# Patient Record
Sex: FEMALE | Race: WHITE | NOT HISPANIC OR LATINO | Employment: OTHER | URBAN - METROPOLITAN AREA
[De-identification: names, ages, dates, MRNs, and addresses within clinical notes are randomized per-mention and may not be internally consistent; named-entity substitution may affect disease eponyms.]

---

## 2017-09-25 ENCOUNTER — HOSPITAL ENCOUNTER (OUTPATIENT)
Dept: GENERAL RADIOLOGY | Facility: HOSPITAL | Age: 82
Discharge: HOME | End: 2017-09-25
Attending: INTERNAL MEDICINE | Admitting: INTERNAL MEDICINE

## 2017-09-25 ENCOUNTER — TRANSCRIBE ORDERS (OUTPATIENT)
Dept: ADMINISTRATIVE | Facility: HOSPITAL | Age: 82
End: 2017-09-25

## 2017-09-25 DIAGNOSIS — R05.9 COUGH: Primary | ICD-10-CM

## 2017-09-25 PROCEDURE — 71020 HC CHEST PA AND LATERAL: CPT

## 2018-01-22 ENCOUNTER — TRANSCRIBE ORDERS (OUTPATIENT)
Dept: ADMINISTRATIVE | Facility: HOSPITAL | Age: 83
End: 2018-01-22

## 2018-01-22 ENCOUNTER — HOSPITAL ENCOUNTER (OUTPATIENT)
Dept: GENERAL RADIOLOGY | Facility: HOSPITAL | Age: 83
Discharge: HOME | End: 2018-01-22
Admitting: NURSE PRACTITIONER

## 2018-01-22 DIAGNOSIS — M25.561 ACUTE PAIN OF RIGHT KNEE: Primary | ICD-10-CM

## 2018-01-22 PROCEDURE — 73560 X-RAY EXAM OF KNEE 1 OR 2: CPT

## 2018-01-25 ENCOUNTER — OFFICE VISIT (OUTPATIENT)
Dept: ORTHOPEDIC SURGERY | Facility: CLINIC | Age: 83
End: 2018-01-25

## 2018-01-25 VITALS
WEIGHT: 136.69 LBS | HEART RATE: 69 BPM | SYSTOLIC BLOOD PRESSURE: 170 MMHG | BODY MASS INDEX: 25.81 KG/M2 | HEIGHT: 61 IN | DIASTOLIC BLOOD PRESSURE: 87 MMHG

## 2018-01-25 DIAGNOSIS — M70.50 PES ANSERINE BURSITIS: Primary | ICD-10-CM

## 2018-01-25 DIAGNOSIS — S80.01XA CONTUSION OF RIGHT KNEE, INITIAL ENCOUNTER: ICD-10-CM

## 2018-01-25 PROCEDURE — 99204 OFFICE O/P NEW MOD 45 MIN: CPT | Performed by: ORTHOPAEDIC SURGERY

## 2018-01-25 PROCEDURE — 20610 DRAIN/INJ JOINT/BURSA W/O US: CPT | Performed by: ORTHOPAEDIC SURGERY

## 2018-01-25 RX ORDER — TRIAMCINOLONE ACETONIDE 40 MG/ML
40 INJECTION, SUSPENSION INTRA-ARTICULAR; INTRAMUSCULAR
Status: COMPLETED | OUTPATIENT
Start: 2018-01-25 | End: 2018-01-25

## 2018-01-25 RX ORDER — LIDOCAINE HYDROCHLORIDE 10 MG/ML
2 INJECTION, SOLUTION INFILTRATION; PERINEURAL
Status: COMPLETED | OUTPATIENT
Start: 2018-01-25 | End: 2018-01-25

## 2018-01-25 RX ADMIN — LIDOCAINE HYDROCHLORIDE 2 ML: 10 INJECTION, SOLUTION INFILTRATION; PERINEURAL at 10:39

## 2018-01-25 RX ADMIN — TRIAMCINOLONE ACETONIDE 40 MG: 40 INJECTION, SUSPENSION INTRA-ARTICULAR; INTRAMUSCULAR at 10:39

## 2018-01-25 NOTE — PROGRESS NOTES
Procedure   Large Joint Arthrocentesis  Date/Time: 1/25/2018 10:39 AM  Consent given by: patient  Site marked: site marked  Timeout: Immediately prior to procedure a time out was called to verify the correct patient, procedure, equipment, support staff and site/side marked as required   Supporting Documentation  Indications: pain   Procedure Details  Location: knee - R knee (Pes Bursa)  Preparation: Patient was prepped and draped in the usual sterile fashion  Needle size: 22 G  Approach: anterolateral  Medications administered: 2 mL lidocaine 1 %; 40 mg triamcinolone acetonide 40 MG/ML (Bupivacaine 0.25%, NDC: 55150-167-10, LOT: [id number], EXP: 09/2019/, 2cc)  Patient tolerance: patient tolerated the procedure well with no immediate complications

## 2018-01-25 NOTE — PROGRESS NOTES
Orthopaedic Clinic Note: Knee New Patient    Chief Complaint   Patient presents with   • Right Knee - Pain        HPI    Luh Mckeon is a 87 y.o. female who presents with right knee pain for 3 week(s). Onset Began after a fall on ice 3 weeks ago in which she bent her knee into a figure 4 as she fell and landed directly on her buttocks.  She localizes the pain to the medial aspect of the knee and proximal tibia.  She also has pain that radiates in the back of the knee.  Pain is worse with bending the knee and standing.  Sitting and resting improve the pain.  Previous treatments have included use of a cane, Tylenol, and bracing.  Despite these interventions, her pain is failed to significantly improve over the past 3 weeks.  She is having difficulty with daily activities as a result of the ongoing pain.    History reviewed. No pertinent past medical history.   Past Surgical History:   Procedure Laterality Date   • HYSTERECTOMY     • SHOULDER SURGERY     • TONSILLECTOMY        Family History   Problem Relation Age of Onset   • Cancer Mother    • Stroke Mother    • Heart attack Father      Social History     Social History   • Marital status:      Spouse name: N/A   • Number of children: N/A   • Years of education: N/A     Occupational History   • Not on file.     Social History Main Topics   • Smoking status: Never Smoker   • Smokeless tobacco: Never Used   • Alcohol use No   • Drug use: No   • Sexual activity: Defer     Other Topics Concern   • Not on file     Social History Narrative   • No narrative on file      No current outpatient prescriptions on file prior to visit.     No current facility-administered medications on file prior to visit.       Allergies   Allergen Reactions   • Nasal Spray         Review of Systems   Constitutional: Positive for activity change.   HENT: Positive for congestion and postnasal drip.    Eyes: Negative.    Respiratory: Positive for cough.    Cardiovascular: Positive for  "leg swelling.   Gastrointestinal: Negative.    Endocrine: Positive for cold intolerance.   Genitourinary: Positive for urgency.   Musculoskeletal: Positive for gait problem.        Joint Pain    Skin: Negative.    Allergic/Immunologic: Negative.    Neurological: Positive for dizziness and light-headedness.   Hematological: Negative.    Psychiatric/Behavioral: Negative.         The following portions of the patient's history were reviewed and updated as appropriate: allergies, current medications, past family history, past medical history, past social history, past surgical history and problem list.    Physical Exam  Blood pressure 170/87, pulse 69, height 154.9 cm (61\"), weight 62 kg (136 lb 11 oz).    Body mass index is 25.83 kg/(m^2).    GENERAL APPEARANCE: awake, alert & oriented x 3, in no acute distress and well developed, well nourished  PSYCH: normal affect  LUNGS:  breathing nonlabored  EYES: sclera anicteric  CARDIOVASCULAR: palpable dorsalis pedis, palpable posterior tibial bilaterally. Capillary refill less than 2 seconds  EXTREMITIES: no clubbing, cyanosis  GAIT:  Antalgic            Right Lower Extremity Exam:   ----------  Hip Exam  ----------  FLEXION CONTRACTURE: None  FLEXION: 110 degrees  INTERNAL ROTATION: 20 degrees at 90 degrees of flexion   EXTERNAL ROTATION: 40 degrees at 90 degrees of flexion    PAIN WITH HIP MOTION: no  ----------  Knee Exam  ----------  ALIGNMENT: neutral    RANGE OF MOTION:  Normal (0-130 degrees) with no extensor lag or flexion contracture  LIGAMENTOUS STABILITY:   stable to varus and valgus stress at terminal extension and 30 degrees without any evidence of laxity     STRENGTH:  4/5 knee flexion, extension. 5/5 ankle dorsiflexion and plantarflexion.     PAIN WITH PALPATION: pes bursa and medial head of gastroc abductor musculature, medial joint line, and popliteal fossa region  KNEE EFFUSION: no  PAIN WITH KNEE ROM: yes, anterior medially   PATELLAR CREPITUS: no  SPECIAL " EXAM FINDINGS:  Painful Ezequiel's medially, negative Ezequiel's laterally, negative Lachman's test, negative posterior drawer    REFLEXES:  PATELLAR 2+/4  ACHILLES 2+/4    CLONUS: no  STRAIGHT LEG TEST:   negative    SENSATION TO LIGHT TOUCH:  DEEP PERONEAL/SUPERFICIAL PERONEAL/SURAL/SAPHENOUS/TIBIAL:   intact    EDEMA:  no  ERYTHEMA:  no  WOUNDS/INCISIONS: no, focal bruising along the anterior medial aspect of the proximal tibia        Left Lower Extremity Exam:   ----------  Hip Exam  ----------  FLEXION CONTRACTURE: None  FLEXION: 110 degrees  INTERNAL ROTATION: 20 degrees at 90 degrees of flexion   EXTERNAL ROTATION: 40 degrees at 90 degrees of flexion    PAIN WITH HIP MOTION: no  ----------  Knee Exam  ----------  ALIGNMENT: neutral, no varus or valgus deformity     RANGE OF MOTION:  Normal (0-130 degrees) with no extensor lag or flexion contracture  LIGAMENTOUS STABILITY:   stable to varus and valgus stress at 0 and 30 degrees without any evidence of laxity     STRENGTH:  5/5 knee flexion, extension. 5/5 ankle dorsiflexion and plantarflexion.     PAIN WITH PALPATION: denies tenderness to palpation about the knee, denies medial or lateral joint line pain  KNEE EFFUSION: no  PAIN WITH KNEE ROM: no  PATELLAR CREPITUS: no  SPECIAL EXAM FINDINGS:  Negative patellar compression    REFLEXES:  PATELLAR 2+/4  ACHILLES 2+/4    CLONUS: negative  STRAIGHT LEG TEST:   negative    SENSATION TO LIGHT TOUCH:  DEEP PERONEAL/SUPERFICIAL PERONEAL/SURAL/SAPHENOUS/TIBIAL:   intact    EDEMA:  no  ERYTHEMA:  no  WOUNDS/INCISIONS: none, no overlying skin problems.    ______________________________________________________________________  ______________________________________________________________________    RADIOGRAPHIC FINDINGS:   Radiographs from 1/22/18 were personally reviewed.  Radiographs demonstrate minimal arthritic changes with no evidence of acute osseous injury or fracture.    Assessment/Plan:   Diagnosis Plan   1. Pes  anserine bursitis  Large Joint Arthrocentesis   2. Contusion of right knee, initial encounter       Patient's symptoms appear to be most consistent with past bursitis as well as an underlying knee contusion as a result of her fall.  She does have some focal bruising overlying the medial aspect of the proximal tibia.  Her most impressive finding today is tenderness over the pes bursa.  I recommended a cortisone injection into this area to see if this alleviates her symptoms.  This would be both a diagnostic and therapeutic measure.  If her symptoms fail to resolve after an additional 3 weeks, we'll likely refer her to physical therapy as well as consider topical anti-inflammatory.  She is agreeable to this plan.    Procedure Note:  I discussed with the patient the potential benefits of performing a therapeutic injection of the right knee pes bursa as well as potential risks including but not limited to infection, swelling, pain, bleeding, bruising, nerve/vessel damage, skin color changes, transient elevation in blood glucose levels, and fat atrophy. After informed consent and after the area was prepped with alcohol, ethyl chloride was used to numb the skin. Via the anteromedial approach, 2 cc of 1% lidocaine, 2 cc of 0.25% marcaine and 1 cc of 40mg/ml of Kenalog were injected into the right knee pes bursa. The patient tolerated the procedure well. There were no complications. A sterile dressing was placed over the injection site.    Adam Olsen MD  01/25/18  10:42 AM

## 2018-01-26 PROBLEM — M25.512 CHRONIC LEFT SHOULDER PAIN: Status: ACTIVE | Noted: 2018-01-26

## 2018-01-26 PROBLEM — G89.29 CHRONIC LEFT SHOULDER PAIN: Status: ACTIVE | Noted: 2018-01-26

## 2018-02-20 ENCOUNTER — OFFICE VISIT (OUTPATIENT)
Dept: ORTHOPEDIC SURGERY | Facility: CLINIC | Age: 83
End: 2018-02-20

## 2018-02-20 VITALS
DIASTOLIC BLOOD PRESSURE: 98 MMHG | SYSTOLIC BLOOD PRESSURE: 145 MMHG | BODY MASS INDEX: 26.64 KG/M2 | HEIGHT: 61 IN | WEIGHT: 141.09 LBS | HEART RATE: 164 BPM

## 2018-02-20 DIAGNOSIS — S80.01XA CONTUSION OF RIGHT KNEE, INITIAL ENCOUNTER: ICD-10-CM

## 2018-02-20 DIAGNOSIS — M70.50 PES ANSERINE BURSITIS: Primary | ICD-10-CM

## 2018-02-20 PROCEDURE — 99212 OFFICE O/P EST SF 10 MIN: CPT | Performed by: ORTHOPAEDIC SURGERY

## 2018-02-20 NOTE — PROGRESS NOTES
Orthopaedic Clinic Note: Knee Established Patient    Chief Complaint   Patient presents with   • Right Knee - Follow-up     3 weeks          HPI    It has been 3  week(s) since Ms. Mckeon's last visit. She returns to clinic today for Follow-up of right knee pain.  Her pain initially began after a fall in which she landed on her knee.  She was diagnosed contusion as well as has bursitis.  She received an injection at her last visit.  She returns clinic today stating her pain is much improved.  She rates her pain a 1/10 on the pain scale.  She is having no residual limitations in daily activities.  Overall she is doing much better.    Past Medical History:   Diagnosis Date   • Ankle instability    • Chronic left shoulder pain 1/26/2018   • Disorder of tendon of shoulder region, left    • Esophagitis    • Hypertension    • Localized, primary osteoarthritis of left shoulder region    • Shoulder joint replacement status     Right   • Stroke     CVA      Past Surgical History:   Procedure Laterality Date   • APPENDECTOMY     • HYSTERECTOMY     • SHOULDER SURGERY Right    • SHOULDER SURGERY      Arthroscopy of shoulder:Right   • TONSILLECTOMY        Family History   Problem Relation Age of Onset   • Cancer Mother    • Stroke Mother    • Bleeding Disorder Mother    • Osteoarthritis Mother    • Rheum arthritis Mother    • Heart attack Father      Social History     Social History   • Marital status:      Spouse name: N/A   • Number of children: N/A   • Years of education: N/A     Occupational History   • Not on file.     Social History Main Topics   • Smoking status: Never Smoker   • Smokeless tobacco: Never Used   • Alcohol use No   • Drug use: No   • Sexual activity: Defer     Other Topics Concern   • Not on file     Social History Narrative      Current Outpatient Prescriptions on File Prior to Visit   Medication Sig Dispense Refill   • aspirin 325 MG tablet Take 325 mg by mouth Daily.     • CALCIUM-VITAMIN D PO Take  " by mouth. 300-200 MG unit tablet, 1 oral     • losartan (COZAAR) 50 MG tablet Take 50 mg by mouth Take As Directed.     • Misc Natural Products (OSTEO BI-FLEX ADV DOUBLE ST PO) Take  by mouth Take As Directed.     • Omega-3 Fatty Acids (FISH OIL CONCENTRATE PO) Take  by mouth Take As Directed.     • TERBINAFINE HCL PO Take  by mouth. 250 MG Tablet       No current facility-administered medications on file prior to visit.       Allergies   Allergen Reactions   • Nasal Spray         Review of Systems     Physical Exam  Blood pressure 145/98, pulse (!) 164, height 154.9 cm (60.98\"), weight 64 kg (141 lb 1.5 oz).    Body mass index is 26.67 kg/(m^2).    GENERAL APPEARANCE: awake, alert, oriented, in no acute distress  LUNGS:  breathing nonlabored  EXTREMITIES: no clubbing, cyanosis  PERIPHERAL PULSES: palpable dorsalis pedis and posterior tibial pulses bilaterally.    GAIT:  Normal        ----------  Right Knee Exam:  ----------  ALIGNMENT: neutral  ----------  RANGE OF MOTION:  Normal (0-130 degrees) with no extensor lag or flexion contracture  LIGAMENTOUS STABILITY:   stable to varus and valgus stress at terminal extension and 30 degrees without any evidence of laxity  ----------  STRENGTH:  KNEE FLEXION 5/5  KNEE EXTENSION  5/5  ANKLE DORSIFLEXION  5/5  ANKLE PLANTARFLEXION  5/5  ----------  PAIN WITH PALPATION:pes bursa (slight)  KNEE EFFUSION: no  PAIN WITH KNEE ROM: no  PATELLAR CREPITUS:  no  ----------  SENSATION TO LIGHT TOUCH:  DEEP PERONEAL/SUPERFICIAL PERONEAL/SURAL/SAPHENOUS/TIBIAL:    intact  ----------  EDEMA:  no  ERYTHEMA:    no  WOUNDS/INCISIONS:  no  _____________________________________________________________________  _____________________________________________________________________    RADIOGRAPHIC FINDINGS:   No new imaging today     Assessment/Plan:   Diagnosis Plan   1. Pes anserine bursitis     2. Contusion of right knee, initial encounter       Patient's symptoms are much improved after " the injection.  As she is having no limitations, no further intervention is warranted.  She'll follow up on an as-needed basis.    Adam Olsen MD  02/20/18  1:34 PM

## 2018-06-04 ENCOUNTER — TRANSCRIBE ORDERS (OUTPATIENT)
Dept: ADMINISTRATIVE | Facility: HOSPITAL | Age: 83
End: 2018-06-04

## 2018-06-04 DIAGNOSIS — Z12.31 VISIT FOR SCREENING MAMMOGRAM: Primary | ICD-10-CM

## 2018-06-06 ENCOUNTER — TRANSCRIBE ORDERS (OUTPATIENT)
Dept: ADMINISTRATIVE | Facility: HOSPITAL | Age: 83
End: 2018-06-06

## 2018-06-06 DIAGNOSIS — Z78.0 POSTMENOPAUSE: Primary | ICD-10-CM

## 2018-07-05 ENCOUNTER — HOSPITAL ENCOUNTER (OUTPATIENT)
Dept: BONE DENSITY | Facility: HOSPITAL | Age: 83
Discharge: HOME | End: 2018-07-05
Admitting: NURSE PRACTITIONER

## 2018-07-05 ENCOUNTER — HOSPITAL ENCOUNTER (OUTPATIENT)
Dept: MAMMOGRAPHY | Facility: HOSPITAL | Age: 83
Discharge: HOME | End: 2018-07-05
Admitting: OBSTETRICS & GYNECOLOGY

## 2018-07-05 DIAGNOSIS — Z78.0 POSTMENOPAUSE: ICD-10-CM

## 2018-07-05 DIAGNOSIS — Z12.31 VISIT FOR SCREENING MAMMOGRAM: ICD-10-CM

## 2018-07-05 PROCEDURE — 77067 SCR MAMMO BI INCL CAD: CPT | Performed by: RADIOLOGY

## 2018-07-05 PROCEDURE — 77080 DXA BONE DENSITY AXIAL: CPT

## 2018-07-05 PROCEDURE — 77063 BREAST TOMOSYNTHESIS BI: CPT | Performed by: RADIOLOGY

## 2018-07-05 PROCEDURE — 77067 SCR MAMMO BI INCL CAD: CPT

## 2018-07-05 PROCEDURE — 77063 BREAST TOMOSYNTHESIS BI: CPT

## 2018-07-06 ENCOUNTER — APPOINTMENT (OUTPATIENT)
Dept: MAMMOGRAPHY | Facility: HOSPITAL | Age: 83
End: 2018-07-06

## 2018-07-18 ENCOUNTER — TRANSCRIBE ORDERS (OUTPATIENT)
Dept: ADMINISTRATIVE | Facility: HOSPITAL | Age: 83
End: 2018-07-18

## 2018-07-18 ENCOUNTER — HOSPITAL ENCOUNTER (OUTPATIENT)
Dept: GENERAL RADIOLOGY | Facility: HOSPITAL | Age: 83
Discharge: HOME | End: 2018-07-18
Attending: INTERNAL MEDICINE | Admitting: INTERNAL MEDICINE

## 2018-07-18 DIAGNOSIS — M25.561 ACUTE PAIN OF RIGHT KNEE: Primary | ICD-10-CM

## 2018-07-18 PROCEDURE — 73562 X-RAY EXAM OF KNEE 3: CPT

## 2019-01-07 ENCOUNTER — TELEPHONE (OUTPATIENT)
Dept: INTERNAL MEDICINE | Facility: CLINIC | Age: 84
End: 2019-01-07

## 2019-01-07 NOTE — TELEPHONE ENCOUNTER
Please have tavon hold the pravasatin for 2 weeks and if persist come in for evaluation or sooner if itching intolerable

## 2019-02-25 PROBLEM — I10 BENIGN ESSENTIAL HYPERTENSION: Status: ACTIVE | Noted: 2019-02-25

## 2019-02-25 PROBLEM — R26.81 UNSTEADY GAIT: Status: ACTIVE | Noted: 2019-02-25

## 2019-02-25 PROBLEM — B37.2 CANDIDA ONYCHOMYCOSIS: Status: ACTIVE | Noted: 2019-02-25

## 2019-02-25 PROBLEM — R41.3 MEMORY LOSS: Status: ACTIVE | Noted: 2019-02-25

## 2019-02-25 PROBLEM — F41.1 GENERALIZED ANXIETY DISORDER: Status: ACTIVE | Noted: 2019-02-25

## 2019-02-25 PROBLEM — R32 URINARY INCONTINENCE IN FEMALE: Status: ACTIVE | Noted: 2019-02-25

## 2019-02-25 PROBLEM — E78.5 HYPERLIPIDEMIA LDL GOAL <100: Status: ACTIVE | Noted: 2019-02-25

## 2019-02-25 PROBLEM — Z91.81 RISK FOR FALLS: Status: ACTIVE | Noted: 2019-02-25

## 2019-02-27 PROBLEM — R25.2 CRAMP IN LIMB: Status: ACTIVE | Noted: 2019-02-27

## 2019-02-27 PROBLEM — K21.9 GERD (GASTROESOPHAGEAL REFLUX DISEASE): Status: ACTIVE | Noted: 2019-02-27

## 2019-02-27 PROBLEM — M81.0 OSTEOPOROSIS: Status: ACTIVE | Noted: 2019-02-27

## 2019-02-27 PROBLEM — M25.519 SHOULDER PAIN: Status: ACTIVE | Noted: 2019-02-27

## 2019-02-27 PROBLEM — E66.3 OVERWEIGHT (BMI 25.0-29.9): Status: ACTIVE | Noted: 2019-02-27

## 2019-02-27 PROBLEM — N95.9 MENOPAUSAL AND POSTMENOPAUSAL DISORDER: Status: ACTIVE | Noted: 2019-02-27

## 2019-02-27 PROBLEM — G47.00 INSOMNIA: Status: ACTIVE | Noted: 2019-02-27

## 2019-02-27 PROBLEM — J30.9 AR (ALLERGIC RHINITIS): Status: ACTIVE | Noted: 2019-02-27

## 2019-02-27 PROBLEM — M25.569 KNEE PAIN: Status: ACTIVE | Noted: 2019-02-27

## 2019-02-27 PROBLEM — K64.4 EXTERNAL HEMORRHOIDS: Status: ACTIVE | Noted: 2019-02-27

## 2019-02-27 PROBLEM — E53.9 VITAMIN B DEFICIENCY: Status: ACTIVE | Noted: 2019-02-27

## 2019-02-27 PROBLEM — I08.0 RHEUMATIC DISEASE OF MITRAL AND AORTIC VALVES: Status: ACTIVE | Noted: 2019-02-27

## 2019-02-27 PROBLEM — M62.9 DISORDER OF SKELETAL MUSCLE: Status: ACTIVE | Noted: 2019-02-27

## 2019-02-27 PROBLEM — K57.30 DIVERTICULAR DISEASE OF COLON: Status: ACTIVE | Noted: 2019-02-27

## 2019-02-27 PROBLEM — R07.9 CHEST PAIN: Status: ACTIVE | Noted: 2019-02-27

## 2019-02-27 PROBLEM — I08.0 DISORDER OF MITRAL AND AORTIC VALVES: Status: ACTIVE | Noted: 2019-02-27

## 2019-02-27 PROBLEM — R13.10 DYSPHAGIA: Status: ACTIVE | Noted: 2019-02-27

## 2019-02-27 PROBLEM — M25.549 HAND JOINT PAIN: Status: ACTIVE | Noted: 2019-02-27

## 2019-03-11 ENCOUNTER — TELEPHONE (OUTPATIENT)
Dept: INTERNAL MEDICINE | Facility: CLINIC | Age: 84
End: 2019-03-11

## 2019-03-11 ENCOUNTER — OFFICE VISIT (OUTPATIENT)
Dept: INTERNAL MEDICINE | Facility: CLINIC | Age: 84
End: 2019-03-11

## 2019-03-11 VITALS
HEART RATE: 74 BPM | DIASTOLIC BLOOD PRESSURE: 80 MMHG | BODY MASS INDEX: 26.24 KG/M2 | WEIGHT: 139 LBS | HEIGHT: 61 IN | SYSTOLIC BLOOD PRESSURE: 118 MMHG

## 2019-03-11 DIAGNOSIS — R41.3 MEMORY LOSS: ICD-10-CM

## 2019-03-11 DIAGNOSIS — R05.9 COUGH: ICD-10-CM

## 2019-03-11 DIAGNOSIS — E78.5 HYPERLIPIDEMIA LDL GOAL <100: Primary | ICD-10-CM

## 2019-03-11 DIAGNOSIS — I08.0 DISORDER OF MITRAL AND AORTIC VALVES: ICD-10-CM

## 2019-03-11 DIAGNOSIS — E66.3 OVERWEIGHT (BMI 25.0-29.9): ICD-10-CM

## 2019-03-11 DIAGNOSIS — E53.9 VITAMIN B DEFICIENCY: ICD-10-CM

## 2019-03-11 DIAGNOSIS — I10 BENIGN ESSENTIAL HYPERTENSION: ICD-10-CM

## 2019-03-11 LAB
ALBUMIN SERPL-MCNC: 4.49 G/DL (ref 3.2–4.8)
ALBUMIN/GLOB SERPL: 1.9 G/DL (ref 1.5–2.5)
ALP SERPL-CCNC: 89 U/L (ref 25–100)
ALT SERPL W P-5'-P-CCNC: 12 U/L (ref 7–40)
ANION GAP SERPL CALCULATED.3IONS-SCNC: 8 MMOL/L (ref 3–11)
ARTICHOKE IGE QN: 148 MG/DL (ref 0–130)
AST SERPL-CCNC: 18 U/L (ref 0–33)
BASOPHILS # BLD AUTO: 0.03 10*3/MM3 (ref 0–0.2)
BASOPHILS NFR BLD AUTO: 0.5 % (ref 0–1)
BILIRUB SERPL-MCNC: 0.6 MG/DL (ref 0.3–1.2)
BUN BLD-MCNC: 14 MG/DL (ref 9–23)
BUN/CREAT SERPL: 16.9 (ref 7–25)
CALCIUM SPEC-SCNC: 10 MG/DL (ref 8.7–10.4)
CHLORIDE SERPL-SCNC: 101 MMOL/L (ref 99–109)
CHOLEST SERPL-MCNC: 219 MG/DL (ref 0–200)
CO2 SERPL-SCNC: 29 MMOL/L (ref 20–31)
CREAT BLD-MCNC: 0.83 MG/DL (ref 0.6–1.3)
DEPRECATED RDW RBC AUTO: 46.2 FL (ref 37–54)
EOSINOPHIL # BLD AUTO: 0.1 10*3/MM3 (ref 0–0.3)
EOSINOPHIL NFR BLD AUTO: 1.6 % (ref 0–3)
ERYTHROCYTE [DISTWIDTH] IN BLOOD BY AUTOMATED COUNT: 12.9 % (ref 11.3–14.5)
GFR SERPL CREATININE-BSD FRML MDRD: 65 ML/MIN/1.73
GLOBULIN UR ELPH-MCNC: 2.3 GM/DL
GLUCOSE BLD-MCNC: 87 MG/DL (ref 70–100)
HCT VFR BLD AUTO: 46.5 % (ref 34.5–44)
HDLC SERPL-MCNC: 56 MG/DL (ref 40–60)
HGB BLD-MCNC: 14.6 G/DL (ref 11.5–15.5)
IMM GRANULOCYTES # BLD AUTO: 0.02 10*3/MM3 (ref 0–0.05)
IMM GRANULOCYTES NFR BLD AUTO: 0.3 % (ref 0–0.6)
LYMPHOCYTES # BLD AUTO: 1.59 10*3/MM3 (ref 0.6–4.8)
LYMPHOCYTES NFR BLD AUTO: 25.6 % (ref 24–44)
MCH RBC QN AUTO: 30.6 PG (ref 27–31)
MCHC RBC AUTO-ENTMCNC: 31.4 G/DL (ref 32–36)
MCV RBC AUTO: 97.5 FL (ref 80–99)
MONOCYTES # BLD AUTO: 0.62 10*3/MM3 (ref 0–1)
MONOCYTES NFR BLD AUTO: 10 % (ref 0–12)
NEUTROPHILS # BLD AUTO: 3.87 10*3/MM3 (ref 1.5–8.3)
NEUTROPHILS NFR BLD AUTO: 62.3 % (ref 41–71)
PLATELET # BLD AUTO: 303 10*3/MM3 (ref 150–450)
PMV BLD AUTO: 11.3 FL (ref 6–12)
POTASSIUM BLD-SCNC: 4.6 MMOL/L (ref 3.5–5.5)
PROT SERPL-MCNC: 6.8 G/DL (ref 5.7–8.2)
RBC # BLD AUTO: 4.77 10*6/MM3 (ref 3.89–5.14)
SODIUM BLD-SCNC: 138 MMOL/L (ref 132–146)
TRIGL SERPL-MCNC: 121 MG/DL (ref 0–150)
TSH SERPL DL<=0.05 MIU/L-ACNC: 0.68 MIU/ML (ref 0.35–5.35)
VIT B12 BLD-MCNC: 535 PG/ML (ref 211–911)
WBC NRBC COR # BLD: 6.21 10*3/MM3 (ref 3.5–10.8)

## 2019-03-11 PROCEDURE — 80053 COMPREHEN METABOLIC PANEL: CPT | Performed by: INTERNAL MEDICINE

## 2019-03-11 PROCEDURE — 80061 LIPID PANEL: CPT | Performed by: INTERNAL MEDICINE

## 2019-03-11 PROCEDURE — 99214 OFFICE O/P EST MOD 30 MIN: CPT | Performed by: INTERNAL MEDICINE

## 2019-03-11 PROCEDURE — 36415 COLL VENOUS BLD VENIPUNCTURE: CPT | Performed by: INTERNAL MEDICINE

## 2019-03-11 PROCEDURE — 82607 VITAMIN B-12: CPT | Performed by: INTERNAL MEDICINE

## 2019-03-11 PROCEDURE — 82570 ASSAY OF URINE CREATININE: CPT | Performed by: INTERNAL MEDICINE

## 2019-03-11 PROCEDURE — 85025 COMPLETE CBC W/AUTO DIFF WBC: CPT | Performed by: INTERNAL MEDICINE

## 2019-03-11 PROCEDURE — 84443 ASSAY THYROID STIM HORMONE: CPT | Performed by: INTERNAL MEDICINE

## 2019-03-11 PROCEDURE — 82043 UR ALBUMIN QUANTITATIVE: CPT | Performed by: INTERNAL MEDICINE

## 2019-03-11 RX ORDER — LEVOCETIRIZINE DIHYDROCHLORIDE 5 MG/1
5 TABLET, FILM COATED ORAL EVERY EVENING
Qty: 90 TABLET | Refills: 3 | Status: SHIPPED | OUTPATIENT
Start: 2019-03-11 | End: 2019-09-23

## 2019-03-11 RX ORDER — PRAVASTATIN SODIUM 20 MG
20 TABLET ORAL DAILY
COMMUNITY
End: 2019-03-11 | Stop reason: SDUPTHER

## 2019-03-11 RX ORDER — PRAVASTATIN SODIUM 20 MG
20 TABLET ORAL DAILY
Qty: 90 TABLET | Refills: 3 | Status: SHIPPED | OUTPATIENT
Start: 2019-03-11 | End: 2019-06-05 | Stop reason: ALTCHOICE

## 2019-03-11 RX ORDER — LOSARTAN POTASSIUM 50 MG/1
50 TABLET ORAL TAKE AS DIRECTED
Qty: 90 TABLET | Refills: 3 | Status: SHIPPED | OUTPATIENT
Start: 2019-03-11 | End: 2020-01-27 | Stop reason: SDUPTHER

## 2019-03-11 NOTE — ASSESSMENT & PLAN NOTE
.Goal to consume large amounts of vegetables and fruits,heart healthy fats and low carbohydrate choices. Encourage aerobic exercise of walking, jogging or biking gradually up to 150 minute a week and 2-3 times of weight resistance. Employe behavioral modifications such as daily meditation and stopping eating and drinking calories after 7 pm.

## 2019-03-11 NOTE — ASSESSMENT & PLAN NOTE
.Discussed with the patient a low sodium diet (<2000mg/day) and discussed increasing regular aerobic exercise.  Recommend monitoring blood pressures with goal < 130 systolic and < 80 diastolic.

## 2019-03-11 NOTE — PROGRESS NOTES
Chief Complaint   Patient presents with   • Hypertension     She is fasting   • Hyperlipidemia   • Anxiety       History of Present Illness  88 y.o. white female presents for follow up on HTN. She denies chest pain or shortness of breath. She does have mild post nasal drip with some cough. She has some mild intermittent anxiety.     Review of Systems   Constitutional: Negative for chills, fatigue and fever.   HENT: Negative for congestion, ear pain and sinus pressure.    Respiratory: Positive for cough. Negative for chest tightness, shortness of breath and wheezing.    Cardiovascular: Negative for chest pain and palpitations.   Gastrointestinal: Negative for abdominal pain, blood in stool and constipation.   Skin: Negative for color change.   Allergic/Immunologic: Negative for environmental allergies.   Neurological: Negative for dizziness, speech difficulty and headaches.   Psychiatric/Behavioral: Positive for sleep disturbance. Negative for confusion. The patient is nervous/anxious.      All other ROS reviewed and negative.    PMSFH:  The following portions of the patient's history were reviewed and updated as appropriate: allergies, current medications, past family history, past medical history, past social history, past surgical history and problem list.      Current Outpatient Medications:   •  CALCIUM-VITAMIN D PO, Take  by mouth. 300-200 MG unit tablet, 1 oral, Disp: , Rfl:   •  losartan (COZAAR) 50 MG tablet, Take 50 mg by mouth Take As Directed., Disp: , Rfl:   •  Mirabegron ER (MYRBETRIQ) 25 MG tablet sustained-release 24 hour 24 hr tablet, Take 25 mg by mouth Daily., Disp: , Rfl:   •  pravastatin (PRAVACHOL) 20 MG tablet, Take 20 mg by mouth Daily., Disp: , Rfl:   •  aspirin 325 MG tablet, Take 325 mg by mouth Daily., Disp: , Rfl:   •  Misc Natural Products (OSTEO BI-FLEX ADV DOUBLE ST PO), Take  by mouth Take As Directed., Disp: , Rfl:   •  Omega-3 Fatty Acids (FISH OIL CONCENTRATE PO), Take  by mouth  "Take As Directed., Disp: , Rfl:   •  TERBINAFINE HCL PO, Take  by mouth. 250 MG Tablet, Disp: , Rfl:     VITALS:  /80 (BP Location: Left arm, Patient Position: Sitting, Cuff Size: Adult)   Pulse 74   Ht 154.9 cm (60.98\")   Wt 63 kg (139 lb)   BMI 26.28 kg/m²     Physical Exam   Constitutional: She is oriented to person, place, and time. She appears well-developed and well-nourished.   HENT:   Head: Normocephalic.   Right Ear: External ear normal.   Left Ear: External ear normal.   Mouth/Throat: Oropharynx is clear and moist. No oropharyngeal exudate.   Eyes: Conjunctivae and EOM are normal.   Cardiovascular: Normal rate and regular rhythm.   Murmur (Iii/VI SM II/VI DM) heard.  Pulmonary/Chest: Effort normal and breath sounds normal. No respiratory distress.   Abdominal: Soft. Bowel sounds are normal. There is no tenderness.   Musculoskeletal: She exhibits no edema.   Lymphadenopathy:     She has no cervical adenopathy.   Neurological: She is alert and oriented to person, place, and time.   oriented with mild memory loss   Skin: Skin is warm and dry.   Psychiatric: She has a normal mood and affect. Her behavior is normal.   Nursing note and vitals reviewed.      LABS:  No results found for this or any previous visit.    Procedures    ASSESSMENT/PLAN:  Problem List Items Addressed This Visit        Cardiovascular and Mediastinum    Hyperlipidemia LDL goal <100 - Primary     Discussed improving diet and exercise. Specifically, decrease saturated fats and trans fats and avoid bad carbohydrates (sweet, breads , potatoes, and high caloric drinks).  Also aim for 150 minutes aerobic exercise weekly and resistance exercises 2-3x/week.           Relevant Medications    pravastatin (PRAVACHOL) 20 MG tablet    Benign essential hypertension     .Discussed with the patient a low sodium diet (<2000mg/day) and discussed increasing regular aerobic exercise.  Recommend monitoring blood pressures with goal < 130 systolic and " < 80 diastolic.             Disorder of mitral and aortic valves     Control blood pressure and cholesterol.             Digestive    Vitamin B deficiency       Other    Memory loss     Check labs and encouraged better exercise and to control blood pressure.          Overweight (BMI 25.0-29.9)     .Goal to consume large amounts of vegetables and fruits,heart healthy fats and low carbohydrate choices. Encourage aerobic exercise of walking, jogging or biking gradually up to 150 minute a week and 2-3 times of weight resistance. Employe behavioral modifications such as daily meditation and stopping eating and drinking calories after 7 pm.            Other Visit Diagnoses     Cough        post nasal drip and will add xyzal          FOLLOW-UP:  No Follow-up on file.      Electronically signed by:    Schuyler Garcia MD

## 2019-03-12 LAB
CREAT 24H UR-MCNC: 113.7 MG/DL
MICROALBUMIN UR-MCNC: 6.7 UG/ML
MICROALBUMIN/CREAT UR: 5.9 MG/G CREAT (ref 0–30)

## 2019-03-13 ENCOUNTER — TELEPHONE (OUTPATIENT)
Dept: INTERNAL MEDICINE | Facility: CLINIC | Age: 84
End: 2019-03-13

## 2019-03-13 NOTE — TELEPHONE ENCOUNTER
MS. NAIN HERNÁNDEZ STATED SHE BY [employer] GAVE THE WRONG FAX NUMBER FOR THE HANDICAP FORM TO BE FAXED TO.    THE CORRECT NUMBER -[phone]  PLEASE RESEND

## 2019-03-22 ENCOUNTER — TELEPHONE (OUTPATIENT)
Dept: INTERNAL MEDICINE | Facility: CLINIC | Age: 84
End: 2019-03-22

## 2019-03-22 NOTE — TELEPHONE ENCOUNTER
PT CALLED AND WANTED TO TALK TO YOU ABOUT HER LAST VISIT WOULD LIKE FOR YOU TO CALL HER BACK . SHE STATED THAT NO ONE ELSE COULD HELP -[phone]

## 2019-03-25 NOTE — TELEPHONE ENCOUNTER
She was concerned abut her losartan and I explained I preferred people stay on the medications unless they have a tainted version and then I would send in a new prescription for her

## 2019-03-28 ENCOUNTER — TELEPHONE (OUTPATIENT)
Dept: INTERNAL MEDICINE | Facility: CLINIC | Age: 84
End: 2019-03-28

## 2019-03-28 NOTE — TELEPHONE ENCOUNTER
PATIENT'S DAUGHTER CALLED AND PUT HER MOTHER ON SPEAKER PHONE TO CLARIFY WHAT MEDICATIONS SHE SHOULD BE TAKING.  HAVE DR. KIRK CONFIRM WHAT MEDS SHE SHOULD BE ON:    FISH OIL  CALCIUM + VITAMIN D  MYRBETRIQ  OSTEO BIO-FLEX  ASPIRIN 81MG  ASPIRIN 325MG    CALL DAUGHTER WITH RESPONSE.  DAUGHTER SAYS HER MOM HAS BEEN OFF ASA, FISH OIL FOR AT LEAST A YEAR.

## 2019-04-29 ENCOUNTER — PATIENT MESSAGE (OUTPATIENT)
Dept: INTERNAL MEDICINE | Facility: CLINIC | Age: 84
End: 2019-04-29

## 2019-04-29 ENCOUNTER — TELEPHONE (OUTPATIENT)
Dept: INTERNAL MEDICINE | Facility: CLINIC | Age: 84
End: 2019-04-29

## 2019-04-29 NOTE — TELEPHONE ENCOUNTER
You can resond and have her hold the levocetririzine and pravastatin for a feww weeks and see if she get better  You may respond in my chart to her Regarding: Prescription Question  Contact: [phone]  ----- Message from Dayna Can LPN sent at 4/29/2019  1:08 PM EDT -----       ----- Message from Luh Mckeon to Schuyler Garcia MD sent at 4/29/2019  1:01 PM -----   Requesting that my mother, Luh Mckeon refrain from taking pravastatin  20 mg for 2 weeks due to her cough not getting any better along with memory and thinking problems. Would like to see if she does better without this medication. I am Swetha Avery, her daughter and can be reached at [phone] or online by my chart. Also the levocertirizine 5mg tablet that was prescribed at last visit? Please contact me by above phone or my chart. Thank you.

## 2019-04-29 NOTE — TELEPHONE ENCOUNTER
From: Luh Mckeon  To: Schuyler Garcia MD  Sent: 4/29/2019 1:01 PM EDT  Subject: Prescription Question    Requesting that my mother, Luh Mckeon refrain from taking pravastatin 20 mg for 2 weeks due to her cough not getting any better along with memory and thinking problems. Would like to see if she does better without this medication. I am Swetha Avery, her daughter and can be reached at [phone] or online by my chart. Also the levocertirizine 5mg tablet that was prescribed at last visit? Please contact me by above phone or my chart. Thank you.

## 2019-06-04 ENCOUNTER — TELEPHONE (OUTPATIENT)
Dept: INTERNAL MEDICINE | Facility: CLINIC | Age: 84
End: 2019-06-04

## 2019-06-04 NOTE — TELEPHONE ENCOUNTER
PATIENT'S DAUGHTER (NAIN HERNÁNDEZ) CALLED REGARDING HER MOM BEING OFF THE PRAVASTATIN FOR A MONTH SINCE IT HAD BEEN MAKING HER HAVE A COUGH.  SINCE BEING OFF THE MEDICATION HER COUGH HAS GOTTEN A LOT BETTER.    WHAT DO YOU WANT TO DO NEXT?  DO YOU WANT TO RECHECK HER CHOLESTEROL OR JUST START HER ON SOMETHING ELSE?    PLEASE CALL HER.

## 2019-06-05 ENCOUNTER — TELEPHONE (OUTPATIENT)
Dept: INTERNAL MEDICINE | Facility: CLINIC | Age: 84
End: 2019-06-05

## 2019-06-05 RX ORDER — ROSUVASTATIN CALCIUM 5 MG/1
5 TABLET, COATED ORAL DAILY
Qty: 90 TABLET | Refills: 3 | Status: SHIPPED | OUTPATIENT
Start: 2019-06-05 | End: 2019-09-23 | Stop reason: SDUPTHER

## 2019-06-05 NOTE — TELEPHONE ENCOUNTER
PATIENT'S DAUGHTER (NAIN HERNÁNDEZ) CALLED REGARDING HER MOTHER'S CHOLESTEROL MEDICATION.      I TOLD HER THAT I TALKED WITH THE PATIENT EARLIER THIS MORNING.

## 2019-06-05 NOTE — TELEPHONE ENCOUNTER
CALLED PATIENT WITH DR. KIRK'S INSTRUCTIONS REGARDING CHOLESTEROL MEDICATION.  PATIENT VERBALIZED UNDERSTANDING.

## 2019-06-11 ENCOUNTER — TELEPHONE (OUTPATIENT)
Dept: INTERNAL MEDICINE | Facility: CLINIC | Age: 84
End: 2019-06-11

## 2019-06-11 NOTE — TELEPHONE ENCOUNTER
PATIENT CALLED AND HAD RECEIVED SEVERAL MEDICATIONS IN THE MAIL FROM [facility] [employer] MAIL ORDER:   LOSARTAN, PRAVASTATIN, AND LEVOCETIRIZINE.    SPOKE WITH ARMIDA AT [facility]    HUMAN SENT THE PRAVASTATIN IN ERROR AND MAILED OUT THE CRESTOR TODAY.  EXPLAINED TO PATIENT NOT TO TAKE THE PRAVASTATIN PER DR. KIRK AND START ON THE CRESTOR THAT HE ORDERED THAT SHE WILL RECEIVE SOON IN THE MAIL .    I WENT OVER THOROUGHLY THE MEDS SHE SHOULD BE TAKING.  TOLD HER TO DISCARD THE PRAVASTATIN AT A NEARBY PHARMACY AND TAKE CRESTOR .    PATIENT VERBALIZED UNDERSTANDING.

## 2019-07-22 ENCOUNTER — TELEPHONE (OUTPATIENT)
Dept: INTERNAL MEDICINE | Facility: CLINIC | Age: 84
End: 2019-07-22

## 2019-07-22 NOTE — TELEPHONE ENCOUNTER
She could use the certirizine as needed and have them stop sending that for her   She needs th crestor and ideally to help her complex urinary issues she would conitnue the mybertique   You may convey that to Luh Mckeon

## 2019-07-22 NOTE — TELEPHONE ENCOUNTER
Patient's daughter Luh Mckeon [phone] called regarding patient is still taking the Levocetirizine and it is making her sleepy.  The daughter said she was initially put on it for coughing, but she is not coughing any longer and Dr. Garcia said she could discontinue it - however patient told [facility] to send another  supply which she does not need.    Patient is taking the generic Crestor ok, but now her mom doesn't think she needs the Myrbetriq and won't refill.    Daughter says her mom will not let her handle her medications and wants Dr. Garcia to advise which medications she should be taking before calling [facility] to cancel any further refills.

## 2019-09-23 ENCOUNTER — LAB (OUTPATIENT)
Dept: LAB | Facility: HOSPITAL | Age: 84
End: 2019-09-23

## 2019-09-23 ENCOUNTER — TELEPHONE (OUTPATIENT)
Dept: INTERNAL MEDICINE | Facility: CLINIC | Age: 84
End: 2019-09-23

## 2019-09-23 ENCOUNTER — OFFICE VISIT (OUTPATIENT)
Dept: INTERNAL MEDICINE | Facility: CLINIC | Age: 84
End: 2019-09-23

## 2019-09-23 VITALS
WEIGHT: 141 LBS | TEMPERATURE: 97.9 F | HEART RATE: 60 BPM | HEIGHT: 61 IN | SYSTOLIC BLOOD PRESSURE: 162 MMHG | BODY MASS INDEX: 26.62 KG/M2 | DIASTOLIC BLOOD PRESSURE: 98 MMHG

## 2019-09-23 DIAGNOSIS — R41.3 MEMORY LOSS: ICD-10-CM

## 2019-09-23 DIAGNOSIS — M81.0 AGE-RELATED OSTEOPOROSIS WITHOUT CURRENT PATHOLOGICAL FRACTURE: ICD-10-CM

## 2019-09-23 DIAGNOSIS — E78.5 HYPERLIPIDEMIA LDL GOAL <100: ICD-10-CM

## 2019-09-23 DIAGNOSIS — Z23 NEED FOR VACCINATION: ICD-10-CM

## 2019-09-23 DIAGNOSIS — Z00.00 MEDICARE ANNUAL WELLNESS VISIT, SUBSEQUENT: Primary | ICD-10-CM

## 2019-09-23 DIAGNOSIS — I10 BENIGN ESSENTIAL HYPERTENSION: ICD-10-CM

## 2019-09-23 DIAGNOSIS — Z91.81 RISK FOR FALLS: ICD-10-CM

## 2019-09-23 DIAGNOSIS — F51.01 PRIMARY INSOMNIA: ICD-10-CM

## 2019-09-23 DIAGNOSIS — N95.9 MENOPAUSAL AND POSTMENOPAUSAL DISORDER: ICD-10-CM

## 2019-09-23 DIAGNOSIS — R42 DIZZINESS: ICD-10-CM

## 2019-09-23 DIAGNOSIS — R26.81 UNSTEADY GAIT: ICD-10-CM

## 2019-09-23 DIAGNOSIS — R32 URINARY INCONTINENCE IN FEMALE: ICD-10-CM

## 2019-09-23 LAB
25(OH)D3 SERPL-MCNC: 35.5 NG/ML (ref 30–100)
ALBUMIN SERPL-MCNC: 4.5 G/DL (ref 3.5–5.2)
ALBUMIN UR-MCNC: <1.2 MG/DL
ALBUMIN/GLOB SERPL: 1.6 G/DL
ALP SERPL-CCNC: 55 U/L (ref 39–117)
ALT SERPL W P-5'-P-CCNC: 9 U/L (ref 1–33)
ANION GAP SERPL CALCULATED.3IONS-SCNC: 12.6 MMOL/L (ref 5–15)
AST SERPL-CCNC: 19 U/L (ref 1–32)
BASOPHILS # BLD AUTO: 0.05 10*3/MM3 (ref 0–0.2)
BASOPHILS NFR BLD AUTO: 0.8 % (ref 0–1.5)
BILIRUB SERPL-MCNC: 0.5 MG/DL (ref 0.2–1.2)
BUN BLD-MCNC: 10 MG/DL (ref 8–23)
BUN/CREAT SERPL: 14.1 (ref 7–25)
CALCIUM SPEC-SCNC: 9.1 MG/DL (ref 8.6–10.5)
CHLORIDE SERPL-SCNC: 100 MMOL/L (ref 98–107)
CHOLEST SERPL-MCNC: 171 MG/DL (ref 0–200)
CO2 SERPL-SCNC: 26.4 MMOL/L (ref 22–29)
CREAT BLD-MCNC: 0.71 MG/DL (ref 0.57–1)
CREAT UR-MCNC: 40.7 MG/DL
DEPRECATED RDW RBC AUTO: 43.4 FL (ref 37–54)
EOSINOPHIL # BLD AUTO: 0.09 10*3/MM3 (ref 0–0.4)
EOSINOPHIL NFR BLD AUTO: 1.5 % (ref 0.3–6.2)
ERYTHROCYTE [DISTWIDTH] IN BLOOD BY AUTOMATED COUNT: 12.5 % (ref 12.3–15.4)
GFR SERPL CREATININE-BSD FRML MDRD: 78 ML/MIN/1.73
GLOBULIN UR ELPH-MCNC: 2.9 GM/DL
GLUCOSE BLD-MCNC: 90 MG/DL (ref 65–99)
HCT VFR BLD AUTO: 44.5 % (ref 34–46.6)
HDLC SERPL-MCNC: 62 MG/DL (ref 40–60)
HGB BLD-MCNC: 14.4 G/DL (ref 12–15.9)
IMM GRANULOCYTES # BLD AUTO: 0.01 10*3/MM3 (ref 0–0.05)
IMM GRANULOCYTES NFR BLD AUTO: 0.2 % (ref 0–0.5)
LDLC SERPL CALC-MCNC: 87 MG/DL (ref 0–100)
LDLC/HDLC SERPL: 1.4 {RATIO}
LYMPHOCYTES # BLD AUTO: 1.81 10*3/MM3 (ref 0.7–3.1)
LYMPHOCYTES NFR BLD AUTO: 29.8 % (ref 19.6–45.3)
MCH RBC QN AUTO: 30.4 PG (ref 26.6–33)
MCHC RBC AUTO-ENTMCNC: 32.4 G/DL (ref 31.5–35.7)
MCV RBC AUTO: 94.1 FL (ref 79–97)
MICROALBUMIN/CREAT UR: NORMAL MG/G{CREAT}
MONOCYTES # BLD AUTO: 0.54 10*3/MM3 (ref 0.1–0.9)
MONOCYTES NFR BLD AUTO: 8.9 % (ref 5–12)
NEUTROPHILS # BLD AUTO: 3.57 10*3/MM3 (ref 1.7–7)
NEUTROPHILS NFR BLD AUTO: 58.8 % (ref 42.7–76)
NRBC BLD AUTO-RTO: 0 /100 WBC (ref 0–0.2)
PLATELET # BLD AUTO: 296 10*3/MM3 (ref 140–450)
PMV BLD AUTO: 10.7 FL (ref 6–12)
POTASSIUM BLD-SCNC: 4.1 MMOL/L (ref 3.5–5.2)
PROT SERPL-MCNC: 7.4 G/DL (ref 6–8.5)
RBC # BLD AUTO: 4.73 10*6/MM3 (ref 3.77–5.28)
SODIUM BLD-SCNC: 139 MMOL/L (ref 136–145)
TRIGL SERPL-MCNC: 111 MG/DL (ref 0–150)
TSH SERPL DL<=0.05 MIU/L-ACNC: 0.66 UIU/ML (ref 0.27–4.2)
VIT B12 BLD-MCNC: 472 PG/ML (ref 211–946)
VLDLC SERPL-MCNC: 22.2 MG/DL (ref 5–40)
WBC NRBC COR # BLD: 6.07 10*3/MM3 (ref 3.4–10.8)

## 2019-09-23 PROCEDURE — G0008 ADMIN INFLUENZA VIRUS VAC: HCPCS | Performed by: INTERNAL MEDICINE

## 2019-09-23 PROCEDURE — G0439 PPPS, SUBSEQ VISIT: HCPCS | Performed by: INTERNAL MEDICINE

## 2019-09-23 PROCEDURE — 82306 VITAMIN D 25 HYDROXY: CPT

## 2019-09-23 PROCEDURE — 85025 COMPLETE CBC W/AUTO DIFF WBC: CPT

## 2019-09-23 PROCEDURE — 80061 LIPID PANEL: CPT

## 2019-09-23 PROCEDURE — 82570 ASSAY OF URINE CREATININE: CPT

## 2019-09-23 PROCEDURE — 99214 OFFICE O/P EST MOD 30 MIN: CPT | Performed by: INTERNAL MEDICINE

## 2019-09-23 PROCEDURE — 90653 IIV ADJUVANT VACCINE IM: CPT | Performed by: INTERNAL MEDICINE

## 2019-09-23 PROCEDURE — 82607 VITAMIN B-12: CPT

## 2019-09-23 PROCEDURE — 80053 COMPREHEN METABOLIC PANEL: CPT

## 2019-09-23 PROCEDURE — 93000 ELECTROCARDIOGRAM COMPLETE: CPT | Performed by: INTERNAL MEDICINE

## 2019-09-23 PROCEDURE — 84443 ASSAY THYROID STIM HORMONE: CPT

## 2019-09-23 PROCEDURE — 82043 UR ALBUMIN QUANTITATIVE: CPT

## 2019-09-23 RX ORDER — ROSUVASTATIN CALCIUM 5 MG/1
5 TABLET, COATED ORAL DAILY
Qty: 7 TABLET | Refills: 0 | Status: SHIPPED | OUTPATIENT
Start: 2019-09-23 | End: 2019-10-04 | Stop reason: SDUPTHER

## 2019-09-23 NOTE — ASSESSMENT & PLAN NOTE
She has somewhat slow get up and go. Her hearing is decent and declined getting hearing evaluation but will discuss with ENT. She does have trouble with falling asleep. Her mood is overall pretty good. Overall her mood has improved and declined getting psych evaluation or taking any medications. I have gone over her medications with she and her daughter. Her MMSE was 24/30 and discussed memory meds and refer to Dr Arzate. Proceed with  Bone Density. Check labs. Age-appropriate Counseling:  Discussed preventative medicine issues with patient including regular exercise, healthy diet, stress reduction, adequate sleep and recommended age-appropriate screening studies.  Immunizations reviewed.

## 2019-09-23 NOTE — PROGRESS NOTES
QUICK REFERENCE INFORMATION:  The ABCs of the Annual Wellness Visit    Subsequent Medicare Wellness Visit    HEALTH RISK ASSESSMENT    11/14/1930    Recent Hospitalizations:  No hospitalization(s) within the last year..        Current Medical Providers:  Patient Care Team:  Schuyler Garcia MD as PCP - General  Schuyler Garcia MD as PCP - Family Medicine  Schuyler Garcia MD as PCP - Claims Attributed        Smoking Status:  Social History     Tobacco Use   Smoking Status Never Smoker   Smokeless Tobacco Never Used       Alcohol Consumption:  Social History     Substance and Sexual Activity   Alcohol Use No       Depression Screen:   PHQ-2/PHQ-9 Depression Screening 9/23/2019   Little interest or pleasure in doing things 0   Feeling down, depressed, or hopeless 0   Total Score 0       Health Habits and Functional and Cognitive Screening:  Functional & Cognitive Status 9/23/2019   Do you have difficulty preparing food and eating? No   Do you have difficulty bathing yourself, getting dressed or grooming yourself? No   Do you have difficulty using the toilet? No   Do you have difficulty moving around from place to place? No   Do you have trouble with steps or getting out of a bed or a chair? No   Current Diet Well Balanced Diet   Dental Exam Up to date   Eye Exam Up to date   Exercise (times per week) 3 times per week   Current Exercise Activities Include Cardiovasular Workout on Exercise Equipment   Do you need help using the phone?  No   Are you deaf or do you have serious difficulty hearing?  No   Do you need help with transportation? Yes   Do you need help shopping? No   Do you need help preparing meals?  No   Do you need help with housework?  No   Do you need help with laundry? No   Do you need help taking your medications? No   Do you need help managing money? Yes   Do you ever drive or ride in a car without wearing a seat belt? No   Have you felt unusual stress, anger or loneliness in the last month? No    Who do you live with? Alone   If you need help, do you have trouble finding someone available to you? No   Have you been bothered in the last four weeks by sexual problems? No   Do you have difficulty concentrating, remembering or making decisions? No       Fall Risk Screen:  ALEXIS Fall Risk Assessment was completed, and patient is at HIGH risk for falls. Assessment completed on:9/23/2019    ACE III MINI        Does the patient have evidence of cognitive impairment? No    Aspirin use counseling: Does not need ASA (and currently is not on it)    Recent Lab Results:  CMP:  Lab Results   Component Value Date    BUN 14 03/11/2019    CREATININE 0.83 03/11/2019    EGFRIFNONA 65 03/11/2019    BCR 16.9 03/11/2019     03/11/2019    K 4.6 03/11/2019    CO2 29.0 03/11/2019    CALCIUM 10.0 03/11/2019    ALBUMIN 4.49 03/11/2019    BILITOT 0.6 03/11/2019    ALKPHOS 89 03/11/2019    AST 18 03/11/2019    ALT 12 03/11/2019     HbA1c:  No results found for: HGBA1C  Microalbumin:  Lab Results   Component Value Date    MICROALBUR <1.2 09/23/2019     Lipid Panel  Lab Results   Component Value Date    CHOL 219 (H) 03/11/2019    TRIG 121 03/11/2019    HDL 56 03/11/2019     (H) 03/11/2019    AST 18 03/11/2019    ALT 12 03/11/2019       Visual Acuity:  No exam data present    Age-appropriate Screening Schedule:  Refer to the list below for future screening recommendations based on patient's age, sex and/or medical conditions. Orders for these recommended tests are listed in the plan section. The patient has been provided with a written plan.    Health Maintenance   Topic Date Due   • ZOSTER VACCINE (2 of 3) 05/25/2009   • TDAP/TD VACCINES (2 - Td) 03/30/2019   • INFLUENZA VACCINE  08/01/2019   • LIPID PANEL  03/11/2020   • DXA SCAN  07/05/2020   • PNEUMOCOCCAL VACCINES (65+ LOW/MEDIUM RISK)  Completed        Subjective   History of Present Illness. She has dizziness and falls. Worsening HTN and urinary incontinence.      Luh Mckeon is a 88 y.o. female who presents for a Subsequent Wellness Visit.She has acute issue of dizziness and had 2 falls. She get dizziness in the mornings and it is worse with movement. She has feeling of room spinning. She denies associated tinnitus or headaches.  She denies fevers or chills.Her blood pressure is acutely elevated.  She denies chest pain or shortness of breath or headache. She has worsening urinary issues. She wants to take oxybutyinin. She has trouble with inability to go to sleep and poor sleep quality.     CHRONIC CONDITIONS    The following portions of the patient's history were reviewed and updated as appropriate: allergies, current medications, past family history, past medical history, past social history and past surgical history.    Outpatient Medications Prior to Visit   Medication Sig Dispense Refill   • ciclopirox (PENLAC) 8 % solution Apply  topically to the appropriate area as directed Every Night.     • losartan (COZAAR) 50 MG tablet Take 1 tablet by mouth Take As Directed. 90 tablet 3   • Mirabegron ER (MYRBETRIQ) 25 MG tablet sustained-release 24 hour 24 hr tablet Take 1 tablet by mouth Daily. 90 tablet 1   • rosuvastatin (CRESTOR) 5 MG tablet Take 1 tablet by mouth Daily. 90 tablet 3   • aspirin 81 MG tablet Take 1 tablet by mouth Daily.     • CALCIUM-VITAMIN D PO Take  by mouth. 300-200 MG unit tablet, 1 oral     • levocetirizine (XYZAL) 5 MG tablet Take 1 tablet by mouth Every Evening. 90 tablet 3   • Omega-3 Fatty Acids (FISH OIL CONCENTRATE PO) Take  by mouth Take As Directed.       No facility-administered medications prior to visit.        Patient Active Problem List   Diagnosis   • Generalized anxiety disorder   • Unsteady gait   • Risk for falls   • Hyperlipidemia LDL goal <100   • Benign essential hypertension   • Memory loss   • Urinary incontinence in female   • Candida onychomycosis   • AR (allergic rhinitis)   • Overweight (BMI 25.0-29.9)   • Chest pain    • Cramp in limb   • Disorder of mitral and aortic valves   • Disorder of skeletal muscle   • Diverticular disease of colon   • Dysphagia   • External hemorrhoids   •     • Hand joint pain   • Insomnia   • Knee pain   • Menopausal and postmenopausal disorder   • Osteoporosis   • Shoulder pain   • Vitamin B deficiency   • Medicare annual wellness visit, subsequent   • Dizziness       Advance Care Planning:  Patient does not have an advance directive - information provided to the patient today    Identification of Risk Factors:  Risk factors include: Advance Directive Discussion  Cardiovascular risk  Chronic Pain   Dementia/Memory   Depression/Dysphoria  Diabetic Lab Screening   Fall Risk  Glaucoma Risk  Inactivity/Sedentary  Obesity/Overweight   Osteoprorosis Risk  Polypharmacy.    Review of Systems   Constitutional: Negative for chills, fatigue and fever.   HENT: Positive for hearing loss and rhinorrhea. Negative for congestion, ear pain and sinus pressure.    Respiratory: Positive for cough. Negative for chest tightness, shortness of breath and wheezing.    Cardiovascular: Negative for chest pain and palpitations.   Gastrointestinal: Negative for abdominal pain, blood in stool and constipation.   Endocrine: Negative for polydipsia and polyphagia.   Genitourinary: Positive for frequency and urgency. Negative for flank pain.        Urinary incontinence   Musculoskeletal: Positive for arthralgias and gait problem.   Skin: Negative for color change and rash.   Allergic/Immunologic: Negative for environmental allergies.   Neurological: Positive for dizziness. Negative for speech difficulty and headaches.   Hematological: Negative for adenopathy.   Psychiatric/Behavioral: Positive for dysphoric mood and sleep disturbance. Negative for confusion and suicidal ideas. The patient is nervous/anxious.        Compared to one year ago, the patient feels her physical health is the same.  Compared to one year ago, the patient  "feels her mental health is the same.    Objective     Physical Exam   Constitutional: She appears well-developed and well-nourished.   HENT:   Head: Normocephalic and atraumatic.   Right Ear: External ear normal.   Left Ear: External ear normal.   Mouth/Throat: Oropharynx is clear and moist. No oropharyngeal exudate.   Eyes: Conjunctivae and EOM are normal.   Neck: Normal range of motion. Neck supple. No JVD present.   Cardiovascular: Normal rate and regular rhythm.   Murmur (III/VI SM) heard.  Pulmonary/Chest: Effort normal and breath sounds normal. She has no wheezes. She has no rales.   Abdominal: Soft. Bowel sounds are normal.   Musculoskeletal: Normal range of motion.   Lymphadenopathy:     She has no cervical adenopathy.   Neurological: She is alert.   Oriented x2 with somewhat slow get up and go and mildly unsteady.24/30 on MMSE   Skin: Skin is warm and dry.   Psychiatric: She has a normal mood and affect. Her behavior is normal. Thought content normal.          ECG 12 Lead  Date/Time: 9/23/2019 10:30 PM  Performed by: Schuyler Garcia MD  Authorized by: Schuyler Garcia MD   Comparison: compared with previous ECG from 4/29/2011  Similar to previous ECG  Rhythm: sinus rhythm and sinus arrhythmia  Comments: Overall ok EKG             Vitals:    09/23/19 0852   BP: 162/98   BP Location: Left arm   Patient Position: Sitting   Cuff Size: Adult   Pulse: 60   Temp: 97.9 °F (36.6 °C)   TempSrc: Temporal   Weight: 64 kg (141 lb)   Height: 154 cm (60.63\")   PainSc: 0-No pain       Patient's Body mass index is 26.97 kg/m². BMI is above normal parameters. Recommendations include: educational material, exercise counseling and nutrition counseling.      Assessment/Plan   Problem List Items Addressed This Visit        Cardiovascular and Mediastinum    Hyperlipidemia LDL goal <100    Current Assessment & Plan     Lipid abnormalities are unchanged.  Nutritional counseling was provided. and Pharmacotherapy as " ordered.  Lipids will be reassessed 6 months. .         Relevant Medications    rosuvastatin (CRESTOR) 5 MG tablet    Other Relevant Orders    CBC & Differential    Comprehensive Metabolic Panel    Lipid Panel    TSH Rfx On Abnormal To Free T4    Benign essential hypertension    Current Assessment & Plan     Acute issue Hypertension that is worsening.  Continue current treatment regimen.  Dietary sodium restriction.  Weight loss.  Regular aerobic exercise.  Blood pressure will be reassessed at the next regular appointment.Increase losartan to 50 mg per day and take extra dose if systolic >165 or diastolic >95          Relevant Medications    losartan (COZAAR) 50 MG tablet    Other Relevant Orders    Microalbumin / Creatinine Urine Ratio - Urine, Clean Catch (Completed)    ECG 12 Lead       Musculoskeletal and Integument    Osteoporosis    Current Assessment & Plan     Check labs and encourage walking.          Relevant Orders    Vitamin D 25 Hydroxy       Genitourinary    Urinary incontinence in female    Current Assessment & Plan     Worsening and will use mybetrique and reduce fluids in the evening. Add pelvic PT in [place].             Other    Unsteady gait    Current Assessment & Plan     Encourage to get up slowly and get bearings before taking first step. Keep home well lit with clear floors to avoid tripping. Use assist devices for all walking especially from bed to bathroom. Proceed with PT and see neurology.          Relevant Orders    Ambulatory Referral to Neurology    Risk for falls    Current Assessment & Plan     DO PT and refer to ENT.          Memory loss    Current Assessment & Plan     Her memory loss is 24/30 and Counseled patient regarding multimodal approach with healthy nutrition, healthy sleep, regular physical activity, social activities, and meditation.We will refer to dr Arzate for further evaluation and treatment options.            Relevant Orders    Vitamin B12    Ambulatory  Referral to Neurology    Insomnia    Current Assessment & Plan     Discussed improving sleep quality and good sleep hygiene. Avoid caffeine in the evening, try not to exercise within 2-3 hours of bedtime, and avoid stimulating programs such as the news or action or drama close to bedtime. Recommend developing routine of not reading in bed, watching tv in the bedroom, or using electronic devices close to bedtime. Recommend using meditation to help fall asleep or to help get back to sleep for nighttime awakenings.   Add tryptophan 500 mg in the evening and melatonin 2.5 mg in the evening.            Menopausal and postmenopausal disorder    Current Assessment & Plan     Proceed with bone density.          Medicare annual wellness visit, subsequent - Primary    Current Assessment & Plan     She has somewhat slow get up and go. Her hearing is decent and declined getting hearing evaluation but will discuss with ENT. She does have trouble with falling asleep. Her mood is overall pretty good. Overall her mood has improved and declined getting psych evaluation or taking any medications. I have gone over her medications with she and her daughter. Her MMSE was 24/30 and discussed memory meds and refer to Dr Arzate. Proceed with  Bone Density. Check labs. Age-appropriate Counseling:  Discussed preventative medicine issues with patient including regular exercise, healthy diet, stress reduction, adequate sleep and recommended age-appropriate screening studies.  Immunizations reviewed.              Dizziness    Current Assessment & Plan     Acute issue of dizziness. She has some vertigo in the morning. She was awakened yesterday and got dizzy and fell. Proceed with labs. I will put in order for PT and ENT and see Neurology         Relevant Orders    Ambulatory Referral to Neurology    Ambulatory Referral to ENT (Otolaryngology) (Completed)      Other Visit Diagnoses     Need for vaccination        Relevant Orders    Fluad Tri  65yr+ (Completed)        Patient Self-Management and Personalized Health Advice  The patient has been provided with information about: diet, exercise, weight management and prevention of cardiac or vascular disease and preventive services including:   · Annual Wellness Visit (AWV)  · Cardiovascular Disease Screening Tests (may do this order every 5 years in beneficiaries without signs or symptoms of cardiovascular disease)  · Glaucoma screening (for individuals with diabetes mellitus, family history of glaucoma, -Americans (> or =) age 50, -Americans (> or =) age 65).    Outpatient Encounter Medications as of 9/23/2019   Medication Sig Dispense Refill   • ciclopirox (PENLAC) 8 % solution Apply  topically to the appropriate area as directed Every Night.     • losartan (COZAAR) 50 MG tablet Take 1 tablet by mouth Take As Directed. 90 tablet 3   • Mirabegron ER (MYRBETRIQ) 25 MG tablet sustained-release 24 hour 24 hr tablet Take 1 tablet by mouth Daily. 90 tablet 3   • [DISCONTINUED] Mirabegron ER (MYRBETRIQ) 25 MG tablet sustained-release 24 hour 24 hr tablet Take 1 tablet by mouth Daily. 90 tablet 1   • [DISCONTINUED] rosuvastatin (CRESTOR) 5 MG tablet Take 1 tablet by mouth Daily. 90 tablet 3   • [DISCONTINUED] aspirin 81 MG tablet Take 1 tablet by mouth Daily.     • [DISCONTINUED] CALCIUM-VITAMIN D PO Take  by mouth. 300-200 MG unit tablet, 1 oral     • [DISCONTINUED] levocetirizine (XYZAL) 5 MG tablet Take 1 tablet by mouth Every Evening. 90 tablet 3   • [DISCONTINUED] Omega-3 Fatty Acids (FISH OIL CONCENTRATE PO) Take  by mouth Take As Directed.       No facility-administered encounter medications on file as of 9/23/2019.        Reviewed use of high risk medication in the elderly: yes  Reviewed for potential of harmful drug interactions in the elderly: yes    Follow Up:  Return in about 4 months (around 1/23/2020) for Labs this visit, Recheck.     An After Visit Summary and PPPS with all of these  plans were given to the patient.

## 2019-09-23 NOTE — ASSESSMENT & PLAN NOTE
Acute issue of dizziness. She has some vertigo in the morning. She was awakened yesterday and got dizzy and fell. Proceed with labs. I will put in order for PT and ENT and see Neurology

## 2019-09-23 NOTE — ASSESSMENT & PLAN NOTE
Discussed improving sleep quality and good sleep hygiene. Avoid caffeine in the evening, try not to exercise within 2-3 hours of bedtime, and avoid stimulating programs such as the news or action or drama close to bedtime. Recommend developing routine of not reading in bed, watching tv in the bedroom, or using electronic devices close to bedtime. Recommend using meditation to help fall asleep or to help get back to sleep for nighttime awakenings.   Add tryptophan 500 mg in the evening and melatonin 2.5 mg in the evening.

## 2019-09-23 NOTE — TELEPHONE ENCOUNTER
PT'S DAUGHTER CALL STATING THAT PATIENT IS OUT OF  HER ROSUVASTATIN AND WANT GET IT FROM MAIL ORDER FOR 5-7 DAYS    I TOLD HER I WOULD SEND IN A PARTIAL ORDER TO LOCAL PHARMACY TO LAST UNTIL SHE GETS THEM IN   7 DAY SUPPLY SENT IN

## 2019-09-24 NOTE — ASSESSMENT & PLAN NOTE
Her memory loss is 24/30 and Counseled patient regarding multimodal approach with healthy nutrition, healthy sleep, regular physical activity, social activities, and meditation.We will refer to dr Arzate for further evaluation and treatment options.

## 2019-09-24 NOTE — ASSESSMENT & PLAN NOTE
Lipid abnormalities are unchanged.  Nutritional counseling was provided. and Pharmacotherapy as ordered.  Lipids will be reassessed 6 months. .

## 2019-09-24 NOTE — ASSESSMENT & PLAN NOTE
Acute issue Hypertension that is worsening.  Continue current treatment regimen.  Dietary sodium restriction.  Weight loss.  Regular aerobic exercise.  Blood pressure will be reassessed at the next regular appointment.Increase losartan to 50 mg per day and take extra dose if systolic >165 or diastolic >95

## 2019-09-24 NOTE — ASSESSMENT & PLAN NOTE
Encourage to get up slowly and get bearings before taking first step. Keep home well lit with clear floors to avoid tripping. Use assist devices for all walking especially from bed to bathroom. Proceed with PT and see neurology.

## 2019-09-26 RX ORDER — ROSUVASTATIN CALCIUM 5 MG/1
TABLET, COATED ORAL
Qty: 7 TABLET | Refills: 0 | OUTPATIENT
Start: 2019-09-26

## 2019-10-04 RX ORDER — ROSUVASTATIN CALCIUM 5 MG/1
5 TABLET, COATED ORAL DAILY
Qty: 90 TABLET | Refills: 3 | Status: SHIPPED | OUTPATIENT
Start: 2019-10-04 | End: 2020-09-29 | Stop reason: SDUPTHER

## 2019-10-04 NOTE — TELEPHONE ENCOUNTER
Patient's mail order supply never was sent to [facility] for her Crestor only a 7 day supply to her local pharmacy.    Sent in 90 day order to [facility] and notified patient I will call [facility] to put a rush on it.    Patient called back and stated she found her bottle of full Crestor and will call [facility] at [phone] to cancel the new RX    She also did not received lab letter of 9/24/19.  [facility] will reprint and mail out.

## 2019-10-21 ENCOUNTER — TELEPHONE (OUTPATIENT)
Dept: NEUROLOGY | Facility: CLINIC | Age: 84
End: 2019-10-21

## 2020-01-27 ENCOUNTER — OFFICE VISIT (OUTPATIENT)
Dept: INTERNAL MEDICINE | Facility: CLINIC | Age: 85
End: 2020-01-27

## 2020-01-27 ENCOUNTER — LAB (OUTPATIENT)
Dept: LAB | Facility: HOSPITAL | Age: 85
End: 2020-01-27

## 2020-01-27 VITALS
DIASTOLIC BLOOD PRESSURE: 88 MMHG | HEART RATE: 68 BPM | SYSTOLIC BLOOD PRESSURE: 128 MMHG | BODY MASS INDEX: 26.43 KG/M2 | HEIGHT: 61 IN | TEMPERATURE: 98.1 F | WEIGHT: 140 LBS

## 2020-01-27 DIAGNOSIS — E78.5 HYPERLIPIDEMIA LDL GOAL <100: ICD-10-CM

## 2020-01-27 DIAGNOSIS — R26.81 UNSTEADY GAIT: ICD-10-CM

## 2020-01-27 DIAGNOSIS — R32 URINARY INCONTINENCE IN FEMALE: ICD-10-CM

## 2020-01-27 DIAGNOSIS — R41.3 MEMORY LOSS: ICD-10-CM

## 2020-01-27 DIAGNOSIS — I10 BENIGN ESSENTIAL HYPERTENSION: Primary | ICD-10-CM

## 2020-01-27 DIAGNOSIS — I10 BENIGN ESSENTIAL HYPERTENSION: ICD-10-CM

## 2020-01-27 LAB
ALBUMIN SERPL-MCNC: 4 G/DL (ref 3.5–5.2)
ALBUMIN UR-MCNC: <1.2 MG/DL
ALBUMIN/GLOB SERPL: 1.3 G/DL
ALP SERPL-CCNC: 67 U/L (ref 39–117)
ALT SERPL W P-5'-P-CCNC: 10 U/L (ref 1–33)
ANION GAP SERPL CALCULATED.3IONS-SCNC: 11.7 MMOL/L (ref 5–15)
AST SERPL-CCNC: 18 U/L (ref 1–32)
BILIRUB SERPL-MCNC: 0.5 MG/DL (ref 0.2–1.2)
BUN BLD-MCNC: 10 MG/DL (ref 8–23)
BUN/CREAT SERPL: 13.5 (ref 7–25)
CALCIUM SPEC-SCNC: 9.6 MG/DL (ref 8.6–10.5)
CHLORIDE SERPL-SCNC: 101 MMOL/L (ref 98–107)
CHOLEST SERPL-MCNC: 171 MG/DL (ref 0–200)
CO2 SERPL-SCNC: 27.3 MMOL/L (ref 22–29)
CREAT BLD-MCNC: 0.74 MG/DL (ref 0.57–1)
CREAT UR-MCNC: 122.1 MG/DL
GFR SERPL CREATININE-BSD FRML MDRD: 74 ML/MIN/1.73
GLOBULIN UR ELPH-MCNC: 3 GM/DL
GLUCOSE BLD-MCNC: 90 MG/DL (ref 65–99)
HDLC SERPL-MCNC: 60 MG/DL (ref 40–60)
LDLC SERPL CALC-MCNC: 89 MG/DL (ref 0–100)
LDLC/HDLC SERPL: 1.48 {RATIO}
MICROALBUMIN/CREAT UR: NORMAL MG/G{CREAT}
POTASSIUM BLD-SCNC: 4.1 MMOL/L (ref 3.5–5.2)
PROT SERPL-MCNC: 7 G/DL (ref 6–8.5)
SODIUM BLD-SCNC: 140 MMOL/L (ref 136–145)
TRIGL SERPL-MCNC: 112 MG/DL (ref 0–150)
VIT B12 BLD-MCNC: 425 PG/ML (ref 211–946)
VLDLC SERPL-MCNC: 22.4 MG/DL (ref 5–40)

## 2020-01-27 PROCEDURE — 82607 VITAMIN B-12: CPT

## 2020-01-27 PROCEDURE — 82043 UR ALBUMIN QUANTITATIVE: CPT

## 2020-01-27 PROCEDURE — 80061 LIPID PANEL: CPT

## 2020-01-27 PROCEDURE — 99214 OFFICE O/P EST MOD 30 MIN: CPT | Performed by: INTERNAL MEDICINE

## 2020-01-27 PROCEDURE — 80053 COMPREHEN METABOLIC PANEL: CPT

## 2020-01-27 PROCEDURE — 82570 ASSAY OF URINE CREATININE: CPT

## 2020-01-27 RX ORDER — LOSARTAN POTASSIUM 50 MG/1
50 TABLET ORAL TAKE AS DIRECTED
Qty: 100 TABLET | Refills: 3 | Status: SHIPPED | OUTPATIENT
Start: 2020-01-27 | End: 2020-01-28 | Stop reason: SDUPTHER

## 2020-01-27 NOTE — PROGRESS NOTES
"Chief Complaint   Patient presents with   • medication question     myrbetriq       History of Present Illness  89 y.o. white female  presents for follow up on HTN. She denies chest pain or shortness of breath. She has improvement in her urinary losses. She has mild unsteadiness but denies falls.     Review of Systems   Genitourinary: Positive for urgency.        Less loss of urine   Musculoskeletal: Positive for arthralgias and gait problem.   Psychiatric/Behavioral: Positive for decreased concentration.      All other ROS reviewed and negative.    PMSFH:  The following portions of the patient's history were reviewed and updated as appropriate: allergies, current medications, past family history, past medical history, past social history, past surgical history and problem list.      Current Outpatient Medications:   •  losartan (COZAAR) 50 MG tablet, Take 1 tablet by mouth Take As Directed., Disp: 90 tablet, Rfl: 3  •  Mirabegron ER (MYRBETRIQ) 25 MG tablet sustained-release 24 hour 24 hr tablet, Take 1 tablet by mouth Daily., Disp: 90 tablet, Rfl: 3  •  rosuvastatin (CRESTOR) 5 MG tablet, Take 1 tablet by mouth Daily., Disp: 90 tablet, Rfl: 3    VITALS:  /88 (BP Location: Left arm)   Pulse 68   Temp 98.1 °F (36.7 °C)   Ht 154 cm (60.63\")   Wt 63.5 kg (140 lb)   BMI 26.78 kg/m²     Physical Exam   Constitutional: She is oriented to person, place, and time. She appears well-developed and well-nourished.   HENT:   Head: Normocephalic.   Eyes: Conjunctivae and EOM are normal.   Neck: No JVD present.   Cardiovascular: Normal rate and regular rhythm.   Murmur (III/VI SM) heard.  Pulmonary/Chest: Effort normal and breath sounds normal. No respiratory distress.   Abdominal: Soft. Bowel sounds are normal. There is no tenderness.   Musculoskeletal: She exhibits no edema.   Neurological: She is alert and oriented to person, place, and time.   She has pretty good get up and go.    Skin: Skin is warm and dry. "   Psychiatric: She has a normal mood and affect. Her behavior is normal.   Nursing note and vitals reviewed.      LABS:  Results for orders placed or performed in visit on 09/23/19   Comprehensive Metabolic Panel   Result Value Ref Range    Glucose 90 65 - 99 mg/dL    BUN 10 8 - 23 mg/dL    Creatinine 0.71 0.57 - 1.00 mg/dL    Sodium 139 136 - 145 mmol/L    Potassium 4.1 3.5 - 5.2 mmol/L    Chloride 100 98 - 107 mmol/L    CO2 26.4 22.0 - 29.0 mmol/L    Calcium 9.1 8.6 - 10.5 mg/dL    Total Protein 7.4 6.0 - 8.5 g/dL    Albumin 4.50 3.50 - 5.20 g/dL    ALT (SGPT) 9 1 - 33 U/L    AST (SGOT) 19 1 - 32 U/L    Alkaline Phosphatase 55 39 - 117 U/L    Total Bilirubin 0.5 0.2 - 1.2 mg/dL    eGFR Non African Amer 78 >60 mL/min/1.73    Globulin 2.9 gm/dL    A/G Ratio 1.6 g/dL    BUN/Creatinine Ratio 14.1 7.0 - 25.0    Anion Gap 12.6 5.0 - 15.0 mmol/L   Lipid Panel   Result Value Ref Range    Total Cholesterol 171 0 - 200 mg/dL    Triglycerides 111 0 - 150 mg/dL    HDL Cholesterol 62 (H) 40 - 60 mg/dL    LDL Cholesterol  87 0 - 100 mg/dL    VLDL Cholesterol 22.2 5 - 40 mg/dL    LDL/HDL Ratio 1.40    Microalbumin / Creatinine Urine Ratio - Urine, Clean Catch   Result Value Ref Range    Microalbumin/Creatinine Ratio      Creatinine, Urine 40.7 mg/dL    Microalbumin, Urine <1.2 mg/dL   TSH Rfx On Abnormal To Free T4   Result Value Ref Range    TSH 0.662 0.270 - 4.200 uIU/mL   Vitamin B12   Result Value Ref Range    Vitamin B-12 472 211 - 946 pg/mL   Vitamin D 25 Hydroxy   Result Value Ref Range    25 Hydroxy, Vitamin D 35.5 30.0 - 100.0 ng/ml   CBC Auto Differential   Result Value Ref Range    WBC 6.07 3.40 - 10.80 10*3/mm3    RBC 4.73 3.77 - 5.28 10*6/mm3    Hemoglobin 14.4 12.0 - 15.9 g/dL    Hematocrit 44.5 34.0 - 46.6 %    MCV 94.1 79.0 - 97.0 fL    MCH 30.4 26.6 - 33.0 pg    MCHC 32.4 31.5 - 35.7 g/dL    RDW 12.5 12.3 - 15.4 %    RDW-SD 43.4 37.0 - 54.0 fl    MPV 10.7 6.0 - 12.0 fL    Platelets 296 140 - 450 10*3/mm3     Neutrophil % 58.8 42.7 - 76.0 %    Lymphocyte % 29.8 19.6 - 45.3 %    Monocyte % 8.9 5.0 - 12.0 %    Eosinophil % 1.5 0.3 - 6.2 %    Basophil % 0.8 0.0 - 1.5 %    Immature Grans % 0.2 0.0 - 0.5 %    Neutrophils, Absolute 3.57 1.70 - 7.00 10*3/mm3    Lymphocytes, Absolute 1.81 0.70 - 3.10 10*3/mm3    Monocytes, Absolute 0.54 0.10 - 0.90 10*3/mm3    Eosinophils, Absolute 0.09 0.00 - 0.40 10*3/mm3    Basophils, Absolute 0.05 0.00 - 0.20 10*3/mm3    Immature Grans, Absolute 0.01 0.00 - 0.05 10*3/mm3    nRBC 0.0 0.0 - 0.2 /100 WBC       Procedures         ASSESSMENT/PLAN:  Problem List Items Addressed This Visit        Cardiovascular and Mediastinum    Hyperlipidemia LDL goal <100     Lipid abnormalities are unchanged.  Nutritional counseling was provided. and Pharmacotherapy as ordered.  Lipids will be reassessed 4 months. .         Relevant Orders    Comprehensive Metabolic Panel    Lipid Panel    Benign essential hypertension - Primary     Hypertension is improving with treatment.  Continue current treatment regimen.  Dietary sodium restriction.  Regular aerobic exercise.  Blood pressure will be reassessed at the next regular appointment.         Relevant Orders    Microalbumin / Creatinine Urine Ratio - Urine, Clean Catch       Genitourinary    Urinary incontinence in female     She is doing ok with myrbetriq and we discussed PT.         Relevant Medications    Mirabegron ER (MYRBETRIQ) 25 MG tablet sustained-release 24 hour 24 hr tablet       Other    Unsteady gait     Encourage to get up slowly and get bearings before taking first step. Keep home well lit with clear floors to avoid tripping. Use assist devices for all walking especially from bed to bathroom.          Memory loss     Follow up labs.          Relevant Orders    Vitamin B12          FOLLOW-UP:  No follow-ups on file.      Electronically signed by:    Schuyler Garcia MD

## 2020-01-27 NOTE — ASSESSMENT & PLAN NOTE
Lipid abnormalities are unchanged.  Nutritional counseling was provided. and Pharmacotherapy as ordered.  Lipids will be reassessed 4 months. .

## 2020-01-28 RX ORDER — LOSARTAN POTASSIUM 50 MG/1
TABLET ORAL
Qty: 100 TABLET | Refills: 3 | Status: SHIPPED | OUTPATIENT
Start: 2020-01-28 | End: 2020-07-10

## 2020-02-07 ENCOUNTER — TELEPHONE (OUTPATIENT)
Dept: INTERNAL MEDICINE | Facility: CLINIC | Age: 85
End: 2020-02-07

## 2020-02-07 NOTE — TELEPHONE ENCOUNTER
PT  DROVE HERSELF TO [place] TO CHECK BLOOD PRESSURE WHICH /74  PT'S DAUGHTER IS CALLING STATING PT HAND IS DRAWING UP HER THUMB DRAWS TO HER FOREFINGER AND STICKS AND PT  CANT GET IT TO GET UNSTUCK IS HOW DAUGHTER DESCRIBED IT.   PT WENT TO URGENT TREATMENT YESTERDAY SHE WAS CONFUSED AND HAD SLIGHT DEMENTIA. URGENT TREATMENT CALLED  DAUGHTER  DIDN'T WANT PT  DRIVING FELT IT WAS VERY UNSAFE SINCE PT  WAS CONFUSED. URGENT TREATMENT WAS CONCERNED ABOUT TIA/ STROKE     DAUGHTER JUST HAS A COUPLE QUESTIONS  ABOUT WHAT DR. KIRK  THINKS ABOUT THE HAND DRAWING UP     PLEASE ADVISE AND GIVE CALL [phone]

## 2020-02-07 NOTE — TELEPHONE ENCOUNTER
I called and talked to talk with daughter and the confusion is better and the hand is better. She will drive with her Mom sometime in the next week and observe her driving. She did pretty well driving home from [facility] and if hand recurs she will bring her in for evaluation

## 2020-05-29 ENCOUNTER — OFFICE VISIT (OUTPATIENT)
Dept: INTERNAL MEDICINE | Facility: CLINIC | Age: 85
End: 2020-05-29

## 2020-05-29 ENCOUNTER — LAB (OUTPATIENT)
Dept: LAB | Facility: HOSPITAL | Age: 85
End: 2020-05-29

## 2020-05-29 VITALS
SYSTOLIC BLOOD PRESSURE: 126 MMHG | TEMPERATURE: 97.4 F | DIASTOLIC BLOOD PRESSURE: 82 MMHG | BODY MASS INDEX: 26.43 KG/M2 | HEART RATE: 60 BPM | WEIGHT: 140 LBS | HEIGHT: 61 IN

## 2020-05-29 DIAGNOSIS — I10 BENIGN ESSENTIAL HYPERTENSION: Primary | ICD-10-CM

## 2020-05-29 DIAGNOSIS — E55.9 VITAMIN D DEFICIENCY: ICD-10-CM

## 2020-05-29 DIAGNOSIS — M81.0 AGE-RELATED OSTEOPOROSIS WITHOUT CURRENT PATHOLOGICAL FRACTURE: ICD-10-CM

## 2020-05-29 DIAGNOSIS — F51.01 PRIMARY INSOMNIA: ICD-10-CM

## 2020-05-29 DIAGNOSIS — E78.5 HYPERLIPIDEMIA LDL GOAL <100: ICD-10-CM

## 2020-05-29 LAB
25(OH)D3 SERPL-MCNC: 30.3 NG/ML (ref 30–100)
ALBUMIN SERPL-MCNC: 4.2 G/DL (ref 3.5–5.2)
ALBUMIN/GLOB SERPL: 1.5 G/DL
ALP SERPL-CCNC: 65 U/L (ref 39–117)
ALT SERPL W P-5'-P-CCNC: 11 U/L (ref 1–33)
ANION GAP SERPL CALCULATED.3IONS-SCNC: 10.4 MMOL/L (ref 5–15)
AST SERPL-CCNC: 21 U/L (ref 1–32)
BILIRUB SERPL-MCNC: 0.4 MG/DL (ref 0.2–1.2)
BUN BLD-MCNC: 13 MG/DL (ref 8–23)
BUN/CREAT SERPL: 16 (ref 7–25)
CALCIUM SPEC-SCNC: 9.3 MG/DL (ref 8.6–10.5)
CHLORIDE SERPL-SCNC: 100 MMOL/L (ref 98–107)
CO2 SERPL-SCNC: 25.6 MMOL/L (ref 22–29)
CREAT BLD-MCNC: 0.81 MG/DL (ref 0.57–1)
GFR SERPL CREATININE-BSD FRML MDRD: 67 ML/MIN/1.73
GLOBULIN UR ELPH-MCNC: 2.8 GM/DL
GLUCOSE BLD-MCNC: 76 MG/DL (ref 65–99)
POTASSIUM BLD-SCNC: 4.5 MMOL/L (ref 3.5–5.2)
PROT SERPL-MCNC: 7 G/DL (ref 6–8.5)
SODIUM BLD-SCNC: 136 MMOL/L (ref 136–145)

## 2020-05-29 PROCEDURE — 82306 VITAMIN D 25 HYDROXY: CPT

## 2020-05-29 PROCEDURE — 85025 COMPLETE CBC W/AUTO DIFF WBC: CPT

## 2020-05-29 PROCEDURE — 99214 OFFICE O/P EST MOD 30 MIN: CPT | Performed by: INTERNAL MEDICINE

## 2020-05-29 PROCEDURE — 80053 COMPREHEN METABOLIC PANEL: CPT

## 2020-05-29 NOTE — PROGRESS NOTES
"Chief Complaint   Patient presents with   • Hypertension   • Hyperlipidemia       History of Present Illness  89 y.o. white female presents for follow up of her long standing HTN. Her blood pressure is overall doing ok. She denies associated chest pain or headaches. She has had some popping of the left ear.     Review of Systems   Constitutional: Negative for chills, fatigue and fever.   HENT: Positive for postnasal drip. Negative for congestion, ear pain and sinus pressure.         Ear fullness    Respiratory: Negative for cough, chest tightness, shortness of breath and wheezing.    Cardiovascular: Negative for chest pain and palpitations.   Gastrointestinal: Negative for abdominal pain, blood in stool and constipation.   Musculoskeletal: Positive for arthralgias.   Skin: Negative for color change.   Allergic/Immunologic: Negative for environmental allergies.   Neurological: Negative for dizziness, speech difficulty and headaches.   Hematological: Negative for adenopathy.   Psychiatric/Behavioral: Negative for confusion. The patient is not nervous/anxious.      All other ROS reviewed and negative.    PMSFH:  The following portions of the patient's history were reviewed and updated as appropriate: allergies, current medications, past family history, past medical history, past social history, past surgical history and problem list.      Current Outpatient Medications:   •  losartan (COZAAR) 50 MG tablet, Take one tablet daily and take extra dose if systolic >165 or diastolic >95, Disp: 100 tablet, Rfl: 3  •  Mirabegron ER (MYRBETRIQ) 25 MG tablet sustained-release 24 hour 24 hr tablet, Take 1 tablet by mouth Daily., Disp: 90 tablet, Rfl: 3  •  rosuvastatin (CRESTOR) 5 MG tablet, Take 1 tablet by mouth Daily., Disp: 90 tablet, Rfl: 3    VITALS:  /82 (BP Location: Left arm, Patient Position: Sitting, Cuff Size: Adult)   Pulse 60   Temp 97.4 °F (36.3 °C) (Temporal)   Ht 154 cm (60.63\")   Wt 63.5 kg (140 lb)   " BMI 26.78 kg/m²     Physical Exam   Constitutional: She is oriented to person, place, and time. She appears well-developed and well-nourished.   HENT:   Head: Normocephalic.   Right Ear: External ear normal.   Left Ear: External ear normal.   Eyes: Conjunctivae and EOM are normal.   Neck: No JVD present.   Cardiovascular: Normal rate and regular rhythm.   Murmur (II/VI SM ) heard.  Pulmonary/Chest: Effort normal and breath sounds normal. No respiratory distress.   Abdominal: Soft. Bowel sounds are normal. She exhibits no mass. There is no tenderness. There is no guarding.   Musculoskeletal: She exhibits no edema.   Lymphadenopathy:     She has no cervical adenopathy.   Neurological: She is alert and oriented to person, place, and time.   Skin: Skin is warm and dry.   Psychiatric: She has a normal mood and affect. Her behavior is normal.   Nursing note and vitals reviewed.      LABS:  Results for orders placed or performed in visit on 01/27/20   Comprehensive Metabolic Panel   Result Value Ref Range    Glucose 90 65 - 99 mg/dL    BUN 10 8 - 23 mg/dL    Creatinine 0.74 0.57 - 1.00 mg/dL    Sodium 140 136 - 145 mmol/L    Potassium 4.1 3.5 - 5.2 mmol/L    Chloride 101 98 - 107 mmol/L    CO2 27.3 22.0 - 29.0 mmol/L    Calcium 9.6 8.6 - 10.5 mg/dL    Total Protein 7.0 6.0 - 8.5 g/dL    Albumin 4.00 3.50 - 5.20 g/dL    ALT (SGPT) 10 1 - 33 U/L    AST (SGOT) 18 1 - 32 U/L    Alkaline Phosphatase 67 39 - 117 U/L    Total Bilirubin 0.5 0.2 - 1.2 mg/dL    eGFR Non African Amer 74 >60 mL/min/1.73    Globulin 3.0 gm/dL    A/G Ratio 1.3 g/dL    BUN/Creatinine Ratio 13.5 7.0 - 25.0    Anion Gap 11.7 5.0 - 15.0 mmol/L   Lipid Panel   Result Value Ref Range    Total Cholesterol 171 0 - 200 mg/dL    Triglycerides 112 0 - 150 mg/dL    HDL Cholesterol 60 40 - 60 mg/dL    LDL Cholesterol  89 0 - 100 mg/dL    VLDL Cholesterol 22.4 5 - 40 mg/dL    LDL/HDL Ratio 1.48    Vitamin B12   Result Value Ref Range    Vitamin B-12 425 456 - 533  pg/mL   Microalbumin / Creatinine Urine Ratio - Urine, Clean Catch   Result Value Ref Range    Microalbumin/Creatinine Ratio      Creatinine, Urine 122.1 mg/dL    Microalbumin, Urine <1.2 mg/dL       Procedures         ASSESSMENT/PLAN:  Problem List Items Addressed This Visit        Cardiovascular and Mediastinum    Hyperlipidemia LDL goal <100     Lipid abnormalities are unchanged.  Nutritional counseling was provided. and Pharmacotherapy as ordered.  Lipids will be reassessed in 3 months.         Relevant Orders    CBC & Differential    Comprehensive Metabolic Panel    Benign essential hypertension - Primary     Hypertension is improving with treatment.  Continue current treatment regimen.  Regular aerobic exercise.  Blood pressure will be reassessed at the next regular appointment.            Musculoskeletal and Integument    Osteoporosis     Check labs and get prolia shot June 8 with Dr Reyes.             Other    Insomnia     Discussed improving sleep quality and good sleep hygiene. Avoid caffeine in the evening, try not to exercise within 2-3 hours of bedtime, and avoid stimulating programs such as the news or action or drama close to bedtime. Recommend developing routine of not reading in bed, watching tv in the bedroom, or using electronic devices close to bedtime. Recommend using meditation to help fall asleep or to help get back to sleep for nighttime awakenings. Use tryptophan 500 mg and melatonin 3 mg in the evening.                Other Visit Diagnoses     Vitamin D deficiency        Relevant Orders    Vitamin D 25 Hydroxy          FOLLOW-UP:  Return in about 4 months (around 9/29/2020) for Medicare Wellness, Labs this visit.      Electronically signed by:    Schuyler Garcia MD

## 2020-05-29 NOTE — ASSESSMENT & PLAN NOTE
Discussed improving sleep quality and good sleep hygiene. Avoid caffeine in the evening, try not to exercise within 2-3 hours of bedtime, and avoid stimulating programs such as the news or action or drama close to bedtime. Recommend developing routine of not reading in bed, watching tv in the bedroom, or using electronic devices close to bedtime. Recommend using meditation to help fall asleep or to help get back to sleep for nighttime awakenings. Use tryptophan 500 mg and melatonin 3 mg in the evening.

## 2020-05-30 LAB
BASOPHILS # BLD AUTO: 0.05 10*3/MM3 (ref 0–0.2)
BASOPHILS NFR BLD AUTO: 0.9 % (ref 0–1.5)
DEPRECATED RDW RBC AUTO: 41.5 FL (ref 37–54)
EOSINOPHIL # BLD AUTO: 0.11 10*3/MM3 (ref 0–0.4)
EOSINOPHIL NFR BLD AUTO: 2 % (ref 0.3–6.2)
ERYTHROCYTE [DISTWIDTH] IN BLOOD BY AUTOMATED COUNT: 12 % (ref 12.3–15.4)
HCT VFR BLD AUTO: 40.5 % (ref 34–46.6)
HGB BLD-MCNC: 13.7 G/DL (ref 12–15.9)
IMM GRANULOCYTES # BLD AUTO: 0 10*3/MM3 (ref 0–0.05)
IMM GRANULOCYTES NFR BLD AUTO: 0 % (ref 0–0.5)
LYMPHOCYTES # BLD AUTO: 1.46 10*3/MM3 (ref 0.7–3.1)
LYMPHOCYTES NFR BLD AUTO: 26.1 % (ref 19.6–45.3)
MCH RBC QN AUTO: 31.4 PG (ref 26.6–33)
MCHC RBC AUTO-ENTMCNC: 33.8 G/DL (ref 31.5–35.7)
MCV RBC AUTO: 92.7 FL (ref 79–97)
MONOCYTES # BLD AUTO: 0.62 10*3/MM3 (ref 0.1–0.9)
MONOCYTES NFR BLD AUTO: 11.1 % (ref 5–12)
NEUTROPHILS # BLD AUTO: 3.36 10*3/MM3 (ref 1.7–7)
NEUTROPHILS NFR BLD AUTO: 59.9 % (ref 42.7–76)
NRBC BLD AUTO-RTO: 0 /100 WBC (ref 0–0.2)
PLATELET # BLD AUTO: 262 10*3/MM3 (ref 140–450)
PMV BLD AUTO: 11.1 FL (ref 6–12)
RBC # BLD AUTO: 4.37 10*6/MM3 (ref 3.77–5.28)
WBC NRBC COR # BLD: 5.6 10*3/MM3 (ref 3.4–10.8)

## 2020-07-01 ENCOUNTER — TELEPHONE (OUTPATIENT)
Dept: INTERNAL MEDICINE | Facility: CLINIC | Age: 85
End: 2020-07-01

## 2020-07-01 NOTE — TELEPHONE ENCOUNTER
I called daughter.  Patient has taken her losartan today.  Denies chest pain or SOB, just c/o dizziness.  I advised daughter to have patient rest, elevate feet, hydrate, and hold losartan in the morning until after she calls us with am BP reading.

## 2020-07-01 NOTE — TELEPHONE ENCOUNTER
Daughter, Swetha Avery (POA), called stating patient feels dizzy. BP today 97/63. Half an hour later 100/61 after eating. The dizziness started yesterday. No readings for yesterday. No other complaints. Please advise.

## 2020-07-01 NOTE — TELEPHONE ENCOUNTER
If she has not taken her blood pressure medication today, she should not take it.  Does she have shortness of breath or chest pain?  If either of those are present, she needs to go to ER.  If not, hold losartan, keep her feet up, hydrate, and call us tomorrow with blood pressure before she takes losartan.

## 2020-07-02 NOTE — TELEPHONE ENCOUNTER
Patients daughter, Swetha, called with BP readings. With no medication BP this morning 111/81 and 112/68. Still feels a little dizzy.

## 2020-07-02 NOTE — TELEPHONE ENCOUNTER
Suggest she stay off losartan through the weekend unless her blood pressure is 150/90 or greater.  Will need to see Dr Garcia next week.

## 2020-07-08 ENCOUNTER — OFFICE VISIT (OUTPATIENT)
Dept: INTERNAL MEDICINE | Facility: CLINIC | Age: 85
End: 2020-07-08

## 2020-07-08 ENCOUNTER — LAB (OUTPATIENT)
Dept: LAB | Facility: HOSPITAL | Age: 85
End: 2020-07-08

## 2020-07-08 VITALS
HEIGHT: 61 IN | TEMPERATURE: 98 F | WEIGHT: 138 LBS | BODY MASS INDEX: 26.06 KG/M2 | HEART RATE: 72 BPM | DIASTOLIC BLOOD PRESSURE: 84 MMHG | SYSTOLIC BLOOD PRESSURE: 132 MMHG

## 2020-07-08 DIAGNOSIS — R42 DIZZY: Primary | ICD-10-CM

## 2020-07-08 DIAGNOSIS — R82.90 ABNORMAL URINE: ICD-10-CM

## 2020-07-08 DIAGNOSIS — L30.9 DERMATITIS: ICD-10-CM

## 2020-07-08 DIAGNOSIS — E86.0 DEHYDRATION: ICD-10-CM

## 2020-07-08 LAB
BILIRUB BLD-MCNC: NEGATIVE MG/DL
CLARITY, POC: CLEAR
COLOR UR: YELLOW
GLUCOSE UR STRIP-MCNC: NEGATIVE MG/DL
KETONES UR QL: NEGATIVE
LEUKOCYTE EST, POC: ABNORMAL
NITRITE UR-MCNC: NEGATIVE MG/ML
PH UR: 5.5 [PH] (ref 5–8)
PROT UR STRIP-MCNC: NEGATIVE MG/DL
RBC # UR STRIP: ABNORMAL /UL
SP GR UR: 1.01 (ref 1–1.03)
UROBILINOGEN UR QL: NORMAL

## 2020-07-08 PROCEDURE — 99214 OFFICE O/P EST MOD 30 MIN: CPT | Performed by: NURSE PRACTITIONER

## 2020-07-08 PROCEDURE — 87086 URINE CULTURE/COLONY COUNT: CPT

## 2020-07-08 PROCEDURE — 81003 URINALYSIS AUTO W/O SCOPE: CPT | Performed by: NURSE PRACTITIONER

## 2020-07-09 LAB — BACTERIA SPEC AEROBE CULT: NO GROWTH

## 2020-07-14 ENCOUNTER — TELEPHONE (OUTPATIENT)
Dept: INTERNAL MEDICINE | Facility: CLINIC | Age: 85
End: 2020-07-14

## 2020-07-14 NOTE — TELEPHONE ENCOUNTER
Pt's daughter Swetha is returning Aster's call. She said that her mom's bp is running the same as when she saw Dr. Radha valadez which is low. She said her readings are around 123/72.

## 2020-07-20 ENCOUNTER — OFFICE VISIT (OUTPATIENT)
Dept: INTERNAL MEDICINE | Facility: CLINIC | Age: 85
End: 2020-07-20

## 2020-07-20 VITALS
SYSTOLIC BLOOD PRESSURE: 120 MMHG | HEIGHT: 61 IN | DIASTOLIC BLOOD PRESSURE: 77 MMHG | HEART RATE: 84 BPM | BODY MASS INDEX: 25.86 KG/M2 | WEIGHT: 137 LBS | TEMPERATURE: 99.5 F

## 2020-07-20 DIAGNOSIS — R42 DIZZINESS: Primary | ICD-10-CM

## 2020-07-20 DIAGNOSIS — I10 BENIGN ESSENTIAL HYPERTENSION: ICD-10-CM

## 2020-07-20 DIAGNOSIS — S69.91XD INJURY OF RIGHT HAND, SUBSEQUENT ENCOUNTER: ICD-10-CM

## 2020-07-20 PROCEDURE — 99213 OFFICE O/P EST LOW 20 MIN: CPT | Performed by: NURSE PRACTITIONER

## 2020-07-20 RX ORDER — MECLIZINE HCL 12.5 MG/1
12.5 TABLET ORAL 3 TIMES DAILY PRN
Qty: 60 TABLET | Refills: 2 | Status: SHIPPED | OUTPATIENT
Start: 2020-07-20 | End: 2020-08-19 | Stop reason: ALTCHOICE

## 2020-07-20 RX ORDER — SULFAMETHOXAZOLE AND TRIMETHOPRIM 800; 160 MG/1; MG/1
1 TABLET ORAL 2 TIMES DAILY
COMMUNITY
Start: 2020-07-13 | End: 2020-07-21

## 2020-07-20 NOTE — PROGRESS NOTES
Luh Mckeon  11/14/1930  [id number]  Patient Care Team:  Schuyler Garcia MD as PCP - General  Schuyler Garcia MD as PCP - Family Medicine  Schuyler Garcia MD as PCP - Claims Attributed    Luh Mckeon is a pleasant 89 y.o. female who presents for evaluation of Wound Check (right hand )      Chief Complaint   Patient presents with   • Wound Check     right hand        HPI:   Dizziness: Continues.  She has increased her daily hydration as discussed.  Now averaging 5 to 6 cups/day of water.  She has been checking her blood pressure 3-4 times daily and brings a list.  Off losartan blood pressure 113/71 to 126/77 average.  She is still concerned that this could be related to starting calcium as directed per Dr. Reyes.  That was her only new medication at onset of symptoms.    Patient had ED visit 7/13/2020 [facility] for puncture dorsal right hand with rosebush thorn.  She went for evaluation due to excessive bleeding.  They put her on Bactrim which she has been taking without side effects.  They did update her tetanus.  Past Medical History:   Diagnosis Date   • Ankle instability    • Chronic left shoulder pain 1/26/2018   • Disorder of tendon of shoulder region, left    • Dysphagia    • Esophagitis    • Finger fracture, right 2009    Dr Chandler casting and physical therapy   • Hemorrhoids    • Hypertension    • Localized, primary osteoarthritis of left shoulder region    • Right shoulder pain    • Shoulder joint replacement status     Right   • Stroke (CMS/HCC)     CVA     Past Surgical History:   Procedure Laterality Date   • APPENDECTOMY     • CATARACT EXTRACTION, BILATERAL      2020   • ENDOSCOPY  07/2009    with biopsy/esophageal ring dilation   • HEMORRHOIDECTOMY      lanced    • HYSTERECTOMY      partial    • OOPHORECTOMY     • SHOULDER SURGERY Right     replacement   • SHOULDER SURGERY      Arthroscopy of shoulder:Right   • TONSILLECTOMY       Family History   Problem Relation Age of Onset   •  Cancer Mother    • Stroke Mother    • Bleeding Disorder Mother    • Osteoarthritis Mother    • Rheum arthritis Mother    • Breast cancer Mother 60   • Heart attack Father    • Coronary artery disease Father    • Cancer Sister         Bladder    • Bleeding Disorder Brother         2   • Colon cancer Brother         3   • Ovarian cancer Neg Hx      Social History     Tobacco Use   Smoking Status Never Smoker   Smokeless Tobacco Never Used     Allergies   Allergen Reactions   • Nasal [place]        Current Outpatient Medications:   •  Calcium Carbonate-Vitamin D (CALCIUM 500/VITAMIN D PO), Take 1 tablet by mouth Daily., Disp: , Rfl:   •  denosumab (Prolia) 60 MG/ML solution prefilled syringe syringe, Inject 60 mg under the skin into the appropriate area as directed Every 6 (Six) Months., Disp: , Rfl:   •  Mirabegron ER (MYRBETRIQ) 25 MG tablet sustained-release 24 hour 24 hr tablet, Take 1 tablet by mouth Daily., Disp: 90 tablet, Rfl: 3  •  rosuvastatin (CRESTOR) 5 MG tablet, Take 1 tablet by mouth Daily., Disp: 90 tablet, Rfl: 3  •  sulfamethoxazole-trimethoprim (BACTRIM DS,SEPTRA DS) 800-160 MG per tablet, Take 1 tablet by mouth 2 (two) times a day., Disp: , Rfl:   •  meclizine (ANTIVERT) 12.5 MG tablet, Take 1 tablet by mouth 3 (Three) Times a Day As Needed for Dizziness., Disp: 60 tablet, Rfl: 2    Review of Systems   Constitutional: Positive for appetite change. Negative for chills, fatigue and fever.   HENT: Negative for congestion, ear pain and sinus pressure.    Respiratory: Negative for cough, chest tightness, shortness of breath and wheezing.    Cardiovascular: Negative for chest pain and palpitations.   Gastrointestinal: Negative for abdominal pain, blood in stool and constipation.   Skin: Negative for color change.   Allergic/Immunologic: Negative for environmental allergies.   Neurological: Positive for dizziness. Negative for speech difficulty and headache.   Psychiatric/Behavioral: Negative for decreased  "concentration. The patient is not nervous/anxious.      /77   Pulse 84   Temp 99.5 °F (37.5 °C) (Temporal)   Ht 154 cm (60.63\")   Wt 62.1 kg (137 lb)   BMI 26.20 kg/m²     Physical Exam   Constitutional: She is oriented to person, place, and time. She appears well-developed and well-nourished.   HENT:   Head: Normocephalic and atraumatic.   Right Ear: External ear normal.   Left Ear: External ear normal.   **wearing mask   Eyes: Conjunctivae and EOM are normal.   Neck: Normal range of motion. Neck supple.   Cardiovascular: Normal rate, regular rhythm and normal heart sounds.   Pulmonary/Chest: Effort normal and breath sounds normal.   Abdominal: Soft. Bowel sounds are normal.   Musculoskeletal: Normal range of motion.   Neurological: She is alert and oriented to person, place, and time.   Skin: Skin is warm and dry.   Ecchymosis dorsal right hand at wrist, no signs of infection, no edema, skin intact   Psychiatric: She has a normal mood and affect. Her behavior is normal. Judgment and thought content normal.   Vitals reviewed.      Results Review:  I reviewed the patient's new clinical results.    Assessment/Plan:  Luh was seen today for wound check.    Diagnoses and all orders for this visit:    Dizziness  Comments:  Okay to stop calcium for 1 to 2 weeks.  Meclizine as needed dizziness, continue good hydration and blood pressure monitoring.  Stay off losartan    Benign essential hypertension  Comments:  Stay off losartan.  Continue good hydration.  Continue checking blood pressure once every few days and keep a list.    Injury of right hand, subsequent encounter  Comments:  rosebush injury, finishing bactrim, no s/s infx    Other orders  -     meclizine (ANTIVERT) 12.5 MG tablet; Take 1 tablet by mouth 3 (Three) Times a Day As Needed for Dizziness.       Patient Instructions   Okay to stop calcium for 1 to 2 weeks to see if this changes dizziness.  If dizziness continues restart calcium and use " meclizine as needed as discussed    Continue to push for good hydration and check blood pressure every couple of days.    Plan of care reviewed with patient at the conclusion of today's visit. Education was provided regarding diagnosis, management and any prescribed or recommended OTC medications.  Patient verbalizes understanding of and agreement with management plan.    Return for Next scheduled follow up.    *Note that portions of this note were completed with a voice recognition program.  Efforts were made to edit the dictation but occasionally words are transcribed.    CHANTELLE Mcelroy          Answers for HPI/ROS submitted by the patient on 7/14/2020   What is the primary reason for your visit?: Other  Please describe your symptoms.: Wound from tasia bush injury 7/13/20 a lot of bleeding & went to [facility] & received tetanus  shot. They requested follow up visit to make sure no fungal present., Also still having dizziness from low BP?  Have you had these symptoms before?: Yes  How long have you been having these symptoms?: 1-2 weeks

## 2020-07-20 NOTE — PATIENT INSTRUCTIONS
Okay to stop calcium for 1 to 2 weeks to see if this changes dizziness.  If dizziness continues restart calcium and use meclizine as needed as discussed    Continue to push for good hydration and check blood pressure every couple of days.

## 2020-07-28 ENCOUNTER — TELEPHONE (OUTPATIENT)
Dept: INTERNAL MEDICINE | Facility: CLINIC | Age: 85
End: 2020-07-28

## 2020-07-28 NOTE — TELEPHONE ENCOUNTER
Patient's daughter Swetha called on behalf of the patient, requesting a call back from the provider/nurse. She stated that the patient has been experiencing urinary frequency. Not able to empty bladder.  She would like to know what she should do, if something could be called into the pharmacy.    Preferred pharmacy:  [facility] [address] [place] [address] [phone] [facility] [phone] [facility]      Please call patient and advise @    [phone]

## 2020-07-29 ENCOUNTER — OFFICE VISIT (OUTPATIENT)
Dept: INTERNAL MEDICINE | Facility: CLINIC | Age: 85
End: 2020-07-29

## 2020-07-29 ENCOUNTER — LAB (OUTPATIENT)
Dept: LAB | Facility: HOSPITAL | Age: 85
End: 2020-07-29

## 2020-07-29 DIAGNOSIS — R39.15 URGENCY OF URINATION: Primary | ICD-10-CM

## 2020-07-29 DIAGNOSIS — R53.83 FATIGUE, UNSPECIFIED TYPE: ICD-10-CM

## 2020-07-29 DIAGNOSIS — I10 BENIGN ESSENTIAL HYPERTENSION: ICD-10-CM

## 2020-07-29 DIAGNOSIS — I08.0 DISORDER OF MITRAL AND AORTIC VALVES: ICD-10-CM

## 2020-07-29 DIAGNOSIS — R39.15 URGENCY OF URINATION: ICD-10-CM

## 2020-07-29 DIAGNOSIS — R42 DIZZINESS: ICD-10-CM

## 2020-07-29 LAB
ALBUMIN SERPL-MCNC: 3.8 G/DL (ref 3.5–5.2)
ALBUMIN/GLOB SERPL: 1.1 G/DL
ALP SERPL-CCNC: 55 U/L (ref 39–117)
ALT SERPL W P-5'-P-CCNC: 11 U/L (ref 1–33)
ANION GAP SERPL CALCULATED.3IONS-SCNC: 6.9 MMOL/L (ref 5–15)
AST SERPL-CCNC: 18 U/L (ref 1–32)
BASOPHILS # BLD AUTO: 0.07 10*3/MM3 (ref 0–0.2)
BASOPHILS NFR BLD AUTO: 1.2 % (ref 0–1.5)
BILIRUB BLD-MCNC: NEGATIVE MG/DL
BILIRUB SERPL-MCNC: 0.3 MG/DL (ref 0–1.2)
BUN SERPL-MCNC: 8 MG/DL (ref 8–23)
BUN/CREAT SERPL: 11.3 (ref 7–25)
CALCIUM SPEC-SCNC: 8.9 MG/DL (ref 8.6–10.5)
CHLORIDE SERPL-SCNC: 98 MMOL/L (ref 98–107)
CLARITY, POC: ABNORMAL
CO2 SERPL-SCNC: 27.1 MMOL/L (ref 22–29)
COLOR UR: ABNORMAL
CREAT SERPL-MCNC: 0.71 MG/DL (ref 0.57–1)
DEPRECATED RDW RBC AUTO: 40 FL (ref 37–54)
EOSINOPHIL # BLD AUTO: 0.09 10*3/MM3 (ref 0–0.4)
EOSINOPHIL NFR BLD AUTO: 1.6 % (ref 0.3–6.2)
ERYTHROCYTE [DISTWIDTH] IN BLOOD BY AUTOMATED COUNT: 12.3 % (ref 12.3–15.4)
GFR SERPL CREATININE-BSD FRML MDRD: 78 ML/MIN/1.73
GLOBULIN UR ELPH-MCNC: 3.4 GM/DL
GLUCOSE SERPL-MCNC: 94 MG/DL (ref 65–99)
GLUCOSE UR STRIP-MCNC: NEGATIVE MG/DL
HCT VFR BLD AUTO: 40.8 % (ref 34–46.6)
HGB BLD-MCNC: 13.8 G/DL (ref 12–15.9)
IMM GRANULOCYTES # BLD AUTO: 0.02 10*3/MM3 (ref 0–0.05)
IMM GRANULOCYTES NFR BLD AUTO: 0.4 % (ref 0–0.5)
KETONES UR QL: NEGATIVE
LEUKOCYTE EST, POC: ABNORMAL
LYMPHOCYTES # BLD AUTO: 1.33 10*3/MM3 (ref 0.7–3.1)
LYMPHOCYTES NFR BLD AUTO: 23.5 % (ref 19.6–45.3)
MCH RBC QN AUTO: 30.4 PG (ref 26.6–33)
MCHC RBC AUTO-ENTMCNC: 33.8 G/DL (ref 31.5–35.7)
MCV RBC AUTO: 89.9 FL (ref 79–97)
MONOCYTES # BLD AUTO: 0.79 10*3/MM3 (ref 0.1–0.9)
MONOCYTES NFR BLD AUTO: 14 % (ref 5–12)
NEUTROPHILS NFR BLD AUTO: 3.35 10*3/MM3 (ref 1.7–7)
NEUTROPHILS NFR BLD AUTO: 59.3 % (ref 42.7–76)
NITRITE UR-MCNC: POSITIVE MG/ML
NRBC BLD AUTO-RTO: 0 /100 WBC (ref 0–0.2)
PH UR: 5.5 [PH] (ref 5–8)
PLATELET # BLD AUTO: 322 10*3/MM3 (ref 140–450)
PMV BLD AUTO: 10.6 FL (ref 6–12)
POTASSIUM SERPL-SCNC: 4.2 MMOL/L (ref 3.5–5.2)
PROT SERPL-MCNC: 7.2 G/DL (ref 6–8.5)
PROT UR STRIP-MCNC: NEGATIVE MG/DL
RBC # BLD AUTO: 4.54 10*6/MM3 (ref 3.77–5.28)
RBC # UR STRIP: ABNORMAL /UL
SODIUM SERPL-SCNC: 132 MMOL/L (ref 136–145)
SP GR UR: 1.02 (ref 1–1.03)
T4 FREE SERPL-MCNC: 1.37 NG/DL (ref 0.93–1.7)
TSH SERPL DL<=0.05 MIU/L-ACNC: 0.97 UIU/ML (ref 0.27–4.2)
UROBILINOGEN UR QL: NORMAL
WBC # BLD AUTO: 5.65 10*3/MM3 (ref 3.4–10.8)

## 2020-07-29 PROCEDURE — 84439 ASSAY OF FREE THYROXINE: CPT

## 2020-07-29 PROCEDURE — 87077 CULTURE AEROBIC IDENTIFY: CPT

## 2020-07-29 PROCEDURE — 87186 SC STD MICRODIL/AGAR DIL: CPT

## 2020-07-29 PROCEDURE — 85025 COMPLETE CBC W/AUTO DIFF WBC: CPT

## 2020-07-29 PROCEDURE — 81003 URINALYSIS AUTO W/O SCOPE: CPT | Performed by: PHYSICIAN ASSISTANT

## 2020-07-29 PROCEDURE — 80053 COMPREHEN METABOLIC PANEL: CPT

## 2020-07-29 PROCEDURE — 87086 URINE CULTURE/COLONY COUNT: CPT

## 2020-07-29 PROCEDURE — 99214 OFFICE O/P EST MOD 30 MIN: CPT | Performed by: PHYSICIAN ASSISTANT

## 2020-07-29 PROCEDURE — 84443 ASSAY THYROID STIM HORMONE: CPT

## 2020-07-29 NOTE — PROGRESS NOTES
Patient Care Team:  Schuyler Garcia MD as PCP - General  Schuyler Garcia MD as PCP - Family Medicine  Schuyler Garcia MD as PCP - Claims Attributed    Chief Complaint::   Chief Complaint   Patient presents with   • Urinary Urgency     no pain , odor or frequency   feels better this morning        Subjective     HPI  89-year-old female presents for follow-up on hypertension, dizziness, urinary frequency, and fatigue.  She was initially seen in the office on 7/8/2020 by the Ayse Aguayo for hypotension and dizziness.  She was instructed to discontinue losartan 50 mg daily and increase water intake.  Patient returned on 7/20/2020 for follow-up and blood pressure remained stable.  Her dizziness had continued and meclizine was prescribed.  Patient felt that her calcium supplement was causing her dizziness and has discontinued this.  She feels that her dizziness did improve slightly.  She has not started the meclizine.  She has a 2 day history of urinary frequency and worsening fatigue.  She denies chest pain, palpitations, or headache.  She continues to monitor blood pressures three times a day and has noticed BP is rising.        The following portions of the patient's history were reviewed and updated as appropriate: active problem list, medication list, allergies, family history, social history    Review of Systems:   Review of Systems   Constitutional: Positive for fatigue. Negative for activity change, appetite change, diaphoresis, fever, unexpected weight gain and unexpected weight loss.   HENT: Negative for hearing loss.    Eyes: Negative for visual disturbance.   Respiratory: Negative for chest tightness and shortness of breath.    Cardiovascular: Negative for chest pain, palpitations and leg swelling.   Gastrointestinal: Negative for abdominal pain, blood in stool, GERD and indigestion.   Endocrine: Negative for cold intolerance and heat intolerance.   Genitourinary: Positive for frequency. Negative  "for dysuria and hematuria.   Musculoskeletal: Negative for arthralgias and myalgias.   Skin: Negative for skin lesions.   Allergic/Immunologic: Negative for environmental allergies and food allergies.   Neurological: Negative for tremors, seizures, syncope, speech difficulty, weakness, headache, memory problem and confusion.   Hematological: Negative for adenopathy. Does not bruise/bleed easily.   Psychiatric/Behavioral: Negative for sleep disturbance and depressed mood. The patient is not nervous/anxious.        Vital Signs  Vitals:    07/29/20 0854 07/30/20 1432   BP: 152/82 158/78   BP Location: Left arm    Patient Position: Sitting    Cuff Size: Adult    Pulse: 72    Temp: 98.1 °F (36.7 °C)    TempSrc: Temporal    Weight: 62.9 kg (138 lb 9.6 oz)    Height: 154 cm (60.63\")    PainSc: 0-No pain      Body mass index is 26.51 kg/m².    Labs  Lab on 07/29/2020   Component Date Value Ref Range Status   • Glucose 07/29/2020 94  65 - 99 mg/dL Final   • BUN 07/29/2020 8  8 - 23 mg/dL Final   • Creatinine 07/29/2020 0.71  0.57 - 1.00 mg/dL Final   • Sodium 07/29/2020 132* 136 - 145 mmol/L Final   • Potassium 07/29/2020 4.2  3.5 - 5.2 mmol/L Final   • Chloride 07/29/2020 98  98 - 107 mmol/L Final   • CO2 07/29/2020 27.1  22.0 - 29.0 mmol/L Final   • Calcium 07/29/2020 8.9  8.6 - 10.5 mg/dL Final   • Total Protein 07/29/2020 7.2  6.0 - 8.5 g/dL Final   • Albumin 07/29/2020 3.80  3.50 - 5.20 g/dL Final   • ALT (SGPT) 07/29/2020 11  1 - 33 U/L Final   • AST (SGOT) 07/29/2020 18  1 - 32 U/L Final   • Alkaline Phosphatase 07/29/2020 55  39 - 117 U/L Final   • Total Bilirubin 07/29/2020 0.3  0.0 - 1.2 mg/dL Final   • eGFR Non African Amer 07/29/2020 78  >60 mL/min/1.73 Final   • Globulin 07/29/2020 3.4  gm/dL Final   • A/G Ratio 07/29/2020 1.1  g/dL Final   • BUN/Creatinine Ratio 07/29/2020 11.3  7.0 - 25.0 Final   • Anion Gap 07/29/2020 6.9  5.0 - 15.0 mmol/L Final   • Urine Culture 07/29/2020 >100,000 CFU/mL Gram Negative " Bacilli*  Preliminary   • TSH 07/29/2020 0.966  0.270 - 4.200 uIU/mL Final   • Free T4 07/29/2020 1.37  0.93 - 1.70 ng/dL Final   • WBC 07/29/2020 5.65  3.40 - 10.80 10*3/mm3 Final   • RBC 07/29/2020 4.54  3.77 - 5.28 10*6/mm3 Final   • Hemoglobin 07/29/2020 13.8  12.0 - 15.9 g/dL Final   • Hematocrit 07/29/2020 40.8  34.0 - 46.6 % Final   • MCV 07/29/2020 89.9  79.0 - 97.0 fL Final   • MCH 07/29/2020 30.4  26.6 - 33.0 pg Final   • MCHC 07/29/2020 33.8  31.5 - 35.7 g/dL Final   • RDW 07/29/2020 12.3  12.3 - 15.4 % Final   • RDW-SD 07/29/2020 40.0  37.0 - 54.0 fl Final   • MPV 07/29/2020 10.6  6.0 - 12.0 fL Final   • Platelets 07/29/2020 322  140 - 450 10*3/mm3 Final   • Neutrophil % 07/29/2020 59.3  42.7 - 76.0 % Final   • Lymphocyte % 07/29/2020 23.5  19.6 - 45.3 % Final   • Monocyte % 07/29/2020 14.0* 5.0 - 12.0 % Final   • Eosinophil % 07/29/2020 1.6  0.3 - 6.2 % Final   • Basophil % 07/29/2020 1.2  0.0 - 1.5 % Final   • Immature Grans % 07/29/2020 0.4  0.0 - 0.5 % Final   • Neutrophils, Absolute 07/29/2020 3.35  1.70 - 7.00 10*3/mm3 Final   • Lymphocytes, Absolute 07/29/2020 1.33  0.70 - 3.10 10*3/mm3 Final   • Monocytes, Absolute 07/29/2020 0.79  0.10 - 0.90 10*3/mm3 Final   • Eosinophils, Absolute 07/29/2020 0.09  0.00 - 0.40 10*3/mm3 Final   • Basophils, Absolute 07/29/2020 0.07  0.00 - 0.20 10*3/mm3 Final   • Immature Grans, Absolute 07/29/2020 0.02  0.00 - 0.05 10*3/mm3 Final   • nRBC 07/29/2020 0.0  0.0 - 0.2 /100 WBC Final   Office Visit on 07/29/2020   Component Date Value Ref Range Status   • Color 07/29/2020 Dark Yellow  Yellow, Straw, Dark Yellow, Jonna Final   • Clarity, UA 07/29/2020 Slightly Cloudy* Clear Final   • Specific Gravity  07/29/2020 1.025  1.005 - 1.030 Final   • pH, Urine 07/29/2020 5.5  5.0 - 8.0 Final   • Leukocytes 07/29/2020 Small (1+)* Negative Final   • Nitrite, UA 07/29/2020 Positive* Negative Final   • Protein, POC 07/29/2020 Negative  Negative mg/dL Final   • Glucose, UA  07/29/2020 Negative  Negative, 1000 mg/dL (3+) mg/dL Final   • Ketones, UA 07/29/2020 Negative  Negative Final   • Urobilinogen, UA 07/29/2020 Normal  Normal Final   • Bilirubin 07/29/2020 Negative  Negative Final   • Blood, UA 07/29/2020 Trace* Negative Final   Lab on 07/08/2020   Component Date Value Ref Range Status   • Urine Culture 07/08/2020 No growth   Final   Office Visit on 07/08/2020   Component Date Value Ref Range Status   • Color 07/08/2020 Yellow  Yellow, Straw, Dark Yellow, Jonna Final   • Clarity, UA 07/08/2020 Clear  Clear Final   • Specific Gravity  07/08/2020 1.010  1.005 - 1.030 Final   • pH, Urine 07/08/2020 5.5  5.0 - 8.0 Final   • Leukocytes 07/08/2020 Small (1+)* Negative Final   • Nitrite, UA 07/08/2020 Negative  Negative Final   • Protein, POC 07/08/2020 Negative  Negative mg/dL Final   • Glucose, UA 07/08/2020 Negative  Negative, 1000 mg/dL (3+) mg/dL Final   • Ketones, UA 07/08/2020 Negative  Negative Final   • Urobilinogen, UA 07/08/2020 Normal  Normal Final   • Bilirubin 07/08/2020 Negative  Negative Final   • Blood, UA 07/08/2020 Trace* Negative Final   Lab on 05/29/2020   Component Date Value Ref Range Status   • 25 Hydroxy, Vitamin D 05/29/2020 30.3  30.0 - 100.0 ng/ml Final   • Glucose 05/29/2020 76  65 - 99 mg/dL Final   • BUN 05/29/2020 13  8 - 23 mg/dL Final   • Creatinine 05/29/2020 0.81  0.57 - 1.00 mg/dL Final   • Sodium 05/29/2020 136  136 - 145 mmol/L Final   • Potassium 05/29/2020 4.5  3.5 - 5.2 mmol/L Final   • Chloride 05/29/2020 100  98 - 107 mmol/L Final   • CO2 05/29/2020 25.6  22.0 - 29.0 mmol/L Final   • Calcium 05/29/2020 9.3  8.6 - 10.5 mg/dL Final   • Total Protein 05/29/2020 7.0  6.0 - 8.5 g/dL Final   • Albumin 05/29/2020 4.20  3.50 - 5.20 g/dL Final   • ALT (SGPT) 05/29/2020 11  1 - 33 U/L Final   • AST (SGOT) 05/29/2020 21  1 - 32 U/L Final   • Alkaline Phosphatase 05/29/2020 65  39 - 117 U/L Final   • Total Bilirubin 05/29/2020 0.4  0.2 - 1.2 mg/dL Final   •  eGFR Non African Amer 05/29/2020 67  >60 mL/min/1.73 Final   • Globulin 05/29/2020 2.8  gm/dL Final   • A/G Ratio 05/29/2020 1.5  g/dL Final   • BUN/Creatinine Ratio 05/29/2020 16.0  7.0 - 25.0 Final   • Anion Gap 05/29/2020 10.4  5.0 - 15.0 mmol/L Final   • WBC 05/29/2020 5.60  3.40 - 10.80 10*3/mm3 Final   • RBC 05/29/2020 4.37  3.77 - 5.28 10*6/mm3 Final   • Hemoglobin 05/29/2020 13.7  12.0 - 15.9 g/dL Final   • Hematocrit 05/29/2020 40.5  34.0 - 46.6 % Final   • MCV 05/29/2020 92.7  79.0 - 97.0 fL Final   • MCH 05/29/2020 31.4  26.6 - 33.0 pg Final   • MCHC 05/29/2020 33.8  31.5 - 35.7 g/dL Final   • RDW 05/29/2020 12.0* 12.3 - 15.4 % Final   • RDW-SD 05/29/2020 41.5  37.0 - 54.0 fl Final   • MPV 05/29/2020 11.1  6.0 - 12.0 fL Final   • Platelets 05/29/2020 262  140 - 450 10*3/mm3 Final   • Neutrophil % 05/29/2020 59.9  42.7 - 76.0 % Final   • Lymphocyte % 05/29/2020 26.1  19.6 - 45.3 % Final   • Monocyte % 05/29/2020 11.1  5.0 - 12.0 % Final   • Eosinophil % 05/29/2020 2.0  0.3 - 6.2 % Final   • Basophil % 05/29/2020 0.9  0.0 - 1.5 % Final   • Immature Grans % 05/29/2020 0.0  0.0 - 0.5 % Final   • Neutrophils, Absolute 05/29/2020 3.36  1.70 - 7.00 10*3/mm3 Final   • Lymphocytes, Absolute 05/29/2020 1.46  0.70 - 3.10 10*3/mm3 Final   • Monocytes, Absolute 05/29/2020 0.62  0.10 - 0.90 10*3/mm3 Final   • Eosinophils, Absolute 05/29/2020 0.11  0.00 - 0.40 10*3/mm3 Final   • Basophils, Absolute 05/29/2020 0.05  0.00 - 0.20 10*3/mm3 Final   • Immature Grans, Absolute 05/29/2020 0.00  0.00 - 0.05 10*3/mm3 Final   • nRBC 05/29/2020 0.0  0.0 - 0.2 /100 WBC Final       Imaging  No radiology results for the last 30 days.      Current Outpatient Medications:   •  denosumab (Prolia) 60 MG/ML solution prefilled syringe syringe, Inject 60 mg under the skin into the appropriate area as directed Every 6 (Six) Months., Disp: , Rfl:   •  Mirabegron ER (MYRBETRIQ) 25 MG tablet sustained-release 24 hour 24 hr tablet, Take 1 tablet  by mouth Daily., Disp: 90 tablet, Rfl: 3  •  rosuvastatin (CRESTOR) 5 MG tablet, Take 1 tablet by mouth Daily., Disp: 90 tablet, Rfl: 3  •  meclizine (ANTIVERT) 12.5 MG tablet, Take 1 tablet by mouth 3 (Three) Times a Day As Needed for Dizziness., Disp: 60 tablet, Rfl: 2    Physical Exam:    Physical Exam   Constitutional: She is oriented to person, place, and time. She appears well-developed and well-nourished.   HENT:   Head: Normocephalic and atraumatic.   Eyes: Pupils are equal, round, and reactive to light. Conjunctivae and EOM are normal.   Neck: Normal range of motion. Neck supple.   Cardiovascular: Normal rate, regular rhythm, normal heart sounds and intact distal pulses.   Pulmonary/Chest: Effort normal and breath sounds normal.   Abdominal: There is no tenderness.   Neurological: She is alert and oriented to person, place, and time.   Skin: Skin is warm and dry.   Psychiatric: She has a normal mood and affect. Her behavior is normal. Thought content normal.   Nursing note and vitals reviewed.      Procedures        Assessment/Plan   Problem List Items Addressed This Visit        Cardiovascular and Mediastinum    Benign essential hypertension    Current Assessment & Plan     Reports blood pressures have been going up and down.  She discontinued losartan earlier in the month due to extremely low blood pressures and dizziness.          Relevant Orders    Comprehensive Metabolic Panel (Completed)    Ambulatory Referral to [facility] [place] [facility] [place]    Disorder of mitral and aortic valves       Genitourinary    Urgency of urination - Primary    Current Assessment & Plan     UA sent for culture.         Relevant Orders    POC Urinalysis Dipstick, Automated (Completed)    CBC & Differential (Completed)    Comprehensive Metabolic Panel (Completed)    Urine Culture - Urine, Urine, Clean Catch (Completed)       Other    Dizziness    Current Assessment & Plan     Improving.         Relevant Orders    TSH  (Completed)    T4, Free (Completed)    Fatigue    Current Assessment & Plan     Continue drinking plenty of water.  Continue to eat regular meals.  Order for labs today.         Relevant Orders    TSH (Completed)    T4, Free (Completed)          Return in about 2 weeks (around 8/12/2020) for Recheck.    Plan of care reviewed with patient at the conclusion of today's visit. Education was provided regarding diagnosis, management, and any prescribed or recommended OTC medications.Patient verbalizes understanding of and agreement with management plan.       Saima Contreras PA-C    Please note that portions of this note were completed with a voice recognition program. Efforts were made to edit the dictations, but occasionally words are mistranscribed.

## 2020-07-29 NOTE — PROGRESS NOTES
"QUICK REFERENCE INFORMATION:  The ABCs of the Annual Wellness Visit    Subsequent Medicare Wellness Visit    HEALTH RISK ASSESSMENT    11/14/1930    Recent Hospitalizations:  {Hospitalization history:[id number]::\"No hospitalization(s) within the last year.\"}.        Current Medical Providers:  Patient Care Team:  Schuyler Garcia MD as PCP - General  Schuyler Garcia MD as PCP - Family Medicine  Schuyler Garcia MD as PCP - Claims Attributed        Smoking Status:  Social History     Tobacco Use   Smoking Status Never Smoker   Smokeless Tobacco Never Used       Alcohol Consumption:  Social History     Substance and Sexual Activity   Alcohol Use No       Depression Screen:   PHQ-2/PHQ-9 Depression Screening 9/23/2019   Little interest or pleasure in doing things 0   Feeling down, depressed, or hopeless 0   Total Score 0       Health Habits and Functional and Cognitive Screening:  Functional & Cognitive Status 9/23/2019   Do you have difficulty preparing food and eating? No   Do you have difficulty bathing yourself, getting dressed or grooming yourself? No   Do you have difficulty using the toilet? No   Do you have difficulty moving around from place to place? No   Do you have trouble with steps or getting out of a bed or a chair? No   Current Diet Well Balanced Diet   Dental Exam Up to date   Eye Exam Up to date   Exercise (times per week) 3 times per week   Current Exercise Activities Include Cardiovasular Workout on Exercise Equipment   Do you need help using the phone?  No   Are you deaf or do you have serious difficulty hearing?  No   Do you need help with transportation? Yes   Do you need help shopping? No   Do you need help preparing meals?  No   Do you need help with housework?  No   Do you need help with laundry? No   Do you need help taking your medications? No   Do you need help managing money? Yes   Do you ever drive or ride in a car without wearing a seat belt? No   Have you felt unusual stress, " "anger or loneliness in the last month? No   Who do you live with? Alone   If you need help, do you have trouble finding someone available to you? No   Have you been bothered in the last four weeks by sexual problems? No   Do you have difficulty concentrating, remembering or making decisions? No       Fall Risk Screen:  ALEXIS Fall Risk Assessment has not been completed.    ACE III MINI        Does the patient have evidence of cognitive impairment? {Yes/No w/ pre-defaulted No:49363::\"No\"}    Aspirin use counseling: {Aspirin :99617}    Recent Lab Results:  CMP:  Lab Results   Component Value Date    BUN 13 05/29/2020    CREATININE 0.81 05/29/2020    EGFRIFNONA 67 05/29/2020    BCR 16.0 05/29/2020     05/29/2020    K 4.5 05/29/2020    CO2 25.6 05/29/2020    CALCIUM 9.3 05/29/2020    ALBUMIN 4.20 05/29/2020    BILITOT 0.4 05/29/2020    ALKPHOS 65 05/29/2020    AST 21 05/29/2020    ALT 11 05/29/2020     HbA1c:  No results found for: HGBA1C  Microalbumin:  Lab Results   Component Value Date    MICROALBUR <1.2 01/27/2020     Lipid Panel  Lab Results   Component Value Date    CHOL 171 01/27/2020    TRIG 112 01/27/2020    HDL 60 01/27/2020    LDL 89 01/27/2020    AST 21 05/29/2020    ALT 11 05/29/2020       Visual Acuity:  No exam data present    Age-appropriate Screening Schedule:  Refer to the list below for future screening recommendations based on patient's age, sex and/or medical conditions. Orders for these recommended tests are listed in the plan section. The patient has been provided with a written plan.    Health Maintenance   Topic Date Due   • ZOSTER VACCINE (2 of 3) 05/25/2009   • DXA SCAN  07/05/2020   • INFLUENZA VACCINE  08/01/2020   • LIPID PANEL  01/27/2021   • TDAP/TD VACCINES (3 - Td) 07/14/2030        Subjective   History of Present Illness    Luh Mckeon is a 89 y.o. female who presents for a Subsequent Wellness Visit.    CHRONIC CONDITIONS    The following portions of the patient's " "history were reviewed and updated as appropriate: {history reviewed:20406::\"allergies\",\"current medications\",\"past family history\",\"past medical history\",\"past social history\",\"past surgical history\",\"problem list\"}.    Outpatient Medications Prior to Visit   Medication Sig Dispense Refill   • denosumab (Prolia) 60 MG/ML solution prefilled syringe syringe Inject 60 mg under the skin into the appropriate area as directed Every 6 (Six) Months.     • Mirabegron ER (MYRBETRIQ) 25 MG tablet sustained-release 24 hour 24 hr tablet Take 1 tablet by mouth Daily. 90 tablet 3   • rosuvastatin (CRESTOR) 5 MG tablet Take 1 tablet by mouth Daily. 90 tablet 3   • Calcium Carbonate-Vitamin D (CALCIUM 500/VITAMIN D PO) Take 1 tablet by mouth Daily.     • meclizine (ANTIVERT) 12.5 MG tablet Take 1 tablet by mouth 3 (Three) Times a Day As Needed for Dizziness. 60 tablet 2     No facility-administered medications prior to visit.        Patient Active Problem List   Diagnosis   • Generalized anxiety disorder   • Unsteady gait   • Risk for falls   • Hyperlipidemia LDL goal <100   • Benign essential hypertension   • Memory loss   • Urinary incontinence in female   • Candida onychomycosis   • AR (allergic rhinitis)   • Overweight (BMI 25.0-29.9)   • Chest pain   • Cramp in limb   • Disorder of mitral and aortic valves   • Disorder of skeletal muscle   • Diverticular disease of colon   • Dysphagia   • External hemorrhoids   •     • Hand joint pain   • Insomnia   • Knee pain   • Menopausal and postmenopausal disorder   • Osteoporosis   • Shoulder pain   • Vitamin B deficiency   • Medicare annual wellness visit, subsequent   • Dizziness       Advance Care Planning:  {Advance Directive Status:72246}    Identification of Risk Factors:  Risk factors include: {; WELLNESS RISK FACTORS:13242}.    Review of Systems   Constitutional: Negative for chills, fatigue and fever.   HENT: Negative for congestion, ear pain and sinus pressure.  " "  Respiratory: Negative for cough, chest tightness, shortness of breath and wheezing.    Cardiovascular: Negative for chest pain and palpitations.   Gastrointestinal: Negative for abdominal pain, blood in stool and constipation.   Genitourinary: Positive for urgency.   Skin: Negative for color change.   Allergic/Immunologic: Negative for environmental allergies.   Neurological: Negative for dizziness, speech difficulty and headaches.   Psychiatric/Behavioral: Negative for confusion. The patient is not nervous/anxious.        Compared to one year ago, the patient feels her physical health is {better worse same:77882}.  Compared to one year ago, the patient feels her mental health is {better worse same:68671}.    Objective     Physical Exam     Procedures     Vitals:    07/29/20 0854   Height: 154 cm (60.63\")   PainSc: 0-No pain       Patient's Body mass index is 26.2 kg/m². BMI is {BMI range:45219}.      Assessment/Plan   Problem List Items Addressed This Visit     None        Patient Self-Management and Personalized Health Advice  The patient has been provided with information about: {; PERSONALIZED HEALTH ADVICE:22909} and preventive services including:   · {plan:17010}.    Outpatient Encounter Medications as of 7/29/2020   Medication Sig Dispense Refill   • denosumab (Prolia) 60 MG/ML solution prefilled syringe syringe Inject 60 mg under the skin into the appropriate area as directed Every 6 (Six) Months.     • Mirabegron ER (MYRBETRIQ) 25 MG tablet sustained-release 24 hour 24 hr tablet Take 1 tablet by mouth Daily. 90 tablet 3   • rosuvastatin (CRESTOR) 5 MG tablet Take 1 tablet by mouth Daily. 90 tablet 3   • Calcium Carbonate-Vitamin D (CALCIUM 500/VITAMIN D PO) Take 1 tablet by mouth Daily.     • meclizine (ANTIVERT) 12.5 MG tablet Take 1 tablet by mouth 3 (Three) Times a Day As Needed for Dizziness. 60 tablet 2     No facility-administered encounter medications on file as of 7/29/2020.        Reviewed use " of high risk medication in the elderly: {Response; yes/no/na:63}  Reviewed for potential of harmful drug interactions in the elderly: {Response; yes/no/na:63}    Follow Up:  No follow-ups on file.     There are no Patient Instructions on file for this visit.    An After Visit Summary and PPPS with all of these plans were given to the patient.

## 2020-07-30 VITALS
HEIGHT: 61 IN | HEART RATE: 72 BPM | WEIGHT: 138.6 LBS | BODY MASS INDEX: 26.17 KG/M2 | SYSTOLIC BLOOD PRESSURE: 158 MMHG | TEMPERATURE: 98.1 F | DIASTOLIC BLOOD PRESSURE: 78 MMHG

## 2020-07-30 PROBLEM — R39.15 URGENCY OF URINATION: Status: ACTIVE | Noted: 2020-07-30

## 2020-07-30 PROBLEM — R53.83 FATIGUE: Status: ACTIVE | Noted: 2020-07-30

## 2020-07-30 NOTE — ASSESSMENT & PLAN NOTE
Reports blood pressures have been going up and down.  She discontinued losartan earlier in the month due to extremely low blood pressures and dizziness.

## 2020-07-31 ENCOUNTER — TELEPHONE (OUTPATIENT)
Dept: INTERNAL MEDICINE | Facility: CLINIC | Age: 85
End: 2020-07-31

## 2020-07-31 DIAGNOSIS — N30.00 ACUTE CYSTITIS WITHOUT HEMATURIA: ICD-10-CM

## 2020-07-31 DIAGNOSIS — N30.00 ACUTE CYSTITIS WITHOUT HEMATURIA: Primary | ICD-10-CM

## 2020-07-31 LAB — BACTERIA SPEC AEROBE CULT: ABNORMAL

## 2020-07-31 RX ORDER — CEFDINIR 300 MG/1
300 CAPSULE ORAL 2 TIMES DAILY
Qty: 10 CAPSULE | Refills: 0 | Status: SHIPPED | OUTPATIENT
Start: 2020-07-31 | End: 2020-07-31 | Stop reason: SDUPTHER

## 2020-07-31 RX ORDER — CEFDINIR 300 MG/1
300 CAPSULE ORAL 2 TIMES DAILY
Qty: 10 CAPSULE | Refills: 0 | Status: SHIPPED | OUTPATIENT
Start: 2020-07-31 | End: 2020-08-05

## 2020-07-31 NOTE — TELEPHONE ENCOUNTER
Pt's daughter returned Swetha's call. I advised what Nicole had said about having a UTI. Saima sent in Cefdinir for UTI but it was sent to [facility] and should have been sent to [facility] in [place].

## 2020-08-04 ENCOUNTER — TELEPHONE (OUTPATIENT)
Dept: INTERNAL MEDICINE | Facility: CLINIC | Age: 85
End: 2020-08-04

## 2020-08-04 NOTE — TELEPHONE ENCOUNTER
PATIENTS DAUGHTER IS CALLING BECAUSE HER MOTHER DOES NOT WANT TO GO TO THE APPT TOMORROW FOR THE HEART REFERRAL AND THEY HAVE TRIED TO CALL OVER THERE TO CANCEL BUT CANNOT GET A HOLD OF ANYONE    SHE SAID SHE IS STILL CONCERNED ABOUT HER MOMS BLOOD PRESSURE AND HER MOM THINKS [facility] IS TRYING TO CONTROL HER BY MAKING HER GO TO THIS APPT.  HER BLOOD PRESSURE YESTERDAY /160 SOMETHING OVER 72.    SHE IS ALSO WORRIED THAT THE UTI MEDICATION HAS MADE HER DEMENTIA WORSE, SHE IS ACCUSING NAIN OF THINGS RIGHT NOW.     SHE IS WONDERING IF YOU SHOULD SEE HER?     NAIN PH: [phone]

## 2020-08-04 NOTE — TELEPHONE ENCOUNTER
Please tell her that blood pressure in 150's is not critical and not likely causing agitation. Please stop the antibiotic and if not better next week could be seen I do not want to prescribe an agittatin medicine for her. Ideally keep appt 8/19

## 2020-08-04 NOTE — TELEPHONE ENCOUNTER
She doesn't need an order, she is currently on UTI med and daughter is worried its making it worse

## 2020-08-04 NOTE — TELEPHONE ENCOUNTER
I would suggest she follow with cardiology and I could put an for urine and she could get it tomorrow.

## 2020-08-04 NOTE — TELEPHONE ENCOUNTER
It would be better if still bad a few days after completing the antibiotics to come for evaluation. Hopefully will improve in the next few days on its own

## 2020-08-19 ENCOUNTER — OFFICE VISIT (OUTPATIENT)
Dept: INTERNAL MEDICINE | Facility: CLINIC | Age: 85
End: 2020-08-19

## 2020-08-19 VITALS
WEIGHT: 136 LBS | TEMPERATURE: 97.4 F | BODY MASS INDEX: 26.01 KG/M2 | HEART RATE: 68 BPM | DIASTOLIC BLOOD PRESSURE: 86 MMHG | SYSTOLIC BLOOD PRESSURE: 142 MMHG

## 2020-08-19 DIAGNOSIS — R42 DIZZINESS: ICD-10-CM

## 2020-08-19 DIAGNOSIS — M81.0 AGE-RELATED OSTEOPOROSIS WITHOUT CURRENT PATHOLOGICAL FRACTURE: ICD-10-CM

## 2020-08-19 DIAGNOSIS — R41.3 MEMORY LOSS: ICD-10-CM

## 2020-08-19 DIAGNOSIS — I10 BENIGN ESSENTIAL HYPERTENSION: Primary | ICD-10-CM

## 2020-08-19 PROCEDURE — 99214 OFFICE O/P EST MOD 30 MIN: CPT | Performed by: INTERNAL MEDICINE

## 2020-08-19 RX ORDER — LOSARTAN POTASSIUM 25 MG/1
TABLET ORAL
Qty: 45 TABLET | Refills: 3 | Status: SHIPPED | OUTPATIENT
Start: 2020-08-19 | End: 2020-09-29 | Stop reason: SDUPTHER

## 2020-08-19 RX ORDER — LOSARTAN POTASSIUM 25 MG/1
TABLET ORAL
Qty: 45 TABLET | Refills: 3 | Status: SHIPPED | OUTPATIENT
Start: 2020-08-19 | End: 2020-08-19 | Stop reason: SDUPTHER

## 2020-08-19 NOTE — PROGRESS NOTES
Chief Complaint   Patient presents with   • bp f/u   • uti f/u       History of Present Illness  89 y.o. white female presents for follow up of dizziness and  Labile HTN. She denies chest pain or shortness of air or headaches.   She denies burning with urination or abdominal pain. Her dizziness is better.     Review of Systems   Constitutional: Negative for chills and fever.   HENT: Negative for sore throat.    Respiratory: Negative for cough and shortness of breath.    Cardiovascular: Negative for chest pain and palpitations.   Gastrointestinal: Negative for abdominal pain, nausea and vomiting.   Musculoskeletal: Negative for back pain.   Skin: Negative for rash.   Neurological: Positive for dizziness (improved). Negative for light-headedness and headaches.   Psychiatric/Behavioral: Positive for decreased concentration. Negative for dysphoric mood and suicidal ideas. The patient is not nervous/anxious.    All other systems reviewed and are negative.    All other ROS reviewed and negative.    PMSFH:  The following portions of the patient's history were reviewed and updated as appropriate: allergies, current medications, past family history, past medical history, past social history, past surgical history and problem list.      Current Outpatient Medications:   •  denosumab (Prolia) 60 MG/ML solution prefilled syringe syringe, Inject 60 mg under the skin into the appropriate area as directed Every 6 (Six) Months., Disp: , Rfl:   •  Mirabegron ER (MYRBETRIQ) 25 MG tablet sustained-release 24 hour 24 hr tablet, Take 1 tablet by mouth Daily., Disp: 90 tablet, Rfl: 3  •  rosuvastatin (CRESTOR) 5 MG tablet, Take 1 tablet by mouth Daily., Disp: 90 tablet, Rfl: 3  •  Cholecalciferol (VITAMIN D3) 25 MCG (1000 UT) capsule, Take 1 capsule by mouth Daily., Disp: 30 capsule, Rfl: 11  •  losartan (COZAAR) 25 MG tablet, 1/2 tab per day, Disp: 45 tablet, Rfl: 3    VITALS:  /86   Pulse 68   Temp 97.4 °F (36.3 °C)   Wt 61.7  kg (136 lb)   BMI 26.01 kg/m²     Physical Exam   Constitutional: She is oriented to person, place, and time. She appears well-developed and well-nourished.   HENT:   Head: Normocephalic.   Eyes: Conjunctivae and EOM are normal.   Neck: No JVD present.   Cardiovascular: Normal rate and regular rhythm.   Murmur (II/VI SM ) heard.  Pulmonary/Chest: Effort normal and breath sounds normal. She has no wheezes. She has no rales.   Abdominal: Soft. Bowel sounds are normal. There is no tenderness.   Musculoskeletal: She exhibits no edema.   Neurological: She is alert and oriented to person, place, and time.   Psychiatric: She has a normal mood and affect. Her behavior is normal.       LABS:  Results for orders placed or performed in visit on 07/29/20   Urine Culture - Urine, Urine, Clean Catch   Result Value Ref Range    Urine Culture >100,000 CFU/mL Escherichia coli (A)        Susceptibility    Escherichia coli - JESSICA     Ampicillin >=32 Resistant ug/ml     Ampicillin + Sulbactam 16 Intermediate ug/ml     Cefazolin <=4 Susceptible ug/ml     Cefepime <=1 Susceptible ug/ml     Ceftazidime <=1 Susceptible ug/ml     Ceftriaxone <=1 Susceptible ug/ml     Gentamicin <=1 Susceptible ug/ml     Levofloxacin <=0.12 Susceptible ug/ml     Nitrofurantoin <=16 Susceptible ug/ml     Piperacillin + Tazobactam <=4 Susceptible ug/ml     Tetracycline >=16 Resistant ug/ml     Trimethoprim + Sulfamethoxazole >=320 Resistant ug/ml   Comprehensive Metabolic Panel   Result Value Ref Range    Glucose 94 65 - 99 mg/dL    BUN 8 8 - 23 mg/dL    Creatinine 0.71 0.57 - 1.00 mg/dL    Sodium 132 (L) 136 - 145 mmol/L    Potassium 4.2 3.5 - 5.2 mmol/L    Chloride 98 98 - 107 mmol/L    CO2 27.1 22.0 - 29.0 mmol/L    Calcium 8.9 8.6 - 10.5 mg/dL    Total Protein 7.2 6.0 - 8.5 g/dL    Albumin 3.80 3.50 - 5.20 g/dL    ALT (SGPT) 11 1 - 33 U/L    AST (SGOT) 18 1 - 32 U/L    Alkaline Phosphatase 55 39 - 117 U/L    Total Bilirubin 0.3 0.0 - 1.2 mg/dL    eGFR Non   Amer 78 >60 mL/min/1.73    Globulin 3.4 gm/dL    A/G Ratio 1.1 g/dL    BUN/Creatinine Ratio 11.3 7.0 - 25.0    Anion Gap 6.9 5.0 - 15.0 mmol/L   TSH   Result Value Ref Range    TSH 0.966 0.270 - 4.200 uIU/mL   T4, Free   Result Value Ref Range    Free T4 1.37 0.93 - 1.70 ng/dL   CBC Auto Differential   Result Value Ref Range    WBC 5.65 3.40 - 10.80 10*3/mm3    RBC 4.54 3.77 - 5.28 10*6/mm3    Hemoglobin 13.8 12.0 - 15.9 g/dL    Hematocrit 40.8 34.0 - 46.6 %    MCV 89.9 79.0 - 97.0 fL    MCH 30.4 26.6 - 33.0 pg    MCHC 33.8 31.5 - 35.7 g/dL    RDW 12.3 12.3 - 15.4 %    RDW-SD 40.0 37.0 - 54.0 fl    MPV 10.6 6.0 - 12.0 fL    Platelets 322 140 - 450 10*3/mm3    Neutrophil % 59.3 42.7 - 76.0 %    Lymphocyte % 23.5 19.6 - 45.3 %    Monocyte % 14.0 (H) 5.0 - 12.0 %    Eosinophil % 1.6 0.3 - 6.2 %    Basophil % 1.2 0.0 - 1.5 %    Immature Grans % 0.4 0.0 - 0.5 %    Neutrophils, Absolute 3.35 1.70 - 7.00 10*3/mm3    Lymphocytes, Absolute 1.33 0.70 - 3.10 10*3/mm3    Monocytes, Absolute 0.79 0.10 - 0.90 10*3/mm3    Eosinophils, Absolute 0.09 0.00 - 0.40 10*3/mm3    Basophils, Absolute 0.07 0.00 - 0.20 10*3/mm3    Immature Grans, Absolute 0.02 0.00 - 0.05 10*3/mm3    nRBC 0.0 0.0 - 0.2 /100 WBC       Procedures         ASSESSMENT/PLAN:  Problem List Items Addressed This Visit        Cardiovascular and Mediastinum    Benign essential hypertension - Primary     Hypertension is worsening.  Continue current treatment regimen.  Dietary sodium restriction.  Weight loss.  Regular aerobic exercise.  Blood pressure will be reassessed at the next regular appointment Add losartan 12.5 mg per day. .         Relevant Medications    losartan (COZAAR) 25 MG tablet       Musculoskeletal and Integument    Osteoporosis     Add vitamin D 1000 IU per day and increase walking.             Other    Memory loss     Encouraged to see Neurology.. Also encouraged to resume Zoroastrian and exercise and socialize with family.          Dizziness      Stay hydrated and watch with resuming the losartan. She declined ENT for now.                FOLLOW-UP:  Return for Recheck.      Electronically signed by:    Schuyler Garcia MD

## 2020-08-20 NOTE — ASSESSMENT & PLAN NOTE
Encouraged to see Neurology.. Also encouraged to resume Moravian and exercise and socialize with family.

## 2020-08-20 NOTE — ASSESSMENT & PLAN NOTE
Hypertension is worsening.  Continue current treatment regimen.  Dietary sodium restriction.  Weight loss.  Regular aerobic exercise.  Blood pressure will be reassessed at the next regular appointment Add losartan 12.5 mg per day. .

## 2020-09-14 ENCOUNTER — TELEPHONE (OUTPATIENT)
Dept: INTERNAL MEDICINE | Facility: CLINIC | Age: 85
End: 2020-09-14

## 2020-09-25 ENCOUNTER — TELEPHONE (OUTPATIENT)
Dept: INTERNAL MEDICINE | Facility: CLINIC | Age: 85
End: 2020-09-25

## 2020-09-25 DIAGNOSIS — R25.2 CRAMP IN LIMB: ICD-10-CM

## 2020-09-25 DIAGNOSIS — I10 BENIGN ESSENTIAL HYPERTENSION: ICD-10-CM

## 2020-09-25 DIAGNOSIS — E53.9 VITAMIN B DEFICIENCY: ICD-10-CM

## 2020-09-25 DIAGNOSIS — E78.5 HYPERLIPIDEMIA LDL GOAL <100: Primary | ICD-10-CM

## 2020-09-25 NOTE — TELEPHONE ENCOUNTER
Caller: Luh Mckeon    Relationship: Self    Best call back number:[phone]    What orders are you requesting (i.e. lab or imaging): LABS    In what timeframe would the patient need to come in: 09/29/20    Where will you receive your lab/imaging services: IN OFFICE    Additional notes:

## 2020-09-29 ENCOUNTER — LAB (OUTPATIENT)
Dept: LAB | Facility: HOSPITAL | Age: 85
End: 2020-09-29

## 2020-09-29 ENCOUNTER — OFFICE VISIT (OUTPATIENT)
Dept: INTERNAL MEDICINE | Facility: CLINIC | Age: 85
End: 2020-09-29

## 2020-09-29 VITALS
TEMPERATURE: 97.5 F | WEIGHT: 137 LBS | SYSTOLIC BLOOD PRESSURE: 126 MMHG | HEART RATE: 68 BPM | BODY MASS INDEX: 25.86 KG/M2 | DIASTOLIC BLOOD PRESSURE: 78 MMHG | HEIGHT: 61 IN

## 2020-09-29 DIAGNOSIS — E78.5 HYPERLIPIDEMIA LDL GOAL <100: ICD-10-CM

## 2020-09-29 DIAGNOSIS — I10 BENIGN ESSENTIAL HYPERTENSION: ICD-10-CM

## 2020-09-29 DIAGNOSIS — R25.2 CRAMP IN LIMB: ICD-10-CM

## 2020-09-29 DIAGNOSIS — Z00.00 MEDICARE ANNUAL WELLNESS VISIT, SUBSEQUENT: Primary | ICD-10-CM

## 2020-09-29 DIAGNOSIS — R42 DIZZINESS: ICD-10-CM

## 2020-09-29 DIAGNOSIS — E53.9 VITAMIN B DEFICIENCY: ICD-10-CM

## 2020-09-29 LAB
ALBUMIN SERPL-MCNC: 4 G/DL (ref 3.5–5.2)
ALBUMIN UR-MCNC: <1.2 MG/DL
ALBUMIN/GLOB SERPL: 1.3 G/DL
ALP SERPL-CCNC: 58 U/L (ref 39–117)
ALT SERPL W P-5'-P-CCNC: 11 U/L (ref 1–33)
ANION GAP SERPL CALCULATED.3IONS-SCNC: 6.6 MMOL/L (ref 5–15)
AST SERPL-CCNC: 17 U/L (ref 1–32)
BASOPHILS # BLD AUTO: 0.03 10*3/MM3 (ref 0–0.2)
BASOPHILS NFR BLD AUTO: 0.5 % (ref 0–1.5)
BILIRUB SERPL-MCNC: 0.5 MG/DL (ref 0–1.2)
BUN SERPL-MCNC: 11 MG/DL (ref 8–23)
BUN/CREAT SERPL: 14.3 (ref 7–25)
CALCIUM SPEC-SCNC: 9.6 MG/DL (ref 8.6–10.5)
CHLORIDE SERPL-SCNC: 103 MMOL/L (ref 98–107)
CHOLEST SERPL-MCNC: 170 MG/DL (ref 0–200)
CO2 SERPL-SCNC: 28.4 MMOL/L (ref 22–29)
CREAT SERPL-MCNC: 0.77 MG/DL (ref 0.57–1)
CREAT UR-MCNC: 105.5 MG/DL
DEPRECATED RDW RBC AUTO: 39.9 FL (ref 37–54)
EOSINOPHIL # BLD AUTO: 0.1 10*3/MM3 (ref 0–0.4)
EOSINOPHIL NFR BLD AUTO: 1.6 % (ref 0.3–6.2)
ERYTHROCYTE [DISTWIDTH] IN BLOOD BY AUTOMATED COUNT: 12.1 % (ref 12.3–15.4)
GFR SERPL CREATININE-BSD FRML MDRD: 71 ML/MIN/1.73
GLOBULIN UR ELPH-MCNC: 3.2 GM/DL
GLUCOSE SERPL-MCNC: 73 MG/DL (ref 65–99)
HCT VFR BLD AUTO: 42.8 % (ref 34–46.6)
HDLC SERPL-MCNC: 62 MG/DL (ref 40–60)
HGB BLD-MCNC: 14.3 G/DL (ref 12–15.9)
IMM GRANULOCYTES # BLD AUTO: 0.03 10*3/MM3 (ref 0–0.05)
IMM GRANULOCYTES NFR BLD AUTO: 0.5 % (ref 0–0.5)
LDLC SERPL CALC-MCNC: 85 MG/DL (ref 0–100)
LDLC/HDLC SERPL: 1.37 {RATIO}
LYMPHOCYTES # BLD AUTO: 1.79 10*3/MM3 (ref 0.7–3.1)
LYMPHOCYTES NFR BLD AUTO: 27.8 % (ref 19.6–45.3)
MAGNESIUM SERPL-MCNC: 2.4 MG/DL (ref 1.6–2.4)
MCH RBC QN AUTO: 30.2 PG (ref 26.6–33)
MCHC RBC AUTO-ENTMCNC: 33.4 G/DL (ref 31.5–35.7)
MCV RBC AUTO: 90.5 FL (ref 79–97)
MICROALBUMIN/CREAT UR: NORMAL MG/G{CREAT}
MONOCYTES # BLD AUTO: 0.71 10*3/MM3 (ref 0.1–0.9)
MONOCYTES NFR BLD AUTO: 11 % (ref 5–12)
NEUTROPHILS NFR BLD AUTO: 3.78 10*3/MM3 (ref 1.7–7)
NEUTROPHILS NFR BLD AUTO: 58.6 % (ref 42.7–76)
NRBC BLD AUTO-RTO: 0 /100 WBC (ref 0–0.2)
PLATELET # BLD AUTO: 260 10*3/MM3 (ref 140–450)
PMV BLD AUTO: 11 FL (ref 6–12)
POTASSIUM SERPL-SCNC: 4.2 MMOL/L (ref 3.5–5.2)
PROT SERPL-MCNC: 7.2 G/DL (ref 6–8.5)
RBC # BLD AUTO: 4.73 10*6/MM3 (ref 3.77–5.28)
SODIUM SERPL-SCNC: 138 MMOL/L (ref 136–145)
TRIGL SERPL-MCNC: 115 MG/DL (ref 0–150)
TSH SERPL DL<=0.05 MIU/L-ACNC: 0.85 UIU/ML (ref 0.27–4.2)
VIT B12 BLD-MCNC: 364 PG/ML (ref 211–946)
VLDLC SERPL-MCNC: 23 MG/DL (ref 5–40)
WBC # BLD AUTO: 6.44 10*3/MM3 (ref 3.4–10.8)

## 2020-09-29 PROCEDURE — 84443 ASSAY THYROID STIM HORMONE: CPT

## 2020-09-29 PROCEDURE — 80053 COMPREHEN METABOLIC PANEL: CPT

## 2020-09-29 PROCEDURE — G0439 PPPS, SUBSEQ VISIT: HCPCS | Performed by: INTERNAL MEDICINE

## 2020-09-29 PROCEDURE — 83735 ASSAY OF MAGNESIUM: CPT

## 2020-09-29 PROCEDURE — 85025 COMPLETE CBC W/AUTO DIFF WBC: CPT

## 2020-09-29 PROCEDURE — G0008 ADMIN INFLUENZA VIRUS VAC: HCPCS | Performed by: INTERNAL MEDICINE

## 2020-09-29 PROCEDURE — 80061 LIPID PANEL: CPT

## 2020-09-29 PROCEDURE — 90694 VACC AIIV4 NO PRSRV 0.5ML IM: CPT | Performed by: INTERNAL MEDICINE

## 2020-09-29 PROCEDURE — 82607 VITAMIN B-12: CPT

## 2020-09-29 PROCEDURE — 82570 ASSAY OF URINE CREATININE: CPT

## 2020-09-29 PROCEDURE — 82043 UR ALBUMIN QUANTITATIVE: CPT

## 2020-09-29 PROCEDURE — 99213 OFFICE O/P EST LOW 20 MIN: CPT | Performed by: INTERNAL MEDICINE

## 2020-09-29 RX ORDER — ROSUVASTATIN CALCIUM 5 MG/1
5 TABLET, COATED ORAL DAILY
Qty: 90 TABLET | Refills: 3 | Status: SHIPPED | OUTPATIENT
Start: 2020-09-29 | End: 2021-08-06

## 2020-09-29 RX ORDER — LOSARTAN POTASSIUM 25 MG/1
TABLET ORAL
Qty: 90 TABLET | Refills: 3 | Status: SHIPPED | OUTPATIENT
Start: 2020-09-29 | End: 2021-08-09

## 2020-09-29 NOTE — PROGRESS NOTES
QUICK REFERENCE INFORMATION:  The ABCs of the Annual Wellness Visit    Subsequent Medicare Wellness Visit    HEALTH RISK ASSESSMENT    11/14/1930    Recent Hospitalizations:  No hospitalization(s) within the last year..        Current Medical Providers:  Patient Care Team:  Schuyler Garcia MD as PCP - General  Schuyler Garcia MD as PCP - Family Medicine  Schuyler Garcia MD as PCP - Claims Attributed        Smoking Status:  Social History     Tobacco Use   Smoking Status Never Smoker   Smokeless Tobacco Never Used       Alcohol Consumption:  Social History     Substance and Sexual Activity   Alcohol Use No       Depression Screen:   PHQ-2/PHQ-9 Depression Screening 9/29/2020   Little interest or pleasure in doing things 0   Feeling down, depressed, or hopeless 0   Total Score 0       Health Habits and Functional and Cognitive Screening:  Functional & Cognitive Status 9/29/2020   Do you have difficulty preparing food and eating? No   Do you have difficulty bathing yourself, getting dressed or grooming yourself? No   Do you have difficulty using the toilet? No   Do you have difficulty moving around from place to place? No   Do you have trouble with steps or getting out of a bed or a chair? No   Current Diet Well Balanced Diet   Dental Exam Up to date   Eye Exam Up to date   Exercise (times per week) 7 times per week   Current Exercise Activities Include Walking   Do you need help using the phone?  No   Are you deaf or do you have serious difficulty hearing?  No   Do you need help with transportation? Yes   Do you need help shopping? No   Do you need help preparing meals?  No   Do you need help with housework?  No   Do you need help with laundry? No   Do you need help taking your medications? No   Do you need help managing money? No   Do you ever drive or ride in a car without wearing a seat belt? No   Have you felt unusual stress, anger or loneliness in the last month? No   Who do you live with? Alone   If you  need help, do you have trouble finding someone available to you? No   Have you been bothered in the last four weeks by sexual problems? No   Do you have difficulty concentrating, remembering or making decisions? Yes       Fall Risk Screen:  ALEXIS Fall Risk Assessment was completed, and patient is at LOW risk for falls.Assessment completed on:9/29/2020    ACE III MINI        Does the patient have evidence of cognitive impairment? No    Aspirin use counseling: Does not need ASA (and currently is not on it)    Recent Lab Results:  CMP:  Lab Results   Component Value Date    BUN 11 09/29/2020    CREATININE 0.77 09/29/2020    EGFRIFNONA 71 09/29/2020    BCR 14.3 09/29/2020     09/29/2020    K 4.2 09/29/2020    CO2 28.4 09/29/2020    CALCIUM 9.6 09/29/2020    ALBUMIN 4.00 09/29/2020    BILITOT 0.5 09/29/2020    ALKPHOS 58 09/29/2020    AST 17 09/29/2020    ALT 11 09/29/2020     HbA1c:  No results found for: HGBA1C  Microalbumin:  Lab Results   Component Value Date    MICROALBUR <1.2 09/29/2020     Lipid Panel  Lab Results   Component Value Date    CHOL 170 09/29/2020    TRIG 115 09/29/2020    HDL 62 (H) 09/29/2020    LDL 85 09/29/2020    AST 17 09/29/2020    ALT 11 09/29/2020       Visual Acuity:  No exam data present    Age-appropriate Screening Schedule:  Refer to the list below for future screening recommendations based on patient's age, sex and/or medical conditions. Orders for these recommended tests are listed in the plan section. The patient has been provided with a written plan.    Health Maintenance   Topic Date Due   • ZOSTER VACCINE (2 of 3) 05/25/2009   • DXA SCAN  07/05/2020   • LIPID PANEL  09/29/2021   • TDAP/TD VACCINES (3 - Td) 07/14/2030   • INFLUENZA VACCINE  Completed        Subjective   History of Present Illness. Her blood pressure has been labile.     Luh Mckeon is a 89 y.o. female who presents for a Subsequent Wellness Visit.She has had dizziness with low blood pressures at 50 mg and BP  sometimes 90's to low 100's. She denies chest pain or shortness of air.   She has memory loss but is stable.     CHRONIC CONDITIONS    The following portions of the patient's history were reviewed and updated as appropriate: allergies, current medications, past family history, past medical history, past social history, past surgical history and problem list.    Outpatient Medications Prior to Visit   Medication Sig Dispense Refill   • denosumab (Prolia) 60 MG/ML solution prefilled syringe syringe Inject 60 mg under the skin into the appropriate area as directed Every 6 (Six) Months.     • Mirabegron ER (MYRBETRIQ) 25 MG tablet sustained-release 24 hour 24 hr tablet Take 1 tablet by mouth Daily. 90 tablet 3   • losartan (COZAAR) 25 MG tablet 1/2 tab per day (Patient taking differently: 1 tab per day) 45 tablet 3   • rosuvastatin (CRESTOR) 5 MG tablet Take 1 tablet by mouth Daily. 90 tablet 3   • Cholecalciferol (VITAMIN D3) 25 MCG (1000 UT) capsule Take 1 capsule by mouth Daily. 30 capsule 11     No facility-administered medications prior to visit.        Patient Active Problem List   Diagnosis   • Generalized anxiety disorder   • Unsteady gait   • Risk for falls   • Hyperlipidemia LDL goal <100   • Benign essential hypertension   • Memory loss   • Urinary incontinence in female   • Candida onychomycosis   • AR (allergic rhinitis)   • Overweight (BMI 25.0-29.9)   • Chest pain   • Cramp in limb   • Disorder of mitral and aortic valves   • Disorder of skeletal muscle   • Diverticular disease of colon   • Dysphagia   • External hemorrhoids   •     • Hand joint pain   • Insomnia   • Knee pain   • Menopausal and postmenopausal disorder   • Osteoporosis   • Shoulder pain   • Vitamin B deficiency   • Medicare annual wellness visit, subsequent   • Dizziness   • Urgency of urination   • Fatigue       Advance Care Planning:  ACP discussion was held with the patient during this visit. Patient has an advance directive in EMR  which is still valid.     Identification of Risk Factors:  Risk factors include: Advance Directive Discussion.    Review of Systems   Constitutional: Negative for chills, diaphoresis, fatigue and fever.   HENT: Positive for hearing loss. Negative for sore throat.    Eyes: Negative for visual disturbance.   Respiratory: Negative for cough, shortness of breath and wheezing.    Cardiovascular: Negative for chest pain and palpitations.   Gastrointestinal: Negative for abdominal pain.   Musculoskeletal: Positive for arthralgias and gait problem.   Skin: Negative for rash.   Neurological: Positive for dizziness. Negative for headaches.   Psychiatric/Behavioral: Positive for confusion and decreased concentration. Negative for dysphoric mood, sleep disturbance and suicidal ideas. The patient is not nervous/anxious.        Compared to one year ago, the patient feels her physical health is the same.  Compared to one year ago, the patient feels her mental health is the same.    Objective     Physical Exam  Vitals signs and nursing note reviewed.   Constitutional:       Appearance: She is well-developed.   HENT:      Head: Normocephalic.      Right Ear: Tympanic membrane normal.      Left Ear: Tympanic membrane normal.   Eyes:      Conjunctiva/sclera: Conjunctivae normal.   Cardiovascular:      Rate and Rhythm: Normal rate and regular rhythm.      Heart sounds: Murmur (II/VI SM) present.   Pulmonary:      Effort: Pulmonary effort is normal. No respiratory distress.      Breath sounds: Normal breath sounds.   Abdominal:      General: Bowel sounds are normal.      Palpations: Abdomen is soft.      Tenderness: There is no abdominal tenderness.   Musculoskeletal:         General: No swelling.   Skin:     General: Skin is warm and dry.   Neurological:      General: No focal deficit present.      Mental Status: She is alert.      Comments: She has somewhat slow get up and go and has some memory issues. Her MMSE was 24/30   "  Psychiatric:         Behavior: Behavior normal.          Procedures     Vitals:    09/29/20 1016   BP: 126/78   Pulse: 68   Temp: 97.5 °F (36.4 °C)   Weight: 62.1 kg (137 lb)   Height: 154 cm (60.63\")   PainSc: 0-No pain       Patient's Body mass index is 26.2 kg/m². BMI is above normal parameters. Recommendations include: educational material, exercise counseling and nutrition counseling.      Assessment/Plan   Problem List Items Addressed This Visit        Cardiovascular and Mediastinum    Hyperlipidemia LDL goal <100    Current Assessment & Plan     Lipid abnormalities are unchanged.  Nutritional counseling was provided.  Lipids will be reassessed in 6 months.         Relevant Medications    rosuvastatin (CRESTOR) 5 MG tablet    Benign essential hypertension    Current Assessment & Plan     Acute issue of low BP at 50 mg.and have stopped the 50 mg dose. She was 1/2 it but confusion so have changed to 25 mg of losartan. Monitor and if still runs low with dizziness will need to half the 25 mg dose.          Relevant Medications    losartan (COZAAR) 25 MG tablet       Other    Medicare annual wellness visit, subsequent - Primary    Current Assessment & Plan     She is following with Dr Reyes for prolia shots and she saw him 6/2020. She saw dr Griffin earlier this year regarding her eyes and is doing well after cataract surgery. She has memory loss and MMSE was 24/30. Her daughter does help her and we wrote out to take the vitamin D. Her mood is good overall. She has a fall risk and encouraged her to  walker. We discussed PT but she declined for now. Age-appropriate Counseling:  Discussed preventative medicine issues with patient including regular exercise, healthy diet, stress reduction, adequate sleep and recommended age-appropriate screening studies.  Immunizations reviewed.              Dizziness        Patient Self-Management and Personalized Health Advice  The patient has been provided with information " about: diet, exercise, weight management, prevention of cardiac or vascular disease and fall prevention and preventive services including:   · Annual Wellness Visit (AWV).    Outpatient Encounter Medications as of 9/29/2020   Medication Sig Dispense Refill   • denosumab (Prolia) 60 MG/ML solution prefilled syringe syringe Inject 60 mg under the skin into the appropriate area as directed Every 6 (Six) Months.     • losartan (COZAAR) 25 MG tablet 1 tab per day 90 tablet 3   • Mirabegron ER (MYRBETRIQ) 25 MG tablet sustained-release 24 hour 24 hr tablet Take 1 tablet by mouth Daily. 90 tablet 3   • rosuvastatin (CRESTOR) 5 MG tablet Take 1 tablet by mouth Daily. 90 tablet 3   • [DISCONTINUED] losartan (COZAAR) 25 MG tablet 1/2 tab per day (Patient taking differently: 1 tab per day) 45 tablet 3   • [DISCONTINUED] rosuvastatin (CRESTOR) 5 MG tablet Take 1 tablet by mouth Daily. 90 tablet 3   • Cholecalciferol (VITAMIN D3) 25 MCG (1000 UT) capsule Take 1 capsule by mouth Daily. 30 capsule 11     No facility-administered encounter medications on file as of 9/29/2020.        Reviewed use of high risk medication in the elderly: yes  Reviewed for potential of harmful drug interactions in the elderly: yes    Follow Up:  Return in about 6 months (around 3/29/2021) for Recheck.     There are no Patient Instructions on file for this visit.    An After Visit Summary and PPPS with all of these plans were given to the patient.

## 2020-09-29 NOTE — ASSESSMENT & PLAN NOTE
She is following with Dr Reyes for prolia shots and she saw him 6/2020. She saw dr Griffin earlier this year regarding her eyes and is doing well after cataract surgery. She has memory loss and MMSE was 24/30. Her daughter does help her and we wrote out to take the vitamin D. Her mood is good overall. She has a fall risk and encouraged her to  walker. We discussed PT but she declined for now. Age-appropriate Counseling:  Discussed preventative medicine issues with patient including regular exercise, healthy diet, stress reduction, adequate sleep and recommended age-appropriate screening studies.  Immunizations reviewed.

## 2020-09-30 NOTE — ASSESSMENT & PLAN NOTE
Acute issue of low BP at 50 mg.and have stopped the 50 mg dose. She was 1/2 it but confusion so have changed to 25 mg of losartan. Monitor and if still runs low with dizziness will need to half the 25 mg dose.

## 2021-01-06 ENCOUNTER — TELEPHONE (OUTPATIENT)
Dept: INTERNAL MEDICINE | Facility: CLINIC | Age: 86
End: 2021-01-06

## 2021-01-06 NOTE — TELEPHONE ENCOUNTER
PT REQUESTING REFILLS ON MEDICATIONS     LOSARTAN  250MG  ROSUVASTSTIN 5 MG    SHE STATES SHE IS OUT OF THE [facility] AND NEEDS IT FILLED AT [facility] IN [facility]. SHE IS REQUESTING A CALL TO DISCUSS HER MEDICATIONS.

## 2021-01-06 NOTE — TELEPHONE ENCOUNTER
Daughter unsure why pt called, daughter notified both meds were filled in September with a year supply. She stated she was on her way to check on pt.

## 2021-01-20 DIAGNOSIS — R32 URINARY INCONTINENCE IN FEMALE: ICD-10-CM

## 2021-01-20 RX ORDER — MIRABEGRON 25 MG/1
TABLET, FILM COATED, EXTENDED RELEASE ORAL
Qty: 90 TABLET | Refills: 3 | Status: SHIPPED | OUTPATIENT
Start: 2021-01-20 | End: 2021-01-26

## 2021-01-26 ENCOUNTER — TELEPHONE (OUTPATIENT)
Dept: INTERNAL MEDICINE | Facility: CLINIC | Age: 86
End: 2021-01-26

## 2021-01-26 NOTE — TELEPHONE ENCOUNTER
PATIENT CALLED AND STATED THAT SHE HAS BEEN HAVING SOME ISSUES WITH HER URINE AND SHE WOULD LIKE TO IF SHE SHOULD GET IT CHECK BEFORE FILLS  HER PRESCRIPTION OF MYRBETRIQ. PATIENT STATED THAT SHE WAS SUPPOSE TO BE TAKING 25MG VITAMIN D AND SHE WOULD BEEN TAKING 50MG. SHE WOULD LIKE TO KNOW COULD THAT BE THE ISSUES WITH THE URINE  PLEASE GIVE PATIENT A CALL BACK.        CALL BACK NUMBER: [phone]

## 2021-01-27 NOTE — TELEPHONE ENCOUNTER
She should take 50 mcg of vitamin D which I believe is 2000 IU regarding her urine frequency I will increase myrbetrique to 50 and see if that helps and if does not she could follow up for further evaluation

## 2021-02-02 NOTE — TELEPHONE ENCOUNTER
Caller: Luh Mckeon    Relationship: Self    Best call back number: [phone]     Medication needed:   Requested Prescriptions     Pending Prescriptions Disp Refills   • Mirabegron ER (Myrbetriq) 50 MG tablet sustained-release 24 hour 24 hr tablet 90 tablet 3     Sig: Take 50 mg by mouth Daily.       When do you need the refill by: 02/05    What details did the patient provide when requesting the medication: PATIENT HAS ENOUGH FOR 3 DAYS     Does the patient have less than a 3 day supply:  [] Yes  [x] No    What is the patient's preferred pharmacy: [facility] - [facility] [address] - [phone]  - [phone] FX

## 2021-02-04 ENCOUNTER — TELEPHONE (OUTPATIENT)
Dept: INTERNAL MEDICINE | Facility: CLINIC | Age: 86
End: 2021-02-04

## 2021-02-04 NOTE — TELEPHONE ENCOUNTER
Pt is wanting a 2 week supply of myrbetriq sent to [facility] because she will be out before the mail order will arrive

## 2021-03-12 ENCOUNTER — TELEPHONE (OUTPATIENT)
Dept: INTERNAL MEDICINE | Facility: CLINIC | Age: 86
End: 2021-03-12

## 2021-03-12 NOTE — TELEPHONE ENCOUNTER
Patient's daughter Swetha requested a call back. Patient was scheduled to see WILLIAM Peoples on 3/16/21 for a referral to urology. Swetha stated the patient has previously been referred to Dr. Luz Bender for bladder issues and when she contacted Dr. Bender's office today she was informed she does not need a new referral and they can see the patient on 4/15/21. Swetha would like to know if she needs to keep the appointment that was scheduled with WILLIAM Peoples and if we would be able to get the patient in with Dr. Bender any sooner than 4/15/21.    Please call and advise. Swetha's call back [phone]

## 2021-03-12 NOTE — TELEPHONE ENCOUNTER
PATIENT'S DAUGHTER NAIN IS REQUESTING A REFERRAL FOR THE PATIENT TO SEE SOMEONE ABOUT HER BLADDER    SHE STATES THEY WENT TO A DOCTOR ABOUT 3 YEARS AGO BUT CANNOT REMEMBER THE NAME      NAIN CALL BACK [phone]

## 2021-03-24 ENCOUNTER — LAB (OUTPATIENT)
Dept: LAB | Facility: HOSPITAL | Age: 86
End: 2021-03-24

## 2021-03-24 ENCOUNTER — OFFICE VISIT (OUTPATIENT)
Dept: INTERNAL MEDICINE | Facility: CLINIC | Age: 86
End: 2021-03-24

## 2021-03-24 VITALS
DIASTOLIC BLOOD PRESSURE: 86 MMHG | SYSTOLIC BLOOD PRESSURE: 132 MMHG | HEIGHT: 61 IN | BODY MASS INDEX: 26.32 KG/M2 | OXYGEN SATURATION: 98 % | TEMPERATURE: 97.5 F | HEART RATE: 70 BPM | WEIGHT: 139.4 LBS

## 2021-03-24 DIAGNOSIS — R30.0 DYSURIA: ICD-10-CM

## 2021-03-24 DIAGNOSIS — R32 URINARY INCONTINENCE IN FEMALE: ICD-10-CM

## 2021-03-24 DIAGNOSIS — M81.0 AGE-RELATED OSTEOPOROSIS WITHOUT CURRENT PATHOLOGICAL FRACTURE: ICD-10-CM

## 2021-03-24 DIAGNOSIS — I10 BENIGN ESSENTIAL HYPERTENSION: ICD-10-CM

## 2021-03-24 DIAGNOSIS — R39.15 URGENCY OF URINATION: Primary | ICD-10-CM

## 2021-03-24 DIAGNOSIS — R82.90 ABNORMAL URINE FINDINGS: ICD-10-CM

## 2021-03-24 DIAGNOSIS — R39.15 URGENCY OF URINATION: ICD-10-CM

## 2021-03-24 DIAGNOSIS — E78.5 HYPERLIPIDEMIA LDL GOAL <100: ICD-10-CM

## 2021-03-24 LAB
BILIRUB BLD-MCNC: NEGATIVE MG/DL
CLARITY, POC: ABNORMAL
COLOR UR: YELLOW
GLUCOSE UR STRIP-MCNC: NEGATIVE MG/DL
KETONES UR QL: NEGATIVE
LEUKOCYTE EST, POC: ABNORMAL
NITRITE UR-MCNC: POSITIVE MG/ML
PH UR: 7 [PH] (ref 5–8)
PROT UR STRIP-MCNC: NEGATIVE MG/DL
RBC # UR STRIP: NEGATIVE /UL
SP GR UR: 1.01 (ref 1–1.03)
UROBILINOGEN UR QL: NORMAL

## 2021-03-24 PROCEDURE — 87186 SC STD MICRODIL/AGAR DIL: CPT

## 2021-03-24 PROCEDURE — 87088 URINE BACTERIA CULTURE: CPT

## 2021-03-24 PROCEDURE — 87086 URINE CULTURE/COLONY COUNT: CPT

## 2021-03-24 PROCEDURE — 81003 URINALYSIS AUTO W/O SCOPE: CPT | Performed by: NURSE PRACTITIONER

## 2021-03-24 PROCEDURE — 99214 OFFICE O/P EST MOD 30 MIN: CPT | Performed by: NURSE PRACTITIONER

## 2021-03-24 RX ORDER — NITROFURANTOIN 25; 75 MG/1; MG/1
100 CAPSULE ORAL 2 TIMES DAILY
Qty: 10 CAPSULE | Refills: 0 | Status: SHIPPED | OUTPATIENT
Start: 2021-03-24 | End: 2021-03-29

## 2021-03-24 NOTE — PROGRESS NOTES
"  Follow Up Office Visit      Patient Name: Luh Mckeon  : 1930   MRN: [id number]   Care Team: Patient Care Team:  Schuyler Garcia MD as PCP - General  Schuyler Garcia MD as PCP - Family Medicine    Chief Complaint:    Chief Complaint   Patient presents with   • Urinary Frequency     going on for a month        History of Present Illness: Luh Mckeon is a 89 y.o. female who is here today for 6 month follow up and for issue of worsening urinary frequency/urgency x 1-2 months.  Accompanied by her daughter today.    Urinary urgency/frequency- reports she has had issues ongoing for years.  Seemed to be helped with start of Myrbetriq, however, about 1.5 months ago, \"felt like it wasn't working anymore\".  Dose was increased from 25mg to 50mg daily.  She did not see any relief with this, actually states symptomos seemed to progressively worsen since.  Frequency increased with pelvic \"pressure\" and feeling of burning with urination.  Night time trips to bathroom at 4-5 times on average nightly.  No associated fever/chills, back pain, N/V.  Denies any issues of constipation.  Has appointment with specialty Dr. Bender 4/15/21 per patient daughter report. Daughter expresses concern that patient does not drink enough fluids.     HTN- chronic and ongoing.  Continuing to take Losartan 25mg daily as prescribed.  Had previous issue of low BP, likely due lack of adequate fluid intake.  Noted that Losartan was decreased from 50mg down to 25mg at that time.  Denies any issues of dizziness, lightheadedness, syncope or chest pain.  Admits she does have occasional swelling in the ankles, better with elevating feet at night.    HLD- chronic and ongoing.  Continuing to take Crestor 5mg daily.  Last lipids in  looked within normal range.  Denies any AE of medication such as muscle aches/pains.      Osteoporoisis- Continues to see Dr. Reyes with [facility] and managed with Prolia Q6 mo injections.   Taking daily " Vitamin D supplement along with Calcium+D supplement.  Patient is inquiring if Vitamin D could be causing her urinary issues.  Admits she had twisting injury and near-fall accident over the winter with some hip pain, now resolved and no ongoing issues.        Subjective      Review of Systems:   Review of Systems   Constitutional: Negative for appetite change, chills and fever.   HENT: Negative for sore throat and trouble swallowing.    Respiratory: Negative for chest tightness and shortness of breath.    Cardiovascular: Positive for leg swelling. Negative for chest pain and palpitations.   Gastrointestinal: Negative for abdominal pain, constipation, nausea and vomiting.   Genitourinary: Positive for dysuria, frequency, pelvic pressure, urgency and urinary incontinence.   Skin: Negative for color change.   Neurological: Positive for confusion. Negative for dizziness and light-headedness.   Psychiatric/Behavioral: Positive for decreased concentration.       I have reviewed and the following portions of the patient's history were updated as appropriate: past family history, past medical history, past social history, past surgical history and problem list.    Medications:     Current Outpatient Medications:   •  Calcium Carbonate-Vit D-Min (CALCIUM 1200 PO), Take  by mouth., Disp: , Rfl:   •  Cholecalciferol (VITAMIN D3) 25 MCG (1000 UT) capsule, Take 1 capsule by mouth Daily., Disp: 30 capsule, Rfl: 11  •  denosumab (Prolia) 60 MG/ML solution prefilled syringe syringe, Inject 60 mg under the skin into the appropriate area as directed Every 6 (Six) Months., Disp: , Rfl:   •  losartan (COZAAR) 25 MG tablet, 1 tab per day, Disp: 90 tablet, Rfl: 3  •  Mirabegron ER (Myrbetriq) 50 MG tablet sustained-release 24 hour 24 hr tablet, Take 50 mg by mouth Daily., Disp: 30 tablet, Rfl: 0  •  rosuvastatin (CRESTOR) 5 MG tablet, Take 1 tablet by mouth Daily., Disp: 90 tablet, Rfl: 3  •  nitrofurantoin, macrocrystal-monohydrate,  "(Macrobid) 100 MG capsule, Take 1 capsule by mouth 2 (Two) Times a Day for 5 days., Disp: 10 capsule, Rfl: 0    Allergies:   Allergies   Allergen Reactions   • Nasal Spray        Objective     Physical Exam:  Vital Signs:   Vitals:    03/24/21 1008   BP: 132/86   BP Location: Right arm   Patient Position: Sitting   Cuff Size: Adult   Pulse: 70   Temp: 97.5 °F (36.4 °C)   TempSrc: Temporal   SpO2: 98%   Weight: 63.2 kg (139 lb 6.4 oz)   Height: 154 cm (60.63\")   PainSc:   6     Body mass index is 26.66 kg/m².     Physical Exam  Vitals and nursing note reviewed.   Constitutional:       General: She is not in acute distress.     Comments: Accompanied by Daughter (Swetha).   HENT:      Head: Normocephalic and atraumatic.      Right Ear: Tympanic membrane and ear canal normal.      Left Ear: Tympanic membrane and ear canal normal.      Nose: Nose normal.      Mouth/Throat:      Mouth: Mucous membranes are moist.      Pharynx: Oropharynx is clear. No oropharyngeal exudate.   Eyes:      General: No scleral icterus.     Conjunctiva/sclera: Conjunctivae normal.      Pupils: Pupils are equal, round, and reactive to light.   Cardiovascular:      Rate and Rhythm: Normal rate and regular rhythm.      Pulses: Normal pulses.      Heart sounds: Murmur (III/VI) heard.     Pulmonary:      Effort: Pulmonary effort is normal. No respiratory distress.      Breath sounds: Normal breath sounds. No wheezing or rhonchi.   Abdominal:      General: Bowel sounds are normal. There is no distension.      Palpations: Abdomen is soft. There is no mass.      Tenderness: There is no abdominal tenderness. There is no right CVA tenderness or left CVA tenderness.   Musculoskeletal:      Cervical back: Normal range of motion and neck supple. No tenderness.      Right lower leg: Edema (trace ) present.      Left lower leg: Edema (trace) present.   Skin:     General: Skin is warm and dry.      Capillary Refill: Capillary refill takes less than 2 seconds. "   Neurological:      Mental Status: She is alert.      Gait: Gait abnormal.   Psychiatric:         Behavior: Behavior normal.      Comments: Patient alert.  Patient difficulty recalling her medications.  Looks to her daughter to answer questions.          Assessment / Plan      Assessment/Plan:   Problems Addressed This Visit    ICD-10-CM ICD-9-CM   1. Urgency of urination  R39.15 788.63   2. Dysuria  R30.0 788.1   3. Abnormal urine findings  R82.90 791.9   4. Benign essential hypertension  I10 401.1   5. Hyperlipidemia LDL goal <100  E78.5 272.4   6. Urinary incontinence in female  R32 788.30   7. Age-related osteoporosis without current pathological fracture  M81.0 733.01     Urinary urgency/incontinence and dysuria  - U/A with + leuk/nit, sending culture  - suspect possible UTI given her burning sensation with urine, treating with course of macrobid 100mg BID x 5 days  -Plan to continue Myrbetriq at 50mg daily at this time  - Discussed importance of adequate daily fluid intake  - Keep scheduled appointment with Dr. Bender on 4/15/21    Hypertension  - Stable.  - Continue Losartan 25mg daily  - Labs CMP, CBC    Hyperlipidemia  - Continue Crestor 5mg daily  - Discussed importance of health diet  - Labs:  Lipid panel in near future (patient not fasting today).    Osteporosis  - Keep scheduled follow up with Dr. Reyes, continue Prolia as directed  - Patient able to ambulate without assistance, overall steady gait  - Continue supplements of Vitamin D and Calcium; advised patient these would not be associated with her urinary symptoms.   Plan of care reviewed with patient at the conclusion of today's visit. Education was provided regarding diagnosis and management.  Patient verbalizes understanding of and agreement with management plan.    There are no Patient Instructions on file for this visit.    Follow Up:   Return in about 6 months (around 9/24/2021) for for annual wellness with Dr. Garcia, [facility].        CHANTELLE Kirk  [facility] [address]    Please note that portions of this note may have been completed with a voice recognition program. Efforts were made to edit the dictations, but occasionally words are mistranscribed.     Answers for HPI/ROS submitted by the patient on 3/24/2021  Please describe your symptoms.: Constant urge for urination  Have you had these symptoms before?: Yes  How long have you been having these symptoms?: Greater than 2 weeks  What is the primary reason for your visit?: Other

## 2021-03-26 ENCOUNTER — TELEPHONE (OUTPATIENT)
Dept: INTERNAL MEDICINE | Facility: CLINIC | Age: 86
End: 2021-03-26

## 2021-03-26 LAB — BACTERIA SPEC AEROBE CULT: ABNORMAL

## 2021-03-26 NOTE — TELEPHONE ENCOUNTER
Spoke with patient via phone to discuss lab results.  Patient states she is forgetful and doesn't recall being seen in the office this week.    I informed the patient that she does have a urinary tract infection but that the antibiotic (macrobid) she was given is appropriate to treat it.    Advised her to have her Daughter (Swetha) call with any further questions or if patient's symptoms dont resolve with her course of macrobid.    Patient verbalizes understanding but concern she will forget this information (daughter not available to speak to at time of my call to patient).

## 2021-03-29 ENCOUNTER — LAB (OUTPATIENT)
Dept: LAB | Facility: HOSPITAL | Age: 86
End: 2021-03-29

## 2021-03-29 DIAGNOSIS — I10 BENIGN ESSENTIAL HYPERTENSION: ICD-10-CM

## 2021-03-29 DIAGNOSIS — E78.5 HYPERLIPIDEMIA LDL GOAL <100: ICD-10-CM

## 2021-03-29 DIAGNOSIS — R39.15 URGENCY OF URINATION: ICD-10-CM

## 2021-03-29 DIAGNOSIS — R30.0 DYSURIA: ICD-10-CM

## 2021-03-29 LAB
ALBUMIN SERPL-MCNC: 4 G/DL (ref 3.5–5.2)
ALBUMIN/GLOB SERPL: 1.5 G/DL
ALP SERPL-CCNC: 54 U/L (ref 39–117)
ALT SERPL W P-5'-P-CCNC: 9 U/L (ref 1–33)
ANION GAP SERPL CALCULATED.3IONS-SCNC: 8.1 MMOL/L (ref 5–15)
AST SERPL-CCNC: 20 U/L (ref 1–32)
BASOPHILS # BLD AUTO: 0.04 10*3/MM3 (ref 0–0.2)
BASOPHILS NFR BLD AUTO: 0.6 % (ref 0–1.5)
BILIRUB SERPL-MCNC: 0.4 MG/DL (ref 0–1.2)
BUN SERPL-MCNC: 8 MG/DL (ref 8–23)
BUN/CREAT SERPL: 10.8 (ref 7–25)
CALCIUM SPEC-SCNC: 8.9 MG/DL (ref 8.2–9.6)
CHLORIDE SERPL-SCNC: 104 MMOL/L (ref 98–107)
CHOLEST SERPL-MCNC: 150 MG/DL (ref 0–200)
CO2 SERPL-SCNC: 27.9 MMOL/L (ref 22–29)
CREAT SERPL-MCNC: 0.74 MG/DL (ref 0.57–1)
DEPRECATED RDW RBC AUTO: 42 FL (ref 37–54)
EOSINOPHIL # BLD AUTO: 0.09 10*3/MM3 (ref 0–0.4)
EOSINOPHIL NFR BLD AUTO: 1.3 % (ref 0.3–6.2)
ERYTHROCYTE [DISTWIDTH] IN BLOOD BY AUTOMATED COUNT: 12.2 % (ref 12.3–15.4)
GFR SERPL CREATININE-BSD FRML MDRD: 74 ML/MIN/1.73
GLOBULIN UR ELPH-MCNC: 2.7 GM/DL
GLUCOSE SERPL-MCNC: 92 MG/DL (ref 65–99)
HCT VFR BLD AUTO: 43.7 % (ref 34–46.6)
HDLC SERPL-MCNC: 62 MG/DL (ref 40–60)
HGB BLD-MCNC: 14.5 G/DL (ref 12–15.9)
IMM GRANULOCYTES # BLD AUTO: 0.01 10*3/MM3 (ref 0–0.05)
IMM GRANULOCYTES NFR BLD AUTO: 0.1 % (ref 0–0.5)
LDLC SERPL CALC-MCNC: 70 MG/DL (ref 0–100)
LDLC/HDLC SERPL: 1.1 {RATIO}
LYMPHOCYTES # BLD AUTO: 1.83 10*3/MM3 (ref 0.7–3.1)
LYMPHOCYTES NFR BLD AUTO: 27 % (ref 19.6–45.3)
MCH RBC QN AUTO: 31 PG (ref 26.6–33)
MCHC RBC AUTO-ENTMCNC: 33.2 G/DL (ref 31.5–35.7)
MCV RBC AUTO: 93.6 FL (ref 79–97)
MONOCYTES # BLD AUTO: 0.67 10*3/MM3 (ref 0.1–0.9)
MONOCYTES NFR BLD AUTO: 9.9 % (ref 5–12)
NEUTROPHILS NFR BLD AUTO: 4.13 10*3/MM3 (ref 1.7–7)
NEUTROPHILS NFR BLD AUTO: 61.1 % (ref 42.7–76)
NRBC BLD AUTO-RTO: 0 /100 WBC (ref 0–0.2)
PLATELET # BLD AUTO: 275 10*3/MM3 (ref 140–450)
PMV BLD AUTO: 10.6 FL (ref 6–12)
POTASSIUM SERPL-SCNC: 4.7 MMOL/L (ref 3.5–5.2)
PROT SERPL-MCNC: 6.7 G/DL (ref 6–8.5)
RBC # BLD AUTO: 4.67 10*6/MM3 (ref 3.77–5.28)
SODIUM SERPL-SCNC: 140 MMOL/L (ref 136–145)
TRIGL SERPL-MCNC: 100 MG/DL (ref 0–150)
VLDLC SERPL-MCNC: 18 MG/DL (ref 5–40)
WBC # BLD AUTO: 6.77 10*3/MM3 (ref 3.4–10.8)

## 2021-03-29 PROCEDURE — 80053 COMPREHEN METABOLIC PANEL: CPT

## 2021-03-29 PROCEDURE — 80061 LIPID PANEL: CPT

## 2021-03-29 PROCEDURE — 85025 COMPLETE CBC W/AUTO DIFF WBC: CPT

## 2021-04-07 ENCOUNTER — TELEPHONE (OUTPATIENT)
Dept: INTERNAL MEDICINE | Facility: CLINIC | Age: 86
End: 2021-04-07

## 2021-06-15 ENCOUNTER — TRANSCRIBE ORDERS (OUTPATIENT)
Dept: ADMINISTRATIVE | Facility: HOSPITAL | Age: 86
End: 2021-06-15

## 2021-06-15 DIAGNOSIS — M81.0 OSTEOPOROSIS WITHOUT PATHOLOGICAL FRACTURE: Primary | ICD-10-CM

## 2021-07-15 ENCOUNTER — ANESTHESIA EVENT CONVERTED (OUTPATIENT)
Dept: ANESTHESIOLOGY | Facility: HOSPITAL | Age: 86
End: 2021-07-15

## 2021-07-15 ENCOUNTER — ANESTHESIA EVENT (OUTPATIENT)
Dept: ANESTHESIOLOGY | Facility: HOSPITAL | Age: 86
End: 2021-07-15

## 2021-07-15 ENCOUNTER — APPOINTMENT (OUTPATIENT)
Dept: GENERAL RADIOLOGY | Facility: HOSPITAL | Age: 86
End: 2021-07-15

## 2021-07-15 ENCOUNTER — ANESTHESIA (OUTPATIENT)
Dept: PERIOP | Facility: HOSPITAL | Age: 86
End: 2021-07-15

## 2021-07-15 ENCOUNTER — APPOINTMENT (OUTPATIENT)
Dept: CARDIOLOGY | Facility: HOSPITAL | Age: 86
End: 2021-07-15

## 2021-07-15 ENCOUNTER — ANESTHESIA EVENT (OUTPATIENT)
Dept: PERIOP | Facility: HOSPITAL | Age: 86
End: 2021-07-15

## 2021-07-15 ENCOUNTER — APPOINTMENT (OUTPATIENT)
Dept: CT IMAGING | Facility: HOSPITAL | Age: 86
End: 2021-07-15

## 2021-07-15 ENCOUNTER — HOSPITAL ENCOUNTER (INPATIENT)
Facility: HOSPITAL | Age: 86
LOS: 4 days | Discharge: NURSING, REHAB OR LONG-TERM CARE | End: 2021-07-19
Attending: EMERGENCY MEDICINE | Admitting: INTERNAL MEDICINE

## 2021-07-15 ENCOUNTER — ANESTHESIA (OUTPATIENT)
Dept: ANESTHESIOLOGY | Facility: HOSPITAL | Age: 86
End: 2021-07-15

## 2021-07-15 DIAGNOSIS — S72.001A CLOSED FRACTURE OF NECK OF RIGHT FEMUR, INITIAL ENCOUNTER (HCC): Primary | ICD-10-CM

## 2021-07-15 DIAGNOSIS — S72.001A CLOSED RIGHT HIP FRACTURE, INITIAL ENCOUNTER (HCC): ICD-10-CM

## 2021-07-15 PROBLEM — S72.009A FEMORAL NECK FRACTURE: Status: ACTIVE | Noted: 2021-07-15
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
## 2021-07-15 LAB
ABO GROUP BLD: NORMAL
ABO GROUP BLD: NORMAL
ALBUMIN SERPL-MCNC: 4 G/DL (ref 3.5–5.2)
ALBUMIN/GLOB SERPL: 1.3 G/DL
ALP SERPL-CCNC: 75 U/L (ref 39–117)
ALT SERPL W P-5'-P-CCNC: 11 U/L (ref 1–33)
ANION GAP SERPL CALCULATED.3IONS-SCNC: 13 MMOL/L (ref 5–15)
AST SERPL-CCNC: 20 U/L (ref 1–32)
BASOPHILS # BLD AUTO: 0.03 10*3/MM3 (ref 0–0.2)
BASOPHILS NFR BLD AUTO: 0.4 % (ref 0–1.5)
BILIRUB SERPL-MCNC: 0.4 MG/DL (ref 0–1.2)
BLD GP AB SCN SERPL QL: NEGATIVE
BUN SERPL-MCNC: 12 MG/DL (ref 8–23)
BUN/CREAT SERPL: 15.8 (ref 7–25)
CALCIUM SPEC-SCNC: 9 MG/DL (ref 8.2–9.6)
CHLORIDE SERPL-SCNC: 100 MMOL/L (ref 98–107)
CO2 SERPL-SCNC: 23 MMOL/L (ref 22–29)
CREAT SERPL-MCNC: 0.76 MG/DL (ref 0.57–1)
DEPRECATED RDW RBC AUTO: 43.9 FL (ref 37–54)
EOSINOPHIL # BLD AUTO: 0.06 10*3/MM3 (ref 0–0.4)
EOSINOPHIL NFR BLD AUTO: 0.7 % (ref 0.3–6.2)
ERYTHROCYTE [DISTWIDTH] IN BLOOD BY AUTOMATED COUNT: 12.9 % (ref 12.3–15.4)
FLUAV RNA RESP QL NAA+PROBE: NOT DETECTED
FLUBV RNA RESP QL NAA+PROBE: NOT DETECTED
GFR SERPL CREATININE-BSD FRML MDRD: 71 ML/MIN/1.73
GLOBULIN UR ELPH-MCNC: 3 GM/DL
GLUCOSE BLDC GLUCOMTR-MCNC: 110 MG/DL (ref 70–130)
GLUCOSE BLDC GLUCOMTR-MCNC: 113 MG/DL (ref 70–130)
GLUCOSE SERPL-MCNC: 109 MG/DL (ref 65–99)
HCT VFR BLD AUTO: 43.9 % (ref 34–46.6)
HGB BLD-MCNC: 14.4 G/DL (ref 12–15.9)
IMM GRANULOCYTES # BLD AUTO: 0.03 10*3/MM3 (ref 0–0.05)
IMM GRANULOCYTES NFR BLD AUTO: 0.4 % (ref 0–0.5)
INR PPP: 1 (ref 0.8–1.2)
INR PPP: >10 (ref 0.85–1.16)
LYMPHOCYTES # BLD AUTO: 1.53 10*3/MM3 (ref 0.7–3.1)
LYMPHOCYTES NFR BLD AUTO: 18.5 % (ref 19.6–45.3)
MCH RBC QN AUTO: 30.8 PG (ref 26.6–33)
MCHC RBC AUTO-ENTMCNC: 32.8 G/DL (ref 31.5–35.7)
MCV RBC AUTO: 93.8 FL (ref 79–97)
MONOCYTES # BLD AUTO: 0.8 10*3/MM3 (ref 0.1–0.9)
MONOCYTES NFR BLD AUTO: 9.7 % (ref 5–12)
NEUTROPHILS NFR BLD AUTO: 5.84 10*3/MM3 (ref 1.7–7)
NEUTROPHILS NFR BLD AUTO: 70.3 % (ref 42.7–76)
NRBC BLD AUTO-RTO: 0 /100 WBC (ref 0–0.2)
PLATELET # BLD AUTO: 256 10*3/MM3 (ref 140–450)
PMV BLD AUTO: 10.2 FL (ref 6–12)
POTASSIUM SERPL-SCNC: 4.2 MMOL/L (ref 3.5–5.2)
PROT SERPL-MCNC: 7 G/DL (ref 6–8.5)
PROTHROMBIN TIME: 12.1 SECONDS (ref 12.8–15.2)
PROTHROMBIN TIME: >120 SECONDS (ref 11.4–14.4)
RBC # BLD AUTO: 4.68 10*6/MM3 (ref 3.77–5.28)
RH BLD: POSITIVE
RH BLD: POSITIVE
SARS-COV-2 RNA RESP QL NAA+PROBE: NOT DETECTED
SODIUM SERPL-SCNC: 136 MMOL/L (ref 136–145)
T&S EXPIRATION DATE: NORMAL
WBC # BLD AUTO: 8.29 10*3/MM3 (ref 3.4–10.8)

## 2021-07-15 PROCEDURE — 25010000002 MORPHINE PER 10 MG: Performed by: ORTHOPAEDIC SURGERY

## 2021-07-15 PROCEDURE — 87636 SARSCOV2 & INF A&B AMP PRB: CPT | Performed by: INTERNAL MEDICINE

## 2021-07-15 PROCEDURE — 25010000002 ONDANSETRON PER 1 MG: Performed by: EMERGENCY MEDICINE

## 2021-07-15 PROCEDURE — 72170 X-RAY EXAM OF PELVIS: CPT

## 2021-07-15 PROCEDURE — C1755 CATHETER, INTRASPINAL: HCPCS | Performed by: ORTHOPAEDIC SURGERY

## 2021-07-15 PROCEDURE — 72192 CT PELVIS W/O DYE: CPT

## 2021-07-15 PROCEDURE — C1889 IMPLANT/INSERT DEVICE, NOC: HCPCS | Performed by: ORTHOPAEDIC SURGERY

## 2021-07-15 PROCEDURE — 25010000002 MORPHINE PER 10 MG: Performed by: INTERNAL MEDICINE

## 2021-07-15 PROCEDURE — C1776 JOINT DEVICE (IMPLANTABLE): HCPCS | Performed by: ORTHOPAEDIC SURGERY

## 2021-07-15 PROCEDURE — 27236 TREAT THIGH FRACTURE: CPT | Performed by: ORTHOPAEDIC SURGERY

## 2021-07-15 PROCEDURE — 99284 EMERGENCY DEPT VISIT MOD MDM: CPT

## 2021-07-15 PROCEDURE — 25010000002 ROPIVACAINE PER 1 MG: Performed by: ORTHOPAEDIC SURGERY

## 2021-07-15 PROCEDURE — 99222 1ST HOSP IP/OBS MODERATE 55: CPT | Performed by: ORTHOPAEDIC SURGERY

## 2021-07-15 PROCEDURE — 27236 TREAT THIGH FRACTURE: CPT | Performed by: PHYSICIAN ASSISTANT

## 2021-07-15 PROCEDURE — 25010000002 PROPOFOL 10 MG/ML EMULSION: Performed by: ANESTHESIOLOGY

## 2021-07-15 PROCEDURE — 25010000002 ROPIVACAINE PER 1 MG: Performed by: NURSE ANESTHETIST, CERTIFIED REGISTERED

## 2021-07-15 PROCEDURE — 80053 COMPREHEN METABOLIC PANEL: CPT | Performed by: EMERGENCY MEDICINE

## 2021-07-15 PROCEDURE — 25010000002 KETOROLAC TROMETHAMINE PER 15 MG: Performed by: ORTHOPAEDIC SURGERY

## 2021-07-15 PROCEDURE — 73502 X-RAY EXAM HIP UNI 2-3 VIEWS: CPT

## 2021-07-15 PROCEDURE — 25010000002 NEOSTIGMINE 10 MG/10ML SOLUTION: Performed by: ANESTHESIOLOGY

## 2021-07-15 PROCEDURE — 25010000002 FENTANYL CITRATE (PF) 50 MCG/ML SOLUTION: Performed by: ANESTHESIOLOGY

## 2021-07-15 PROCEDURE — 85610 PROTHROMBIN TIME: CPT

## 2021-07-15 PROCEDURE — 99223 1ST HOSP IP/OBS HIGH 75: CPT | Performed by: INTERNAL MEDICINE

## 2021-07-15 PROCEDURE — 86900 BLOOD TYPING SEROLOGIC ABO: CPT | Performed by: INTERNAL MEDICINE

## 2021-07-15 PROCEDURE — 93306 TTE W/DOPPLER COMPLETE: CPT | Performed by: INTERNAL MEDICINE

## 2021-07-15 PROCEDURE — 93005 ELECTROCARDIOGRAM TRACING: CPT | Performed by: EMERGENCY MEDICINE

## 2021-07-15 PROCEDURE — 85610 PROTHROMBIN TIME: CPT | Performed by: EMERGENCY MEDICINE

## 2021-07-15 PROCEDURE — 86900 BLOOD TYPING SEROLOGIC ABO: CPT

## 2021-07-15 PROCEDURE — 86901 BLOOD TYPING SEROLOGIC RH(D): CPT

## 2021-07-15 PROCEDURE — 85025 COMPLETE CBC W/AUTO DIFF WBC: CPT | Performed by: EMERGENCY MEDICINE

## 2021-07-15 PROCEDURE — 25010000002 HYDROMORPHONE PER 4 MG: Performed by: EMERGENCY MEDICINE

## 2021-07-15 PROCEDURE — 86850 RBC ANTIBODY SCREEN: CPT | Performed by: INTERNAL MEDICINE

## 2021-07-15 PROCEDURE — 86901 BLOOD TYPING SEROLOGIC RH(D): CPT | Performed by: INTERNAL MEDICINE

## 2021-07-15 PROCEDURE — 0SRR03Z REPLACEMENT OF RIGHT HIP JOINT, FEMORAL SURFACE WITH CERAMIC SYNTHETIC SUBSTITUTE, OPEN APPROACH: ICD-10-PCS | Performed by: ORTHOPAEDIC SURGERY

## 2021-07-15 PROCEDURE — 82962 GLUCOSE BLOOD TEST: CPT

## 2021-07-15 PROCEDURE — 25010000002 ONDANSETRON PER 1 MG: Performed by: ANESTHESIOLOGY

## 2021-07-15 PROCEDURE — 25010000003 CEFAZOLIN PER 500 MG: Performed by: ANESTHESIOLOGY

## 2021-07-15 PROCEDURE — 86923 COMPATIBILITY TEST ELECTRIC: CPT

## 2021-07-15 PROCEDURE — 93306 TTE W/DOPPLER COMPLETE: CPT

## 2021-07-15 DEVICE — PRT HIP UNI/BIPOL STRYKER: Type: IMPLANTABLE DEVICE | Site: HIP | Status: FUNCTIONAL

## 2021-07-15 DEVICE — HD FEM/HIP BIOLOX/DELTA V40 CERAM 28MM: Type: IMPLANTABLE DEVICE | Site: HIP | Status: FUNCTIONAL

## 2021-07-15 DEVICE — DEV CONTRL TISS STRATAFIX SPIRAL PDO BIDIR 1 36X36CM: Type: IMPLANTABLE DEVICE | Site: HIP | Status: FUNCTIONAL

## 2021-07-15 DEVICE — 127 DEGREE NECK ANGLE HIP STEM
Type: IMPLANTABLE DEVICE | Site: HIP | Status: FUNCTIONAL
Brand: ACCOLADE

## 2021-07-15 DEVICE — DEV CONTRL TISS STRATAFIX SPIRAL PDO DBLARM 0 24X24CM: Type: IMPLANTABLE DEVICE | Site: HIP | Status: FUNCTIONAL

## 2021-07-15 DEVICE — HD UHR BIPOL 28X45MM: Type: IMPLANTABLE DEVICE | Site: HIP | Status: FUNCTIONAL

## 2021-07-15 RX ORDER — PROPOFOL 10 MG/ML
VIAL (ML) INTRAVENOUS AS NEEDED
Status: DISCONTINUED | OUTPATIENT
Start: 2021-07-15 | End: 2021-07-15 | Stop reason: SURG

## 2021-07-15 RX ORDER — ONDANSETRON 2 MG/ML
4 INJECTION INTRAMUSCULAR; INTRAVENOUS EVERY 6 HOURS PRN
Status: DISCONTINUED | OUTPATIENT
Start: 2021-07-15 | End: 2021-07-19 | Stop reason: HOSPADM

## 2021-07-15 RX ORDER — HYDROMORPHONE HYDROCHLORIDE 1 MG/ML
0.5 INJECTION, SOLUTION INTRAMUSCULAR; INTRAVENOUS; SUBCUTANEOUS
Status: DISCONTINUED | OUTPATIENT
Start: 2021-07-15 | End: 2021-07-19 | Stop reason: HOSPADM

## 2021-07-15 RX ORDER — MORPHINE SULFATE 2 MG/ML
1 INJECTION, SOLUTION INTRAMUSCULAR; INTRAVENOUS EVERY 4 HOURS PRN
Status: DISCONTINUED | OUTPATIENT
Start: 2021-07-15 | End: 2021-07-19 | Stop reason: HOSPADM

## 2021-07-15 RX ORDER — ONDANSETRON 2 MG/ML
4 INJECTION INTRAMUSCULAR; INTRAVENOUS ONCE
Status: COMPLETED | OUTPATIENT
Start: 2021-07-15 | End: 2021-07-15

## 2021-07-15 RX ORDER — SODIUM CHLORIDE 0.9 % (FLUSH) 0.9 %
10 SYRINGE (ML) INJECTION AS NEEDED
Status: DISCONTINUED | OUTPATIENT
Start: 2021-07-15 | End: 2021-07-19 | Stop reason: HOSPADM

## 2021-07-15 RX ORDER — ROSUVASTATIN CALCIUM 10 MG/1
5 TABLET, COATED ORAL DAILY
Status: DISCONTINUED | OUTPATIENT
Start: 2021-07-15 | End: 2021-07-19 | Stop reason: HOSPADM

## 2021-07-15 RX ORDER — OXYCODONE HYDROCHLORIDE 5 MG/1
10 TABLET ORAL EVERY 4 HOURS PRN
Status: DISCONTINUED | OUTPATIENT
Start: 2021-07-15 | End: 2021-07-19 | Stop reason: HOSPADM

## 2021-07-15 RX ORDER — OMEGA-3S/DHA/EPA/FISH OIL/D3 300MG-1000
1 CAPSULE ORAL EVERY MORNING
COMMUNITY

## 2021-07-15 RX ORDER — TRANEXAMIC ACID 10 MG/ML
1000 INJECTION, SOLUTION INTRAVENOUS ONCE
Status: COMPLETED | OUTPATIENT
Start: 2021-07-15 | End: 2021-07-15

## 2021-07-15 RX ORDER — NEOSTIGMINE METHYLSULFATE 1 MG/ML
INJECTION, SOLUTION INTRAVENOUS AS NEEDED
Status: DISCONTINUED | OUTPATIENT
Start: 2021-07-15 | End: 2021-07-15 | Stop reason: SURG

## 2021-07-15 RX ORDER — ROPIVACAINE HYDROCHLORIDE 5 MG/ML
INJECTION, SOLUTION EPIDURAL; INFILTRATION; PERINEURAL
Status: COMPLETED | OUTPATIENT
Start: 2021-07-15 | End: 2021-07-15

## 2021-07-15 RX ORDER — SODIUM CHLORIDE, SODIUM LACTATE, POTASSIUM CHLORIDE, CALCIUM CHLORIDE 600; 310; 30; 20 MG/100ML; MG/100ML; MG/100ML; MG/100ML
INJECTION, SOLUTION INTRAVENOUS CONTINUOUS PRN
Status: DISCONTINUED | OUTPATIENT
Start: 2021-07-15 | End: 2021-07-15 | Stop reason: SURG

## 2021-07-15 RX ORDER — FENTANYL CITRATE 50 UG/ML
INJECTION, SOLUTION INTRAMUSCULAR; INTRAVENOUS AS NEEDED
Status: DISCONTINUED | OUTPATIENT
Start: 2021-07-15 | End: 2021-07-15 | Stop reason: SURG

## 2021-07-15 RX ORDER — ONDANSETRON 2 MG/ML
INJECTION INTRAMUSCULAR; INTRAVENOUS AS NEEDED
Status: DISCONTINUED | OUTPATIENT
Start: 2021-07-15 | End: 2021-07-15 | Stop reason: SURG

## 2021-07-15 RX ORDER — NALOXONE HCL 0.4 MG/ML
0.1 VIAL (ML) INJECTION
Status: DISCONTINUED | OUTPATIENT
Start: 2021-07-15 | End: 2021-07-19 | Stop reason: HOSPADM

## 2021-07-15 RX ORDER — LOSARTAN POTASSIUM 25 MG/1
25 TABLET ORAL
Status: DISCONTINUED | OUTPATIENT
Start: 2021-07-15 | End: 2021-07-19 | Stop reason: HOSPADM

## 2021-07-15 RX ORDER — CEFAZOLIN SODIUM 2 G/100ML
2 INJECTION, SOLUTION INTRAVENOUS EVERY 8 HOURS
Status: COMPLETED | OUTPATIENT
Start: 2021-07-16 | End: 2021-07-16

## 2021-07-15 RX ORDER — SODIUM CHLORIDE 9 MG/ML
100 INJECTION, SOLUTION INTRAVENOUS CONTINUOUS
Status: DISCONTINUED | OUTPATIENT
Start: 2021-07-15 | End: 2021-07-19 | Stop reason: HOSPADM

## 2021-07-15 RX ORDER — ACETAMINOPHEN 500 MG
1000 TABLET ORAL EVERY 8 HOURS
Status: DISCONTINUED | OUTPATIENT
Start: 2021-07-16 | End: 2021-07-19 | Stop reason: HOSPADM

## 2021-07-15 RX ORDER — ATRACURIUM BESYLATE 10 MG/ML
INJECTION, SOLUTION INTRAVENOUS AS NEEDED
Status: DISCONTINUED | OUTPATIENT
Start: 2021-07-15 | End: 2021-07-15 | Stop reason: SURG

## 2021-07-15 RX ORDER — CEFAZOLIN SODIUM 1 G/3ML
INJECTION, POWDER, FOR SOLUTION INTRAMUSCULAR; INTRAVENOUS AS NEEDED
Status: DISCONTINUED | OUTPATIENT
Start: 2021-07-15 | End: 2021-07-15 | Stop reason: SURG

## 2021-07-15 RX ORDER — BUPIVACAINE HCL/0.9 % NACL/PF 0.125 %
PLASTIC BAG, INJECTION (ML) EPIDURAL AS NEEDED
Status: DISCONTINUED | OUTPATIENT
Start: 2021-07-15 | End: 2021-07-15 | Stop reason: SURG

## 2021-07-15 RX ORDER — SODIUM CHLORIDE 0.9 % (FLUSH) 0.9 %
10 SYRINGE (ML) INJECTION EVERY 12 HOURS SCHEDULED
Status: DISCONTINUED | OUTPATIENT
Start: 2021-07-15 | End: 2021-07-19 | Stop reason: HOSPADM

## 2021-07-15 RX ORDER — BUPIVACAINE HYDROCHLORIDE 2.5 MG/ML
30 INJECTION, SOLUTION EPIDURAL; INFILTRATION; INTRACAUDAL ONCE
Status: COMPLETED | OUTPATIENT
Start: 2021-07-15 | End: 2021-07-15

## 2021-07-15 RX ORDER — ROPIVACAINE HYDROCHLORIDE 2 MG/ML
8 INJECTION, SOLUTION EPIDURAL; INFILTRATION CONTINUOUS
Status: DISCONTINUED | OUTPATIENT
Start: 2021-07-15 | End: 2021-07-19 | Stop reason: HOSPADM

## 2021-07-15 RX ORDER — HYDROMORPHONE HYDROCHLORIDE 1 MG/ML
0.5 INJECTION, SOLUTION INTRAMUSCULAR; INTRAVENOUS; SUBCUTANEOUS ONCE
Status: COMPLETED | OUTPATIENT
Start: 2021-07-15 | End: 2021-07-15

## 2021-07-15 RX ORDER — ASPIRIN 81 MG/1
81 TABLET ORAL EVERY 12 HOURS SCHEDULED
Status: DISCONTINUED | OUTPATIENT
Start: 2021-07-16 | End: 2021-07-19 | Stop reason: HOSPADM

## 2021-07-15 RX ORDER — NALOXONE HCL 0.4 MG/ML
0.4 VIAL (ML) INJECTION
Status: DISCONTINUED | OUTPATIENT
Start: 2021-07-15 | End: 2021-07-19 | Stop reason: HOSPADM

## 2021-07-15 RX ORDER — OXYBUTYNIN CHLORIDE 10 MG/1
10 TABLET, EXTENDED RELEASE ORAL DAILY
Status: DISCONTINUED | OUTPATIENT
Start: 2021-07-15 | End: 2021-07-15

## 2021-07-15 RX ORDER — MELOXICAM 7.5 MG/1
15 TABLET ORAL DAILY
Status: DISCONTINUED | OUTPATIENT
Start: 2021-07-16 | End: 2021-07-19 | Stop reason: HOSPADM

## 2021-07-15 RX ORDER — GLYCOPYRROLATE 0.2 MG/ML
INJECTION INTRAMUSCULAR; INTRAVENOUS AS NEEDED
Status: DISCONTINUED | OUTPATIENT
Start: 2021-07-15 | End: 2021-07-15 | Stop reason: SURG

## 2021-07-15 RX ORDER — METAXALONE 800 MG/1
400 TABLET ORAL 3 TIMES DAILY PRN
Status: DISCONTINUED | OUTPATIENT
Start: 2021-07-15 | End: 2021-07-19 | Stop reason: HOSPADM

## 2021-07-15 RX ORDER — OXYCODONE HYDROCHLORIDE 5 MG/1
5 TABLET ORAL EVERY 4 HOURS PRN
Status: DISCONTINUED | OUTPATIENT
Start: 2021-07-15 | End: 2021-07-19 | Stop reason: HOSPADM

## 2021-07-15 RX ORDER — CEFAZOLIN SODIUM 2 G/100ML
2 INJECTION, SOLUTION INTRAVENOUS ONCE
Status: DISCONTINUED | OUTPATIENT
Start: 2021-07-15 | End: 2021-07-15 | Stop reason: HOSPADM

## 2021-07-15 RX ORDER — ONDANSETRON 4 MG/1
4 TABLET, FILM COATED ORAL EVERY 6 HOURS PRN
Status: DISCONTINUED | OUTPATIENT
Start: 2021-07-15 | End: 2021-07-19 | Stop reason: HOSPADM

## 2021-07-15 RX ORDER — ACETAMINOPHEN 325 MG/1
650 TABLET ORAL EVERY 8 HOURS
Status: DISCONTINUED | OUTPATIENT
Start: 2021-07-15 | End: 2021-07-15 | Stop reason: SDUPTHER

## 2021-07-15 RX ORDER — LIDOCAINE HYDROCHLORIDE AND EPINEPHRINE 10; 10 MG/ML; UG/ML
10 INJECTION, SOLUTION INFILTRATION; PERINEURAL ONCE
Status: COMPLETED | OUTPATIENT
Start: 2021-07-15 | End: 2021-07-15

## 2021-07-15 RX ADMIN — CEFAZOLIN SODIUM 2 G: 1 INJECTION, POWDER, FOR SOLUTION INTRAMUSCULAR; INTRAVENOUS at 19:42

## 2021-07-15 RX ADMIN — ACETAMINOPHEN 650 MG: 325 TABLET, FILM COATED ORAL at 17:43

## 2021-07-15 RX ADMIN — LIDOCAINE HYDROCHLORIDE,EPINEPHRINE BITARTRATE 10 ML: 10; .01 INJECTION, SOLUTION INFILTRATION; PERINEURAL at 04:23

## 2021-07-15 RX ADMIN — METAXALONE 400 MG: 800 TABLET ORAL at 17:43

## 2021-07-15 RX ADMIN — Medication 400 MCG: at 19:52

## 2021-07-15 RX ADMIN — PROPOFOL 100 MG: 10 INJECTION, EMULSION INTRAVENOUS at 19:41

## 2021-07-15 RX ADMIN — SODIUM CHLORIDE, PRESERVATIVE FREE 10 ML: 5 INJECTION INTRAVENOUS at 08:31

## 2021-07-15 RX ADMIN — FENTANYL CITRATE 100 MCG: 50 INJECTION, SOLUTION INTRAMUSCULAR; INTRAVENOUS at 19:41

## 2021-07-15 RX ADMIN — Medication 200 MCG: at 20:26

## 2021-07-15 RX ADMIN — MORPHINE SULFATE 1 MG: 2 INJECTION, SOLUTION INTRAMUSCULAR; INTRAVENOUS at 06:28

## 2021-07-15 RX ADMIN — SODIUM CHLORIDE, POTASSIUM CHLORIDE, SODIUM LACTATE AND CALCIUM CHLORIDE: 600; 310; 30; 20 INJECTION, SOLUTION INTRAVENOUS at 19:42

## 2021-07-15 RX ADMIN — HYDROMORPHONE HYDROCHLORIDE 0.5 MG: 1 INJECTION, SOLUTION INTRAMUSCULAR; INTRAVENOUS; SUBCUTANEOUS at 03:55

## 2021-07-15 RX ADMIN — Medication 100 MCG: at 19:41

## 2021-07-15 RX ADMIN — ROPIVACAINE HYDROCHLORIDE 8 ML/HR: 2 INJECTION, SOLUTION EPIDURAL; INFILTRATION at 12:15

## 2021-07-15 RX ADMIN — GLYCOPYRROLATE 0.4 MG: 0.4 INJECTION INTRAMUSCULAR; INTRAVENOUS at 21:02

## 2021-07-15 RX ADMIN — ONDANSETRON 4 MG: 2 INJECTION INTRAMUSCULAR; INTRAVENOUS at 03:55

## 2021-07-15 RX ADMIN — SODIUM CHLORIDE 100 ML/HR: 9 INJECTION, SOLUTION INTRAVENOUS at 22:02

## 2021-07-15 RX ADMIN — MORPHINE SULFATE 1 MG: 2 INJECTION, SOLUTION INTRAMUSCULAR; INTRAVENOUS at 11:34

## 2021-07-15 RX ADMIN — TRANEXAMIC ACID 1000 MG: 10 INJECTION, SOLUTION INTRAVENOUS at 20:50

## 2021-07-15 RX ADMIN — ONDANSETRON 4 MG: 2 INJECTION INTRAMUSCULAR; INTRAVENOUS at 21:02

## 2021-07-15 RX ADMIN — OXYBUTYNIN CHLORIDE 10 MG: 10 TABLET, EXTENDED RELEASE ORAL at 08:31

## 2021-07-15 RX ADMIN — ATRACURIUM BESYLATE 20 MG: 10 INJECTION, SOLUTION INTRAVENOUS at 19:41

## 2021-07-15 RX ADMIN — ACETAMINOPHEN 650 MG: 325 TABLET, FILM COATED ORAL at 08:39

## 2021-07-15 RX ADMIN — METAXALONE 400 MG: 800 TABLET ORAL at 08:39

## 2021-07-15 RX ADMIN — ROSUVASTATIN CALCIUM 5 MG: 10 TABLET, COATED ORAL at 08:31

## 2021-07-15 RX ADMIN — BUPIVACAINE HYDROCHLORIDE 30 ML: 2.5 INJECTION, SOLUTION EPIDURAL; INFILTRATION; INTRACAUDAL; PERINEURAL at 04:23

## 2021-07-15 RX ADMIN — ROPIVACAINE HYDROCHLORIDE 20 ML: 5 INJECTION, SOLUTION EPIDURAL; INFILTRATION; PERINEURAL at 11:11

## 2021-07-15 RX ADMIN — MORPHINE SULFATE 1 MG: 2 INJECTION, SOLUTION INTRAMUSCULAR; INTRAVENOUS at 15:13

## 2021-07-15 RX ADMIN — LOSARTAN POTASSIUM 25 MG: 25 TABLET, FILM COATED ORAL at 08:31

## 2021-07-15 RX ADMIN — TRANEXAMIC ACID 1000 MG: 10 INJECTION, SOLUTION INTRAVENOUS at 21:42

## 2021-07-15 RX ADMIN — ROPIVACAINE HYDROCHLORIDE 8 ML/HR: 2 INJECTION, SOLUTION EPIDURAL; INFILTRATION at 21:43

## 2021-07-15 RX ADMIN — NEOSTIGMINE 2 MG: 1 INJECTION INTRAVENOUS at 21:02

## 2021-07-15 NOTE — ANESTHESIA PREPROCEDURE EVALUATION
Anesthesia Evaluation                  Airway   Mallampati: I  TM distance: >3 FB  Neck ROM: full  No difficulty expected  Dental      Pulmonary    Cardiovascular     ECG reviewed    (+) hypertension, hyperlipidemia,       Neuro/Psych  (+) CVA, dizziness/light headedness, psychiatric history,     GI/Hepatic/Renal/Endo    (+)  GERD,      Musculoskeletal     Abdominal    Substance History      OB/GYN          Other                        Anesthesia Plan    ASA 3     general     intravenous induction     Anesthetic plan, all risks, benefits, and alternatives have been provided, discussed and informed consent has been obtained with: patient.

## 2021-07-15 NOTE — ANESTHESIA PROCEDURE NOTES
FICB CATHETER      Patient reassessed immediately prior to procedure    Patient location during procedure: pre-op  Reason for block: procedure for pain and at surgeon's request  Performed by  CRNA: Radha Gorman CRNA  Assisted by: Elza Millard RN  Preanesthetic Checklist  Completed: patient identified, IV checked, site marked, risks and benefits discussed, surgical consent, monitors and equipment checked, pre-op evaluation and timeout performed  Prep:  Pt Position: supine  Sterile barriers:cap, gloves and mask  Prep: ChloraPrep  Patient monitoring: blood pressure monitoring, continuous pulse oximetry and EKG  Procedure  Sedation:no  Performed under: local infiltration  Guidance:ultrasound guided  Images:still images obtained, printed/placed on chart    Laterality:right  Block Type:fascia iliaca compartment  Injection Technique:catheter  Needle Type:echogenic  Needle Gauge:18 G  Resistance on Injection: none  Catheter Size:20 G (20g)  Cath Depth at skin: 10 cm    Medications Used: ropivacaine (NAROPIN) 0.5 % injection, 20 mL  Med admintered at 7/15/2021 11:11 AM      Medications  Preservative Free Saline:20ml  Comment:Ropivacaine 0.5 % 20 ml + NS PF 20 ml given via needle    Post Assessment  Injection Assessment: negative aspiration for heme, no paresthesia on injection and incremental injection  Patient Tolerance:comfortable throughout block  Complications:no  Additional Notes  Procedure:                 Pt placed in supine position.   The insertion site was prepped in sterile fashion with Chlorapreop and clear plastic drapes.  Analgesia was provided by skin infiltration at insertion site with Lidocaine 1% 3mls.  A B-Kohli 18 g , 4 inch echogenic Touhy needle was advance In-plane under ultrasound guidance. The   Anterior superior Iliac crest was initially visualized and the probe was directed slightly medially and slightly towards the umbilicus.  The course of the needle was tracked over the sartorius muscle  through the fascia Iliacus and into the anterior portion of the Iliacus muscle.  Major vessels where identified and avoided as where structures of the peritoneal cavity.  LA injection was made incrementally in 1-5ml amounts spread was visualized superiorly below fascia iliacus.  Injection was completed with negative aspiration of blood and negative intravascular injection.  Injection pressures where normal or minimal resistance.  A 20 g B-Kohli wire styleted catheter was then advance thru the needle and very easily placed in a superior or cephalad direction.  The catheter was secured at insertion site with exofin tissue adhesive, benzoin, and steristreps.  A CHG tegaderm dressing was placed over the insertion site and the nerve catheter labeled and capped.  Thank You.

## 2021-07-15 NOTE — PLAN OF CARE
Goal Outcome Evaluation:  Plan of Care Reviewed With: patient        Progress: no change   Pt is alert and oriented x3, confused on time. Repeatedly asks the same questions over and over. VSS on 2L NC. Normal sinus on tele. PRN morphine given as ordered for pain. NPO. On bedrest. Covid was negative. Purewick in place; has not voided yet. Anticipating surgery today. Will continue to monitor.

## 2021-07-15 NOTE — PLAN OF CARE
Goal Outcome Evaluation:  Plan of Care Reviewed With: patient        Progress: no change  Outcome Summary: Pt taking po muscle relaxer and iv pain meds throughout the day. nerve block put in this am with some relief. surgery sched for late this evening.

## 2021-07-15 NOTE — ED PROVIDER NOTES
"Subjective   89-year-old female presents for evaluation of right hip pain.  She states that this morning she was attempting to get out of her bed when she tripped, fell, and landed awkwardly on her right hip.  She usually uses a walker for ambulation.  She was unable to stand or bear weight after the fall so EMS was called and she was brought to our facility to be evaluated.  The patient is not anticoagulated.  She did not hit her head.  No LOC.  She is complaining of isolated right \"groin pain\" and has no other complaints at this time.  Pain is worse with attempted movement and is currently 8 out of 10 in severity.  No paresthesias.          Review of Systems   Musculoskeletal:        Right hip pain   All other systems reviewed and are negative.      Past Medical History:   Diagnosis Date   • Ankle instability    • Chronic left shoulder pain 1/26/2018   • Disorder of tendon of shoulder region, left    • Dysphagia    • Esophagitis    • Femoral neck fracture (CMS/[facility]) 7/15/2021   • Finger fracture, right 2009    Dr Chandler casting and physical therapy   • Hemorrhoids    • Hypertension    • Localized, primary osteoarthritis of left shoulder region    • Right shoulder pain    • Shoulder joint replacement status     Right   • Stroke (CMS/[facility])     CVA       Allergies   Allergen Reactions   • Nasal Spray        Past Surgical History:   Procedure Laterality Date   • APPENDECTOMY     • CATARACT EXTRACTION, BILATERAL      2020   • ENDOSCOPY  07/2009    with biopsy/esophageal ring dilation   • HEMORRHOIDECTOMY      lanced    • HYSTERECTOMY      partial    • OOPHORECTOMY     • SHOULDER SURGERY Right     replacement   • SHOULDER SURGERY      Arthroscopy of shoulder:Right   • TONSILLECTOMY         Family History   Problem Relation Age of Onset   • Cancer Mother    • Stroke Mother    • Bleeding Disorder Mother    • Osteoarthritis Mother    • Rheum arthritis Mother    • Breast cancer Mother 60   • Heart attack Father    • Coronary " artery disease Father    • Cancer Sister         Bladder    • Bleeding Disorder Brother         2   • Colon cancer Brother         3   • Ovarian cancer Neg Hx        Social History     Socioeconomic History   • Marital status:      Spouse name: Not on file   • Number of children: Not on file   • Years of education: Not on file   • Highest education level: Not on file   Tobacco Use   • Smoking status: Never Smoker   • Smokeless tobacco: Never Used   Substance and Sexual Activity   • Alcohol use: No   • Drug use: No   • Sexual activity: Defer           Objective   Physical Exam  Vitals and nursing note reviewed.   Constitutional:       General: She is not in acute distress.     Appearance: Normal appearance. She is well-developed. She is not diaphoretic.      Comments: Nontoxic-appearing elderly female   HENT:      Head: Normocephalic and atraumatic.   Eyes:      Pupils: Pupils are equal, round, and reactive to light.   Neck:      Comments: No midline cervical spine tenderness noted, no step-off or deformity noted  Cardiovascular:      Rate and Rhythm: Normal rate and regular rhythm.      Pulses: Normal pulses.      Heart sounds: Normal heart sounds. No murmur heard.   No friction rub. No gallop.    Pulmonary:      Effort: Pulmonary effort is normal. No respiratory distress.      Breath sounds: Normal breath sounds. No wheezing or rales.   Abdominal:      General: Bowel sounds are normal. There is no distension.      Palpations: Abdomen is soft. There is no mass.      Tenderness: There is no abdominal tenderness. There is no guarding or rebound.   Musculoskeletal:      Comments: Right lower extremity appears shortened when compared to the left, no pelvic instability present   Skin:     General: Skin is warm and dry.      Findings: No erythema or rash.   Neurological:      Mental Status: She is alert and oriented to person, place, and time.      Comments: Right lower extremity is neurovascularly intact distally  with bounding distal pulses normal sensation   Psychiatric:         Mood and Affect: Mood normal.         Thought Content: Thought content normal.         Judgment: Judgment normal.         Nerve Block    Date/Time: 7/15/2021 4:29 AM  Performed by: Sesar Lazar MD  Authorized by: Sesar Lazar MD     Consent:     Consent obtained:  Verbal and written    Consent given by:  Patient and guardian    Risks discussed:  Allergic reaction, bleeding, intravenous injection, infection, pain, nerve damage, swelling and unsuccessful block    Alternatives discussed:  Alternative treatment  Indications:     Indications:  Pain relief  Location:     Laterality:  Right  Pre-procedure details:     Skin preparation:  2% chlorhexidine    Preparation: Patient was prepped and draped in usual sterile fashion    Skin anesthesia (see MAR for exact dosages):     Skin anesthesia method:  Local infiltration    Local anesthetic:  Lidocaine 1% WITH epi  Procedure details (see MAR for exact dosages):     Guidance: ultrasound      Anesthetic injected:  Bupivacaine 0.25% w/o epi    Steroid injected:  None    Additive injected:  None    Injection procedure:  Anatomic landmarks identified, anatomic landmarks palpated, introduced needle, incremental injection and negative aspiration for blood    Paresthesia:  None  Post-procedure details:     Dressing:  Sterile dressing    Outcome:  Pain improved    Patient tolerance of procedure:  Tolerated well, no immediate complications               ED Course  ED Course as of Jul 15 0430   Thu Jul 15, 2021   0241 89-year-old female presents for evaluation of right hip pain.  She states that just prior to calling the ambulance tonight, she had a mechanical trip and fall and fell awkwardly on her right hip.  She was unable to bear weight afterward.  On arrival to the ED, the patient is nontoxic-appearing.  Her right lower extremity is shortened when compared to the left.  Range of motion limited  secondary to pain.  No pelvic instability noted.  We will obtain labs and imaging, and we will reassess following initial interventions.    [DD]   0310 Plain films confirmed right hip fracture.  Dr. Olsen of orthopedics notified.  We will proceed with femoral nerve block under ultrasound guidance and seek admission to the hospitalist for further evaluation and treatment.    [DD]   0315 I discussed the patient's case with Dr. Marley, the patient will be admitted under her care for further evaluation and treatment.  She is aware/agreeable with the plan at this time.    [DD]   0359 Labs remarkable for markedly elevated INR.  However, the patient does not report taking any anticoagulants.  As result, we will repeat labs.    [DD]   0409 Repeat point-of-care INR is normal.  The patient's initial markedly elevated INR appears to be a lab error.    [DD]   0429 Using ultrasound guidance and under sterile conditions, femoral nerve block was performed without complication.  The patient tolerated the procedure well.    [DD]      ED Course User Index  [DD] Sesar Lazar MD                                   Recent Results (from the past 24 hour(s))   Comprehensive Metabolic Panel    Collection Time: 07/15/21  2:57 AM    Specimen: Blood   Result Value Ref Range    Glucose 109 (H) 65 - 99 mg/dL    BUN 12 8 - 23 mg/dL    Creatinine 0.76 0.57 - 1.00 mg/dL    Sodium 136 136 - 145 mmol/L    Potassium 4.2 3.5 - 5.2 mmol/L    Chloride 100 98 - 107 mmol/L    CO2 23.0 22.0 - 29.0 mmol/L    Calcium 9.0 8.2 - 9.6 mg/dL    Total Protein 7.0 6.0 - 8.5 g/dL    Albumin 4.00 3.50 - 5.20 g/dL    ALT (SGPT) 11 1 - 33 U/L    AST (SGOT) 20 1 - 32 U/L    Alkaline Phosphatase 75 39 - 117 U/L    Total Bilirubin 0.4 0.0 - 1.2 mg/dL    eGFR Non African Amer 71 >60 mL/min/1.73    Globulin 3.0 gm/dL    A/G Ratio 1.3 g/dL    BUN/Creatinine Ratio 15.8 7.0 - 25.0    Anion Gap 13.0 5.0 - 15.0 mmol/L   Protime-INR    Collection Time: 07/15/21  2:57 AM     Specimen: Blood   Result Value Ref Range    Protime >120.0 (C) 11.4 - 14.4 Seconds    INR >10.00 (C) 0.85 - 1.16   CBC Auto Differential    Collection Time: 07/15/21  2:57 AM    Specimen: Blood   Result Value Ref Range    WBC 8.29 3.40 - 10.80 10*3/mm3    RBC 4.68 3.77 - 5.28 10*6/mm3    Hemoglobin 14.4 12.0 - 15.9 g/dL    Hematocrit 43.9 34.0 - 46.6 %    MCV 93.8 79.0 - 97.0 fL    MCH 30.8 26.6 - 33.0 pg    MCHC 32.8 31.5 - 35.7 g/dL    RDW 12.9 12.3 - 15.4 %    RDW-SD 43.9 37.0 - 54.0 fl    MPV 10.2 6.0 - 12.0 fL    Platelets 256 140 - 450 10*3/mm3    Neutrophil % 70.3 42.7 - 76.0 %    Lymphocyte % 18.5 (L) 19.6 - 45.3 %    Monocyte % 9.7 5.0 - 12.0 %    Eosinophil % 0.7 0.3 - 6.2 %    Basophil % 0.4 0.0 - 1.5 %    Immature Grans % 0.4 0.0 - 0.5 %    Neutrophils, Absolute 5.84 1.70 - 7.00 10*3/mm3    Lymphocytes, Absolute 1.53 0.70 - 3.10 10*3/mm3    Monocytes, Absolute 0.80 0.10 - 0.90 10*3/mm3    Eosinophils, Absolute 0.06 0.00 - 0.40 10*3/mm3    Basophils, Absolute 0.03 0.00 - 0.20 10*3/mm3    Immature Grans, Absolute 0.03 0.00 - 0.05 10*3/mm3    nRBC 0.0 0.0 - 0.2 /100 WBC   POC Protime / INR    Collection Time: 07/15/21  4:06 AM    Specimen: Blood   Result Value Ref Range    Protime 12.1 (L) 12.8 - 15.2 seconds    INR 1.0 0.8 - 1.2     Note: In addition to lab results from this visit, the labs listed above may include labs taken at another facility or during a different encounter within the last 24 hours. Please correlate lab times with ED admission and discharge times for further clarification of the services performed during this visit.    XR Hip With or Without Pelvis 2 - 3 View Right   Final Result   Acute right femoral neck fracture which may extend into the greater trochanter.      Signer Name: Oscar Bajwa MD    Signed: 7/15/2021 3:04 AM    Workstation Name: [email or web address]     [facility]        Vitals:    07/15/21 0300 07/15/21 0315 07/15/21 0330 07/15/21 0400   BP: 171/79   157/78 160/80   BP Location:       Patient Position:       Pulse: 76 68 80 67   Resp:       Temp:       TempSrc:       SpO2:  98% 98% 96%   Weight:       Height:         Medications   sodium chloride 0.9 % flush 10 mL (has no administration in time range)   fat emulsion (INTRALIPID,LIPOSYN) 20 % infusion  - ADS Override Pull (has no administration in time range)   HYDROmorphone (DILAUDID) injection 0.5 mg (0.5 mg Intravenous Given 7/15/21 0355)   ondansetron (ZOFRAN) injection 4 mg (4 mg Intravenous Given 7/15/21 0355)   lidocaine 1% - EPINEPHrine 1:623813 (XYLOCAINE W/EPI) 1 %-1:321177 injection 10 mL (10 mL Injection Given by Other 7/15/21 0423)   bupivacaine (PF) (MARCAINE) 0.25 % injection 30 mL (30 mL Injection Given by Other 7/15/21 0423)     ECG/EMG Results (last 24 hours)     Procedure Component Value Units Date/Time    ECG 12 Lead [[id number]] Collected: 07/15/21 0243     Updated: 07/15/21 0244        ECG 12 Lead                     MDM    Final diagnoses:   Closed fracture of neck of right femur, initial encounter (CMS/[facility])       ED Disposition  ED Disposition     ED Disposition Condition Comment    Decision to Admit  Level of Care: Telemetry [5]   Diagnosis: Femoral neck fracture (CMS/[facility]) [516963]   Admitting Physician: MARY VARGAS [[id number]]   Attending Physician: MARY VARGAS [[id number]]   Certification: I Certify That Inpatient Hospital Services Are Medically Necessary For Greater Than 2 Midnights            No follow-up provider specified.       Medication List      No changes were made to your prescriptions during this visit.          Sesar Lazar MD  07/15/21 [id number]

## 2021-07-15 NOTE — CONSULTS
Orthopedic Consult      Patient: Luh Mckeon    Date of Admission: 7/15/2021  2:05 AM    YOB: 1930    Medical Record Number: [id number]    Attending Physician: Amor Serna MD    Consulting Physician: Adam Olsen MD      Chief Complaint(s): Femoral neck fracture (CMS/[facility]) [S72.009A]      History of Present Illness: 89 y.o. female admitted to [facility] with Femoral neck fracture (CMS/[facility]ly [facility]) [S72.009A]. I was consulted for further evaluation and treatment of right femoral neck fracture.  This is a 89-year-old female who was attempting to get out of her bed yesterday evening when she tripped and fell and landed on her right hip.  She was unable to ambulate afterwards.  She was transported by EMS to the hospital for further evaluation and diagnosed with right femoral neck fracture.  At baseline, she uses a walker to assist with ambulation.  She denies any loss of consciousness with her fall.  She is complaining of isolated pain in the right hip groin region.  She rates it an 8/10 on the pain scale with attempted movement.  With immobilization it is a 4/10 on the pain scale.  She denies numbness or tingling in the extremity.  She denies pain elsewhere     Allergies   Allergen Reactions   • Nasal Spray         Home Medications:  Medications Prior to Admission   Medication Sig Dispense Refill Last Dose   • denosumab (Prolia) 60 MG/ML solution prefilled syringe syringe Inject 60 mg under the skin into the appropriate area as directed Every 6 (Six) Months.      • losartan (COZAAR) 25 MG tablet 1 tab per day 90 tablet 3    • Mirabegron ER (Myrbetriq) 50 MG tablet sustained-release 24 hour 24 hr tablet Take 50 mg by mouth Daily. 30 tablet 0    • rosuvastatin (CRESTOR) 5 MG tablet Take 1 tablet by mouth Daily. 90 tablet 3        Current Medications:  Scheduled Meds:fat emulsion, , ,   losartan, 25 mg, Oral, Q24H  oxybutynin XL, 10 mg, Oral, Daily  rosuvastatin, 5 mg, Oral, Daily  sodium  chloride, 10 mL, Intravenous, Q12H      Continuous Infusions:   PRN Meds:.Morphine **AND** naloxone  •  [COMPLETED] Insert peripheral IV **AND** sodium chloride  •  sodium chloride    Past Medical History:   Diagnosis Date   • Ankle instability    • Chronic left shoulder pain 1/26/2018   • Disorder of tendon of shoulder region, left    • Dysphagia    • Esophagitis    • Femoral neck fracture (CMS/HCC) 7/15/2021   • Finger fracture, right 2009    Dr Chandler casting and physical therapy   • Hemorrhoids    • Hypertension    • Localized, primary osteoarthritis of left shoulder region    • Right shoulder pain    • Shoulder joint replacement status     Right   • Stroke (CMS/HCC)     CVA        Past Surgical History:   Procedure Laterality Date   • APPENDECTOMY     • CATARACT EXTRACTION, BILATERAL      2020   • ENDOSCOPY  07/2009    with biopsy/esophageal ring dilation   • HEMORRHOIDECTOMY      lanced    • HYSTERECTOMY      partial    • OOPHORECTOMY     • SHOULDER SURGERY Right     replacement   • SHOULDER SURGERY      Arthroscopy of shoulder:Right   • TONSILLECTOMY          Social History     Occupational History   • Not on file   Tobacco Use   • Smoking status: Never Smoker   • Smokeless tobacco: Never Used   Substance and Sexual Activity   • Alcohol use: No   • Drug use: No   • Sexual activity: Defer      Social History     Social History Narrative   • Not on file        Family History   Problem Relation Age of Onset   • Cancer Mother    • Stroke Mother    • Bleeding Disorder Mother    • Osteoarthritis Mother    • Rheum arthritis Mother    • Breast cancer Mother 60   • Heart attack Father    • Coronary artery disease Father    • Cancer Sister         Bladder    • Bleeding Disorder Brother         2   • Colon cancer Brother         3   • Ovarian cancer Neg Hx        Review of Systems:   General: negative for - chills, fatigue, fever, malaise or night sweats  Psychological: negative for - behavioral disorder, hostility,  irritability, mood swings, obsessive thoughts or suicidal ideation  Ophthalmic: negative for - blurry vision, double vision or eye pain  HEENT: negative for - headaches, hearing change, sinus pain, sore throat or visual changes  Hematological and Lymphatic: negative for - bleeding problems, jaundice, night sweats, pallor or swollen lymph nodes  Endocrine: negative for - malaise/lethargy, mood swings, palpitations, polydipsia/polyuria, skin changes or unexpected weight changes  Respiratory: negative for - cough, shortness of breath or wheezing  Cardiovascular: negative for - chest pain, dyspnea on exertion, palpitations or shortness of breath  Gastrointestinal: negative for - abdominal pain, change in bowel habits, change in stools, constipation, gas/bloating or stool incontinence  Genitourinary: negative for - dysuria, genital discharge, nocturia, pelvic pain or scrotal mass/pain  Musculoskeletal: positive for - pain in hip - right  Neurological: negative for - behavioral changes, headaches, speech problems or visual changes  Dermatological: negative for hair changes, lumps, pruritus and skin lesion changes    Physical Exam: 89 y.o. female    GENERAL APPEARANCE: awake, alert & oriented x 3, in no acute distress and well developed, well nourished  Vitals:    07/15/21 0415 07/15/21 0430 07/15/21 0500 07/15/21 0535   BP:  156/73 144/80 159/72   BP Location:    Left arm   Patient Position:    Lying   Pulse: 73  82 75   Resp:    18   Temp:    97.6 °F (36.4 °C)   TempSrc:    Oral   SpO2: 96%   94%   Weight:    61.2 kg (135 lb)   Height:         PSYCH: normal affect  HEAD: Normocephalic, without obvious abnormality, atraumatic  EYES: No icterus, corneas clear, PERRLA  CARDIOVASCULAR: pulses palpable and equal bilaterally. Capillary refill less than 2 seconds  LUNGS:  breathing nonlabored  ABDOMEN: Soft, nontender, nondistended  BACK: No C-T- L spine tenderness  EXTREMITIES: no clubbing, cyanosis  MUSCULOSKELETAL:    Left  upper extremity: Full painless range of motion of shoulder, elbow, wrist, and digits.  Nontender to palpation throughout the extremity.  Intact EPL, FPL, EDC, FDP, FDS, interosseous, wrist flexion, wrist extension, biceps, triceps, and deltoid. Sensation intact light touch to median, radial, ulnar, and axillary nerves. Palpable radial pulse.  Skin is intact without significant lesions or wounds.    Right upper extremity: Full painless range of motion of shoulder, elbow, wrist, and digits. Nontender to palpation throughout the extremity.  Intact EPL, FPL, EDC, FDP, FDS, interosseous, wrist flexion, wrist extension, biceps, triceps, and deltoid. Sensation intact light touch to median, radial, ulnar, and axillary nerves. Palpable radial pulse.  Skin is intact without significant lesions or wounds.    Pelvis: Stable to AP and lateral compression.    Left lower extremity: Full, painless range of motion of hip, knee, ankle, toes.  Nontender to palpation throughout the extremity.  Negative pain with logroll. Intact EHL, FHL, tibialis anterior, and gastrocsoleus. Sensation intact to light touch to deep peroneal, superficial peroneal, sural, saphenous, tibial nerves. Palpable DP and PT pulses. Skin -intact without evidence of breakdown. Edema -none.    Right lower extremity: Full, painless range of motion of ankle, toes. Range of motion is limited at hip and knee secondary to pain at the hip region.  She is focally tender to palpation about the proximal femur.  Trace tenderness with no focal swelling about the distal femur and knee region.  Nontender to palpation through tibia and foot.  Positive pain with logroll.  Extremity is in a shortened and externally rotated position compared to contralateral side. Intact EHL, FHL, tibialis anterior, and gastrocsoleus. Sensation intact to light touch to deep peroneal, superficial peroneal, sural, saphenous, tibial nerves. Palpable DP and PT pulses. Skin -intact without evidence of  breakdown. Edema -focal swelling at the proximal femur.      Diagnostic Tests:    Admission on 07/15/2021   Component Date Value Ref Range Status   • Glucose 07/15/2021 109* 65 - 99 mg/dL Final   • BUN 07/15/2021 12  8 - 23 mg/dL Final   • Creatinine 07/15/2021 0.76  0.57 - 1.00 mg/dL Final   • Sodium 07/15/2021 136  136 - 145 mmol/L Final   • Potassium 07/15/2021 4.2  3.5 - 5.2 mmol/L Final    Slight hemolysis detected by analyzer. Results may be affected.   • Chloride 07/15/2021 100  98 - 107 mmol/L Final   • CO2 07/15/2021 23.0  22.0 - 29.0 mmol/L Final   • Calcium 07/15/2021 9.0  8.2 - 9.6 mg/dL Final   • Total Protein 07/15/2021 7.0  6.0 - 8.5 g/dL Final   • Albumin 07/15/2021 4.00  3.50 - 5.20 g/dL Final   • ALT (SGPT) 07/15/2021 11  1 - 33 U/L Final   • AST (SGOT) 07/15/2021 20  1 - 32 U/L Final   • Alkaline Phosphatase 07/15/2021 75  39 - 117 U/L Final   • Total Bilirubin 07/15/2021 0.4  0.0 - 1.2 mg/dL Final   • eGFR Non  Amer 07/15/2021 71  >60 mL/min/1.73 Final   • Globulin 07/15/2021 3.0  gm/dL Final   • A/G Ratio 07/15/2021 1.3  g/dL Final   • BUN/Creatinine Ratio 07/15/2021 15.8  7.0 - 25.0 Final   • Anion Gap 07/15/2021 13.0  5.0 - 15.0 mmol/L Final   • Protime 07/15/2021 >120.0* 11.4 - 14.4 Seconds Final   • INR 07/15/2021 >10.00* 0.85 - 1.16 Final   • WBC 07/15/2021 8.29  3.40 - 10.80 10*3/mm3 Final   • RBC 07/15/2021 4.68  3.77 - 5.28 10*6/mm3 Final   • Hemoglobin 07/15/2021 14.4  12.0 - 15.9 g/dL Final   • Hematocrit 07/15/2021 43.9  34.0 - 46.6 % Final   • MCV 07/15/2021 93.8  79.0 - 97.0 fL Final   • MCH 07/15/2021 30.8  26.6 - 33.0 pg Final   • MCHC 07/15/2021 32.8  31.5 - 35.7 g/dL Final   • RDW 07/15/2021 12.9  12.3 - 15.4 % Final   • RDW-SD 07/15/2021 43.9  37.0 - 54.0 fl Final   • MPV 07/15/2021 10.2  6.0 - 12.0 fL Final   • Platelets 07/15/2021 256  140 - 450 10*3/mm3 Final   • Neutrophil % 07/15/2021 70.3  42.7 - 76.0 % Final   • Lymphocyte % 07/15/2021 18.5* 19.6 - 45.3 % Final    • Monocyte % 07/15/2021 9.7  5.0 - 12.0 % Final   • Eosinophil % 07/15/2021 0.7  0.3 - 6.2 % Final   • Basophil % 07/15/2021 0.4  0.0 - 1.5 % Final   • Immature Grans % 07/15/2021 0.4  0.0 - 0.5 % Final   • Neutrophils, Absolute 07/15/2021 5.84  1.70 - 7.00 10*3/mm3 Final   • Lymphocytes, Absolute 07/15/2021 1.53  0.70 - 3.10 10*3/mm3 Final   • Monocytes, Absolute 07/15/2021 0.80  0.10 - 0.90 10*3/mm3 Final   • Eosinophils, Absolute 07/15/2021 0.06  0.00 - 0.40 10*3/mm3 Final   • Basophils, Absolute 07/15/2021 0.03  0.00 - 0.20 10*3/mm3 Final   • Immature Grans, Absolute 07/15/2021 0.03  0.00 - 0.05 10*3/mm3 Final   • nRBC 07/15/2021 0.0  0.0 - 0.2 /100 WBC Final   • COVID19 07/15/2021 Not Detected  Not Detected - Ref. Range Final   • Influenza A PCR 07/15/2021 Not Detected  Not Detected Final   • Influenza B PCR 07/15/2021 Not Detected  Not Detected Final   • Protime 07/15/2021 12.1* 12.8 - 15.2 seconds Final   • INR 07/15/2021 1.0  0.8 - 1.2 Final    Serial Number: [id number]:  [id number]       XR Hip With or Without Pelvis 2 - 3 View Right    Result Date: 7/15/2021  Narrative: CR Hip Uni Comp Min 2 Vws RT INDICATION: Hip pain after fall. COMPARISON: None. FINDINGS: AP pelvis and 2 view(s) of the right hip.  Patient is osteopenic. There is an acute right femoral neck fracture. The fracture may extend into the greater trochanter. No hip dislocation is seen. No other acute fractures are identified. There is no sacroiliac joint diastasis.     Impression: Acute right femoral neck fracture which may extend into the greater trochanter. Signer Name: Oscar Bajwa MD  Signed: 7/15/2021 3:04 AM  Workstation Name: [facility]  [facility]    AP pelvis and right hip radiographs were personally interpreted.  Radiographs demonstrate a displaced right femoral neck fracture with foreshortening of the femoral shaft.    Assessment:  Patient Active Problem List   Diagnosis   • Generalized anxiety disorder    • Unsteady gait   • Risk for falls   • Hyperlipidemia LDL goal <100   • Benign essential hypertension   • Memory loss   • Urinary incontinence in female   • Candida onychomycosis   • AR (allergic rhinitis)   • Overweight (BMI 25.0-29.9)   • Chest pain   • Cramp in limb   • Disorder of mitral and aortic valves   • Disorder of skeletal muscle   • Diverticular disease of colon   • Dysphagia   • External hemorrhoids   •     • Hand joint pain   • Insomnia   • Knee pain   • Menopausal and postmenopausal disorder   • Osteoporosis   • Shoulder pain   • Vitamin B deficiency   • Medicare annual wellness visit, subsequent   • Dizziness   • Urgency of urination   • Fatigue   • Femoral neck fracture (CMS/HCC)   • Fall   • Closed fracture of neck of right femur (CMS/[facility])           Plan:  The patient has clinical and radiographic evidence of right femoral neck fracture.  I had an extensive discussion with the patient/family regarding treatment options for this injury taking into account the nature of the diagnosis, the patient's prior level of cognitive and physical function, medical comorbidities, and goals for treatment.  Operative and nonoperative options as well as alternatives were presented and the risks and benefits of each option were discussed.  Given the patient's age and current functional status, hemiarthroplasty was recommended.  After extensive discussion, the patient/family elected to proceed with a right hip varsha-arthroplasty with the goal to improve patient function, decrease pain, and restore mobility. The risks, benefits, potential complications, and alternatives were again discussed with the patient in detail. Risks included but were not limited to bleeding, infection, anesthesia risks, damage to neurovascular structures, osteolysis, aseptic loosening, instability, dislocation, pain, continued pain, iatrogenic fracture, leg length discrepancy, possible need for future surgery including the potential for  amputation, blood clots, myocardial infarction, stroke, and death. Jazmine-operative blood management and the potential for blood transfusion were discussed with risks and options clearly outlined. Specific details of the surgical procedure, hospitalization, recovery, rehabilitation, and long-term precautions were also presented. Pre-operative teaching was provided. Implant/prosthesis selection was outlined, and the many options available were explained; the final choice will be made at the time of the procedure to match the anatomy and condition of the bone, ligaments, tendons, and muscles. Given this instruction, the patient elected to proceed with the right hip varsha-arthroplasty.     We will obtain CT to ensure fracture does not extend into trochanteric region.  N.p.o.  Plan for OR today for right hip hemiarthroplasty.  Hold DVT chemoprophylaxis  Obtain informed consent.    ADDENDUM: CT scan reveals isolated femoral neck fracture with no extension into trochanteric region.  Plan to proceed with right hip hemiarthroplasty today.    Date: 7/15/2021    Adam Olsen MD

## 2021-07-15 NOTE — ANESTHESIA PROCEDURE NOTES
Airway  Urgency: elective    Date/Time: 7/15/2021 7:43 PM  Airway not difficult    General Information and Staff    Patient location during procedure: OR    Indications and Patient Condition  Indications for airway management: airway protection    Preoxygenated: yes  MILS not maintained throughout  Mask difficulty assessment: 1 - vent by mask    Final Airway Details  Final airway type: endotracheal airway      Successful airway: ETT  Cuffed: yes   Successful intubation technique: direct laryngoscopy  Endotracheal tube insertion site: oral  Blade: Miesha  Blade size: 3  ETT size (mm): 7.0  Cormack-Lehane Classification: grade I - full view of glottis  Placement verified by: chest auscultation and capnometry   Measured from: lips  ETT/EBT  to lips (cm): 20  Number of attempts at approach: 1  Assessment: lips, teeth, and gum same as pre-op and atraumatic intubation    Additional Comments  Negative epigastric sounds, Breath sound equal bilaterally with symmetric chest rise and fall

## 2021-07-15 NOTE — CASE MANAGEMENT/SOCIAL WORK
Discharge Planning Assessment  [facility]     Patient Name: Luh Mckeon  MRN: [id number]  Today's Date: 7/15/2021    Admit Date: 7/15/2021    Discharge Needs Assessment     Row Name 07/15/21 1421       Living Environment    Lives With  alone    Current Living Arrangements  home/apartment/condo 1 level home with 3 steps to enter    Primary Care Provided by  self    Provides Primary Care For  no one    Family Caregiver if Needed  child(ralph), adult;grandchild(ralph), adult    Family Caregiver Names  daughter is Swetha,  granddanarendra Dye tele [phone]    Quality of Family Relationships  involved;supportive    Able to Return to Prior Arrangements  yes       Resource/Environmental Concerns    Transportation Concerns  car, none       Transition Planning    Patient/Family Anticipates Transition to  inpatient rehabilitation facility;home with help/services    Patient/Family Anticipated Services at Transition  home health care;rehabilitation services;skilled nursing    Transportation Anticipated  family or friend will provide       Discharge Needs Assessment    Readmission Within the Last 30 Days  no previous admission in last 30 days    Equipment Currently Used at Home  cane, straight;walker, rolling    Concerns to be Addressed  discharge planning    Anticipated Changes Related to Illness  none    Equipment Needed After Discharge  commode    Outpatient/Agency/Support Group Needs  homecare agency;inpatient rehabilitation facility;outpatient therapy    Discharge Facility/Level of Care Needs  home with home health;rehabilitation facility    Provided Post Acute Provider List?  Yes    Post Acute Provider List  Nursing Home    Delivered To  Support Person    Support Person  chelsea Dye    Method of Delivery  In person    Patient's Choice of Community Agency(s)  [facility] and [facility]        Discharge Plan     Row Name 07/15/21 1425       Plan    Plan  Anticipate rehab services    Plan  Comments  I spoke with patient and her granddaughter who is present with her. She is anticipating surgery this afternoon. Patient is independent at home, using no DME or home services, she drives and lives alone. She is hoping for [facility] rehab at discharge. She is agreeable for referrals for inpatient rehab, if this would be needed. We discussed referral choices and I will make referrals to [facility], [facility] and [facility].     Final Discharge Disposition Code  30 - still a patient        Continued Care and Services - Admitted Since 7/15/2021    Coordination has not been started for this encounter.       Expected Discharge Date and Time     Expected Discharge Date Expected Discharge Time    Jul 19, 2021         Demographic Summary     Row Name 07/15/21 1421       General Information    Admission Type  inpatient    Referral Source  admission list    Preferred Language  English    General Information Comments  PCP Schuyler Garcia       Contact Information    Permission Granted to Share Info With  family/designee        Functional Status     Row Name 07/15/21 1421       Functional Status    Usual Activity Tolerance  good       Functional Status, IADL    Medications  independent    Meal Preparation  independent    Housekeeping  independent    Laundry  independent    Shopping  independent       Employment/    Employment/ Comments Patient has  Medicare and [employer] insurance and denies concerns regarding coverage or disruption in coverage issues. Patient has drug coverage and denies issues obtaining or affording current medications.  .        Psychosocial    No documentation.       Abuse/Neglect    No documentation.       Legal                                          Patient tells me she has advanced directives. Daughter is POA.    No documentation.       Substance Abuse    No documentation.       Patient Forms    No documentation.           Leann Costa RN

## 2021-07-15 NOTE — H&P
[facility] Medicine Services  HISTORY AND PHYSICAL    Patient Name: Luh Mckeon  : 1930  MRN: [id number]  Primary Care Physician: Schuyler Garcia MD  Date of admission: 7/15/2021      Subjective   Subjective     Chief Complaint:  Fall, hip pain    HPI:  Luh Mckeon is a 89 y.o. female who presents with fall after trying to get into bed.  Pt states she fell onto right hip.  Did not hit head.  Did not lose consciousness.  Is not on blood thinners including asa.  Does not have recurrent falls.  Lives w/ daughter.  Has severe pain in right hip/groin currently.  No nu/ti in legs.  No chest pain, shortness of breath, f/c, cough, abd pain.            COVID Details:    Symptoms:    [x] NONE [] Fever []  Cough [] Shortness of breath [] Change in taste/smell      The patient has a COVID HM Topic on their chart, and they are fully vaccinated.      Review of Systems      All other systems reviewed and are negative.     Personal History     Past Medical History:   Diagnosis Date   • Ankle instability    • Chronic left shoulder pain 2018   • Disorder of tendon of shoulder region, left    • Dysphagia    • Esophagitis    • Femoral neck fracture (CMS/HCC) 7/15/2021   • Finger fracture, right     Dr Chandler casting and physical therapy   • Hemorrhoids    • Hypertension    • Localized, primary osteoarthritis of left shoulder region    • Right shoulder pain    • Shoulder joint replacement status     Right   • Stroke (CMS/HCC)     CVA       Past Surgical History:   Procedure Laterality Date   • APPENDECTOMY     • CATARACT EXTRACTION, BILATERAL         • ENDOSCOPY  2009    with biopsy/esophageal ring dilation   • HEMORRHOIDECTOMY      lanced    • HYSTERECTOMY      partial    • OOPHORECTOMY     • SHOULDER SURGERY Right     replacement   • SHOULDER SURGERY      Arthroscopy of shoulder:Right   • TONSILLECTOMY         Family History:   family history includes Bleeding Disorder in  her brother and mother; Breast cancer (age of onset: 60) in her mother; Cancer in her mother and sister; Colon cancer in her brother; Coronary artery disease in her father; Heart attack in her father; Osteoarthritis in her mother; Rheum arthritis in her mother; Stroke in her mother. Otherwise pertinent FHx was reviewed and unremarkable.     Social History:  reports that she has never smoked. She has never used smokeless tobacco. She reports that she does not drink alcohol and does not use drugs.  Social History     Social History Narrative   • Not on file       Medications:  Available home medication information reviewed.  (Not in a hospital admission)      Allergies   Allergen Reactions   • Nasal Spray        Objective   Objective     Vital Signs:   Temp:  [98.1 °F (36.7 °C)] 98.1 °F (36.7 °C)  Heart Rate:  [72] 72  Resp:  [20] 20  BP: (186)/(88) 186/88       Physical Exam    gen; alert, oriented, nad  Heent; perrla, eomi, mmm  Cv; rrr, 3/6 slo rsb  L; ctab, no wheeze/crackles  Abd; soft, +bs, ntnd  Ext; rle shortened and externally rotated; sensation intact, pulses palpable  Skin ;cdi, warm  Neuro; grossly intact  Psych; mood and affect appropriate      Result Review:  I have personally reviewed the results from the time of this admission to 7/15/2021 03:40 EDT and agree with these findings:  [x]  Laboratory  []  Microbiology  [x]  Radiology  []  EKG/Telemetry   []  Cardiology/Vascular   []  Pathology  [x]  Old records  []  Other:  Most notable findings include:   Cbc normal, cmp normal  Hip xray; right femoral neck fracture possibly extending into greater trochanter        LAB RESULTS:      Lab 07/15/21  0257   WBC 8.29   HEMOGLOBIN 14.4   HEMATOCRIT 43.9   PLATELETS 256   NEUTROS ABS 5.84   IMMATURE GRANS (ABS) 0.03   LYMPHS ABS 1.53   MONOS ABS 0.80   EOS ABS 0.06   MCV 93.8         Lab 07/15/21  0257   SODIUM 136   POTASSIUM 4.2   CHLORIDE 100   CO2 23.0   ANION GAP 13.0   BUN 12   CREATININE 0.76   GLUCOSE  "109*   CALCIUM 9.0         Lab 07/15/21  0257   TOTAL PROTEIN 7.0   ALBUMIN 4.00   GLOBULIN 3.0   ALT (SGPT) 11   AST (SGOT) 20   BILIRUBIN 0.4   ALK PHOS 75                     UA    Urinalysis 7/29/20 3/24/21   Ketones, UA Negative Negative   Leukocytes, UA Small (1+) (A) Small (1+) (A)   (A) Abnormal value              Microbiology Results (last 10 days)     ** No results found for the last 240 hours. **          XR Hip With or Without Pelvis 2 - 3 View Right    Result Date: 7/15/2021  CR Hip Uni Comp Min 2 Vws RT INDICATION: Hip pain after fall. COMPARISON: None. FINDINGS: AP pelvis and 2 view(s) of the right hip.  Patient is osteopenic. There is an acute right femoral neck fracture. The fracture may extend into the greater trochanter. No hip dislocation is seen. No other acute fractures are identified. There is no sacroiliac joint diastasis.     Impression: Acute right femoral neck fracture which may extend into the greater trochanter. 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 Right femoral  Neck fracture possibly into greater trochanter;  -Dr. Olsen notified by ER  -pain control (nerve block in er)  -ekg performed  -coags, t/s  *not on anticoagulation  2. Murmur w/ \"disorder of mitral and aortic valves\"  Noted on hx;  -obtain ECHO  3. Htn;  -resume home meds  4. Hyperlipidemia;  -statin    DVT prophylaxis:  Mechanical lle      CODE STATUS:  Full   There are " no questions and answers to display.       Admission Status:  I believe this patient meets INPATIENT status due to femoral neck fracture needing surgical repair.  I feel patient’s risk for adverse outcomes and need for care warrant INPATIENT evaluation and I predict the patient’s care encounter to likely last beyond 2 midnights.      Hilda Marley MD  07/15/21  Electronically signed by Hilda Marley MD, 07/15/21, 3:45 AM EDT.

## 2021-07-15 NOTE — PROGRESS NOTES
[facility] Medicine Services  PROGRESS NOTE    Patient Name: Luh Mckeon  : 1930  MRN: [id number]    Date of Admission: 7/15/2021  Primary Care Physician: Schuyler Garcia MD    Subjective   Subjective     CC:  fall    HPI:  Right hip hurts.    ROS:  Gen- No fevers, chills  CV- No chest pain, palpitations  Resp- No cough, dyspnea  GI- No N/V/D, abd pain        Objective   Objective     Vital Signs:   Temp:  [97.5 °F (36.4 °C)-99.1 °F (37.3 °C)] 97.7 °F (36.5 °C)  Heart Rate:  [64-82] 64  Resp:  [14-20] 16  BP: (112-186)/(59-88) 153/76  Flow (L/min):  [2] 2     Physical Exam:  Constitutional - frail female, lying flat in bed  HEENT-NCAT, mucous membranes moist  CV-RRR  Resp-CTAB, no wheezes, rhonchi or rales  Abd-soft, nontender, nondistended, normoactive bowel sounds  Ext-No lower extremity cyanosis, clubbing or edema bilaterally  Neuro-alert, speech clear  MS- right leg externally rotated  Psych-normal affect   Skin- No rash on exposed UE or LE bilaterally      Results Reviewed:  LAB RESULTS:      Lab 07/15/21  0406 07/15/21  0257   WBC  --  8.29   HEMOGLOBIN  --  14.4   HEMATOCRIT  --  43.9   PLATELETS  --  256   NEUTROS ABS  --  5.84   IMMATURE GRANS (ABS)  --  0.03   LYMPHS ABS  --  1.53   MONOS ABS  --  0.80   EOS ABS  --  0.06   MCV  --  93.8   PROTIME 12.1* >120.0*         Lab 07/15/21  0257   SODIUM 136   POTASSIUM 4.2   CHLORIDE 100   CO2 23.0   ANION GAP 13.0   BUN 12   CREATININE 0.76   GLUCOSE 109*   CALCIUM 9.0         Lab 07/15/21  0257   TOTAL PROTEIN 7.0   ALBUMIN 4.00   GLOBULIN 3.0   ALT (SGPT) 11   AST (SGOT) 20   BILIRUBIN 0.4   ALK PHOS 75         Lab 07/15/21  0406 07/15/21  0257   PROTIME 12.1* >120.0*   INR 1.0 >10.00*             Lab 07/15/21  0621   ABO TYPING A   RH TYPING Positive   ANTIBODY SCREEN Negative         Brief Urine Lab Results  (Last result in the past 365 days)      Color   Clarity   Blood   Leuk Est   Nitrite   Protein   CREAT   Urine  HCG        03/24/21 1038 Yellow Slightly Cloudy Negative Small (1+) Positive Negative               Microbiology Results Abnormal     Procedure Component Value - Date/Time    COVID PRE-OP / PRE-PROCEDURE SCREENING ORDER (NO ISOLATION) - Swab, Nasopharynx [[id number]]  (Normal) Collected: 07/15/21 0426    Lab Status: Final result Specimen: Swab from Nasopharynx Updated: 07/15/21 0508    Narrative:      The following orders were created for panel order COVID PRE-OP / PRE-PROCEDURE SCREENING ORDER (NO ISOLATION) - Swab, Nasopharynx.  Procedure                               Abnormality         Status                     ---------                               -----------         ------                     COVID-19 and FLU A/B PCR...[[id number]]  Normal              Final result                 Please view results for these tests on the individual orders.    COVID-19 and FLU A/B PCR - Swab, Nasopharynx [[id number]]  (Normal) Collected: 07/15/21 0426    Lab Status: Final result Specimen: Swab from Nasopharynx Updated: 07/15/21 0508     COVID19 Not Detected     Influenza A PCR Not Detected     Influenza B PCR Not Detected    Narrative:      Fact sheet for providers: https://www.fda.gov/media/452842/download    Fact sheet for patients: https://www.fda.gov/media/415369/download    Test performed by PCR.          CT Pelvis Without Contrast    Result Date: 7/15/2021  CT Pelvis WO INDICATION:  Right hip pain status post fall today. Right femoral neck fracture. Order requesting evaluation of fracture extent. TECHNIQUE: CT of the pelvis without IV contrast. Coronal and sagittal reconstructions were obtained.  Radiation dose reduction techniques included automated exposure control or exposure modulation based on body size. Count of known CT and cardiac nuc med studies performed in previous 12 months: 0. COMPARISON:  Right hip x-rays 7/15/2021 FINDINGS: There is a moderately impacted and angulated oblique fracture through the right  femoral neck, primarily involving the subcapital and transcervical regions. No evidence of fracture extension into the intertrochanteric region. Moderate apex anterior angulation at the fracture site. Right femoral head is normally located. Small right hip effusion. Bony pelvis, left hip, sacrum, and sacroiliac joints are intact. Multilevel degenerative changes in the utilized lumbar spine. Pelvic soft tissues and musculature are unremarkable. Included visceral pelvis demonstrates moderate distention of the urinary bladder. There is uncomplicated colonic diverticulosis. No free pelvic fluid.     Impression: 1. Moderately impacted and angulated oblique fracture of the right femoral neck, primarily involving the subcapital and transcervical regions. No evidence of fracture extension into the intertrochanteric region. Right hip normally located. 2. Bony pelvis, sacrum, and left hip appear intact. 3. Moderate distention urinary bladder. 4. No free pelvic fluid. Signer Name: Garth Neumann MD  Signed: 7/15/2021 9:05 AM  Workstation Name: [id number]  [facility]    FICB CATHETER    Result Date: 7/15/2021  Radha Gorman CRNA     7/15/2021 11:12 AM FICB CATHETER Patient reassessed immediately prior to procedure Patient location during procedure: pre-op Reason for block: procedure for pain and at surgeon's request Performed by CRNA: Radha Gorman CRNA Assisted by: Elza Millard RN Preanesthetic Checklist Completed: patient identified, IV checked, site marked, risks and benefits discussed, surgical consent, monitors and equipment checked, pre-op evaluation and timeout performed Prep: Pt Position: supine Sterile barriers:cap, gloves and mask Prep: ChloraPrep Patient monitoring: blood pressure monitoring, continuous pulse oximetry and EKG Procedure Sedation:no Performed under: local infiltration Guidance:ultrasound guided Images:still images obtained, printed/placed on chart Laterality:right Block  Type:fascia iliaca compartment Injection Technique:catheter Needle Type:echogenic Needle Gauge:18 G Resistance on Injection: none Catheter Size:20 G (20g) Cath Depth at skin: 10 cm Medications Used: ropivacaine (NAROPIN) 0.5 % injection, 20 mL Med admintered at 7/15/2021 11:11 AM Medications Preservative Free Saline:20ml Comment:Ropivacaine 0.5 % 20 ml + NS PF 20 ml given via needle Post Assessment Injection Assessment: negative aspiration for heme, no paresthesia on injection and incremental injection Patient Tolerance:comfortable throughout block Complications:no Additional Notes Procedure:         Pt placed in supine position.   The insertion site was prepped in sterile fashion with Chlorapreop and clear plastic drapes.  Analgesia was provided by skin infiltration at insertion site with Lidocaine 1% 3mls.  A B-Kohli 18 g , 4 inch echogenic Touhy needle was advance In-plane under ultrasound guidance. The   Anterior superior Iliac crest was initially visualized and the probe was directed slightly medially and slightly towards the umbilicus.  The course of the needle was tracked over the sartorius muscle through the fascia Iliacus and into the anterior portion of the Iliacus muscle.  Major vessels where identified and avoided as where structures of the peritoneal cavity.  LA injection was made incrementally in 1-5ml amounts spread was visualized superiorly below fascia iliacus.  Injection was completed with negative aspiration of blood and negative intravascular injection.  Injection pressures where normal or minimal resistance.  A 20 g B-Kohli wire styleted catheter was then advance thru the needle and very easily placed in a superior or cephalad direction.  The catheter was secured at insertion site with exofin tissue adhesive, benzoin, and steristreps.  A CHG tegaderm dressing was placed over the insertion site and the nerve catheter labeled and capped.  Thank You.     XR Hip With or Without Pelvis 2 - 3 View  Right    Result Date: 7/15/2021  CR Hip Uni Comp Min 2 Vws RT INDICATION: Hip pain after fall. COMPARISON: None. FINDINGS: AP pelvis and 2 view(s) of the right hip.  Patient is osteopenic. There is an acute right femoral neck fracture. The fracture may extend into the greater trochanter. No hip dislocation is seen. No other acute fractures are identified. There is no sacroiliac joint diastasis.     Impression: Acute right femoral neck fracture which may extend into the greater trochanter. Signer Name: Oscar Bajwa MD  Signed: 7/15/2021 3:04 AM  Workstation Name: [facility]  [facility]          I have reviewed the medications:  Scheduled Meds:acetaminophen, 650 mg, Oral, Q8H  losartan, 25 mg, Oral, Q24H  oxybutynin XL, 10 mg, Oral, Daily  rosuvastatin, 5 mg, Oral, Daily  sodium chloride, 10 mL, Intravenous, Q12H      Continuous Infusions:ropivacaine (NAROPIN) 0.2% peripheral nerve cath (moog), 8 mL/hr, Last Rate: 8 mL/hr (07/15/21 1215)      PRN Meds:.metaxalone  •  Morphine **AND** naloxone  •  [COMPLETED] Insert peripheral IV **AND** sodium chloride  •  sodium chloride    Assessment/Plan   Assessment & Plan     Active Hospital Problems    Diagnosis  POA   • **Closed fracture of neck of right femur (CMS/HCC) [S72.001A]  Unknown   • Femoral neck fracture (CMS/HCC) [S72.009A]  Yes   • Fall [W19.XXXA]  Yes   • 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        Brief Hospital Course to date:  Luh Mckeon is a 89 y.o. female with history of HTN, HLD, osteoporosis and urinary frequency present after a fall. Imaging reveals a right femoral neck fracture.    Right femoral neck fracture  - pain control  - plan for hemiarthroplasty today  - PT/OT    HTN    HLD  - crestor    Osteoporosis      DVT prophylaxis: mechanical  Mechanical DVT  prophylaxis orders are present.       Disposition: I expect the patient to be discharged TBD    CODE STATUS:   There are no questions and answers to display.       Amor Serna MD  07/15/21

## 2021-07-16 LAB
ANION GAP SERPL CALCULATED.3IONS-SCNC: 7 MMOL/L (ref 5–15)
BH CV ECHO MEAS - AI DEC SLOPE: 332.6 CM/SEC^2
BH CV ECHO MEAS - AI MAX PG: 88.9 MMHG
BH CV ECHO MEAS - AI MAX VEL: 470.1 CM/SEC
BH CV ECHO MEAS - AI P1/2T: 414 MSEC
BH CV ECHO MEAS - AO MAX PG (FULL): 56 MMHG
BH CV ECHO MEAS - AO MAX PG: 59.2 MMHG
BH CV ECHO MEAS - AO MEAN PG (FULL): 35.7 MMHG
BH CV ECHO MEAS - AO MEAN PG: 37.5 MMHG
BH CV ECHO MEAS - AO ROOT AREA (BSA CORRECTED): 1.8
BH CV ECHO MEAS - AO ROOT AREA: 6.9 CM^2
BH CV ECHO MEAS - AO ROOT DIAM: 3 CM
BH CV ECHO MEAS - AO V2 MAX: 384.8 CM/SEC
BH CV ECHO MEAS - AO V2 MEAN: 286.7 CM/SEC
BH CV ECHO MEAS - AO V2 VTI: 94.9 CM
BH CV ECHO MEAS - AVA(I,A): 0.76 CM^2
BH CV ECHO MEAS - AVA(I,D): 0.76 CM^2
BH CV ECHO MEAS - AVA(V,A): 0.74 CM^2
BH CV ECHO MEAS - AVA(V,D): 0.74 CM^2
BH CV ECHO MEAS - BSA(HAYCOCK): 1.7 M^2
BH CV ECHO MEAS - BSA: 1.7 M^2
BH CV ECHO MEAS - BZI_BMI: 23.2 KILOGRAMS/M^2
BH CV ECHO MEAS - BZI_METRIC_HEIGHT: 162.6 CM
BH CV ECHO MEAS - BZI_METRIC_WEIGHT: 61.2 KG
BH CV ECHO MEAS - EDV(CUBED): 100.4 ML
BH CV ECHO MEAS - EDV(MOD-SP2): 63 ML
BH CV ECHO MEAS - EDV(MOD-SP4): 70 ML
BH CV ECHO MEAS - EDV(TEICH): 99.8 ML
BH CV ECHO MEAS - EF(CUBED): 70.6 %
BH CV ECHO MEAS - EF(MOD-BP): 68 %
BH CV ECHO MEAS - EF(MOD-SP2): 71.4 %
BH CV ECHO MEAS - EF(MOD-SP4): 70 %
BH CV ECHO MEAS - EF(TEICH): 62.3 %
BH CV ECHO MEAS - ESV(CUBED): 29.5 ML
BH CV ECHO MEAS - ESV(MOD-SP2): 18 ML
BH CV ECHO MEAS - ESV(MOD-SP4): 21 ML
BH CV ECHO MEAS - ESV(TEICH): 37.6 ML
BH CV ECHO MEAS - FS: 33.5 %
BH CV ECHO MEAS - IVS/LVPW: 1
BH CV ECHO MEAS - IVSD: 1.1 CM
BH CV ECHO MEAS - LA DIMENSION: 3.3 CM
BH CV ECHO MEAS - LA/AO: 1.1
BH CV ECHO MEAS - LAD MAJOR: 5.1 CM
BH CV ECHO MEAS - LV DIASTOLIC VOL/BSA (35-75): 42.3 ML/M^2
BH CV ECHO MEAS - LV MASS(C)D: 181.4 GRAMS
BH CV ECHO MEAS - LV MASS(C)DI: 109.6 GRAMS/M^2
BH CV ECHO MEAS - LV MAX PG: 3.2 MMHG
BH CV ECHO MEAS - LV MEAN PG: 1.9 MMHG
BH CV ECHO MEAS - LV SYSTOLIC VOL/BSA (12-30): 12.7 ML/M^2
BH CV ECHO MEAS - LV V1 MAX: 89.8 CM/SEC
BH CV ECHO MEAS - LV V1 MEAN: 62.9 CM/SEC
BH CV ECHO MEAS - LV V1 VTI: 22.8 CM
BH CV ECHO MEAS - LVIDD: 4.6 CM
BH CV ECHO MEAS - LVIDS: 3.1 CM
BH CV ECHO MEAS - LVLD AP2: 7.4 CM
BH CV ECHO MEAS - LVLD AP4: 7.4 CM
BH CV ECHO MEAS - LVLS AP2: 5.9 CM
BH CV ECHO MEAS - LVLS AP4: 6.4 CM
BH CV ECHO MEAS - LVOT AREA (M): 3.1 CM^2
BH CV ECHO MEAS - LVOT AREA: 3.2 CM^2
BH CV ECHO MEAS - LVOT DIAM: 2 CM
BH CV ECHO MEAS - LVPWD: 1.1 CM
BH CV ECHO MEAS - MR MAX PG: 127 MMHG
BH CV ECHO MEAS - MR MAX VEL: 560.2 CM/SEC
BH CV ECHO MEAS - MR MEAN PG: 95.9 MMHG
BH CV ECHO MEAS - MR MEAN VEL: 465.2 CM/SEC
BH CV ECHO MEAS - MR VTI: 169.5 CM
BH CV ECHO MEAS - MV A MAX VEL: 110.1 CM/SEC
BH CV ECHO MEAS - MV DEC SLOPE: 422.8 CM/SEC^2
BH CV ECHO MEAS - MV DEC TIME: 0.11 SEC
BH CV ECHO MEAS - MV E MAX VEL: 71.1 CM/SEC
BH CV ECHO MEAS - MV E/A: 0.65
BH CV ECHO MEAS - MV P1/2T MAX VEL: 108.6 CM/SEC
BH CV ECHO MEAS - MV P1/2T: 75.2 MSEC
BH CV ECHO MEAS - MVA P1/2T LCG: 2 CM^2
BH CV ECHO MEAS - MVA(P1/2T): 2.9 CM^2
BH CV ECHO MEAS - PA ACC SLOPE: 595.8 CM/SEC^2
BH CV ECHO MEAS - PA ACC TIME: 0.08 SEC
BH CV ECHO MEAS - PA PR(ACCEL): 41 MMHG
BH CV ECHO MEAS - RAP SYSTOLE: 8 MMHG
BH CV ECHO MEAS - RVDD: 2.1 CM
BH CV ECHO MEAS - RVSP: 20 MMHG
BH CV ECHO MEAS - SI(AO): 394.8 ML/M^2
BH CV ECHO MEAS - SI(CUBED): 42.9 ML/M^2
BH CV ECHO MEAS - SI(LVOT): 43.6 ML/M^2
BH CV ECHO MEAS - SI(MOD-SP2): 27.2 ML/M^2
BH CV ECHO MEAS - SI(MOD-SP4): 29.6 ML/M^2
BH CV ECHO MEAS - SI(TEICH): 37.5 ML/M^2
BH CV ECHO MEAS - SV(AO): 653.4 ML
BH CV ECHO MEAS - SV(CUBED): 70.9 ML
BH CV ECHO MEAS - SV(LVOT): 72.1 ML
BH CV ECHO MEAS - SV(MOD-SP2): 45 ML
BH CV ECHO MEAS - SV(MOD-SP4): 49 ML
BH CV ECHO MEAS - SV(TEICH): 62.1 ML
BH CV ECHO MEAS - TR MAX PG: 12 MMHG
BH CV ECHO MEAS - TR MAX VEL: 175.1 CM/SEC
BH CV VAS BP RIGHT ARM: NORMAL MMHG
BH CV XLRA - RV BASE: 3.9 CM
BH CV XLRA - RV LENGTH: 7.1 CM
BH CV XLRA - RV MID: 3 CM
BUN SERPL-MCNC: 9 MG/DL (ref 8–23)
BUN/CREAT SERPL: 14.1 (ref 7–25)
CALCIUM SPEC-SCNC: 7.6 MG/DL (ref 8.2–9.6)
CHLORIDE SERPL-SCNC: 101 MMOL/L (ref 98–107)
CO2 SERPL-SCNC: 24 MMOL/L (ref 22–29)
CREAT SERPL-MCNC: 0.64 MG/DL (ref 0.57–1)
GFR SERPL CREATININE-BSD FRML MDRD: 87 ML/MIN/1.73
GLUCOSE BLDC GLUCOMTR-MCNC: 86 MG/DL (ref 70–130)
GLUCOSE BLDC GLUCOMTR-MCNC: 97 MG/DL (ref 70–130)
GLUCOSE SERPL-MCNC: 110 MG/DL (ref 65–99)
HCT VFR BLD AUTO: 36.3 % (ref 34–46.6)
HGB BLD-MCNC: 11.7 G/DL (ref 12–15.9)
LEFT ATRIUM VOLUME INDEX: 27.8 ML/M^2
LEFT ATRIUM VOLUME: 46 ML
LV EF 2D ECHO EST: 60 %
POTASSIUM SERPL-SCNC: 4.4 MMOL/L (ref 3.5–5.2)
SODIUM SERPL-SCNC: 132 MMOL/L (ref 136–145)

## 2021-07-16 PROCEDURE — 25010000002 HALOPERIDOL LACTATE PER 5 MG: Performed by: INTERNAL MEDICINE

## 2021-07-16 PROCEDURE — 80048 BASIC METABOLIC PNL TOTAL CA: CPT | Performed by: ORTHOPAEDIC SURGERY

## 2021-07-16 PROCEDURE — 99024 POSTOP FOLLOW-UP VISIT: CPT | Performed by: ORTHOPAEDIC SURGERY

## 2021-07-16 PROCEDURE — 25010000002 ROPIVACAINE PER 1 MG: Performed by: ORTHOPAEDIC SURGERY

## 2021-07-16 PROCEDURE — 85018 HEMOGLOBIN: CPT | Performed by: ORTHOPAEDIC SURGERY

## 2021-07-16 PROCEDURE — 97162 PT EVAL MOD COMPLEX 30 MIN: CPT | Performed by: PHYSICAL THERAPIST

## 2021-07-16 PROCEDURE — 99233 SBSQ HOSP IP/OBS HIGH 50: CPT | Performed by: INTERNAL MEDICINE

## 2021-07-16 PROCEDURE — P9612 CATHETERIZE FOR URINE SPEC: HCPCS

## 2021-07-16 PROCEDURE — 97166 OT EVAL MOD COMPLEX 45 MIN: CPT

## 2021-07-16 PROCEDURE — 82962 GLUCOSE BLOOD TEST: CPT

## 2021-07-16 PROCEDURE — 25010000002 CEFAZOLIN PER 500 MG: Performed by: ORTHOPAEDIC SURGERY

## 2021-07-16 PROCEDURE — 85014 HEMATOCRIT: CPT | Performed by: ORTHOPAEDIC SURGERY

## 2021-07-16 RX ORDER — HALOPERIDOL 5 MG/ML
1 INJECTION INTRAMUSCULAR EVERY 6 HOURS PRN
Status: DISCONTINUED | OUTPATIENT
Start: 2021-07-16 | End: 2021-07-19 | Stop reason: HOSPADM

## 2021-07-16 RX ADMIN — LOSARTAN POTASSIUM 25 MG: 25 TABLET, FILM COATED ORAL at 08:50

## 2021-07-16 RX ADMIN — ASPIRIN 81 MG: 81 TABLET, COATED ORAL at 08:49

## 2021-07-16 RX ADMIN — ASPIRIN 81 MG: 81 TABLET, COATED ORAL at 20:22

## 2021-07-16 RX ADMIN — ROPIVACAINE HYDROCHLORIDE 8 ML/HR: 2 INJECTION, SOLUTION EPIDURAL; INFILTRATION at 19:56

## 2021-07-16 RX ADMIN — ROSUVASTATIN CALCIUM 5 MG: 10 TABLET, COATED ORAL at 08:49

## 2021-07-16 RX ADMIN — ACETAMINOPHEN 1000 MG: 500 TABLET, FILM COATED ORAL at 08:49

## 2021-07-16 RX ADMIN — CEFAZOLIN 2 G: 10 INJECTION, POWDER, FOR SOLUTION INTRAVENOUS at 12:54

## 2021-07-16 RX ADMIN — SODIUM CHLORIDE 100 ML/HR: 9 INJECTION, SOLUTION INTRAVENOUS at 12:54

## 2021-07-16 RX ADMIN — SODIUM CHLORIDE, PRESERVATIVE FREE 10 ML: 5 INJECTION INTRAVENOUS at 20:22

## 2021-07-16 RX ADMIN — CEFAZOLIN 2 G: 10 INJECTION, POWDER, FOR SOLUTION INTRAVENOUS at 03:55

## 2021-07-16 RX ADMIN — MIRABEGRON 50 MG: 50 TABLET, FILM COATED, EXTENDED RELEASE ORAL at 20:22

## 2021-07-16 RX ADMIN — HALOPERIDOL LACTATE 1 MG: 5 INJECTION, SOLUTION INTRAMUSCULAR at 14:11

## 2021-07-16 RX ADMIN — MELOXICAM 15 MG: 7.5 TABLET ORAL at 08:49

## 2021-07-16 NOTE — PLAN OF CARE
Problem: Adult Inpatient Plan of Care  Goal: Plan of Care Review  Recent Flowsheet Documentation  Taken 7/16/2021 1330 by Kaila Alfonso, PT  Plan of Care Reviewed With:   patient   daughter   grandchild(ralph)  Outcome Summary: Pt only oriented to self. While preparing to perform bed mobility and sit patient EOB, patient became agitated. Called nursing, unable to perform EOB or OOB activity at this time due to patient's agitation. Spent time educating patient's family on posterior hip precautions and expectations for patient's PT. Recommend IP rehab at discharge.

## 2021-07-16 NOTE — PLAN OF CARE
Problem: Adult Inpatient Plan of Care  Goal: Plan of Care Review  Recent Flowsheet Documentation  Taken 7/16/2021 1409 by Sylwia Blanca OT  Plan of Care Reviewed With:   patient   daughter   grandchild(ralph)  Outcome Summary: OT IE completed. Pt is alert, Ox1 with acute confusion and agitated by increased stimulation. OOB activity deferred for safety as pt unable to follow commands or maintain PHP. BUE AROM and strength are intact (4+/5) Max A UB dressing. Dependent LB dressing and toileting. Set-up to bring drink to mouth. Education initated with family for RLE PHPs. Family provided all history and DME in place at home. OT will follow IP. Recommend rehab at d/c for best outcome.

## 2021-07-16 NOTE — THERAPY EVALUATION
Patient Name: Luh Mckeon  : 1930    MRN: [id number]                              Today's Date: 2021       Admit Date: 7/15/2021    Visit Dx:     ICD-10-CM ICD-9-CM   1. Closed fracture of neck of right femur, initial encounter (CMS/HCC)  S72.001A 820.8   2. Closed right hip fracture, initial encounter (CMS/HCC)  S72.001A 820.8     Patient Active Problem List   Diagnosis   • Generalized anxiety disorder   • Unsteady gait   • Risk for falls   • Hyperlipidemia LDL goal <100   • Benign essential hypertension   • Memory loss   • Urinary incontinence in female   • Candida onychomycosis   • AR (allergic rhinitis)   • Overweight (BMI 25.0-29.9)   • Chest pain   • Cramp in limb   • Disorder of mitral and aortic valves   • Disorder of skeletal muscle   • Diverticular disease of colon   • Dysphagia   • External hemorrhoids   •     • Hand joint pain   • Insomnia   • Knee pain   • Menopausal and postmenopausal disorder   • Osteoporosis   • Shoulder pain   • Vitamin B deficiency   • Medicare annual wellness visit, subsequent   • Dizziness   • Urgency of urination   • Fatigue   • Femoral neck fracture (CMS/HCC)   • Fall   • Closed fracture of neck of right femur (CMS/[facility])     Past Medical History:   Diagnosis Date   • Ankle instability    • Chronic left shoulder pain 2018   • Disorder of tendon of shoulder region, left    • Dysphagia    • Esophagitis    • Femoral neck fracture (CMS/[facility]) 7/15/2021   • Finger fracture, right     Dr Chandler casting and physical therapy   • Hemorrhoids    • Hypertension    • Localized, primary osteoarthritis of left shoulder region    • Right shoulder pain    • Shoulder joint replacement status     Right   • Stroke (CMS/HCC)     CVA     Past Surgical History:   Procedure Laterality Date   • APPENDECTOMY      Daughter denies   • CATARACT EXTRACTION, BILATERAL         • ENDOSCOPY  2009    with biopsy/esophageal ring dilation   • HEMORRHOIDECTOMY      lanced    • HIP  HEMIARTHROPLASTY Right 7/15/2021    Procedure: HIP HEMIARTHROPLASTY;  Surgeon: Adam Olsen MD;  Location: [facility];  Service: Orthopedics;  Laterality: Right;   • HYSTERECTOMY      partial    • OOPHORECTOMY     • SHOULDER SURGERY Right     replacement   • SHOULDER SURGERY      Arthroscopy of shoulder:Right   • TONSILLECTOMY       General Information     Row Name 07/16/21 1330          Physical Therapy Time and Intention    Document Type  evaluation  -CS     Mode of Treatment  physical therapy;individual therapy  -CS     Row Name 07/16/21 1330          General Information    Patient Profile Reviewed  yes  -CS     Prior Level of Function  independent:;all household mobility;community mobility;gait;transfer;ADL's;bed mobility  -CS     Existing Precautions/Restrictions  fall;right;hip, posterior;brace worn when out of bed;other (see comments) Knee immobilizer when OOB due to fascia iliaca nerve catheter  -CS     Barriers to Rehab  cognitive status;previous functional deficit Pt used STC occassionally prior to fall  -CS     Row Name 07/16/21 1330          Living Environment    Lives With  alone;other (see comments) Daughter and granddaughter able to assist at home  -CS     Row Name 07/16/21 1330          Home Main Entrance    Number of Stairs, Main Entrance  four  -CS     Stair Railings, Main Entrance  railing on left side (ascending)  -CS     Row Name 07/16/21 1330          Stairs Within Home, Primary    Stairs, Within Home, Primary  1 story house  -CS     Number of Stairs, Within Home, Primary  none  -CS     Row Name 07/16/21 1330          Cognition    Orientation Status (Cognition)  oriented to;person;disoriented to;place;situation;time;verbal cues/prompts needed for orientation  -CS     Row Name 07/16/21 1330          Safety Issues, Functional Mobility    Safety Issues Affecting Function (Mobility)  safety precautions follow-through/compliance;insight into deficits/self-awareness;ability to follow  commands;at risk behavior observed;judgment;awareness of need for assistance;problem-solving;sequencing abilities;safety precaution awareness  -CS     Impairments Affecting Function (Mobility)  cognition;endurance/activity tolerance;strength;range of motion (ROM);sensation/sensory awareness  -CS     Comment, Safety Issues/Impairments (Mobility)  Pt became very agitated when asked to perform mobility  -CS       User Key  (r) = Recorded By, (t) = Taken By, (c) = Cosigned By    Initials Name Provider Type    CS Kaila Alfonso PT Physical Therapist        Mobility     Row Name 07/16/21 1330          Bed Mobility    Comment (Bed Mobility)  Set up to perform bed mobility but patient became very agitated and began pulling at lines and gown. Called nsg. Spent time educating patient's family on posterior hip precautions and generalized course of care for patient.  -CS     Row Name 07/16/21 1330          Transfers    Comment (Transfers)  Unable to perform due to patient's confusion and agitation.  -CS     Row Name 07/16/21 1330          Bed-Chair Transfer    Bed-Chair Gage (Transfers)  unable to assess  -CS     Row Name 07/16/21 1330          Sit-Stand Transfer    Sit-Stand [place] (Transfers)  unable to assess  -CS     Row Name 07/16/21 1330          Gait/Stairs (Locomotion)    [place] Level (Gait)  unable to assess  -CS     [place] Level (Stairs)  not tested  -CS     Row Name 07/16/21 1330          Mobility    Extremity Weight-bearing Status  right lower extremity  -CS     Right Lower Extremity (Weight-bearing Status)  weight-bearing as tolerated (WBAT)  -CS       User Key  (r) = Recorded By, (t) = Taken By, (c) = Cosigned By    Initials Name Provider Type    Kaila Irving PT Physical Therapist        Obj/Interventions     Row Name 07/16/21 1330          Range of Motion Comprehensive    General Range of Motion  lower extremity range of motion deficits identified  -CS     Comment,  General Range of Motion  R hip ROM limited by 25%  -     Row Name 07/16/21 1330          Strength Comprehensive (MMT)    General Manual Muscle Testing (MMT) Assessment  lower extremity strength deficits identified  -CS     Comment, General Manual Muscle Testing (MMT) Assessment  R LE unable to perform SLR or have strong quad set. R LE 1/5. L LE MMT grossly 4+/5 - patient able to perform SLR on L LE  -     Row Name 07/16/21 1330          Balance    Balance Interventions  sitting;standing;sit to stand;supported;dynamic;static  -[facility] Name 07/16/21 1330          Sensory Assessment (Somatosensory)    Sensory Assessment (Somatosensory)  unable/difficult to assess Pt unable to answer sensation questions  -       User Key  (r) = Recorded By, (t) = Taken By, (c) = Cosigned By    Initials Name Provider Type    CS Kaila Alfonso, PT Physical Therapist        Goals/Plan     [facility] Name 07/16/21 1330          Bed Mobility Goal 1 (PT)    Activity/Assistive Device (Bed Mobility Goal 1, PT)  bed mobility activities, all  -CS     [place] Level/Cues Needed (Bed Mobility Goal 1, PT)  minimum assist (75% or more patient effort);2 person assist  -CS     Time Frame (Bed Mobility Goal 1, PT)  long term goal (LTG);5 days  -CS     Progress/Outcomes (Bed Mobility Goal 1, PT)  goal ongoing  -[facility] Name 07/16/21 1330          Transfer Goal 1 (PT)    Activity/Assistive Device (Transfer Goal 1, PT)  sit-to-stand/stand-to-sit;bed-to-chair/chair-to-bed;walker, rolling  -CS     [place] Level/Cues Needed (Transfer Goal 1, PT)  minimum assist (75% or more patient effort);2 person assist  -CS     Time Frame (Transfer Goal 1, PT)  long term goal (LTG);5 days  -CS     Progress/Outcome (Transfer Goal 1, PT)  goal ongoing  -[facility] Name 07/16/21 1330          Gait Training Goal 1 (PT)    Activity/Assistive Device (Gait Training Goal 1, PT)  gait (walking locomotion);assistive device use;walker, rolling  -CS     [place]  Level (Gait Training Goal 1, PT)  moderate assist (50-74% patient effort);2 person assist  -CS     Distance (Gait Training Goal 1, PT)  20'  -CS     Time Frame (Gait Training Goal 1, PT)  long term goal (LTG);5 days  -CS     Progress/Outcome (Gait Training Goal 1, PT)  goal ongoing  -CS       User Key  (r) = Recorded By, (t) = Taken By, (c) = Cosigned By    Initials Name Provider Type    CS Kaila Alfonso, PT Physical Therapist        Clinical Impression     Row Name 07/16/21 1331          Pain    Additional Documentation  Pain Scale: Numbers Pre/Post-Treatment (Group)  -CS     Row Name 07/16/21 6833          Pain Scale: Numbers Pre/Post-Treatment    Pretreatment Pain Rating  0/10 - no pain  -CS     Posttreatment Pain Rating  0/10 - no pain  -CS     Row Name 07/16/21 0509          Plan of Care Review    Plan of Care Reviewed With  patient;daughter;grandchild(ralph)  -CS     Outcome Summary  Pt only oriented to self. While preparing to perform bed mobility and sit patient EOB, patient became agitated. Called nursing, unable to perform EOB or OOB activity at this time. Spent time educating patient's family on posterior hip precautions and expectations for patient's PT. Recommend IP rehab at discharge.  -CS     Row Name 07/16/21 3982          Therapy Assessment/Plan (PT)    Patient/Family Therapy Goals Statement (PT)  To go home  -CS     Rehab Potential (PT)  fair, will monitor progress closely  -CS     Criteria for Skilled Interventions Met (PT)  yes;meets criteria  -CS     Predicted Duration of Therapy Intervention (PT)  5 days  -CS     Row Name 07/16/21 8530          Positioning and Restraints    Pre-Treatment Position  in bed  -CS     Post Treatment Position  bed  -CS     In Bed  notified nsg;supine;call light within reach;encouraged to call for assist;exit alarm on;ABD pillow;with family/caregiver;with nsg  -CS     Restraints  soft limb;other (comment) RN notified and soft limb restraints placed to protect  lines  -CS       User Key  (r) = Recorded By, (t) = Taken By, (c) = Cosigned By    Initials Name Provider Type    Kaila Irving PT Physical Therapist        Outcome Measures     Row Name 07/16/21 1330 07/16/21 0800       How much help from another person do you currently need...    Turning from your back to your side while in flat bed without using bedrails?  1  -CS  2  -LF    Moving from lying on back to sitting on the side of a flat bed without bedrails?  1  -CS  2  -LF    Moving to and from a bed to a chair (including a wheelchair)?  1  -CS  2  -LF    Standing up from a chair using your arms (e.g., wheelchair, bedside chair)?  1  -CS  2  -LF    Climbing 3-5 steps with a railing?  1  -CS  1  -LF    To walk in hospital room?  1  -CS  2  -LF    AM-PAC 6 Clicks Score (PT)  6  -CS  11  -LF    Row Name 07/16/21 1417 07/16/21 1330       Functional Assessment    Outcome Measure Options  AM-PAC 6 Clicks Daily Activity (OT)  -TB  AM-PAC 6 Clicks Basic Mobility (PT)  -      User Key  (r) = Recorded By, (t) = Taken By, (c) = Cosigned By    Initials Name Provider Type    Sylwia Swan OT Occupational Therapist    Kera Rojas, RN Registered Nurse    Kaila Irving PT Physical Therapist        Physical Therapy Education                 Title: PT OT SLP Therapies (In Progress)     Topic: Physical Therapy (In Progress)     Point: Mobility training (In Progress)     Learning Progress Summary           Patient Acceptance, D,E, NR,NL by  at 7/16/2021 1330    Comment: Educated patient on posterior hip precautions, importance of PT, importance of mobility, and plan for POC.                   Point: Home exercise program (In Progress)     Learning Progress Summary           Patient Acceptance, D,E, NR,NL by  at 7/16/2021 1330    Comment: Educated patient on posterior hip precautions, importance of PT, importance of mobility, and plan for POC.                   Point: Body mechanics (In  Progress)     Learning Progress Summary           Patient Acceptance, D,E, NR,NL by  at 7/16/2021 1330    Comment: Educated patient on posterior hip precautions, importance of PT, importance of mobility, and plan for POC.                   Point: Precautions (In Progress)     Learning Progress Summary           Patient Acceptance, D,E, NR,NL by  at 7/16/2021 1330    Comment: Educated patient on posterior hip precautions, importance of PT, importance of mobility, and plan for POC.                               User Key     Initials Effective Dates Name Provider Type Discipline     06/16/21 -  Kaila Alfonso PT Physical Therapist PT              PT Recommendation and Plan  Planned Therapy Interventions (PT): balance training, bed mobility training, gait training, home exercise program, neuromuscular re-education, patient/family education, ROM (range of motion), strengthening, transfer training  Plan of Care Reviewed With: patient, daughter, grandchild(ralph)  Outcome Summary: Pt only oriented to self. While preparing to perform bed mobility and sit patient EOB, patient became agitated. Called nursing, unable to perform EOB or OOB activity at this time. Spent time educating patient's family on posterior hip precautions and expectations for patient's PT. Recommend IP rehab at discharge.     Time Calculation:   PT Charges     Row Name 07/16/21 1330             Time Calculation    Start Time  1330  -CS      PT Received On  07/16/21  -CS      PT Goal Re-Cert Due Date  07/26/21  -CS         Untimed Charges    PT Eval/Re-eval Minutes  48  -CS         Total Minutes    Untimed Charges Total Minutes  48  -CS       Total Minutes  48  -CS        User Key  (r) = Recorded By, (t) = Taken By, (c) = Cosigned By    Initials Name Provider Type    Kaila Irving PT Physical Therapist        Therapy Charges for Today     Code Description Service Date Service Provider Modifiers Qty    84469259602 HC PT EVAL MOD  COMPLEXITY 4 7/16/2021 Kaila Alfonso, PT GP 1          PT G-Codes  Outcome Measure Options: AM-PAC 6 Clicks Daily Activity (OT)  AM-PAC 6 Clicks Score (PT): 6  AM-PAC 6 Clicks Score (OT): 12    Kaila Alfonso, ELIUD  7/16/2021

## 2021-07-16 NOTE — DISCHARGE INSTRUCTIONS
"DISCHARGE INSTRUCTIONS   Dr. Olsen     Total Hip Replacement/Hip Hemiarthroplasty     Wound Care   1) Keep wound / incision area clean and dry.   2) Dressing to remain in place until post-operative day 7. Upon dressing removal, assess for wound drainage. If no drainage is present, keep wound / incision area open to air as much as possible. If drainage is present, place sterile dressing to cover wound and assess daily. If drainage continues to occur after post-operative day 14, call the office for an urgent appointment. (You should be seen in the clinic within 1-2 days of calling). DO NOT REMOVE SUTURES (IF PRESENT) UNDER ANY CIRCUMSTANCES PRIOR TO FOLLOW UP APPOINTMENT.  3) No baths or swimming until otherwise instructed. The wound must remain dry for 10 days after surgery. After 10 days, you may begin to shower only if no drainage is present. No submerging the wound under standing water until cleared by your physician (no baths, hot tubs, swimming pools, etc). Sponge baths are the best way to perform personal hygiene while at the same time protecting the wound from moisture.   4) Prior to showering, the wound must remain dry for 72 consecutive hours (no drainage whatsoever) prior to showering. If the wound drains or spots, the clock \"resets\" - make sure the wound has been drainage-free for 72 consecutive hours.   5) Once you are allowed to get the wound wet, please use gentle soap to wash the wound area. DO NOT aggressively scrub the wound with a washcloth or bath sponge. Please visually inspect your wound(s) at least once daily. If the wound(s) are in a difficult to see location, please use a mirror or have someone else assist with visual inspection.   6) No scrubbing the wound. You may \"pad dry\" the wound, but do not rub, as this may open up the wound and pre-dispose to wound infection.   7) Do not apply lotions or creams to incision site, unless instructed otherwise.   8) Observe for redness, swelling, or " drainage. Please call the clinic immediately if you have fevers, chills with warmth/redness surrounding wound site or if you notice pus drainage from the wound site     Activity   No heavy lifting objects greater than 10 pounds.   No driving while on narcotic pain medication.   No submerging wound under standing water (pool, bath tub, etc.) until otherwise instructed.   You may be protected weightbearing as tolerated on your operative (right lower) extremity   Use a walker for ambulation for at least 2 weeks after surgery.  May wean from walker after 2 weeks if approved by your therapist.   Posterior hip precautions for 6 weeks: No bending the hip past 90 degrees. Do not allow the leg to cross the midline of your body (adduction). No twisting motions. Ask your physical therapist to review these precautions with you.     Blood Clot Prophylaxis   (Aspirin vs. Lovenox vs. Eliquis administration is determined by your surgeon and tailored to your specific risk profile. You will be discharged with one of these medications.) You will need to complete a total 4 week course of enteric coated aspirin 325 mg (or 81mg) twice daily or Eliquis 2.5mg twice daily, in order to minimize your risk of blood clots following surgery. You will be supplied with a prescription to obtain this. Alternatively, you will need to compete a total 2 week course of Lovenox after surgery (followed by a 2 week course of aspirin twice daily), in order to minimize the risk of blood clots following surgery. Lovenox requires a single shot in the abdomen, to be taken once daily. You will be supplied with the prescription to obtain this. Prior to your discharge from the hospital, the nursing staff will instruct you on self-administration of the Lovenox, if you will be returning directly home from the hospital.     Discharge Pain Medications   You will be given a prescription for pain medication. You should start taking this the same day after your surgery.  "Wean off as tolerated. Do not wait to take the pain medication until the pain is severe, as it will be difficult to \"catch up\" once this occurs. The pain medication usually reaches its full effect ~1 hour after ingesting. If you have been sent home on Valium, this medication works well for muscle spasms. If you have been sent home on Colace, this medication should be taken until you are off all narcotic (i.e. Norco, Percocet, Oxycodone, etc) pain medications, in order to prevent constipation. Percocet or Norco have Tylenol in their ingredient lists. You must be careful not to exceed 4,000mg (4 grams) of Tylenol, from all sources, within a single 24-hr period. This means that you may not take more than 12 Percocets or Norcos within a 24-hr period. Do NOT take Regular or Extra Strength Tylenol when taking your Percocet or Norco medications.  If you have been sent home with a combination of oxycodone and Tylenol, please take Tylenol as scheduled.  The oxycodone is to be taken as needed for \"breakthrough\" pain.  Some common side effects of the narcotic pain medications (Percocet, Oxycodone, Norco, etc) include nausea and itching. Benadryl is a great over the counter medication that helps calm your stomach, decreases your anxiety levels, and minimizes the itching. You can easily purchase this at your local pharmacy as an over-the-counter medication. Please abide by the instructions as printed on the bottle. If your nausea persists, make sure to take small amounts of crackers or other lighter foods.     Follow-Up   Follow-up with Dr. Olsen's office in 3 weeks from the surgery date for a post-operative evaluation. Have the following xrays done upon arrival to the follow-up appointment: AP pelvis. Please call Dr. Olsen's office at [phone] for orthopaedic appointments or questions.  "

## 2021-07-16 NOTE — CASE MANAGEMENT/SOCIAL WORK
Continued Stay Note  [facility]     Patient Name: Luh Mckeon  MRN: [id number]  Today's Date: 7/16/2021    Admit Date: 7/15/2021    Discharge Plan     Row Name 07/16/21 1711       Plan    Plan  Rehab placement    Plan Comments  Patient has been  accepted by both [facility] and [facility]. They will follow up on Monday and see how she is doing. I have updated her daughter and granddaughter.    Final Discharge Disposition Code  03 - skilled nursing facility (SNF)        Discharge Codes    No documentation.       Expected Discharge Date and Time     Expected Discharge Date Expected Discharge Time    Jul 19, 2021             Leann Costa RN

## 2021-07-16 NOTE — PROGRESS NOTES
[facility]  PROGRESS NOTE    Patient Name: Luh Mckeon  : 1930  MRN: [id number]    Date of Admission: 7/15/2021  Primary Care Physician: Schuyler Garcia MD    Subjective   Subjective     CC:  fall    HPI:  Daughter says she seems much more comfortable than preop.  Patient is not hurting, says she doesn't remember hurting previously.       ROS:  Gen- No fevers, chills  CV- No chest pain, palpitations  Resp- No cough, dyspnea  GI- No N/V/D, abd pain        Objective   Objective     Vital Signs:   Temp:  [96.7 °F (35.9 °C)-99.1 °F (37.3 °C)] 98 °F (36.7 °C)  Heart Rate:  [52-76] 71  Resp:  [14-18] 16  BP: (104-156)/(50-95) 117/68  Flow (L/min):  [2-4] 2     Physical Exam:  Constitutional - frail but alert and conversant.  Dtr at bedside.   HEENT-NCAT, mucous membranes moist  CV-RRR  Resp-CTAB, no wheezes, rhonchi or rales  Abd- soft, nontender, nondistended, normoactive bowel sounds  Ext- Dressing over R hip   Neuro-alert, speech clear  Psych-normal affect   Skin- No rash on exposed UE or LE bilaterally      Results Reviewed:  LAB RESULTS:      Lab 07/16/21  0404 07/15/21  0406 07/15/21  0257   WBC  --   --  8.29   HEMOGLOBIN 11.7*  --  14.4   HEMATOCRIT 36.3  --  43.9   PLATELETS  --   --  256   NEUTROS ABS  --   --  5.84   IMMATURE GRANS (ABS)  --   --  0.03   LYMPHS ABS  --   --  1.53   MONOS ABS  --   --  0.80   EOS ABS  --   --  0.06   MCV  --   --  93.8   PROTIME  --  12.1* >120.0*         Lab 21  0404 07/15/21  025   SODIUM 132* 136   POTASSIUM 4.4 4.2   CHLORIDE 101 100   CO2 24.0 23.0   ANION GAP 7.0 13.0   BUN 9 12   CREATININE 0.64 0.76   GLUCOSE 110* 109*   CALCIUM 7.6* 9.0         Lab 07/15/21  0257   TOTAL PROTEIN 7.0   ALBUMIN 4.00   GLOBULIN 3.0   ALT (SGPT) 11   AST (SGOT) 20   BILIRUBIN 0.4   ALK PHOS 75         Lab 07/15/21  0406 07/15/21  0257   PROTIME 12.1* >120.0*   INR 1.0 >10.00*             Lab 07/15/21  0621   ABO TYPING A    RH TYPING Positive   ANTIBODY SCREEN Negative         Brief Urine Lab Results  (Last result in the past 365 days)      Color   Clarity   Blood   Leuk Est   Nitrite   Protein   CREAT   Urine HCG        03/24/21 1038 Yellow Slightly Cloudy Negative Small (1+) Positive Negative               Microbiology Results Abnormal     Procedure Component Value - Date/Time    COVID PRE-OP / PRE-PROCEDURE SCREENING ORDER (NO ISOLATION) - Swab, Nasopharynx [[id number]]  (Normal) Collected: 07/15/21 0426    Lab Status: Final result Specimen: Swab from Nasopharynx Updated: 07/15/21 0508    Narrative:      The following orders were created for panel order COVID PRE-OP / PRE-PROCEDURE SCREENING ORDER (NO ISOLATION) - Swab, Nasopharynx.  Procedure                               Abnormality         Status                     ---------                               -----------         ------                     COVID-19 and FLU A/B PCR...[[id number]]  Normal              Final result                 Please view results for these tests on the individual orders.    COVID-19 and FLU A/B PCR - Swab, Nasopharynx [[id number]]  (Normal) Collected: 07/15/21 0426    Lab Status: Final result Specimen: Swab from Nasopharynx Updated: 07/15/21 0508     COVID19 Not Detected     Influenza A PCR Not Detected     Influenza B PCR Not Detected    Narrative:      Fact sheet for providers: https://www.fda.gov/media/150878/download    Fact sheet for patients: https://www.fda.gov/media/917593/download    Test performed by PCR.          Adult Transthoracic Echo Complete w/ Color, Spectral and Contrast if necessary per protocol    Result Date: 7/16/2021  · Estimated left ventricular EF = 60% Left ventricular systolic function is normal. · Left ventricular wall thickness is consistent with borderline concentric hypertrophy. · Left ventricular diastolic dysfunction is noted. · Moderate to severe aortic valve stenosis is present. · Aortic valve maximum pressure  gradient is 59.2 mmHg. Aortic valve mean pressure gradient is 37.5 mmHg. · Moderate mitral valve regurgitation is present.      CT Pelvis Without Contrast    Result Date: 7/15/2021  CT Pelvis WO INDICATION:  Right hip pain status post fall today. Right femoral neck fracture. Order requesting evaluation of fracture extent. TECHNIQUE: CT of the pelvis without IV contrast. Coronal and sagittal reconstructions were obtained.  Radiation dose reduction techniques included automated exposure control or exposure modulation based on body size. Count of known CT and cardiac nuc med studies performed in previous 12 months: 0. COMPARISON:  Right hip x-rays 7/15/2021 FINDINGS: There is a moderately impacted and angulated oblique fracture through the right femoral neck, primarily involving the subcapital and transcervical regions. No evidence of fracture extension into the intertrochanteric region. Moderate apex anterior angulation at the fracture site. Right femoral head is normally located. Small right hip effusion. Bony pelvis, left hip, sacrum, and sacroiliac joints are intact. Multilevel degenerative changes in the utilized lumbar spine. Pelvic soft tissues and musculature are unremarkable. Included visceral pelvis demonstrates moderate distention of the urinary bladder. There is uncomplicated colonic diverticulosis. No free pelvic fluid.     Impression: 1. Moderately impacted and angulated oblique fracture of the right femoral neck, primarily involving the subcapital and transcervical regions. No evidence of fracture extension into the intertrochanteric region. Right hip normally located. 2. Bony pelvis, sacrum, and left hip appear intact. 3. Moderate distention urinary bladder. 4. No free pelvic fluid. Signer Name: Garth Neumann MD  Signed: 7/15/2021 9:05 AM  Workstation Name: [id number]  [facility]    XR Pelvis 1 or 2 View    Result Date: 7/15/2021  CR Pelvis 1 or 2 Vws INDICATION: Intraoperative  exam for right hip arthroplasty. COMPARISON: 7/15/2021 FINDINGS: AP view(s) of the pelvis.  There is artifact from operative hardware. The femoral component of a right hip arthroplasty has been placed and is grossly properly aligned. Please see the operative report.  Signer Name: Oscar Bajwa MD  Signed: 7/15/2021 10:46 PM  Workstation Name: [facility]  [facility]    XR Pelvis 1 or 2 View    Result Date: 7/15/2021  CR Pelvis 1 or 2 Vws INDICATION: Status post right hip arthroplasty. COMPARISON: 7/15/2021 FINDINGS: AP view(s) of the pelvis.  There has been interval right hip arthroplasty. Hardware shows expected alignment with no complications. Soft tissue drain is present. There is degenerative disease in the left hip. The iliac crests are not included in the field-of-view.     Impression: Satisfactory appearance of a new right hip arthroplasty. Signer Name: Oscar Bajwa MD  Signed: 7/15/2021 10:45 PM  Workstation Name: [facility]  [facility]    FICB CATHETER    Result Date: 7/15/2021  Radha Gorman CRNA     7/15/2021 11:12 AM FICB CATHETER Patient reassessed immediately prior to procedure Patient location during procedure: pre-op Reason for block: procedure for pain and at surgeon's request Performed by CRNA: Radha Gorman CRNA Assisted by: Elza Millard RN Preanesthetic Checklist Completed: patient identified, IV checked, site marked, risks and benefits discussed, surgical consent, monitors and equipment checked, pre-op evaluation and timeout performed Prep: Pt Position: supine Sterile barriers:cap, gloves and mask Prep: ChloraPrep Patient monitoring: blood pressure monitoring, continuous pulse oximetry and EKG Procedure Sedation:no Performed under: local infiltration Guidance:ultrasound guided Images:still images obtained, printed/placed on chart Laterality:right Block Type:fascia iliaca compartment Injection Technique:catheter Needle Type:echogenic  Needle Gauge:18 G Resistance on Injection: none Catheter Size:20 G (20g) Cath Depth at skin: 10 cm Medications Used: ropivacaine (NAROPIN) 0.5 % injection, 20 mL Med admintered at 7/15/2021 11:11 AM Medications Preservative Free Saline:20ml Comment:Ropivacaine 0.5 % 20 ml + NS PF 20 ml given via needle Post Assessment Injection Assessment: negative aspiration for heme, no paresthesia on injection and incremental injection Patient Tolerance:comfortable throughout block Complications:no Additional Notes Procedure:         Pt placed in supine position.   The insertion site was prepped in sterile fashion with Chlorapreop and clear plastic drapes.  Analgesia was provided by skin infiltration at insertion site with Lidocaine 1% 3mls.  A B-Kohli 18 g , 4 inch echogenic [place] needle was advance In-plane under ultrasound guidance. The   Anterior superior Iliac crest was initially visualized and the probe was directed slightly medially and slightly towards the umbilicus.  The course of the needle was tracked over the sartorius muscle through the fascia Iliacus and into the anterior portion of the Iliacus muscle.  Major vessels where identified and avoided as where structures of the peritoneal cavity.  LA injection was made incrementally in 1-5ml amounts spread was visualized superiorly below fascia iliacus.  Injection was completed with negative aspiration of blood and negative intravascular injection.  Injection pressures where normal or minimal resistance.  A 20 g B-Kohli wire styleted catheter was then advance thru the needle and very easily placed in a superior or cephalad direction.  The catheter was secured at insertion site with exofin tissue adhesive, benzoin, and steristreps.  A CHG tegaderm dressing was placed over the insertion site and the nerve catheter labeled and capped.  Thank You.     XR Hip With or Without Pelvis 2 - 3 View Right    Result Date: 7/15/2021  CR Hip Uni Comp Min 2 Vws RT INDICATION: Hip pain  after fall. COMPARISON: None. FINDINGS: AP pelvis and 2 view(s) of the right hip.  Patient is osteopenic. There is an acute right femoral neck fracture. The fracture may extend into the greater trochanter. No hip dislocation is seen. No other acute fractures are identified. There is no sacroiliac joint diastasis.     Impression: Acute right femoral neck fracture which may extend into the greater trochanter. Signer Name: Oscar Bajwa MD  Signed: 7/15/2021 3:04 AM  Workstation Name: [facility]  [facility]      Results for orders placed during the hospital encounter of 07/15/21    Adult Transthoracic Echo Complete w/ Color, Spectral and Contrast if necessary per protocol    Interpretation Summary  · Estimated left ventricular EF = 60% Left ventricular systolic function is normal.  · Left ventricular wall thickness is consistent with borderline concentric hypertrophy.  · Left ventricular diastolic dysfunction is noted.  · Moderate to severe aortic valve stenosis is present.  · Aortic valve maximum pressure gradient is 59.2 mmHg. Aortic valve mean pressure gradient is 37.5 mmHg.  · Moderate mitral valve regurgitation is present.      I have reviewed the medications:  Scheduled Meds:acetaminophen, 1,000 mg, Oral, Q8H  aspirin, 81 mg, Oral, Q12H  ceFAZolin, 2 g, Intravenous, Q8H  losartan, 25 mg, Oral, Q24H  meloxicam, 15 mg, Oral, Daily  Mirabegron ER, 50 mg, Oral, Nightly  rosuvastatin, 5 mg, Oral, Daily  sodium chloride, 10 mL, Intravenous, Q12H      Continuous Infusions:ropivacaine (NAROPIN) 0.2% peripheral nerve cath (moog), 8 mL/hr, Last Rate: 8 mL/hr (07/15/21 2143)  sodium chloride, 100 mL/hr, Last Rate: 100 mL/hr (07/15/21 2202)      PRN Meds:.HYDROmorphone **AND** naloxone  •  metaxalone  •  Morphine **AND** naloxone  •  ondansetron **OR** ondansetron  •  oxyCODONE  •  oxyCODONE  •  [COMPLETED] Insert peripheral IV **AND** sodium chloride  •  sodium chloride    Assessment/Plan    Assessment & Plan     Active Hospital Problems    Diagnosis  POA   • **Closed fracture of neck of right femur (CMS/HCC) [S72.001A]  Unknown   • Femoral neck fracture (CMS/HCC) [S72.009A]  Yes   • Fall [W19.XXXA]  Yes   • 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        Brief Hospital Course to date:  Luh Mckeon is a 89 y.o. female with history of HTN, HLD, osteoporosis and urinary frequency present after a fall. Imaging revealed a right femoral neck fracture.    Right femoral neck fracture  - hemiarthroplasty 7/15 Dr Olsen  - pain control  - PT/OT  - aspirin for DVT proph per surgeon's note  - to the [facility] soon.     Confusion  - likely metabolic encephalopathy superimposed on baseline debility  - safety measures and reorientation  - prioritize good sleep     HTN    HLD  - crestor    Osteoporosis      DVT prophylaxis: mechanical/ASA  Mechanical DVT prophylaxis orders are present.       Disposition: I expect the patient to be discharged to rehab when bed available.      CODE STATUS:   There are no questions and answers to display.       Yamila Wu MD  07/16/21

## 2021-07-16 NOTE — ANESTHESIA POSTPROCEDURE EVALUATION
Patient: Luh Mckeon    Procedure Summary     Date: 07/15/21 Room / Location:  [facility]  /  [facility]    Anesthesia Start: 1938 Anesthesia Stop: 2134    Procedure: HIP HEMIARTHROPLASTY (Right Hip) Diagnosis:       Closed fracture of neck of right femur, initial encounter (CMS/[facility])      (Closed fracture of neck of right femur, initial encounter (CMS/[facility]) [S72.001A])    Surgeons: Adam Olsen MD Provider: Schuyler Carbone MD    Anesthesia Type: general ASA Status: 3          Anesthesia Type: general    Vitals  No vitals data found for the desired time range.          Post Anesthesia Care and Evaluation    Patient location during evaluation: PACU  Patient participation: complete - patient participated  Level of consciousness: awake and alert  Pain management: adequate  Airway patency: patent  Anesthetic complications: No anesthetic complications  PONV Status: none  Cardiovascular status: hemodynamically stable and acceptable  Respiratory status: nonlabored ventilation, acceptable and nasal cannula  Hydration status: acceptable

## 2021-07-16 NOTE — PLAN OF CARE
Goal Outcome Evaluation:               Pt is alert with confusion. VSS. Denied pain. In and out cath during the shift. SCD in place. NSR to SB on telemetry. 2L of oxygen on nasal canula.

## 2021-07-16 NOTE — THERAPY EVALUATION
Patient Name: Luh Mckeon  : 1930    MRN: [id number]                              Today's Date: 2021       Admit Date: 7/15/2021    Visit Dx:     ICD-10-CM ICD-9-CM   1. Closed fracture of neck of right femur, initial encounter (CMS/HCC)  S72.001A 820.8   2. Closed right hip fracture, initial encounter (CMS/HCC)  S72.001A 820.8     Patient Active Problem List   Diagnosis   • Generalized anxiety disorder   • Unsteady gait   • Risk for falls   • Hyperlipidemia LDL goal <100   • Benign essential hypertension   • Memory loss   • Urinary incontinence in female   • Candida onychomycosis   • AR (allergic rhinitis)   • Overweight (BMI 25.0-29.9)   • Chest pain   • Cramp in limb   • Disorder of mitral and aortic valves   • Disorder of skeletal muscle   • Diverticular disease of colon   • Dysphagia   • External hemorrhoids   •     • Hand joint pain   • Insomnia   • Knee pain   • Menopausal and postmenopausal disorder   • Osteoporosis   • Shoulder pain   • Vitamin B deficiency   • Medicare annual wellness visit, subsequent   • Dizziness   • Urgency of urination   • Fatigue   • Femoral neck fracture (CMS/HCC)   • Fall   • Closed fracture of neck of right femur (CMS/[facility])     Past Medical History:   Diagnosis Date   • Ankle instability    • Chronic left shoulder pain 2018   • Disorder of tendon of shoulder region, left    • Dysphagia    • Esophagitis    • Femoral neck fracture (CMS/[facility]) 7/15/2021   • Finger fracture, right     Dr Chandler casting and physical therapy   • Hemorrhoids    • Hypertension    • Localized, primary osteoarthritis of left shoulder region    • Right shoulder pain    • Shoulder joint replacement status     Right   • Stroke (CMS/HCC)     CVA     Past Surgical History:   Procedure Laterality Date   • APPENDECTOMY      Daughter denies   • CATARACT EXTRACTION, BILATERAL         • ENDOSCOPY  2009    with biopsy/esophageal ring dilation   • HEMORRHOIDECTOMY      lanced    • HIP  "HEMIARTHROPLASTY Right 7/15/2021    Procedure: HIP HEMIARTHROPLASTY;  Surgeon: Adam Olsen MD;  Location: [facility];  Service: Orthopedics;  Laterality: Right;   • HYSTERECTOMY      partial    • OOPHORECTOMY     • SHOULDER SURGERY Right     replacement   • SHOULDER SURGERY      Arthroscopy of shoulder:Right   • TONSILLECTOMY       General Information     Row Name 07/16/21 1400          OT Time and Intention    Document Type  evaluation  -TB     Mode of Treatment  occupational therapy;individual therapy  -TB     Row Name 07/16/21 1400          General Information    Patient Profile Reviewed  yes  -TB     Prior Level of Function  independent:;all household mobility;transfer;bed mobility;ADL's;driving;using stairs Pt lives alone at baseline and family reports independent with family support  -TB     Existing Precautions/Restrictions  fall;right;hip, posterior;brace worn when out of bed KI when up due to fascia iliaca block  -TB     Barriers to Rehab  cognitive status  -TB     Row Name 07/16/21 1400          Occupational Profile    Reason for Services/Referral (Occupational Profile)  to advance occupational engagement  -TB     Row Name 07/16/21 1400          Living Environment    Lives With  alone;other (see comments) family support  -TB     Row Name 07/16/21 1400          Home Main Entrance    Number of Stairs, Main Entrance  four  -TB     Stair Railings, Main Entrance  railings safe and in good condition  -TB     Row Name 07/16/21 1400          Stairs Within Home, Primary    Number of Stairs, Within Home, Primary  none  -TB     Row Name 07/16/21 1400          Cognition    Orientation Status (Cognition)  oriented to;person;disoriented to;place;situation;time;verbal cues/prompts needed for orientation \"July 1962\"  -TB     Row Name 07/16/21 1400          Safety Issues, Functional Mobility    Safety Issues Affecting Function (Mobility)  ability to follow commands;at risk behavior observed;awareness of need for " assistance;insight into deficits/self-awareness;judgment;safety precaution awareness;safety precautions follow-through/compliance  -     Impairments Affecting Function (Mobility)  cognition;range of motion (ROM)  -     Cognitive Impairments, Mobility Safety/Performance  attention;awareness, need for assistance;insight into deficits/self-awareness;judgment;safety precaution awareness;safety precaution follow-through  -TB     Comment, Safety Issues/Impairments (Mobility)  Mobility deferred due to acute confusion/agitation and poor command following.  -       User Key  (r) = Recorded By, (t) = Taken By, (c) = Cosigned By    Initials Name Provider Type     Sylwia Blanca, OT Occupational Therapist          Mobility/ADL's     Row Name 07/16/21 [facility]          Bed Mobility    Comment (Bed Mobility)  Deferred due to acute confusion/agitation.  -     Row Name 07/16/21 140          Transfers    Comment (Transfers)  Deferred due to acute confusion/agitation.  -     Row Name 07/16/21 140          Functional Mobility    Functional Mobility- Comment  Deferred due to acute confusion/agitation.  -     Row Name 07/16/21 1405          Activities of Daily Living    BADL Assessment/Intervention  upper body dressing;lower body dressing;feeding;toileting  -     Row Name 07/16/21 1405          Mobility    Extremity Weight-bearing Status  right lower extremity  -     Right Lower Extremity (Weight-bearing Status)  weight-bearing as tolerated (WBAT) WBAT protected by RW AAT  -     Row Name 07/16/21 1405          Upper Body Dressing Assessment/Training    [place] Level (Upper Body Dressing)  doff;don;pajama/robe;maximum assist (25% patient effort);verbal cues  -     Position (Upper Body Dressing)  sitting up in bed  -     Row Name 07/16/21 1405          Lower Body Dressing Assessment/Training    [place] Level (Lower Body Dressing)  doff;don;socks;dependent (less than 25% patient effort);verbal cues   -TB     Position (Lower Body Dressing)  sitting up in bed  -TB     Comment (Lower Body Dressing)  s/p R YESSICA with PHP x6 weeks  -TB     Row Name 07/16/21 1405          Self-Feeding Assessment/Training    [place] Level (Feeding)  set up;liquids to mouth  -TB     Position (Self-Feeding)  sitting up in bed  -TB     Row Name 07/16/21 1405          Toileting Assessment/Training    [place] Level (Toileting)  dependent (less than 25% patient effort)  -TB     Position (Toileting)  sitting up in bed  -TB     Comment (Toileting)  purwick  -TB       User Key  (r) = Recorded By, (t) = Taken By, (c) = Cosigned By    Initials Name Provider Type    TB Sylwia Blanca OT Occupational Therapist        Obj/Interventions     Row Name 07/16/21 1408          Sensory Assessment (Somatosensory)    Sensory Assessment (Somatosensory)  unable/difficult to assess Pt responds to touch  -TB     Row Name 07/16/21 1408          Vision Assessment/Intervention    Visual Impairment/Limitations  WFL  -TB     Row Name 07/16/21 1408          Range of Motion Comprehensive    General Range of Motion  bilateral upper extremity ROM WFL  -TB     Comment, General Range of Motion  BUE intact  -TB     Row Name 07/16/21 1408          Strength Comprehensive (MMT)    General Manual Muscle Testing (MMT) Assessment  no strength deficits identified  -TB     Comment, General Manual Muscle Testing (MMT) Assessment  BUE intact, functionally 4+/5  -TB       User Key  (r) = Recorded By, (t) = Taken By, (c) = Cosigned By    Initials Name Provider Type    TB Sylwia Blanca OT Occupational Therapist        Goals/Plan     Row Name 07/16/21 1416          Bed Mobility Goal 1 (OT)    Activity/Assistive Device (Bed Mobility Goal 1, OT)  sit to supine/supine to sit  -TB     [place] Level/Cues Needed (Bed Mobility Goal 1, OT)  moderate assist (50-74% patient effort);tactile cues required;verbal cues required  -TB     Time Frame (Bed Mobility Goal 1,  OT)  by discharge  -TB     Strategies/Barriers (Bed Mobility Goal 1, OT)  RLE PHP  -TB     Progress/Outcomes (Bed Mobility Goal 1, OT)  goal ongoing  -TB     Row Name 07/16/21 1416          Transfer Goal 1 (OT)    Activity/Assistive Device (Transfer Goal 1, OT)  sit-to-stand/stand-to-sit;bed-to-chair/chair-to-bed;toilet;walker, rolling  -TB     [place] Level/Cues Needed (Transfer Goal 1, OT)  minimum assist (75% or more patient effort);tactile cues required;verbal cues required  -TB     Time Frame (Transfer Goal 1, OT)  by discharge  -TB     Strategies/Barriers (Transfers Goal 1, OT)  RLE PHP  -TB     Progress/Outcome (Transfer Goal 1, OT)  goal ongoing  -TB     Row Name 07/16/21 1416          Dressing Goal 1 (OT)    Activity/Device (Dressing Goal 1, OT)  lower body dressing  -TB     [place]/Cues Needed (Dressing Goal 1, OT)  moderate assist (50-74% patient effort);tactile cues required;verbal cues required  -TB     Time Frame (Dressing Goal 1, OT)  by discharge  -TB     Strategies/Barriers (Dressing Goal 1, OT)  RLE PHP. AE TBD in treatment.  -TB     Progress/Outcome (Dressing Goal 1, OT)  goal ongoing  -TB       User Key  (r) = Recorded By, (t) = Taken By, (c) = Cosigned By    Initials Name Provider Type    TB Sylwai Blanca OT Occupational Therapist        Clinical Impression     Row Name 07/16/21 1406          Pain Assessment    Additional Documentation  Pain Scale: FACES Pre/Post-Treatment (Group)  -TB     Row Name 07/16/21 1403          Pain Scale: FACES Pre/Post-Treatment    Pain: FACES Scale, Pretreatment  0-->no hurt  -TB     Posttreatment Pain Rating  0-->no hurt  -TB     Pain Location - Side  Right  -TB     Pain Location  hip  -TB     Pre/Posttreatment Pain Comment  No indication of pain  -TB     Row Name 07/16/21 9297          Plan of Care Review    Plan of Care Reviewed With  patient;daughter;grandchild(ralph)  -TB     Outcome Summary  OT IE completed. Pt is alert, Ox1 with acute  confusion and agitated by stimulation. OOB activity deferred for safety as pt unable to follow commands or maintain PHP. BUE AROM and strength are intact (4+/5) Max A UB dressing. Dependent LB dressing and toileting. Set-up to bring drink to mouth. Education initated with family for RLE PHPs. Family provided all history and DME in place at home. OT will follow IP. Recommend rehab at d/c for best outcome.  -TB     Row Name 07/16/21 1406          Therapy Assessment/Plan (OT)    Rehab Potential (OT)  fair, will monitor progress closely  -TB     Criteria for Skilled Therapeutic Interventions Met (OT)  yes;skilled treatment is necessary  -TB     Therapy Frequency (OT)  daily  -TB     Row Name 07/16/21 1404          Therapy Plan Review/Discharge Plan (OT)    Equipment Needs Upon Discharge (OT)  shower chair  -TB     Anticipated Discharge Disposition (OT)  inpatient rehabilitation facility  -TB     Row Name 07/16/21 1405          Vital Signs    Pre Systolic BP Rehab  -- RN cleared OT  -TB     O2 Delivery Pre Treatment  room air  -TB     O2 Delivery Intra Treatment  room air  -TB     O2 Delivery Post Treatment  room air  -TB     Pre Patient Position  Supine  -TB     Intra Patient Position  Supine  -TB     Post Patient Position  Supine  -TB     Row Name 07/16/21 1405          Positioning and Restraints    Pre-Treatment Position  in bed  -TB     Post Treatment Position  bed  -TB     In Bed  fowlers;call light within reach;encouraged to call for assist;exit alarm on;with family/caregiver;with nsg  -TB       User Key  (r) = Recorded By, (t) = Taken By, (c) = Cosigned By    Initials Name Provider Type    TB Sylwia Blanca, OT Occupational Therapist        Outcome Measures     Row Name 07/16/21 8060          How much help from another is currently needed...    Putting on and taking off regular lower body clothing?  1  -TB     Bathing (including washing, rinsing, and drying)  2  -TB     Toileting (which includes using  toilet bed pan or urinal)  1  -TB     Putting on and taking off regular upper body clothing  2  -TB     Taking care of personal grooming (such as brushing teeth)  3  -TB     Eating meals  3  -TB     AM-PAC 6 Clicks Score (OT)  12  -TB     Row Name 07/16/21 0800          How much help from another person do you currently need...    Turning from your back to your side while in flat bed without using bedrails?  2  -LF     Moving from lying on back to sitting on the side of a flat bed without bedrails?  2  -LF     Moving to and from a bed to a chair (including a wheelchair)?  2  -LF     Standing up from a chair using your arms (e.g., wheelchair, bedside chair)?  2  -LF     Climbing 3-5 steps with a railing?  1  -LF     To walk in hospital room?  2  -LF     AM-PAC 6 Clicks Score (PT)  11  -LF     Row Name 07/16/21 1417          Functional Assessment    Outcome Measure Options  AM-PAC 6 Clicks Daily Activity (OT)  -TB       User Key  (r) = Recorded By, (t) = Taken By, (c) = Cosigned By    Initials Name Provider Type    TB Sylwia Blanca OT Occupational Therapist     Kera Justice RN Registered Nurse        Occupational Therapy Education                 Title: PT OT SLP Therapies (In Progress)     Topic: Occupational Therapy (In Progress)     Point: ADL training (In Progress)     Description:   Instruct learner(s) on proper safety adaptation and remediation techniques during self care or transfers.   Instruct in proper use of assistive devices.              Learning Progress Summary           Patient Acceptance, E, NR,NL by TB at 7/16/2021 1418   Family Acceptance, E, NR,NL by TB at 7/16/2021 1418                   Point: Precautions (In Progress)     Description:   Instruct learner(s) on prescribed precautions during self-care and functional transfers.              Learning Progress Summary           Patient Acceptance, E, NR,NL by TB at 7/16/2021 1418   Family Acceptance, E, NR,NL by TB at 7/16/2021 1418                                User Key     Initials Effective Dates Name Provider Type Discipline    TB 06/16/21 -  Sylwia Blanca OT Occupational Therapist OT              OT Recommendation and Plan  Therapy Frequency (OT): daily  Plan of Care Review  Plan of Care Reviewed With: patient, daughter, grandchild(ralph)  Outcome Summary: OT IE completed. Pt is alert, Ox1 with acute confusion and agitated by stimulation. OOB activity deferred for safety as pt unable to follow commands or maintain PHP. BUE AROM and strength are intact (4+/5) Max A UB dressing. Dependent LB dressing and toileting. Set-up to bring drink to mouth. Education initated with family for RLE PHPs. Family provided all history and DME in place at home. OT will follow IP. Recommend rehab at d/c for best outcome.     Time Calculation:   Time Calculation- OT     Row Name 07/16/21 1315             Time Calculation- OT    OT Start Time  1315  -TB      OT Received On  07/16/21  -TB      OT Goal Re-Cert Due Date  07/26/21  -TB         Untimed Charges    OT Eval/Re-eval Minutes  50  -TB         Total Minutes    Untimed Charges Total Minutes  50  -TB       Total Minutes  50  -TB        User Key  (r) = Recorded By, (t) = Taken By, (c) = Cosigned By    Initials Name Provider Type    TB Sylwia Blanca OT Occupational Therapist        Therapy Charges for Today     Code Description Service Date Service Provider Modifiers Qty    [id number] HC OT EVAL MOD COMPLEXITY 4 7/16/2021 Sylwia Blanca OT GO 1               Sylwia Blanca OT  7/16/2021

## 2021-07-16 NOTE — PROGRESS NOTES
"  SUBJECTIVE  Patient resting comfortably.  Pain well controlled.  No events overnight.  Remains confused (baseline).    PHYSICAL THERAPY PROGRESS        OBJECTIVE  Temp (24hrs), Av.9 °F (36.6 °C), Min:96.7 °F (35.9 °C), Max:99.1 °F (37.3 °C)    Blood pressure 117/68, pulse 71, temperature 98 °F (36.7 °C), temperature source Oral, resp. rate 16, height 162.6 cm (64.02\"), weight 61.2 kg (135 lb), SpO2 98 %.    Lab Results (last 24 hours)     Procedure Component Value Units Date/Time    POC Glucose Once [[id number]]  (Normal) Collected: 21 07    Specimen: Blood Updated: 21     Glucose 86 mg/dL      Comment: Meter: [id number] : [id number] Franco Chavis       Basic Metabolic Panel [[id number]]  (Abnormal) Collected: 21 0404    Specimen: Blood Updated: 21 0504     Glucose 110 mg/dL      BUN 9 mg/dL      Creatinine 0.64 mg/dL      Sodium 132 mmol/L      Potassium 4.4 mmol/L      Chloride 101 mmol/L      CO2 24.0 mmol/L      Calcium 7.6 mg/dL      eGFR Non African Amer 87 mL/min/1.73      BUN/Creatinine Ratio 14.1     Anion Gap 7.0 mmol/L     Narrative:      GFR Normal >60  Chronic Kidney Disease <60  Kidney Failure <15      Hemoglobin & Hematocrit, Blood [[id number]]  (Abnormal) Collected: 21 0404    Specimen: Blood Updated: 21 0437     Hemoglobin 11.7 g/dL      Hematocrit 36.3 %     POC Glucose Once [[id number]]  (Normal) Collected: 07/15/21 1135    Specimen: Blood Updated: 07/15/21 1143     Glucose 110 mg/dL      Comment: Meter: [id number] : [id number] Franco Chavis               PHYSICAL EXAM  Right lower extremity: Dressing clean, dry and intact.  Leg lengths symmetric.  Intact EHL, FHL, tibialis anterior, and gastrocsoleus. Sensation intact to light touch to deep peroneal, superficial peroneal, sural, saphenous, tibial nerves. 2+ palpable DP and PT pulses.         Closed fracture of neck of right femur (CMS/HCC)    Hyperlipidemia LDL goal <100    Benign essential " hypertension    Disorder of mitral and aortic valves    Femoral neck fracture (CMS/HCC)    Fall      PLAN / DISPOSITION:  1 Day Post-Op right hip hemiarthroplasty    Protected weight bearing as tolerated right lower extremity, posterior precautions x6 weeks  Pain control  PT/OT for post op mobilization and medical equipment needs   23 hours perioperative antibiotic prophylaxis   SCD's bilateral lower extremities   Aspirin for DVT prophylaxis   Social work for discharge planning.  Okay to discharge from orthopedic standpoint.  Dressing to remain in place for 7 days. May remove on POD#7. If no drainage, may shower on POD#10. No submerging wound in water. If drainage is noted, sterile dressing should be placed and wound checked daily. No showering until wound has remained dry for 72 consecutive hours.   Follow up in 3 weeks for re-assessment.      Future Appointments   Date Time Provider Department Center   9/3/2021  3:20 PM [facility]  [facility]   9/30/2021 10:30 AM Schuyler Garcia MD [facility]       Adam Olsen MD  07/16/21  07:23 EDT

## 2021-07-16 NOTE — NURSING NOTE
RN was called into the room by family and therapy. Patient had pulled off her gown, telemetry monitor, and was trying to pull out her IV. Patient disoriented and combative. Order received for bilateral soft wrist restraints. Haldol 1mg IV given. In and out cath performed. Patient now resting comfortably in bed asleep. Will continue to monitor and assess need for restraint.

## 2021-07-17 LAB
ANION GAP SERPL CALCULATED.3IONS-SCNC: 10 MMOL/L (ref 5–15)
BACTERIA UR QL AUTO: ABNORMAL /HPF
BILIRUB UR QL STRIP: NEGATIVE
BUN SERPL-MCNC: 7 MG/DL (ref 8–23)
BUN/CREAT SERPL: 15.2 (ref 7–25)
CALCIUM SPEC-SCNC: 8 MG/DL (ref 8.2–9.6)
CHLORIDE SERPL-SCNC: 103 MMOL/L (ref 98–107)
CLARITY UR: CLEAR
CO2 SERPL-SCNC: 21 MMOL/L (ref 22–29)
COLOR UR: YELLOW
CREAT SERPL-MCNC: 0.46 MG/DL (ref 0.57–1)
DEPRECATED RDW RBC AUTO: 45.1 FL (ref 37–54)
ERYTHROCYTE [DISTWIDTH] IN BLOOD BY AUTOMATED COUNT: 12.7 % (ref 12.3–15.4)
GFR SERPL CREATININE-BSD FRML MDRD: 128 ML/MIN/1.73
GLUCOSE SERPL-MCNC: 98 MG/DL (ref 65–99)
GLUCOSE UR STRIP-MCNC: NEGATIVE MG/DL
HCT VFR BLD AUTO: 36.6 % (ref 34–46.6)
HGB BLD-MCNC: 11.9 G/DL (ref 12–15.9)
HGB UR QL STRIP.AUTO: ABNORMAL
HYALINE CASTS UR QL AUTO: ABNORMAL /LPF
KETONES UR QL STRIP: ABNORMAL
LEUKOCYTE ESTERASE UR QL STRIP.AUTO: ABNORMAL
MCH RBC QN AUTO: 31.4 PG (ref 26.6–33)
MCHC RBC AUTO-ENTMCNC: 32.5 G/DL (ref 31.5–35.7)
MCV RBC AUTO: 96.6 FL (ref 79–97)
NITRITE UR QL STRIP: NEGATIVE
PH UR STRIP.AUTO: 6.5 [PH] (ref 5–8)
PLATELET # BLD AUTO: 198 10*3/MM3 (ref 140–450)
PMV BLD AUTO: 11.1 FL (ref 6–12)
POTASSIUM SERPL-SCNC: 4 MMOL/L (ref 3.5–5.2)
PROT UR QL STRIP: NEGATIVE
RBC # BLD AUTO: 3.79 10*6/MM3 (ref 3.77–5.28)
RBC # UR: ABNORMAL /HPF
REF LAB TEST METHOD: ABNORMAL
SODIUM SERPL-SCNC: 134 MMOL/L (ref 136–145)
SP GR UR STRIP: 1.01 (ref 1–1.03)
SQUAMOUS #/AREA URNS HPF: ABNORMAL /HPF
UROBILINOGEN UR QL STRIP: ABNORMAL
WBC # BLD AUTO: 11.55 10*3/MM3 (ref 3.4–10.8)
WBC UR QL AUTO: ABNORMAL /HPF

## 2021-07-17 PROCEDURE — 99232 SBSQ HOSP IP/OBS MODERATE 35: CPT | Performed by: NURSE PRACTITIONER

## 2021-07-17 PROCEDURE — 80048 BASIC METABOLIC PNL TOTAL CA: CPT | Performed by: ORTHOPAEDIC SURGERY

## 2021-07-17 PROCEDURE — 97116 GAIT TRAINING THERAPY: CPT

## 2021-07-17 PROCEDURE — 81001 URINALYSIS AUTO W/SCOPE: CPT | Performed by: NURSE PRACTITIONER

## 2021-07-17 PROCEDURE — 99024 POSTOP FOLLOW-UP VISIT: CPT | Performed by: ORTHOPAEDIC SURGERY

## 2021-07-17 PROCEDURE — 85027 COMPLETE CBC AUTOMATED: CPT | Performed by: INTERNAL MEDICINE

## 2021-07-17 PROCEDURE — 87086 URINE CULTURE/COLONY COUNT: CPT | Performed by: NURSE PRACTITIONER

## 2021-07-17 RX ORDER — CEFDINIR 300 MG/1
300 CAPSULE ORAL EVERY 12 HOURS SCHEDULED
Status: DISCONTINUED | OUTPATIENT
Start: 2021-07-17 | End: 2021-07-19 | Stop reason: HOSPADM

## 2021-07-17 RX ADMIN — SODIUM CHLORIDE, PRESERVATIVE FREE 10 ML: 5 INJECTION INTRAVENOUS at 08:11

## 2021-07-17 RX ADMIN — CEFDINIR 300 MG: 300 CAPSULE ORAL at 20:10

## 2021-07-17 RX ADMIN — CEFDINIR 300 MG: 300 CAPSULE ORAL at 11:53

## 2021-07-17 RX ADMIN — ASPIRIN 81 MG: 81 TABLET, COATED ORAL at 20:10

## 2021-07-17 RX ADMIN — MELOXICAM 15 MG: 7.5 TABLET ORAL at 08:12

## 2021-07-17 RX ADMIN — ACETAMINOPHEN 1000 MG: 500 TABLET, FILM COATED ORAL at 00:24

## 2021-07-17 RX ADMIN — ROSUVASTATIN CALCIUM 5 MG: 10 TABLET, COATED ORAL at 08:11

## 2021-07-17 RX ADMIN — ACETAMINOPHEN 1000 MG: 500 TABLET, FILM COATED ORAL at 15:40

## 2021-07-17 RX ADMIN — SODIUM CHLORIDE, PRESERVATIVE FREE 10 ML: 5 INJECTION INTRAVENOUS at 20:10

## 2021-07-17 RX ADMIN — ACETAMINOPHEN 1000 MG: 500 TABLET, FILM COATED ORAL at 08:11

## 2021-07-17 RX ADMIN — ASPIRIN 81 MG: 81 TABLET, COATED ORAL at 08:11

## 2021-07-17 RX ADMIN — LOSARTAN POTASSIUM 25 MG: 25 TABLET, FILM COATED ORAL at 08:12

## 2021-07-17 NOTE — PLAN OF CARE
Goal Outcome Evaluation:  Plan of Care Reviewed With: patient, caregiver, family        Progress: improving  Outcome Summary: 100 ft gait with RW OOB in chair.  Tranfers w supervision to standby assist.

## 2021-07-17 NOTE — THERAPY TREATMENT NOTE
Patient Name: Luh Mckeon  : 1930    MRN: [id number]                              Today's Date: 2021       Admit Date: 7/15/2021    Visit Dx:     ICD-10-CM ICD-9-CM   1. Closed fracture of neck of right femur, initial encounter (CMS/HCC)  S72.001A 820.8   2. Closed right hip fracture, initial encounter (CMS/HCC)  S72.001A 820.8     Patient Active Problem List   Diagnosis   • Generalized anxiety disorder   • Unsteady gait   • Risk for falls   • Hyperlipidemia LDL goal <100   • Benign essential hypertension   • Memory loss   • Urinary incontinence in female   • Candida onychomycosis   • AR (allergic rhinitis)   • Overweight (BMI 25.0-29.9)   • Chest pain   • Cramp in limb   • Disorder of mitral and aortic valves   • Disorder of skeletal muscle   • Diverticular disease of colon   • Dysphagia   • External hemorrhoids   •     • Hand joint pain   • Insomnia   • Knee pain   • Menopausal and postmenopausal disorder   • Osteoporosis   • Shoulder pain   • Vitamin B deficiency   • Medicare annual wellness visit, subsequent   • Dizziness   • Urgency of urination   • Fatigue   • Femoral neck fracture (CMS/HCC)   • Fall   • Closed fracture of neck of right femur (CMS/[facility])     Past Medical History:   Diagnosis Date   • Ankle instability    • Chronic left shoulder pain 2018   • Disorder of tendon of shoulder region, left    • Dysphagia    • Esophagitis    • Femoral neck fracture (CMS/[facility]) 7/15/2021   • Finger fracture, right     Dr Chandler casting and physical therapy   • Hemorrhoids    • Hypertension    • Localized, primary osteoarthritis of left shoulder region    • Right shoulder pain    • Shoulder joint replacement status     Right   • Stroke (CMS/[facility])     CVA     Past Surgical History:   Procedure Laterality Date   • APPENDECTOMY      Daughter denies   • CATARACT EXTRACTION, BILATERAL         • ENDOSCOPY  2009    with biopsy/esophageal ring dilation   • HEMORRHOIDECTOMY      lanced    • HIP  HEMIARTHROPLASTY Right 7/15/2021    Procedure: HIP HEMIARTHROPLASTY;  Surgeon: Adam Olsen MD;  Location: [facility];  Service: Orthopedics;  Laterality: Right;   • HYSTERECTOMY      partial    • OOPHORECTOMY     • SHOULDER SURGERY Right     replacement   • SHOULDER SURGERY      Arthroscopy of shoulder:Right   • TONSILLECTOMY       General Information     Row Name 07/17/21 1051          Physical Therapy Time and Intention    Document Type  therapy note (daily note)  -CT     Mode of Treatment  physical therapy  -CT     Row Name 07/17/21 1051          General Information    Patient Profile Reviewed  yes  -CT     Existing Precautions/Restrictions  oxygen therapy device and L/min;hip  -CT     Barriers to Rehab  previous functional deficit;cognitive status  -CT     Row Name 07/17/21 1051          Cognition    Orientation Status (Cognition)  oriented to;person;situation  -CT     Row Name 07/17/21 1051          Safety Issues, Functional Mobility    Safety Issues Affecting Function (Mobility)  awareness of need for assistance;problem-solving;sequencing abilities  -CT     Impairments Affecting Function (Mobility)  balance;cognition;endurance/activity tolerance;pain  -CT       User Key  (r) = Recorded By, (t) = Taken By, (c) = Cosigned By    Initials Name Provider Type    CT Eduard Peña, PT Physical Therapist        Mobility     Row Name 07/17/21 1053          Bed Mobility    Bed Mobility  bed mobility (all) activities;rolling left  -CT     All Activities, [place] (Bed Mobility)  supervision  -CT     Rolling Left [place] (Bed Mobility)  modified independence  -CT     Row Name 07/17/21 1053          Bed-Chair Transfer    Bed-Chair [place] (Transfers)  supervision  -CT     Assistive Device (Bed-Chair Transfers)  walker, front-wheeled  -CT     Row Name 07/17/21 1053          Sit-Stand Transfer    Sit-Stand [place] (Transfers)  independent  -CT     Assistive Device (Sit-Stand Transfers)   walker, front-wheeled  -CT     Row Name 07/17/21 1053          Gait/Stairs (Locomotion)    [place] Level (Gait)  supervision  -CT     Assistive Device (Gait)  walker, front-wheeled  -CT     Distance in Feet (Gait)  gait to 100 ft with RW and cues to sequance and maintain line of progresssion.  OOB in chair.  Needs repetition to sequence appropiately  -CT     Deviations/Abnormal Patterns (Gait)  filiberto decreased;gait speed decreased;stride length decreased  -CT     Row Name 07/17/21 1053          Mobility    Extremity Weight-bearing Status  right lower extremity  -CT     Right Lower Extremity (Weight-bearing Status)  weight-bearing as tolerated (WBAT)  -CT       User Key  (r) = Recorded By, (t) = Taken By, (c) = Cosigned By    Initials Name Provider Type    CT Eduard Peña, PT Physical Therapist        Obj/Interventions     Row Name 07/17/21 1057          Range of Motion Comprehensive    General Range of Motion  lower extremity range of motion deficits identified  -CT     Row Name 07/17/21 1057          Strength Comprehensive (MMT)    General Manual Muscle Testing (MMT) Assessment  lower extremity strength deficits identified  -CT     Row Name 07/17/21 1057          Motor Skills    Motor Skills  coordination;functional endurance;motor control/coordination interventions  -CT     Motor Control/Coordination Interventions  stepping/walking  -CT     Row Name 07/17/21 1057          Balance    Balance Assessment  sitting static balance;sitting dynamic balance;standing static balance;standing dynamic balance  -CT     Static Sitting Balance  WNL  -CT     Dynamic Sitting Balance  mild impairment  -CT     Static Standing Balance  WNL  -CT     Dynamic Standing Balance  mild impairment  -CT     Balance Interventions  sitting;standing;sit to stand;weight shifting activity  -CT       User Key  (r) = Recorded By, (t) = Taken By, (c) = Cosigned By    Initials Name Provider Type    CT Eduard Peña, PT  Physical Therapist        Goals/Plan    No documentation.       Clinical Impression     Row Name 07/17/21 1058          Pain    Additional Documentation  Pain Scale: Numbers Pre/Post-Treatment (Group)  -CT     Row Name 07/17/21 1058          Pain Scale: Numbers Pre/Post-Treatment    Pretreatment Pain Rating  0/10 - no pain  -CT     Posttreatment Pain Rating  0/10 - no pain  -CT     Pain Location - Orientation  incisional  -CT     Pain Location  hip  -CT     Pain Intervention(s)  Medication (See MAR);Home medication;Repositioned;Ambulation/increased activity  -CT     Row Name 07/17/21 1058          Plan of Care Review    Plan of Care Reviewed With  patient;caregiver;family  -CT     Progress  improving  -CT     Outcome Summary  100 ft gait with RW OOB in chair.  Tranfers w supervision to standby assist.  -CT     Row Name 07/17/21 1058          Therapy Assessment/Plan (PT)    Rehab Potential (PT)  good, to achieve stated therapy goals  -CT     Criteria for Skilled Interventions Met (PT)  yes  -CT     Row Name 07/17/21 1058          Vital Signs    O2 Delivery Pre Treatment  supplemental O2  -CT     Intra SpO2 (%)  92  -CT     O2 Delivery Intra Treatment  supplemental O2  -CT     Post SpO2 (%)  92  -CT     O2 Delivery Post Treatment  supplemental O2  -CT     Pre Patient Position  Supine  -CT     Intra Patient Position  Sitting  -CT     Post Patient Position  Sitting  -CT     Row Name 07/17/21 1058          Positioning and Restraints    Pre-Treatment Position  in bed  -CT     Post Treatment Position  chair  -CT     In Chair  notified nsg;reclined;sitting;call light within reach;exit alarm on;with family/caregiver;legs elevated  -CT       User Key  (r) = Recorded By, (t) = Taken By, (c) = Cosigned By    Initials Name Provider Type    CT Eduard Peña, PT Physical Therapist        Outcome Measures     Row Name 07/17/21 1105 07/17/21 0811       How much help from another person do you currently need...    Turning  from your back to your side while in flat bed without using bedrails?  3  -CT  1  -RW    Moving from lying on back to sitting on the side of a flat bed without bedrails?  3  -CT  1  -RW    Moving to and from a bed to a chair (including a wheelchair)?  4  -CT  1  -RW    Standing up from a chair using your arms (e.g., wheelchair, bedside chair)?  4  -CT  1  -RW    Climbing 3-5 steps with a railing?  2  -CT  1  -RW    To walk in hospital room?  3  -CT  1  -RW    AM-PAC 6 Clicks Score (PT)  19  -CT  6  -RW    Row Name 07/17/21 1105          Functional Assessment    Outcome Measure Options  AM-PAC 6 Clicks Basic Mobility (PT)  -CT       User Key  (r) = Recorded By, (t) = Taken By, (c) = Cosigned By    Initials Name Provider Type    CT Eduard Peña, PT Physical Therapist    Amanda Hernandez RN Registered Nurse        Physical Therapy Education                 Title: PT OT SLP Therapies (In Progress)     Topic: Physical Therapy (In Progress)     Point: Mobility training (In Progress)     Learning Progress Summary           Patient Eager, E,D, NR by CT at 7/17/2021 1105    Acceptance, D,E, NR,NL by CS at 7/16/2021 1330    Comment: Educated patient on posterior hip precautions, importance of PT, importance of mobility, and plan for POC.   Family Eager, E,D, NR by CT at 7/17/2021 1105                   Point: Home exercise program (In Progress)     Learning Progress Summary           Patient Eager, E,D, NR by CT at 7/17/2021 1105    Acceptance, D,E, NR,NL by CS at 7/16/2021 1330    Comment: Educated patient on posterior hip precautions, importance of PT, importance of mobility, and plan for POC.   Family Eager, E,D, NR by CT at 7/17/2021 1105                   Point: Body mechanics (In Progress)     Learning Progress Summary           Patient Eager, E,D, NR by CT at 7/17/2021 1105    Acceptance, D,E, NR,NL by CS at 7/16/2021 1330    Comment: Educated patient on posterior hip precautions, importance of PT,  importance of mobility, and plan for POC.   Family Eager, E,D, NR by CT at 7/17/2021 1105                   Point: Precautions (In Progress)     Learning Progress Summary           Patient Eager, E,D, NR by CT at 7/17/2021 1105    Acceptance, D,E, NR,NL by  at 7/16/2021 1330    Comment: Educated patient on posterior hip precautions, importance of PT, importance of mobility, and plan for POC.   Family Eager, E,D, NR by CT at 7/17/2021 1105                               User Key     Initials Effective Dates Name Provider Type Discipline    CT 06/16/21 -  Eduard Peña, PT Physical Therapist PT    CS 06/16/21 -  Kaila Alfonso, ELIUD Physical Therapist PT              PT Recommendation and Plan     Plan of Care Reviewed With: patient, caregiver, family  Progress: improving  Outcome Summary: 100 ft gait with RW OOB in chair.  Tranfers w supervision to standby assist.     Time Calculation:   PT Charges     Row Name 07/17/21 1108             Time Calculation    Start Time  1020  -CT      PT Received On  07/17/21  -CT         Time Calculation- PT    Total Timed Code Minutes- PT  28 minute(s)  -CT        User Key  (r) = Recorded By, (t) = Taken By, (c) = Cosigned By    Initials Name Provider Type    CT Eduard Peña, PT Physical Therapist        Therapy Charges for Today     Code Description Service Date Service Provider Modifiers Qty    [id number] HC GAIT TRAINING EA 15 MIN 7/17/2021 Eduard Peña, PT GP 2          PT G-Codes  Outcome Measure Options: AM-PAC 6 Clicks Basic Mobility (PT)  AM-PAC 6 Clicks Score (PT): 19  AM-PAC 6 Clicks Score (OT): 12    Eduard Peña PT  7/17/2021

## 2021-07-17 NOTE — PROGRESS NOTES
[facility] Medicine Services  PROGRESS NOTE    Patient Name: Luh Mckeon  : 1930  MRN: [id number]    Date of Admission: 7/15/2021  Primary Care Physician: Schuyler Garcia MD    Subjective   Subjective     CC:  Hospital follow up for fall, confusion     HPI:      Up in chair this morning. Worked with PT and was able to ambulate in the mccrary with assist x 1 today. Daughter at bedside. Patient required soft wrist restraints last night but has done well this morning since daughter arrived.  Nursing staff report multiple episodes of urinary retention and the need for I/O cath.      ROS:  Patient confused but able to answer most questions appropriately.   Denies pain, no dysuria or frequency.         Objective   Objective     Vital Signs:   Temp:  [98.5 °F (36.9 °C)-99.4 °F (37.4 °C)] 99.1 °F (37.3 °C)  Heart Rate:  [78-83] 83  Resp:  [16-18] 16  BP: (120-150)/(54-85) 150/85  Flow (L/min):  [2] 2     Physical Exam:  Constitutional: No acute distress, awake, alert, resting comfortably in chair, daughter at bedside.   HENT: NCAT, mucous membranes moist  Respiratory: Clear to auscultation bilaterally, respiratory effort normal on room air  Cardiovascular: RRR, no murmurs, rubs, or gallops  Gastrointestinal: Positive bowel sounds, soft, nontender, nondistended  Musculoskeletal: No bilateral ankle edema noted to exposed skin   Psychiatric: Appropriate affect, cooperative  Neurologic: Oriented x 2, thinks it is 1962, strength symmetric in all extremities, Cranial Nerves grossly intact to confrontation, speech clear  Skin: No rashes noted to exposed loose fragile skin       Results Reviewed:  LAB RESULTS:      Lab 21  0726 21  0404 07/15/21  0406 07/15/21  0257   WBC 11.55*  --   --  8.29   HEMOGLOBIN 11.9* 11.7*  --  14.4   HEMATOCRIT 36.6 36.3  --  43.9   PLATELETS 198  --   --  256   NEUTROS ABS  --   --   --  5.84   IMMATURE GRANS (ABS)  --   --   --  0.03   LYMPHS ABS  --    --   --  1.53   MONOS ABS  --   --   --  0.80   EOS ABS  --   --   --  0.06   MCV 96.6  --   --  93.8   PROTIME  --   --  12.1* >120.0*         Lab 07/17/21  0726 07/16/21  0404 07/15/21  0257   SODIUM 134* 132* 136   POTASSIUM 4.0 4.4 4.2   CHLORIDE 103 101 100   CO2 21.0* 24.0 23.0   ANION GAP 10.0 7.0 13.0   BUN 7* 9 12   CREATININE 0.46* 0.64 0.76   GLUCOSE 98 110* 109*   CALCIUM 8.0* 7.6* 9.0         Lab 07/15/21  0257   TOTAL PROTEIN 7.0   ALBUMIN 4.00   GLOBULIN 3.0   ALT (SGPT) 11   AST (SGOT) 20   BILIRUBIN 0.4   ALK PHOS 75         Lab 07/15/21  0406 07/15/21  0257   PROTIME 12.1* >120.0*   INR 1.0 >10.00*             Lab 07/15/21  0621   ABO TYPING A   RH TYPING Positive   ANTIBODY SCREEN Negative         Brief Urine Lab Results  (Last result in the past 365 days)      Color   Clarity   Blood   Leuk Est   Nitrite   Protein   CREAT   Urine HCG        07/17/21 1030 Yellow Clear Trace Moderate (2+) Negative Negative               Microbiology Results Abnormal     Procedure Component Value - Date/Time    COVID PRE-OP / PRE-PROCEDURE SCREENING ORDER (NO ISOLATION) - Swab, Nasopharynx [[id number]]  (Normal) Collected: 07/15/21 0426    Lab Status: Final result Specimen: Swab from Nasopharynx Updated: 07/15/21 0508    Narrative:      The following orders were created for panel order COVID PRE-OP / PRE-PROCEDURE SCREENING ORDER (NO ISOLATION) - Swab, Nasopharynx.  Procedure                               Abnormality         Status                     ---------                               -----------         ------                     COVID-19 and FLU A/B PCR...[[id number]]  Normal              Final result                 Please view results for these tests on the individual orders.    COVID-19 and FLU A/B PCR - Swab, Nasopharynx [[id number]]  (Normal) Collected: 07/15/21 0426    Lab Status: Final result Specimen: Swab from Nasopharynx Updated: 07/15/21 0508     COVID19 Not Detected     Influenza A PCR Not  Detected     Influenza B PCR Not Detected    Narrative:      Fact sheet for providers: https://www.fda.gov/media/770238/download    Fact sheet for patients: https://www.fda.gov/media/811966/download    Test performed by PCR.          Adult Transthoracic Echo Complete w/ Color, Spectral and Contrast if necessary per protocol    Result Date: 7/16/2021  · Estimated left ventricular EF = 60% Left ventricular systolic function is normal. · Left ventricular wall thickness is consistent with borderline concentric hypertrophy. · Left ventricular diastolic dysfunction is noted. · Moderate to severe aortic valve stenosis is present. · Aortic valve maximum pressure gradient is 59.2 mmHg. Aortic valve mean pressure gradient is 37.5 mmHg. · Moderate mitral valve regurgitation is present.      XR Pelvis 1 or 2 View    Result Date: 7/15/2021  CR Pelvis 1 or 2 Vws INDICATION: Intraoperative exam for right hip arthroplasty. COMPARISON: 7/15/2021 FINDINGS: AP view(s) of the pelvis.  There is artifact from operative hardware. The femoral component of a right hip arthroplasty has been placed and is grossly properly aligned. Please see the operative report.  Signer Name: Oscar Bajwa MD  Signed: 7/15/2021 10:46 PM  Workstation Name: [facility]    XR Pelvis 1 or 2 View    Result Date: 7/15/2021  CR Pelvis 1 or 2 Vws INDICATION: Status post right hip arthroplasty. COMPARISON: 7/15/2021 FINDINGS: AP view(s) of the pelvis.  There has been interval right hip arthroplasty. Hardware shows expected alignment with no complications. Soft tissue drain is present. There is degenerative disease in the left hip. The iliac crests are not included in the field-of-view.     Impression: Satisfactory appearance of a new right hip arthroplasty. Signer Name: Oscar Bajwa MD  Signed: 7/15/2021 10:45 PM  Workstation Name: [facility]  Radiology [facility]      Results for orders placed during the  hospital encounter of 07/15/21    Adult Transthoracic Echo Complete w/ Color, Spectral and Contrast if necessary per protocol    Interpretation Summary  · Estimated left ventricular EF = 60% Left ventricular systolic function is normal.  · Left ventricular wall thickness is consistent with borderline concentric hypertrophy.  · Left ventricular diastolic dysfunction is noted.  · Moderate to severe aortic valve stenosis is present.  · Aortic valve maximum pressure gradient is 59.2 mmHg. Aortic valve mean pressure gradient is 37.5 mmHg.  · Moderate mitral valve regurgitation is present.      I have reviewed the medications:  Scheduled Meds:acetaminophen, 1,000 mg, Oral, Q8H  aspirin, 81 mg, Oral, Q12H  cefdinir, 300 mg, Oral, Q12H  losartan, 25 mg, Oral, Q24H  meloxicam, 15 mg, Oral, Daily  rosuvastatin, 5 mg, Oral, Daily  sodium chloride, 10 mL, Intravenous, Q12H      Continuous Infusions:ropivacaine (NAROPIN) 0.2% peripheral nerve cath (moog), 8 mL/hr, Last Rate: 8 mL/hr (07/16/21 1956)  sodium chloride, 100 mL/hr, Last Rate: 100 mL/hr (07/16/21 1254)      PRN Meds:.haloperidol lactate  •  HYDROmorphone **AND** naloxone  •  metaxalone  •  Morphine **AND** naloxone  •  ondansetron **OR** ondansetron  •  oxyCODONE  •  oxyCODONE  •  [COMPLETED] Insert peripheral IV **AND** sodium chloride  •  sodium chloride    Assessment/Plan   Assessment & Plan     Active Hospital Problems    Diagnosis  POA   • **Closed fracture of neck of right femur (CMS/HCC) [S72.001A]  Unknown   • Femoral neck fracture (CMS/HCC) [S72.009A]  Yes   • Fall [W19.XXXA]  Yes   • 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        Brief Hospital Course to date:  Luh Mckeon is a 89 y.o. female with history of HTN, HLD, osteoporosis and urinary frequency present after a fall. Imaging revealed  a right femoral neck fracture.    Right femoral neck fracture  - hemiarthroplasty 7/15 Dr Olsen  - pain control  - PT/OT  - aspirin for DVT prophylaxis per surgeon's note  - to the [place] once arrangements made.     Acute urinary retention  UTI  -requiring I/O cath at this point and not voiding on her own.   -will hold home myrbetriq while having retention.   -cath UA consistent with UTI.  Received 2 doses of cefazolin yesterday for perioperative care.    -will start cefdinir BID today    Confusion-appears improved this morning   - may be related to UTI.  Will treat urine and monitor.   - safety measures and reorientation  - prioritize good sleep  - restraints removed this morning.  Will monitor.     HTN    HLD  - crestor    Osteoporosis      DVT prophylaxis: mechanical/ASA  Mechanical DVT prophylaxis orders are present.       Disposition: I expect the patient to be discharged to rehab when bed available.      CODE STATUS:   There are no questions and answers to display.       Shanna Bolton, APRN  07/17/21

## 2021-07-17 NOTE — PLAN OF CARE
Goal Outcome Evaluation:               Pt is alert and confused. Very calm and cooperative. Soft wrist restraint was D/C at midnight, but pt became restless and pulled out all lines and nerve block. New wrist order was given by MARY JO VALENCIA. I and out cath twice during the shift. Denied pain and discomfort.

## 2021-07-17 NOTE — PROGRESS NOTES
"  SUBJECTIVE  Patient resting comfortably.  Pain well controlled.  Required restraints yesterday and overnight due to confusion and combative behavior.    PHYSICAL THERAPY PROGRESS  Outcome Summary: Pt only oriented to self. While preparing to perform bed mobility and sit patient EOB, patient became agitated. Called nursing, unable to perform EOB or OOB activity at this time. Spent time educating patient's family on posterior hip precautions and expectations for patient's PT. Recommend IP rehab at discharge. (21 1330)     OBJECTIVE  Temp (24hrs), Av.8 °F (37.1 °C), Min:98 °F (36.7 °C), Max:99.4 °F (37.4 °C)    Blood pressure 150/85, pulse 83, temperature 99.1 °F (37.3 °C), temperature source Oral, resp. rate 16, height 162.6 cm (64.02\"), weight 61.2 kg (135 lb), SpO2 92 %.    Lab Results (last 24 hours)     ** No results found for the last 24 hours. **            PHYSICAL EXAM  Right lower extremity: Dressing peeled off of incision.  Incision is clean dry and intact without drainage.  Leg lengths symmetric.  Intact EHL, FHL, tibialis anterior, and gastrocsoleus. Sensation intact to light touch to deep peroneal, superficial peroneal, sural, saphenous, tibial nerves. 2+ palpable DP and PT pulses.         Closed fracture of neck of right femur (CMS/HCC)    Hyperlipidemia LDL goal <100    Benign essential hypertension    Disorder of mitral and aortic valves    Femoral neck fracture (CMS/HCC)    Fall      PLAN / DISPOSITION:  2 Day Post-Op right hip hemiarthroplasty    Protected weight bearing as tolerated right lower extremity, posterior precautions x6 weeks  Pain control  PT/OT for post op mobilization and medical equipment needs   Replace dressing with Covaderm.  SCD's bilateral lower extremities   Aspirin for DVT prophylaxis   Social work for discharge planning.  Okay to discharge from orthopedic standpoint.  Dressing to remain in place for 7 days. May remove on POD#7. If no drainage, may shower on POD#10. " No submerging wound in water. If drainage is noted, sterile dressing should be placed and wound checked daily. No showering until wound has remained dry for 72 consecutive hours.   Follow up in 3 weeks for re-assessment.      Future Appointments   Date Time Provider Department Center   8/6/2021  9:30 AM Adam Olsen MD [facility]   9/3/2021  3:20 PM [facility]   9/30/2021 10:30 AM Schuyler Garcia MD [facility]       Adam Olsen MD  07/17/21  08:00 EDT

## 2021-07-17 NOTE — PLAN OF CARE
Goal Outcome Evaluation:      Pt had wrist restraints d/c'd at 10am. Has improved cognitively through the day-family at bedside. Has not pulled at any lines-anesthesia notified that she pulled her arrow pump line in the night; pain managed with scheduled meds. Did well with PT. Up in chair for several hours. Starting to void spontaneously but did have to straight cath x2 during the shift.

## 2021-07-18 PROCEDURE — 97116 GAIT TRAINING THERAPY: CPT

## 2021-07-18 PROCEDURE — 99232 SBSQ HOSP IP/OBS MODERATE 35: CPT | Performed by: NURSE PRACTITIONER

## 2021-07-18 RX ORDER — BISACODYL 5 MG/1
5 TABLET, DELAYED RELEASE ORAL DAILY PRN
Status: DISCONTINUED | OUTPATIENT
Start: 2021-07-18 | End: 2021-07-19 | Stop reason: HOSPADM

## 2021-07-18 RX ORDER — POLYETHYLENE GLYCOL 3350 17 G/17G
17 POWDER, FOR SOLUTION ORAL DAILY PRN
Status: DISCONTINUED | OUTPATIENT
Start: 2021-07-18 | End: 2021-07-19 | Stop reason: HOSPADM

## 2021-07-18 RX ORDER — BISACODYL 10 MG
10 SUPPOSITORY, RECTAL RECTAL DAILY PRN
Status: DISCONTINUED | OUTPATIENT
Start: 2021-07-18 | End: 2021-07-19 | Stop reason: HOSPADM

## 2021-07-18 RX ORDER — AMOXICILLIN 250 MG
2 CAPSULE ORAL 2 TIMES DAILY
Status: DISCONTINUED | OUTPATIENT
Start: 2021-07-18 | End: 2021-07-19 | Stop reason: HOSPADM

## 2021-07-18 RX ORDER — TAMSULOSIN HYDROCHLORIDE 0.4 MG/1
0.4 CAPSULE ORAL DAILY
Status: DISCONTINUED | OUTPATIENT
Start: 2021-07-18 | End: 2021-07-19 | Stop reason: HOSPADM

## 2021-07-18 RX ADMIN — BISACODYL 5 MG: 5 TABLET, COATED ORAL at 12:50

## 2021-07-18 RX ADMIN — ACETAMINOPHEN 1000 MG: 500 TABLET, FILM COATED ORAL at 00:20

## 2021-07-18 RX ADMIN — CEFDINIR 300 MG: 300 CAPSULE ORAL at 08:42

## 2021-07-18 RX ADMIN — ACETAMINOPHEN 1000 MG: 500 TABLET, FILM COATED ORAL at 23:41

## 2021-07-18 RX ADMIN — ACETAMINOPHEN 1000 MG: 500 TABLET, FILM COATED ORAL at 16:59

## 2021-07-18 RX ADMIN — MELOXICAM 15 MG: 7.5 TABLET ORAL at 08:41

## 2021-07-18 RX ADMIN — CEFDINIR 300 MG: 300 CAPSULE ORAL at 20:34

## 2021-07-18 RX ADMIN — ROSUVASTATIN CALCIUM 5 MG: 10 TABLET, COATED ORAL at 08:41

## 2021-07-18 RX ADMIN — ASPIRIN 81 MG: 81 TABLET, COATED ORAL at 08:40

## 2021-07-18 RX ADMIN — DOCUSATE SODIUM 50MG AND SENNOSIDES 8.6MG 2 TABLET: 8.6; 5 TABLET, FILM COATED ORAL at 08:40

## 2021-07-18 RX ADMIN — ASPIRIN 81 MG: 81 TABLET, COATED ORAL at 20:34

## 2021-07-18 RX ADMIN — DOCUSATE SODIUM 50MG AND SENNOSIDES 8.6MG 2 TABLET: 8.6; 5 TABLET, FILM COATED ORAL at 20:35

## 2021-07-18 RX ADMIN — TAMSULOSIN HYDROCHLORIDE 0.4 MG: 0.4 CAPSULE ORAL at 11:49

## 2021-07-18 RX ADMIN — ACETAMINOPHEN 1000 MG: 500 TABLET, FILM COATED ORAL at 08:41

## 2021-07-18 RX ADMIN — SODIUM CHLORIDE, PRESERVATIVE FREE 10 ML: 5 INJECTION INTRAVENOUS at 08:48

## 2021-07-18 RX ADMIN — LOSARTAN POTASSIUM 25 MG: 25 TABLET, FILM COATED ORAL at 08:42

## 2021-07-18 RX ADMIN — POLYETHYLENE GLYCOL 3350 17 G: 17 POWDER, FOR SOLUTION ORAL at 12:50

## 2021-07-18 NOTE — PLAN OF CARE
Problem: Fall Injury Risk  Goal: Absence of Fall and Fall-Related Injury  Intervention: Identify and Manage Contributors to Fall Injury Risk  Recent Flowsheet Documentation  Taken 7/18/2021 1200 by Carito Lau RN  Self-Care Promotion:   BADL personal objects within reach   independence encouraged   BADL personal routines maintained   meal setup provided   safe use of adaptive equipment encouraged  Taken 7/18/2021 1000 by Carito Lau RN  Self-Care Promotion:   independence encouraged   BADL personal objects within reach   BADL personal routines maintained   safe use of adaptive equipment encouraged   meal setup provided  Taken 7/18/2021 0800 by Carito Lau, RN  Medication Review/Management: medications reviewed  Self-Care Promotion:   independence encouraged   BADL personal objects within reach   BADL personal routines maintained   meal setup provided   safe use of adaptive equipment encouraged  Intervention: Promote Injury-Free Environment  Recent Flowsheet Documentation  Taken 7/18/2021 1200 by Carito Lau RN  Safety Promotion/Fall Prevention:   activity supervised   assistive device/personal items within reach   clutter free environment maintained   fall prevention program maintained   gait belt   mobility aid in reach   nonskid shoes/slippers when out of bed   room organization consistent   safety round/check completed  Taken 7/18/2021 1000 by Carito Lau RN  Safety Promotion/Fall Prevention:   activity supervised   assistive device/personal items within reach   clutter free environment maintained   fall prevention program maintained   gait belt   mobility aid in reach   nonskid shoes/slippers when out of bed   room organization consistent   safety round/check completed  Taken 7/18/2021 0800 by Carito Lau RN  Safety Promotion/Fall Prevention:   activity supervised   assistive device/personal items within reach   clutter free environment maintained   fall prevention program maintained   nonskid  shoes/slippers when out of bed   room organization consistent   safety round/check completed   Goal Outcome Evaluation:

## 2021-07-18 NOTE — PLAN OF CARE
Goal Outcome Evaluation:  Plan of Care Reviewed With: patient, caregiver, family        Progress: improving  Outcome Summary: 160 ft gait with RW and CG. Nneed min assist to get out of soft bed. OOB in chair

## 2021-07-18 NOTE — THERAPY TREATMENT NOTE
Patient Name: Luh Mckeon  : 1930    MRN: [id number]                              Today's Date: 2021       Admit Date: 7/15/2021    Visit Dx:     ICD-10-CM ICD-9-CM   1. Closed fracture of neck of right femur, initial encounter (CMS/HCC)  S72.001A 820.8   2. Closed right hip fracture, initial encounter (CMS/HCC)  S72.001A 820.8     Patient Active Problem List   Diagnosis   • Generalized anxiety disorder   • Unsteady gait   • Risk for falls   • Hyperlipidemia LDL goal <100   • Benign essential hypertension   • Memory loss   • Urinary incontinence in female   • Candida onychomycosis   • AR (allergic rhinitis)   • Overweight (BMI 25.0-29.9)   • Chest pain   • Cramp in limb   • Disorder of mitral and aortic valves   • Disorder of skeletal muscle   • Diverticular disease of colon   • Dysphagia   • External hemorrhoids   •     • Hand joint pain   • Insomnia   • Knee pain   • Menopausal and postmenopausal disorder   • Osteoporosis   • Shoulder pain   • Vitamin B deficiency   • Medicare annual wellness visit, subsequent   • Dizziness   • Urgency of urination   • Fatigue   • Femoral neck fracture (CMS/HCC)   • Fall   • Closed fracture of neck of right femur (CMS/[facility])     Past Medical History:   Diagnosis Date   • Ankle instability    • Chronic left shoulder pain 2018   • Disorder of tendon of shoulder region, left    • Dysphagia    • Esophagitis    • Femoral neck fracture (CMS/[facility]) 7/15/2021   • Finger fracture, right     Dr Chandler casting and physical therapy   • Hemorrhoids    • Hypertension    • Localized, primary osteoarthritis of left shoulder region    • Right shoulder pain    • Shoulder joint replacement status     Right   • Stroke (CMS/[facility])     CVA     Past Surgical History:   Procedure Laterality Date   • APPENDECTOMY      Daughter denies   • CATARACT EXTRACTION, BILATERAL         • ENDOSCOPY  2009    with biopsy/esophageal ring dilation   • HEMORRHOIDECTOMY      lanced    • HIP  HEMIARTHROPLASTY Right 7/15/2021    Procedure: HIP HEMIARTHROPLASTY;  Surgeon: Adam Olsen MD;  [facility];  Service: Orthopedics;  Laterality: Right;   • HYSTERECTOMY      partial    • OOPHORECTOMY     • SHOULDER SURGERY Right     replacement   • SHOULDER SURGERY      Arthroscopy of shoulder:Right   • TONSILLECTOMY       General Information     Row Name 07/18/21 0966          Physical Therapy Time and Intention    Document Type  therapy note (daily note)  -CT     Mode of Treatment  physical therapy  -CT     Row Name 07/18/21 0947          General Information    Patient Profile Reviewed  yes  -CT     Existing Precautions/Restrictions  hip, posterior  -CT       User Key  (r) = Recorded By, (t) = Taken By, (c) = Cosigned By    Initials Name Provider Type    CT Eduard Peña, PT Physical Therapist        Mobility     Row Name 07/18/21 0948          Bed Mobility    Bed Mobility  bed mobility (all) activities  -CT     All Activities, [place] (Bed Mobility)  minimum assist (75% patient effort)  -CT     Rolling Left [place] (Bed Mobility)  minimum assist (75% patient effort)  -CT     Assistive Device (Bed Mobility)  draw sheet  -CT     Row Name 07/18/21 0948          Bed-Chair Transfer    Bed-Chair [place] (Transfers)  minimum assist (75% patient effort)  -CT     Assistive Device (Bed-Chair Transfers)  walker, front-wheeled  -CT     Row Name 07/18/21 0948          Sit-Stand Transfer    Sit-Stand [place] (Transfers)  supervision;contact guard  -CT     Assistive Device (Sit-Stand Transfers)  walker, front-wheeled  -CT     Row Name 07/18/21 0946          Gait/Stairs (Locomotion)    [place] Level (Gait)  contact guard  -CT     Assistive Device (Gait)  walker, front-wheeled  -CT     Distance in Feet (Gait)  x 160 ft w RW and CG.  OOB in chair.  Needs min assist to get out of soft bed and to avoid extreme IR of RLE with transfer.  -CT     Deviations/Abnormal Patterns (Gait)   gait speed decreased  -CT     Row Name 07/18/21 0948          Mobility    Extremity Weight-bearing Status  right lower extremity  -CT     Right Lower Extremity (Weight-bearing Status)  weight-bearing as tolerated (WBAT)  -CT       User Key  (r) = Recorded By, (t) = Taken By, (c) = Cosigned By    Initials Name Provider Type    CT Eduard Peña PT Physical Therapist        Obj/Interventions     Row Name 07/18/21 0951          Range of Motion Comprehensive    General Range of Motion  lower extremity range of motion deficits identified  -CT     Row Name 07/18/21 0951          Motor Skills    Motor Skills  coordination;functional endurance  -CT     Row Name 07/18/21 0951          Balance    Balance Assessment  sitting static balance;sitting dynamic balance;standing static balance;standing dynamic balance  -CT     Static Sitting Balance  WNL  -CT     Dynamic Sitting Balance  sitting in chair;WNL  -CT     Static Standing Balance  mild impairment  -CT     Dynamic Standing Balance  mild impairment  -CT     Balance Interventions  sitting;standing;weight shifting activity;tandem gait;narrowed base of support  -CT       User Key  (r) = Recorded By, (t) = Taken By, (c) = Cosigned By    Initials Name Provider Type    CT Eduard Peña, ELIUD Physical Therapist        Goals/Plan    No documentation.       Clinical Impression     Row Name 07/18/21 0952          Pain    Additional Documentation  Pain Scale: Numbers Pre/Post-Treatment (Group)  -CT     [facility] Name 07/18/21 0952          Pain Scale: Numbers Pre/Post-Treatment    Pretreatment Pain Rating  0/10 - no pain  -CT     Posttreatment Pain Rating  0/10 - no pain  -CT     Pain Location - Side  Right  -CT     Pain Location  hip  -CT     Pain Intervention(s)  Medication (See MAR);Ambulation/increased activity;Elevated  -CT     Row Name 07/18/21 0958          Plan of Care Review    Progress  improving  -CT     Outcome Summary  160 ft gait with RW and CG. Nneed min  assist to get out of soft bed. OOB in chair  -CT     Row Name 07/18/21 0952          Therapy Assessment/Plan (PT)    Rehab Potential (PT)  good, to achieve stated therapy goals  -CT     Criteria for Skilled Interventions Met (PT)  yes  -CT     Row Name 07/18/21 0952          Positioning and Restraints    Pre-Treatment Position  in bed  -CT     Post Treatment Position  chair  -CT     In Chair  notified nsg;reclined;sitting;call light within reach;exit alarm on;with family/caregiver;legs elevated  -CT       User Key  (r) = Recorded By, (t) = Taken By, (c) = Cosigned By    Initials Name Provider Type    CT Eduard Peña, PT Physical Therapist        Outcome Measures     Row Name 07/18/21 0953 07/18/21 0800       How much help from another person do you currently need...    Turning from your back to your side while in flat bed without using bedrails?  3  -CT  3  -LC    Moving from lying on back to sitting on the side of a flat bed without bedrails?  3  -CT  3  -LC    Moving to and from a bed to a chair (including a wheelchair)?  3  -CT  4  -LC    Standing up from a chair using your arms (e.g., wheelchair, bedside chair)?  3  -CT  4  -LC    Climbing 3-5 steps with a railing?  2  -CT  2  -LC    To walk in hospital room?  3  -CT  3  -LC    AM-PAC 6 Clicks Score (PT)  17  -CT  19  -LC      User Key  (r) = Recorded By, (t) = Taken By, (c) = Cosigned By    Initials Name Provider Type    CT Eduard Peña, ELIUD Physical Therapist    Carito Carroll RN Registered Nurse        Physical Therapy Education                 Title: PT OT SLP Therapies (In Progress)     Topic: Physical Therapy (In Progress)     Point: Mobility training (In Progress)     Learning Progress Summary           Patient Eager, E,D, NR by CT at 7/18/2021 0953    Comment: Memory issues impair retenion of information    Eager, E,D, NR by CT at 7/17/2021 1105    Acceptance, D,E, NR,NL by  at 7/16/2021 1330    Comment: Educated patient on  posterior hip precautions, importance of PT, importance of mobility, and plan for POC.   Family Eager, E,D, NR by CT at 7/18/2021 0953    Comment: Memory issues impair retenion of information    Eager, E,D, NR by CT at 7/17/2021 1105                   Point: Home exercise program (In Progress)     Learning Progress Summary           Patient Eager, E,D, NR by CT at 7/18/2021 0953    Comment: Memory issues impair retenion of information    Eager, E,D, NR by CT at 7/17/2021 1105    Acceptance, D,E, NR,NL by CS at 7/16/2021 1330    Comment: Educated patient on posterior hip precautions, importance of PT, importance of mobility, and plan for POC.   Family Eager, E,D, NR by CT at 7/18/2021 0953    Comment: Memory issues impair retenion of information    Eager, E,D, NR by CT at 7/17/2021 1105                   Point: Body mechanics (In Progress)     Learning Progress Summary           Patient Eager, E,D, NR by CT at 7/18/2021 0953    Comment: Memory issues impair retenion of information    Eager, E,D, NR by CT at 7/17/2021 1105    Acceptance, D,E, NR,NL by CS at 7/16/2021 1330    Comment: Educated patient on posterior hip precautions, importance of PT, importance of mobility, and plan for POC.   Family Eager, E,D, NR by CT at 7/18/2021 0953    Comment: Memory issues impair retenion of information    Eager, E,D, NR by CT at 7/17/2021 1105                   Point: Precautions (In Progress)     Learning Progress Summary           Patient Eager, E,D, NR by CT at 7/18/2021 0953    Comment: Memory issues impair retenion of information    Eager, E,D, NR by CT at 7/17/2021 1105    Acceptance, D,E, NR,NL by CS at 7/16/2021 1330    Comment: Educated patient on posterior hip precautions, importance of PT, importance of mobility, and plan for POC.   Family Eager, E,D, NR by CT at 7/18/2021 0953    Comment: Memory issues impair retenion of information    Eager, E,D, NR by CT at 7/17/2021 1105                               User Key      Initials Effective Dates Name Provider Type Discipline    CT 06/16/21 -  Eduard Peña, PT Physical Therapist PT    CS 06/16/21 -  Kaila Alfonso PT Physical Therapist PT              PT Recommendation and Plan     Plan of Care Reviewed With: patient, caregiver, family  Progress: improving  Outcome Summary: 160 ft gait with RW and CG. Nneed min assist to get out of soft bed. OOB in chair     Time Calculation:   PT Charges     Row Name 07/18/21 0959             Time Calculation    Start Time  0920  -CT      PT Received On  07/18/21  -CT         Time Calculation- PT    Total Timed Code Minutes- PT  30 minute(s)  -CT        User Key  (r) = Recorded By, (t) = Taken By, (c) = Cosigned By    Initials Name Provider Type    CT Eduard Peña, PT Physical Therapist        Therapy Charges for Today     Code Description Service Date Service Provider Modifiers Qty    11596903008 HC GAIT TRAINING EA 15 MIN 7/17/2021 Eduard Peña, PT GP 2    12013298860 HC GAIT TRAINING EA 15 MIN 7/18/2021 Eduard Peña, PT GP 2          PT G-Codes  Outcome Measure Options: AM-PAC 6 Clicks Basic Mobility (PT)  AM-PAC 6 Clicks Score (PT): 17  AM-PAC 6 Clicks Score (OT): 12    Eduard Peña PT  7/18/2021

## 2021-07-18 NOTE — PLAN OF CARE
Goal Outcome Evaluation:               Pt is alert and oriented X 4. VSS. Pain controlled with po tylenol. I & O cath during the shift.

## 2021-07-18 NOTE — PROGRESS NOTES
[facility] Medicine Services  PROGRESS NOTE    Patient Name: Luh Mckeon  : 1930  MRN: [id number]    Date of Admission: 7/15/2021  Primary Care Physician: Schuyler Garcia MD    Subjective   Subjective     CC:  Hospital follow up for fall, confusion     HPI:    Patient is up in the chair. She has worked with PT today and is feeling well. She was able to ambulate 160 ft gait with rolling walker x1. Patient has been able to tolerate restraints off. She is pleasant in conversation.Patient states she was able to sleep well once she went to sleep last night. She has also been able to rest in the chair today. Grandson is at bedside and asked if OT will see the patient, verified that OT has been ordered. Patient has no new concerns at this time.   .      ROS:  Patient has some confusion but is able to answer some questions appropriately.   Gen- No fevers, chills.   Resp- No cough, dyspnea,  Patient denies pain, no dysuria, or frequency.             Objective   Objective     Vital Signs:   Temp:  [98.1 °F (36.7 °C)-98.5 °F (36.9 °C)] 98.2 °F (36.8 °C)  Heart Rate:  [70-80] 73  Resp:  [16-18] 16  BP: (106-115)/(62-80) 112/80  Flow (L/min):  [2] 2     Physical Exam:  Constitutional: No acute distress, awake, alert, sitting upright in chair, grandson at bedside.   HENT: NCAT, mucous membranes moist  Respiratory: Clear to auscultation bilaterally, respiratory effort normal on room air  Cardiovascular: RRR, no murmurs, rubs, or gallops  Gastrointestinal: + bowel sounds, soft, nontender, nondistended  Musculoskeletal: Patient has some focal edema to BLE ankles, but states this is how they normally look. Per patient her ankles feel the same at her baseline.   Psychiatric: Appropriate affect, cooperative  Neurologic: Oriented x 3, strength symmetric in all extremities, Cranial Nerves grossly intact to confrontation, speech clear  Skin: No rashes noted to exposed,  loose fragile skin.  Dressing to right hip is CDI.     No significant change from assessment from 07/17.      Results Reviewed:  LAB RESULTS:      Lab 07/17/21  0726 07/16/21  0404 07/15/21  0406 07/15/21  0257   WBC 11.55*  --   --  8.29   HEMOGLOBIN 11.9* 11.7*  --  14.4   HEMATOCRIT 36.6 36.3  --  43.9   PLATELETS 198  --   --  256   NEUTROS ABS  --   --   --  5.84   IMMATURE GRANS (ABS)  --   --   --  0.03   LYMPHS ABS  --   --   --  1.53   MONOS ABS  --   --   --  0.80   EOS ABS  --   --   --  0.06   MCV 96.6  --   --  93.8   PROTIME  --   --  12.1* >120.0*         Lab 07/17/21  0726 07/16/21  0404 07/15/21  0257   SODIUM 134* 132* 136   POTASSIUM 4.0 4.4 4.2   CHLORIDE 103 101 100   CO2 21.0* 24.0 23.0   ANION GAP 10.0 7.0 13.0   BUN 7* 9 12   CREATININE 0.46* 0.64 0.76   GLUCOSE 98 110* 109*   CALCIUM 8.0* 7.6* 9.0         Lab 07/15/21  0257   TOTAL PROTEIN 7.0   ALBUMIN 4.00   GLOBULIN 3.0   ALT (SGPT) 11   AST (SGOT) 20   BILIRUBIN 0.4   ALK PHOS 75         Lab 07/15/21  0406 07/15/21  0257   PROTIME 12.1* >120.0*   INR 1.0 >10.00*             Lab 07/15/21  0621   ABO TYPING A   RH TYPING Positive   ANTIBODY SCREEN Negative         Brief Urine Lab Results  (Last result in the past 365 days)      Color   Clarity   Blood   Leuk Est   Nitrite   Protein   CREAT   Urine HCG        07/17/21 1030 Yellow Clear Trace Moderate (2+) Negative Negative               Microbiology Results Abnormal     Procedure Component Value - Date/Time    Urine Culture - Urine, Urine, Catheter In/Out [[id number]]  (Normal) Collected: 07/17/21 1030    Lab Status: Final result Specimen: Urine, Catheter In/Out Updated: 07/18/21 1214    Narrative:      Growth present not clinically significant for workup. No further testing will be performed.     COVID PRE-OP / PRE-PROCEDURE SCREENING ORDER (NO ISOLATION) - Swab, Nasopharynx [[id number]]  (Normal) Collected: 07/15/21 4653    Lab Status: Final result Specimen: Swab from Nasopharynx Updated: 07/15/21 0766     Narrative:      The following orders were created for panel order COVID PRE-OP / PRE-PROCEDURE SCREENING ORDER (NO ISOLATION) - Swab, Nasopharynx.  Procedure                               Abnormality         Status                     ---------                               -----------         ------                     COVID-19 and FLU A/B PCR...[[id number]]  Normal              Final result                 Please view results for these tests on the individual orders.    COVID-19 and FLU A/B PCR - Swab, Nasopharynx [[id number]]  (Normal) Collected: 07/15/21 0426    Lab Status: Final result Specimen: Swab from Nasopharynx Updated: 07/15/21 0508     COVID19 Not Detected     Influenza A PCR Not Detected     Influenza B PCR Not Detected    Narrative:      Fact sheet for providers: https://www.fda.gov/media/604605/download    Fact sheet for patients: https://www.fda.gov/media/644160/download    Test performed by PCR.          No radiology results from the last 24 hrs    Results for orders placed during the hospital encounter of 07/15/21    Adult Transthoracic Echo Complete w/ Color, Spectral and Contrast if necessary per protocol    Interpretation Summary  · Estimated left ventricular EF = 60% Left ventricular systolic function is normal.  · Left ventricular wall thickness is consistent with borderline concentric hypertrophy.  · Left ventricular diastolic dysfunction is noted.  · Moderate to severe aortic valve stenosis is present.  · Aortic valve maximum pressure gradient is 59.2 mmHg. Aortic valve mean pressure gradient is 37.5 mmHg.  · Moderate mitral valve regurgitation is present.      I have reviewed the medications:  Scheduled Meds:acetaminophen, 1,000 mg, Oral, Q8H  aspirin, 81 mg, Oral, Q12H  cefdinir, 300 mg, Oral, Q12H  losartan, 25 mg, Oral, Q24H  meloxicam, 15 mg, Oral, Daily  rosuvastatin, 5 mg, Oral, Daily  senna-docusate sodium, 2 tablet, Oral, BID  sodium chloride, 10 mL, Intravenous, Q12H  tamsulosin,  0.4 mg, Oral, Daily      Continuous Infusions:ropivacaine (NAROPIN) 0.2% peripheral nerve cath (moog), 8 mL/hr, Last Rate: 8 mL/hr (07/16/21 1956)  sodium chloride, 100 mL/hr, Last Rate: 100 mL/hr (07/16/21 1254)      PRN Meds:.senna-docusate sodium **AND** polyethylene glycol **AND** bisacodyl **AND** bisacodyl  •  haloperidol lactate  •  HYDROmorphone **AND** naloxone  •  metaxalone  •  Morphine **AND** naloxone  •  ondansetron **OR** ondansetron  •  oxyCODONE  •  oxyCODONE  •  [COMPLETED] Insert peripheral IV **AND** sodium chloride  •  sodium chloride    Assessment/Plan   Assessment & Plan     Active Hospital Problems    Diagnosis  POA   • **Closed fracture of neck of right femur (CMS/HCC) [S72.001A]  Unknown   • Femoral neck fracture (CMS/HCC) [S72.009A]  Yes   • Fall [W19.XXXA]  Yes   • 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        Brief Hospital Course to date:  Luh Mckeon is a 89 y.o. female with history of HTN, HLD, osteoporosis and urinary frequency present after a fall. Imaging revealed a right femoral neck fracture.    Right femoral neck fracture  - hemiarthroplasty 7/15 Dr Olsen  - pain well controlled  - PT/OT- able to ambulate in [place] with gaitbelt and rolling walker  - aspirin for DVT prophylaxis per surgeon's note  - to the [place] [facility] once arrangements made.     Acute urinary retention  UTI  -requiring I/O cath at times  -will hold home myrbetriq while having retention.   -cath UA consistent with UTI.  Received 2 doses of cefazolin yesterday for perioperative care.    -will start cefdinir BID today  -She has been I/O cath twice by night shift last night. Per nursing she will monitor patient for bladder scan and I/O cath.   -will start Flomax daily x 5 days for urinary retention- patient has been able to urinate 300 ml on  her own today (07/18).     Confusion-appears improved this morning   - may be related to UTI.  Will treat urine and monitor.   - safety measures and reorientation  - prioritize good sleep  -Haldol PRN if needed   - restraints removed 07/17/21 AM. The patient is more oriented, able to follow directions, and more cooperative today.   Will monitor.     HTN  -Losartan 25 mg daily, home medication    HLD  - Crestor 5 mg Daily, home medication     Osteoporosis      DVT prophylaxis: mechanical/ASA  Mechanical DVT prophylaxis orders are present.       Disposition: I expect the patient to be discharged to rehab when bed available.      CODE STATUS:   There are no questions and answers to display.       Shanna Bolton, APRN  07/18/21

## 2021-07-19 VITALS
DIASTOLIC BLOOD PRESSURE: 77 MMHG | HEART RATE: 79 BPM | SYSTOLIC BLOOD PRESSURE: 145 MMHG | WEIGHT: 135 LBS | HEIGHT: 64 IN | OXYGEN SATURATION: 95 % | TEMPERATURE: 98.1 F | BODY MASS INDEX: 23.05 KG/M2 | RESPIRATION RATE: 18 BRPM

## 2021-07-19 PROBLEM — S72.001A CLOSED FRACTURE OF NECK OF RIGHT FEMUR: Status: RESOLVED | Noted: 2021-07-15 | Resolved: 2021-07-19

## 2021-07-19 PROBLEM — S72.009A FEMORAL NECK FRACTURE (HCC): Status: RESOLVED | Noted: 2021-07-15 | Resolved: 2021-07-19
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
## 2021-07-19 LAB
BH BB BLOOD EXPIRATION DATE: NORMAL
BH BB BLOOD TYPE BARCODE: 6200
BH BB DISPENSE STATUS: NORMAL
BH BB PRODUCT CODE: NORMAL
BH BB UNIT NUMBER: NORMAL
CROSSMATCH INTERPRETATION: NORMAL
QT INTERVAL: 432 MS
QTC INTERVAL: 475 MS
UNIT  ABO: NORMAL
UNIT  RH: NORMAL

## 2021-07-19 PROCEDURE — 99239 HOSP IP/OBS DSCHRG MGMT >30: CPT | Performed by: NURSE PRACTITIONER

## 2021-07-19 PROCEDURE — 97110 THERAPEUTIC EXERCISES: CPT

## 2021-07-19 PROCEDURE — 97116 GAIT TRAINING THERAPY: CPT

## 2021-07-19 RX ORDER — CEFDINIR 300 MG/1
300 CAPSULE ORAL EVERY 12 HOURS SCHEDULED
Qty: 3 CAPSULE | Refills: 0
Start: 2021-07-19 | End: 2021-07-21

## 2021-07-19 RX ORDER — ACETAMINOPHEN 500 MG
1000 TABLET ORAL EVERY 8 HOURS
Start: 2021-07-19 | End: 2021-08-22

## 2021-07-19 RX ORDER — MELOXICAM 15 MG/1
15 TABLET ORAL DAILY
Start: 2021-07-20 | End: 2021-08-25 | Stop reason: HOSPADM

## 2021-07-19 RX ORDER — ASPIRIN 81 MG/1
81 TABLET ORAL EVERY 12 HOURS SCHEDULED
Start: 2021-07-19 | End: 2021-08-25 | Stop reason: HOSPADM

## 2021-07-19 RX ADMIN — BISACODYL 5 MG: 5 TABLET, COATED ORAL at 04:29

## 2021-07-19 RX ADMIN — MELOXICAM 15 MG: 7.5 TABLET ORAL at 09:06

## 2021-07-19 RX ADMIN — ASPIRIN 81 MG: 81 TABLET, COATED ORAL at 09:06

## 2021-07-19 RX ADMIN — ROSUVASTATIN CALCIUM 5 MG: 10 TABLET, COATED ORAL at 09:06

## 2021-07-19 RX ADMIN — TAMSULOSIN HYDROCHLORIDE 0.4 MG: 0.4 CAPSULE ORAL at 09:06

## 2021-07-19 RX ADMIN — POLYETHYLENE GLYCOL 3350 17 G: 17 POWDER, FOR SOLUTION ORAL at 04:28

## 2021-07-19 RX ADMIN — CEFDINIR 300 MG: 300 CAPSULE ORAL at 09:06

## 2021-07-19 RX ADMIN — BISACODYL 10 MG: 10 SUPPOSITORY RECTAL at 04:29

## 2021-07-19 RX ADMIN — LOSARTAN POTASSIUM 25 MG: 25 TABLET, FILM COATED ORAL at 09:06

## 2021-07-19 RX ADMIN — ACETAMINOPHEN 1000 MG: 500 TABLET, FILM COATED ORAL at 09:06

## 2021-07-19 RX ADMIN — DOCUSATE SODIUM 50MG AND SENNOSIDES 8.6MG 2 TABLET: 8.6; 5 TABLET, FILM COATED ORAL at 09:06

## 2021-07-19 NOTE — PLAN OF CARE
Goal Outcome Evaluation:  Plan of Care Reviewed With: patient        Progress: improving  Outcome Summary: Patient participated well in therapeutic activities. She has increased her ambulation to 180 feet with walker .

## 2021-07-19 NOTE — THERAPY TREATMENT NOTE
Patient Name: Luh Mckeon  : 1930    MRN: [id number]                              Today's Date: 2021       Admit Date: 7/15/2021    Visit Dx:     ICD-10-CM ICD-9-CM   1. Closed fracture of neck of right femur, initial encounter (CMS/HCC)  S72.001A 820.8   2. Closed right hip fracture, initial encounter (CMS/HCC)  S72.001A 820.8     Patient Active Problem List   Diagnosis   • Generalized anxiety disorder   • Unsteady gait   • Risk for falls   • Hyperlipidemia LDL goal <100   • Benign essential hypertension   • Memory loss   • Urinary incontinence in female   • Candida onychomycosis   • AR (allergic rhinitis)   • Overweight (BMI 25.0-29.9)   • Chest pain   • Cramp in limb   • Disorder of mitral and aortic valves   • Disorder of skeletal muscle   • Diverticular disease of colon   • Dysphagia   • External hemorrhoids   •     • Hand joint pain   • Insomnia   • Knee pain   • Menopausal and postmenopausal disorder   • Osteoporosis   • Shoulder pain   • Vitamin B deficiency   • Medicare annual wellness visit, subsequent   • Dizziness   • Urgency of urination   • Fatigue   • Femoral neck fracture (CMS/HCC)   • Fall   • Closed fracture of neck of right femur (CMS/[facility])     Past Medical History:   Diagnosis Date   • Ankle instability    • Chronic left shoulder pain 2018   • Disorder of tendon of shoulder region, left    • Dysphagia    • Esophagitis    • Femoral neck fracture (CMS/[facility]) 7/15/2021   • Finger fracture, right     Dr Chandler casting and physical therapy   • Hemorrhoids    • Hypertension    • Localized, primary osteoarthritis of left shoulder region    • Right shoulder pain    • Shoulder joint replacement status     Right   • Stroke (CMS/[facility])     CVA     Past Surgical History:   Procedure Laterality Date   • APPENDECTOMY      Daughter denies   • CATARACT EXTRACTION, BILATERAL         • ENDOSCOPY  2009    with biopsy/esophageal ring dilation   • HEMORRHOIDECTOMY      lanced    • HIP  HEMIARTHROPLASTY Right 7/15/2021    Procedure: HIP HEMIARTHROPLASTY;  Surgeon: Adam Olsen MD;  Location: [facility];  Service: Orthopedics;  Laterality: Right;   • HYSTERECTOMY      partial    • OOPHORECTOMY     • SHOULDER SURGERY Right     replacement   • SHOULDER SURGERY      Arthroscopy of shoulder:Right   • TONSILLECTOMY       General Information     Row Name 07/19/21 1142          Physical Therapy Time and Intention    Document Type  therapy note (daily note)  -SC     Mode of Treatment  physical therapy  -SC     Row Name 07/19/21 1142          General Information    Patient Profile Reviewed  yes  -SC     Existing Precautions/Restrictions  hip, posterior  -[facility] Name 07/19/21 1142          Cognition    Orientation Status (Cognition)  oriented to;person;situation  -SC     Row Name 07/19/21 1142          Safety Issues, Functional Mobility    Impairments Affecting Function (Mobility)  balance;cognition;endurance/activity tolerance;pain  -SC     Comment, Safety Issues/Impairments (Mobility)  alert, following directions  -SC       User Key  (r) = Recorded By, (t) = Taken By, (c) = Cosigned By    Initials Name Provider Type    SC Carson Garibay PT Physical Therapist        Mobility     Row Name 07/19/21 1143          Bed Mobility    Bed Mobility  supine-sit;scooting/bridging  -SC     Scooting/Bridging [place] (Bed Mobility)  modified independence  -SC     Supine-Sit [place] (Bed Mobility)  verbal cues;minimum assist (75% patient effort)  -SC     Assistive Device (Bed Mobility)  bed rails;head of bed elevated  -SC     Comment (Bed Mobility)  cues for sequencing and using bed rail  -[facility] Name 07/19/21 1143          Transfers    Comment (Transfers)  STS from EOB/commode with cues for hand placement and leg position  -[facility] Name 07/19/21 1143          Sit-Stand Transfer    Sit-Stand [place] (Transfers)  supervision;contact guard  -SC     Assistive Device (Sit-Stand Transfers)   walker, front-wheeled  -[facility] Name 07/19/21 1143          Gait/Stairs (Locomotion)    [place] Level (Gait)  contact guard;verbal cues  -SC     Assistive Device (Gait)  walker, front-wheeled  -SC     Distance in Feet (Gait)  180  -SC     Deviations/Abnormal Patterns (Gait)  gait speed decreased  -SC     Bilateral Gait Deviations  forward flexed posture  -SC     Right Sided Gait Deviations  heel strike decreased;weight shift ability decreased  -SC     Comment (Gait/Stairs)  Gt training focused on controling walker with step throught gait pattern. Cues for full Wt shifting. No LOB noted  -[facility] Name 07/19/21 1143          Mobility    Extremity Weight-bearing Status  right lower extremity  -SC     Right Lower Extremity (Weight-bearing Status)  weight-bearing as tolerated (WBAT)  -SC       User Key  (r) = Recorded By, (t) = Taken By, (c) = Cosigned By    Initials Name Provider Type    SC Carson Garibay PT Physical Therapist        Obj/Interventions     [facility] Name 07/19/21 1145          Motor Skills    Therapeutic Exercise  knee;hip;ankle  -SC     Row Name 07/19/21 1145          Hip (Therapeutic Exercise)    Hip (Therapeutic Exercise)  AROM (active range of motion)  -SC     Hip AROM (Therapeutic Exercise)  extension;flexion;aBduction;aDduction;right;10 repetitions;supine  -SC     Row Name 07/19/21 1145          Knee (Therapeutic Exercise)    Knee (Therapeutic Exercise)  strengthening exercise;isometric exercises  -SC     Knee Isometrics (Therapeutic Exercise)  bilateral;quad sets;10 repetitions  -SC     Knee Strengthening (Therapeutic Exercise)  right;heel slides;LAQ (long arc quad);10 repetitions  -[facility] Name 07/19/21 1145          Balance    Balance Assessment  standing dynamic balance  -SC     Dynamic Standing Balance  mild impairment;supported  -SC     Comment, Balance  needs walker  -SC       User Key  (r) = Recorded By, (t) = Taken By, (c) = Cosigned By    Initials Name Provider Type    SC Lani,  Carson EVANS, PT Physical Therapist        Goals/Plan    No documentation.       Clinical Impression     [facility] Name 07/19/21 1146          Pain    Additional Documentation  Pain Scale: FACES Pre/Post-Treatment (Group)  -SC     Row Name 07/19/21 1146          Pain Scale: Numbers Pre/Post-Treatment    Pretreatment Pain Rating  0/10 - no pain  -SC     Posttreatment Pain Rating  0/10 - no pain  -[facility] Name 07/19/21 1146          Plan of Care Review    Plan of Care Reviewed With  patient  -SC     Progress  improving  -SC     Outcome Summary  Patient participated well in therapeutic activities. She has increased her ambulation to 180 feet with walker .  -[facility] Name 07/19/21 1146          Therapy Assessment/Plan (PT)    Rehab Potential (PT)  good, to achieve stated therapy goals  -SC     Criteria for Skilled Interventions Met (PT)  yes  -SC     Row Name 07/19/21 1146          Positioning and Restraints    Pre-Treatment Position  in bed  -SC     Post Treatment Position  chair  -SC     In Chair  notified nsg;reclined;sitting;call light within reach;encouraged to call for assist;exit alarm on;with family/caregiver  -SC       User Key  (r) = Recorded By, (t) = Taken By, (c) = Cosigned By    Initials Name Provider Type    SC Carson Garibay, PT Physical Therapist        Outcome Measures     [facility] Name 07/19/21 1148          How much help from another person do you currently need...    Turning from your back to your side while in flat bed without using bedrails?  3  -SC     Moving from lying on back to sitting on the side of a flat bed without bedrails?  3  -SC     Moving to and from a bed to a chair (including a wheelchair)?  3  -SC     Standing up from a chair using your arms (e.g., wheelchair, bedside chair)?  3  -SC     Climbing 3-5 steps with a railing?  3  -SC     To walk in hospital room?  3  -SC     AM-PAC 6 Clicks Score (PT)  18  -SC     Row Name 07/19/21 1148          Functional Assessment    Outcome Measure Options   AM-PAC 6 Clicks Basic Mobility (PT)  -SC       User Key  (r) = Recorded By, (t) = Taken By, (c) = Cosigned By    Initials Name Provider Type    Carson Felton PT Physical Therapist        Physical Therapy Education                 Title: PT OT SLP Therapies (In Progress)     Topic: Physical Therapy (In Progress)     Point: Mobility training (In Progress)     Learning Progress Summary           Patient Eager, E, VU by SC at 7/19/2021 1148    Comment: reviewed safety with mobility    Eager, E,D, NR by CT at 7/18/2021 0953    Comment: Memory issues impair retenion of information    Eager, E,D, NR by CT at 7/17/2021 1105    Acceptance, D,E, NR,NL by CS at 7/16/2021 1330    Comment: Educated patient on posterior hip precautions, importance of PT, importance of mobility, and plan for POC.   Family Eager, E,D, NR by CT at 7/18/2021 0953    Comment: Memory issues impair retenion of information    Eager, E,D, NR by CT at 7/17/2021 1105                   Point: Home exercise program (In Progress)     Learning Progress Summary           Patient Eager, E, VU by SC at 7/19/2021 1148    Comment: reviewed safety with mobility    Eager, E,D, NR by CT at 7/18/2021 0953    Comment: Memory issues impair retenion of information    Eager, E,D, NR by CT at 7/17/2021 1105    Acceptance, D,E, NR,NL by CS at 7/16/2021 1330    Comment: Educated patient on posterior hip precautions, importance of PT, importance of mobility, and plan for POC.   Family Eager, E,D, NR by CT at 7/18/2021 0953    Comment: Memory issues impair retenion of information    Eager, E,D, NR by CT at 7/17/2021 1105                   Point: Body mechanics (In Progress)     Learning Progress Summary           Patient Eager, E, VU by SC at 7/19/2021 1148    Comment: reviewed safety with mobility    Eager, E,D, NR by CT at 7/18/2021 0953    Comment: Memory issues impair retenion of information    Eager, E,D, NR by CT at 7/17/2021 1105    Acceptance, D,E, NR,NL by CS at  7/16/2021 1330    Comment: Educated patient on posterior hip precautions, importance of PT, importance of mobility, and plan for POC.   Family Eager, E,D, NR by CT at 7/18/2021 0953    Comment: Memory issues impair retenion of information    Eager, E,D, NR by CT at 7/17/2021 1105                   Point: Precautions (In Progress)     Learning Progress Summary           Patient [place], E, VU by SC at 7/19/2021 1148    Comment: reviewed safety with mobility    [place], E,D, NR by CT at 7/18/2021 0953    Comment: Memory issues impair retenion of information    Eager, E,D, NR by CT at 7/17/2021 1105    Acceptance, D,E, NR,NL by  at 7/16/2021 1330    Comment: Educated patient on posterior hip precautions, importance of PT, importance of mobility, and plan for POC.   Family [place], E,D, NR by CT at 7/18/2021 0953    Comment: Memory issues impair retenion of information    [place], E,D, NR by CT at 7/17/2021 1105                               User Key     Initials Effective Dates Name Provider Type Discipline    SC 06/16/21 -  Carson Garibay, PT Physical Therapist PT    CT 06/16/21 -  Eduard Peña, PT Physical Therapist PT     06/16/21 -  Kaila Alfonso, PT Physical Therapist PT              PT Recommendation and Plan     Plan of Care Reviewed With: patient  Progress: improving  Outcome Summary: Patient participated well in therapeutic activities. She has increased her ambulation to 180 feet with walker .     Time Calculation:   PT Charges     Row Name 07/19/21 1120             Time Calculation    Start Time  1120  -SC      PT Received On  07/19/21  -SC      PT Goal Re-Cert Due Date  07/26/21  -SC         Time Calculation- PT    Total Timed Code Minutes- PT  23 minute(s)  -SC         Timed Charges    14022 - PT Therapeutic Exercise Minutes  10  -SC      65911 - Gait Training Minutes   10  -SC      35898 - PT Therapeutic Activity Minutes  3  -SC         Total Minutes    Timed Charges Total Minutes  23  -SC        Total Minutes  23  -SC        User Key  (r) = Recorded By, (t) = Taken By, (c) = Cosigned By    Initials Name Provider Type    SC Carson Garibay PT Physical Therapist        Therapy Charges for Today     Code Description Service Date Service Provider Modifiers Qty    [id number]  PT THER PROC EA 15 MIN 7/19/2021 Carson Garibay, PT GP 1    [id number] HC GAIT TRAINING EA 15 MIN 7/19/2021 Carson Garibay PT GP 1          PT G-Codes  Outcome Measure Options: AM-PAC 6 Clicks Basic Mobility (PT)  AM-PAC 6 Clicks Score (PT): 18  AM-PAC 6 Clicks Score (OT): 12    Carson Garibay PT  7/19/2021

## 2021-07-19 NOTE — PLAN OF CARE
Problem: Adult Inpatient Plan of Care  Goal: Plan of Care Review  Outcome: Ongoing, Progressing  Flowsheets (Taken 7/19/2021 0437)  Progress: improving  Plan of Care Reviewed With: patient  Goal: Patient-Specific Goal (Individualized)  Outcome: Ongoing, Progressing  Goal: Absence of Hospital-Acquired Illness or Injury  Outcome: Ongoing, Progressing  Intervention: Identify and Manage Fall Risk  Recent Flowsheet Documentation  Taken 7/19/2021 0400 by Mag Oquendo RN  Safety Promotion/Fall Prevention:   activity supervised   assistive device/personal items within reach   clutter free environment maintained   room organization consistent   safety round/check completed   toileting scheduled  Taken 7/19/2021 0200 by Mag Oquendo RN  Safety Promotion/Fall Prevention:   activity supervised   assistive device/personal items within reach   clutter free environment maintained   room organization consistent   safety round/check completed   toileting scheduled  Taken 7/19/2021 0000 by Mag Oquendo RN  Safety Promotion/Fall Prevention:   activity supervised   assistive device/personal items within reach   clutter free environment maintained   room organization consistent   safety round/check completed   toileting scheduled  Taken 7/18/2021 2200 by Mag Oquendo RN  Safety Promotion/Fall Prevention:   activity supervised   assistive device/personal items within reach   clutter free environment maintained   room organization consistent   safety round/check completed   toileting scheduled  Taken 7/18/2021 2034 by Mag Oquendo RN  Safety Promotion/Fall Prevention:   activity supervised   assistive device/personal items within reach   clutter free environment maintained   room organization consistent   safety round/check completed   toileting scheduled  Intervention: Prevent Skin Injury  Recent Flowsheet Documentation  Taken 7/19/2021 0400 by Mag Oquendo RN  Body Position: position changed  independently  Skin Protection:   adhesive use limited   incontinence pads utilized   tubing/devices free from skin contact  Taken 7/19/2021 0200 by Mag Oquendo RN  Body Position: position changed independently  Skin Protection:   adhesive use limited   incontinence pads utilized   tubing/devices free from skin contact  Taken 7/19/2021 0000 by Mag Oquendo RN  Body Position: supine  Skin Protection:   adhesive use limited   incontinence pads utilized   tubing/devices free from skin contact  Taken 7/18/2021 2200 by Mag Oquendo RN  Body Position: position changed independently  Skin Protection:   adhesive use limited   incontinence pads utilized   tubing/devices free from skin contact  Taken 7/18/2021 2034 by Mag Oquendo RN  Body Position: sitting up in bed  Skin Protection:   adhesive use limited   incontinence pads utilized   tubing/devices free from skin contact  Intervention: Prevent and Manage VTE (venous thromboembolism) Risk  Recent Flowsheet Documentation  Taken 7/19/2021 0400 by Mag Oquendo RN  VTE Prevention/Management:   bilateral   sequential compression devices on  Taken 7/19/2021 0200 by Mag Oquendo RN  VTE Prevention/Management:   bilateral   sequential compression devices on  Taken 7/19/2021 0000 by Mag Oquendo RN  VTE Prevention/Management:   bilateral   sequential compression devices on  Taken 7/18/2021 2200 by Mag Oquendo RN  VTE Prevention/Management:   bilateral   sequential compression devices on  Taken 7/18/2021 2034 by Mag Oquendo RN  VTE Prevention/Management:   bilateral   sequential compression devices on  Intervention: Prevent Infection  Recent Flowsheet Documentation  Taken 7/19/2021 0400 by Mag Oquendo RN  Infection Prevention:   environmental surveillance performed   rest/sleep promoted  Taken 7/19/2021 0200 by Mag Oquendo RN  Infection Prevention:   environmental surveillance performed   rest/sleep promoted  Taken  7/19/2021 0000 by Mag Oquendo RN  Infection Prevention:   environmental surveillance performed   rest/sleep promoted  Taken 7/18/2021 2200 by Mag Oquendo RN  Infection Prevention:   environmental surveillance performed   rest/sleep promoted  Taken 7/18/2021 2034 by Mag Oquendo RN  Infection Prevention:   environmental surveillance performed   rest/sleep promoted  Goal: Optimal Comfort and Wellbeing  Outcome: Ongoing, Progressing  Intervention: Provide Person-Centered Care  Recent Flowsheet Documentation  Taken 7/18/2021 2034 by Mag Oquendo RN  Trust Relationship/Rapport: care explained  Goal: Readiness for Transition of Care  Outcome: Ongoing, Progressing     Problem: Hypertension Comorbidity  Goal: Blood Pressure in Desired Range  Outcome: Ongoing, Progressing  Intervention: Maintain Hypertension-Management Strategies  Recent Flowsheet Documentation  Taken 7/19/2021 0400 by Mag Oquendo RN  Medication Review/Management: medications reviewed  Taken 7/19/2021 0200 by Mag Oquendo RN  Medication Review/Management: medications reviewed  Taken 7/19/2021 0000 by Mag Oquendo RN  Medication Review/Management: medications reviewed  Taken 7/18/2021 2200 by Mag Oquendo RN  Medication Review/Management: medications reviewed  Taken 7/18/2021 2034 by Mag Oquendo RN  Medication Review/Management: medications reviewed     Problem: Skin Injury Risk Increased  Goal: Skin Health and Integrity  Outcome: Ongoing, Progressing  Intervention: Optimize Skin Protection  Recent Flowsheet Documentation  Taken 7/19/2021 0400 by Mag Oquendo RN  Pressure Reduction Techniques:   frequent weight shift encouraged   weight shift assistance provided  Head of Bed (HOB): HOB at 20-30 degrees  Pressure Reduction Devices: pressure-redistributing mattress utilized  Skin Protection:   adhesive use limited   incontinence pads utilized   tubing/devices free from skin contact  Taken 7/19/2021 0200 by  Mag Oquendo RN  Pressure Reduction Techniques:   frequent weight shift encouraged   weight shift assistance provided  Head of Bed (HOB): HOB at 20-30 degrees  Pressure Reduction Devices: pressure-redistributing mattress utilized  Skin Protection:   adhesive use limited   incontinence pads utilized   tubing/devices free from skin contact  Taken 7/19/2021 0000 by Mag Oquendo RN  Pressure Reduction Techniques:   frequent weight shift encouraged   weight shift assistance provided  Head of Bed (HOB): HOB at 20-30 degrees  Pressure Reduction Devices: pressure-redistributing mattress utilized  Skin Protection:   adhesive use limited   incontinence pads utilized   tubing/devices free from skin contact  Taken 7/18/2021 2200 by Mag Oquendo RN  Pressure Reduction Techniques:   frequent weight shift encouraged   weight shift assistance provided  Head of Bed (HOB): HOB at 20-30 degrees  Pressure Reduction Devices: pressure-redistributing mattress utilized  Skin Protection:   adhesive use limited   incontinence pads utilized   tubing/devices free from skin contact  Taken 7/18/2021 2034 by Mag Oquendo RN  Pressure Reduction Techniques:   frequent weight shift encouraged   weight shift assistance provided  Head of Bed (HOB): HOB at 20-30 degrees  Pressure Reduction Devices: pressure-redistributing mattress utilized  Skin Protection:   adhesive use limited   incontinence pads utilized   tubing/devices free from skin contact     Problem: Adjustment to Injury (Hip Fracture)  Goal: Optimal Coping with Change in Health Status  Outcome: Ongoing, Progressing     Problem: Bleeding (Hip Fracture)  Goal: Absence of Bleeding  Outcome: Ongoing, Progressing     Problem: Bowel Elimination Impaired (Hip Fracture)  Goal: Effective Bowel Elimination  Outcome: Ongoing, Progressing  Intervention: Promote Effective Bowel Elimination  Recent Flowsheet Documentation  Taken 7/18/2021 2034 by Mag Oquendo RN  Bowel Elimination  Promotion:   adequate fluid intake promoted   commode/bedpan at bedside     Problem: Delayed Union/Nonunion (Hip Fracture)  Goal: Fracture Stability  Outcome: Ongoing, Progressing     Problem: Embolism (Hip Fracture)  Goal: Absence of Embolism  Outcome: Ongoing, Progressing  Intervention: Prevent and Manage Embolism Risk  Recent Flowsheet Documentation  Taken 7/19/2021 0400 by Mag Oquendo RN  VTE Prevention/Management:   bilateral   sequential compression devices on  Taken 7/19/2021 0200 by Mag Oquendo RN  VTE Prevention/Management:   bilateral   sequential compression devices on  Taken 7/19/2021 0000 by Mag Oquendo RN  VTE Prevention/Management:   bilateral   sequential compression devices on  Taken 7/18/2021 2200 by Mag Oquendo RN  VTE Prevention/Management:   bilateral   sequential compression devices on  Taken 7/18/2021 2034 by Mag Oquendo RN  VTE Prevention/Management:   bilateral   sequential compression devices on     Problem: Functional Ability Impaired (Hip Fracture)  Goal: Optimal Functional Performance  Outcome: Ongoing, Progressing  Intervention: Promote Optimal Functional Status  Recent Flowsheet Documentation  Taken 7/19/2021 0114 by Mag Oquendo RN  Activity Management: up to bedside commode     Problem: Pain (Hip Fracture)  Goal: Acceptable Pain Level  Outcome: Ongoing, Progressing     Problem: Urinary Elimination Impaired (Hip Fracture)  Goal: Effective Urinary Elimination  Outcome: Ongoing, Progressing     Problem: Restraint, Nonbehavioral (Nonviolent)  Goal: Personal Dignity and Safety Maintained  Outcome: Ongoing, Progressing  Intervention: Protect Dignity, Rights, and Personal Wellbeing  Recent Flowsheet Documentation  Taken 7/18/2021 2034 by Mag Oquendo RN  Trust Relationship/Rapport: care explained  Intervention: Protect Skin and Joint Integrity  Recent Flowsheet Documentation  Taken 7/19/2021 0400 by Mag Oquendo RN  Body Position: position  changed independently  Taken 7/19/2021 0200 by Mag Oquendo RN  Body Position: position changed independently  Taken 7/19/2021 0000 by Mag Oquendo RN  Body Position: supine  Taken 7/18/2021 2200 by Mag Oquendo RN  Body Position: position changed independently  Taken 7/18/2021 2034 by Mag Oquendo RN  Body Position: sitting up in bed     Problem: Fall Injury Risk  Goal: Absence of Fall and Fall-Related Injury  Outcome: Ongoing, Progressing  Intervention: Identify and Manage Contributors to Fall Injury Risk  Recent Flowsheet Documentation  Taken 7/19/2021 0400 by Mag Oquendo RN  Medication Review/Management: medications reviewed  Taken 7/19/2021 0200 by Mag Oquendo RN  Medication Review/Management: medications reviewed  Taken 7/19/2021 0000 by Mag Oquendo RN  Medication Review/Management: medications reviewed  Taken 7/18/2021 2200 by Mag Oquendo RN  Medication Review/Management: medications reviewed  Taken 7/18/2021 2034 by Mag Oquendo RN  Medication Review/Management: medications reviewed  Intervention: Promote Injury-Free Environment  Recent Flowsheet Documentation  Taken 7/19/2021 0400 by Mag Oquendo RN  Safety Promotion/Fall Prevention:   activity supervised   assistive device/personal items within reach   clutter free environment maintained   room organization consistent   safety round/check completed   toileting scheduled  Taken 7/19/2021 0200 by Mag Oquendo RN  Safety Promotion/Fall Prevention:   activity supervised   assistive device/personal items within reach   clutter free environment maintained   room organization consistent   safety round/check completed   toileting scheduled  Taken 7/19/2021 0000 by Mag Oquendo RN  Safety Promotion/Fall Prevention:   activity supervised   assistive device/personal items within reach   clutter free environment maintained   room organization consistent   safety round/check completed   toileting  scheduled  Taken 7/18/2021 2200 by Mag Oquendo, RN  Safety Promotion/Fall Prevention:   activity supervised   assistive device/personal items within reach   clutter free environment maintained   room organization consistent   safety round/check completed   toileting scheduled  Taken 7/18/2021 2034 by Mag Oquendo, RN  Safety Promotion/Fall Prevention:   activity supervised   assistive device/personal items within reach   clutter free environment maintained   room organization consistent   safety round/check completed   toileting scheduled   Goal Outcome Evaluation:  Plan of Care Reviewed With: patient        Progress: improving     Pt alert and disoriented to time. VSS. RA. NSR on telemetry. Dressing CDI. Voiding spontaneously. Up x1 to [facility]. Last BM on 7/13. PRN miralax, dulcolax tablet, and dulcolax suppository administered this morning. Plan to d/c to rehab when bed is available

## 2021-07-19 NOTE — PLAN OF CARE
Problem: Fall Injury Risk  Goal: Absence of Fall and Fall-Related Injury  Intervention: Promote Injury-Free Environment  Recent Flowsheet Documentation  Taken 7/19/2021 1434 by Jackie Mayfield RN  Safety Promotion/Fall Prevention:   assistive device/personal items within reach   clutter free environment maintained   safety round/check completed   toileting scheduled  Taken 7/19/2021 1215 by Jackie Mayfield RN  Safety Promotion/Fall Prevention:   assistive device/personal items within reach   clutter free environment maintained   safety round/check completed   toileting scheduled  Taken 7/19/2021 1025 by Jackie Mayfield, RN  Safety Promotion/Fall Prevention:   assistive device/personal items within reach   clutter free environment maintained   safety round/check completed   toileting scheduled  Taken 7/19/2021 0745 by Jackie Mayfield RN  Safety Promotion/Fall Prevention:   assistive device/personal items within reach   clutter free environment maintained   safety round/check completed   toileting scheduled     Problem: Fall Injury Risk  Goal: Absence of Fall and Fall-Related Injury  Outcome: Met  Intervention: Promote Injury-Free Environment  Recent Flowsheet Documentation  Taken 7/19/2021 1434 by Jackie Mayfield RN  Safety Promotion/Fall Prevention:   assistive device/personal items within reach   clutter free environment maintained   safety round/check completed   toileting scheduled  Taken 7/19/2021 1215 by Jackie Mayfield RN  Safety Promotion/Fall Prevention:   assistive device/personal items within reach   clutter free environment maintained   safety round/check completed   toileting scheduled  Taken 7/19/2021 1025 by Jackie Mayfield, RN  Safety Promotion/Fall Prevention:   assistive device/personal items within reach   clutter free environment maintained   safety round/check completed   toileting scheduled  Taken 7/19/2021 0745 by Jackie Mayfield RN  Safety Promotion/Fall  Prevention:   assistive device/personal items within reach   clutter free environment maintained   safety round/check completed   toileting scheduled     Problem: Adult Inpatient Plan of Care  Goal: Plan of Care Review  Outcome: Met  Goal: Patient-Specific Goal (Individualized)  Outcome: Met  Goal: Absence of Hospital-Acquired Illness or Injury  Outcome: Met  Intervention: Identify and Manage Fall Risk  Recent Flowsheet Documentation  Taken 7/19/2021 1434 by Jackie Mayfield RN  Safety Promotion/Fall Prevention:   assistive device/personal items within reach   clutter free environment maintained   safety round/check completed   toileting scheduled  Taken 7/19/2021 1215 by Jackie Mayfield RN  Safety Promotion/Fall Prevention:   assistive device/personal items within reach   clutter free environment maintained   safety round/check completed   toileting scheduled  Taken 7/19/2021 1025 by Jackie Mayfield RN  Safety Promotion/Fall Prevention:   assistive device/personal items within reach   clutter free environment maintained   safety round/check completed   toileting scheduled  Taken 7/19/2021 0745 by Jackie Mayfield RN  Safety Promotion/Fall Prevention:   assistive device/personal items within reach   clutter free environment maintained   safety round/check completed   toileting scheduled  Intervention: Prevent Skin Injury  Recent Flowsheet Documentation  Taken 7/19/2021 1434 by Jackie Mayfield RN  Body Position: supine  Skin Protection:   adhesive use limited   incontinence pads utilized  Taken 7/19/2021 1215 by Jackie Mayfield RN  Skin Protection:   adhesive use limited   incontinence pads utilized  Taken 7/19/2021 1025 by Jackie Mayfield RN  Body Position: supine  Skin Protection:   adhesive use limited   incontinence pads utilized  Taken 7/19/2021 0745 by Jackie Mayfield RN  Body Position: supine  Skin Protection:   adhesive use limited   incontinence pads utilized  Goal: Optimal  Comfort and Wellbeing  Outcome: Met  Goal: Readiness for Transition of Care  Outcome: Met     Problem: Hypertension Comorbidity  Goal: Blood Pressure in Desired Range  Outcome: Met     Problem: Skin Injury Risk Increased  Goal: Skin Health and Integrity  Outcome: Met  Intervention: Optimize Skin Protection  Recent Flowsheet Documentation  Taken 7/19/2021 1434 by Jackie Mayfield RN  Pressure Reduction Techniques: frequent weight shift encouraged  Head of Bed (HOB): HOB at 20-30 degrees  Pressure Reduction Devices: pressure-redistributing mattress utilized  Skin Protection:   adhesive use limited   incontinence pads utilized  Taken 7/19/2021 1215 by Jackie Mayfield RN  Pressure Reduction Techniques: frequent weight shift encouraged  Pressure Reduction Devices: pressure-redistributing mattress utilized  Skin Protection:   adhesive use limited   incontinence pads utilized  Taken 7/19/2021 1025 by Jackie Mayfield RN  Pressure Reduction Techniques: frequent weight shift encouraged  Head of Bed (HOB): HOB at 20-30 degrees  Pressure Reduction Devices: pressure-redistributing mattress utilized  Skin Protection:   adhesive use limited   incontinence pads utilized  Taken 7/19/2021 0745 by Jackie Mayfield RN  Pressure Reduction Techniques: frequent weight shift encouraged  Head of Bed (HOB): HOB at 20-30 degrees  Pressure Reduction Devices: pressure-redistributing mattress utilized  Skin Protection:   adhesive use limited   incontinence pads utilized     Problem: Adjustment to Injury (Hip Fracture)  Goal: Optimal Coping with Change in Health Status  Outcome: Met     Problem: Bleeding (Hip Fracture)  Goal: Absence of Bleeding  Outcome: Met     Problem: Bowel Elimination Impaired (Hip Fracture)  Goal: Effective Bowel Elimination  Outcome: Met     Problem: Delayed Union/Nonunion (Hip Fracture)  Goal: Fracture Stability  Outcome: Met     Problem: Embolism (Hip Fracture)  Goal: Absence of Embolism  Outcome: Met      Problem: Functional Ability Impaired (Hip Fracture)  Goal: Optimal Functional Performance  Outcome: Met  Intervention: Promote Optimal Functional Status  Recent Flowsheet Documentation  Taken 7/19/2021 1434 by Jackie Mayfield RN  Activity Management: bedrest  Taken 7/19/2021 1215 by Jackie Mayfield RN  Activity Management: up in chair  Taken 7/19/2021 1025 by Jackie Mayfield RN  Activity Management: bedrest  Taken 7/19/2021 0745 by Jackie Mayfield RN  Activity Management: bedrest     Problem: Pain (Hip Fracture)  Goal: Acceptable Pain Level  Outcome: Met     Problem: Urinary Elimination Impaired (Hip Fracture)  Goal: Effective Urinary Elimination  Outcome: Met     Problem: Restraint, Nonbehavioral (Nonviolent)  Goal: Personal Dignity and Safety Maintained  Outcome: Met  Intervention: Protect Skin and Joint Integrity  Recent Flowsheet Documentation  Taken 7/19/2021 1434 by Jackie Mayfield RN  Body Position: supine  Taken 7/19/2021 1025 by Jackie Mayfield RN  Body Position: supine  Taken 7/19/2021 0745 by Jackie Mayfield RN  Body Position: supine   Goal Outcome Evaluation:

## 2021-07-19 NOTE — CASE MANAGEMENT/SOCIAL WORK
Case Management Discharge Note      Final Note: Patient plans for [facility] today, general rehab unit tele [phone], fax [phone]. [facility] was interested and could not offer a bed today, though. I had discussion with her daughter and granddaughter regarding differences in levels of rehab. They plan for [facility]. I am arranging [facility] transport, awaiting a time.    Provided Post Acute Provider List?: Yes  Post Acute Provider List: Nursing Home  Delivered To: Support Person  Support Person: granddanarendra Josephi  Method of Delivery: In person    Selected Continued Care - Admitted Since 7/15/2021     Destination Coordination complete    Service Provider Selected Services Address Phone Fax Patient Preferred    [facility]  Inpatient Rehabilitation [address] [facility] [address] [phone] --          Durable Medical Equipment    No services have been selected for the patient.              Dialysis/Infusion    No services have been selected for the patient.              Home Medical Care    No services have been selected for the patient.              Therapy    No services have been selected for the patient.              Community Resources    No services have been selected for the patient.              Community & DME    No services have been selected for the patient.                  Transportation Services  W/C Van:  ([facility])    Final Discharge Disposition Code: 62 - inpatient rehab facility

## 2021-07-19 NOTE — DISCHARGE SUMMARY
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 female  history of HTN, HLD, osteoporosis and urinary frequency present after a fall. Imaging revealed a right femoral neck fracture.  Ortho consulted.  Patient is now s/p femur arthroplasty 715 per Dr. Olsen.  Pain is well controlled.  During hospitalization, patient found to have acute urinary retention with UTI.  Patient treated with Ancef perioperatively, continued on Omnicef until .    Discharge Follow Up Recommendations for labs/diagnostics:  - F/U with PCP in 1 week after discharge from rehab  - F/U with Dr Olsen in 3 weeks  - Protected weight bearing as tolerated right lower extremity, posterior precautions x6 weeks   - Replace dressing with Covaderm.  - Aspirin 81 mg bid for DVT prophylaxis   - Dressing to remain in place for 7 days. May remove on POD#7. If no drainage, may shower on POD#10. No submerging wound in water. If drainage is noted, sterile dressing should be placed and wound checked daily.  No showering until wound has remained dry for 72 consecutive hours.     Day of Discharge     HPI:   Patient resting in chair, pleasant, talkative.  Patient denies pain today.  Daughters at the bedside.    Review of Systems   Gen- No fevers, chills  CV- No chest pain, palpitations  Resp- No cough, dyspnea  GI- No N/V/D, abd pain  Otherwise ROS is negative except as mentioned in the HPI.    Vital Signs:   Temp:  [97.6 °F (36.4 °C)-98.3 °F (36.8 °C)] 98.1 °F (36.7 °C)  Heart Rate:  [71-85] 79  Resp:  [14-18] 18  BP: (128-145)/(68-88) 145/77     Physical Exam:  Constitutional: Awake, alert, NAD  Eyes: PERRLA, sclerae anicteric, no conjunctival injection  HENT: NCAT, mucous membranes moist  Neck: Supple, no thyromegaly, no lymphadenopathy, trachea midline  Respiratory: Clear to auscultation bilaterally, nonlabored respirations   Cardiovascular: RRR, 2/6 murmurs, no rubs, or gallops, palpable pedal pulses bilaterally  Gastrointestinal: Positive bowel sounds, soft, nontender, nondistended  Musculoskeletal: No bilateral ankle edema, no clubbing or cyanosis to extremities  Psychiatric: Appropriate affect, cooperative  Neurologic: Oriented x 3, strength symmetric in all extremities, Cranial Nerves grossly intact to confrontation, speech clear  Skin: No rashes    Assessment/Plan  Right femoral neck fracture-stable  - hemiarthroplasty 7/15 Dr Olsen  - PT/OT- able to ambulate in mccrary with gaitbelt and rolling walker  - aspirin 81 mg bid for DVT prophylaxis per surgeon's note until she for follow up    Acute urinary retention-resolved  UTI-improving  -Cefdinir, Myrbetriq     Confusion, likely from UTI/resolved to baseline  -improved with antibiotic therapy     HTN-stable  -Losartan 25 mg daily     HLD-stable  - Crestor 5 mg Daily     Osteoporosis-ongoing      Pertinent  and/or Most Recent Results     Results from last 7 days   Lab Units 07/17/21  0726 07/16/21  0404 07/15/21  0257   WBC 10*3/mm3 11.55*  --  8.29   HEMOGLOBIN  g/dL 11.9* 11.7* 14.4   HEMATOCRIT % 36.6 36.3 43.9   PLATELETS 10*3/mm3 198  --  256   SODIUM mmol/L 134* 132* 136   POTASSIUM mmol/L 4.0 4.4 4.2   CHLORIDE mmol/L 103 101 100   CO2 mmol/L 21.0* 24.0 23.0   BUN mg/dL 7* 9 12   CREATININE mg/dL 0.46* 0.64 0.76   GLUCOSE mg/dL 98 110* 109*   CALCIUM mg/dL 8.0* 7.6* 9.0     Results from last 7 days   Lab Units 07/15/21  0406 07/15/21  0257   BILIRUBIN mg/dL  --  0.4   ALK PHOS U/L  --  75   ALT (SGPT) U/L  --  11   AST (SGOT) U/L  --  20   PROTIME seconds 12.1* >120.0*   INR  1.0 >10.00*       Brief Urine Lab Results  (Last result in the past 365 days)      Color   Clarity   Blood   Leuk Est   Nitrite   Protein   CREAT   Urine HCG        07/17/21 1030 Yellow Clear Trace Moderate (2+) Negative Negative               Microbiology Results Abnormal     Procedure Component Value - Date/Time    Urine Culture - Urine, Urine, Catheter In/Out [[id number]]  (Normal) Collected: 07/17/21 1030    Lab Status: Final result Specimen: Urine, Catheter In/Out Updated: 07/18/21 1214    Narrative:      Growth present not clinically significant for workup. No further testing will be performed.     COVID PRE-OP / PRE-PROCEDURE SCREENING ORDER (NO ISOLATION) - Swab, Nasopharynx [[id number]]  (Normal) Collected: 07/15/21 0426    Lab Status: Final result Specimen: Swab from Nasopharynx Updated: 07/15/21 0508    Narrative:      The following orders were created for panel order COVID PRE-OP / PRE-PROCEDURE SCREENING ORDER (NO ISOLATION) - Swab, Nasopharynx.  Procedure                               Abnormality         Status                     ---------                               -----------         ------                     COVID-19 and FLU A/B PCR...[[id number]]  Normal              Final result                 Please view results for these tests on the individual orders.    COVID-19 and FLU A/B PCR - Swab, Nasopharynx [[id number]]  (Normal) Collected: 07/15/21 0426    Lab Status: Final result  Specimen: Swab from Nasopharynx Updated: 07/15/21 0508     COVID19 Not Detected     Influenza A PCR Not Detected     Influenza B PCR Not Detected    Narrative:      Fact sheet for providers: https://www.fda.gov/media/277151/download    Fact sheet for patients: https://www.fda.gov/media/092345/download    Test performed by PCR.          Imaging Results (All)     Procedure Component Value Units Date/Time    XR Pelvis 1 or 2 View [[id number]] Collected: 07/15/21 2246     Updated: 07/15/21 2248    Narrative:      CR Pelvis 1 or 2 Vws    INDICATION:   Intraoperative exam for right hip arthroplasty.    COMPARISON:   7/15/2021    FINDINGS:  AP view(s) of the pelvis.  There is artifact from operative hardware. The femoral component of a right hip arthroplasty has been placed and is grossly properly aligned. Please see the operative report.      Signer Name: Oscar Bajwa MD   Signed: 7/15/2021 10:46 PM   Workstation Name: [facility]    Radiology [facility]    XR Pelvis 1 or 2 View [[id number]] Collected: 07/15/21 2245     Updated: 07/15/21 2247    Narrative:      CR Pelvis 1 or 2 Vws    INDICATION:   Status post right hip arthroplasty.    COMPARISON:   7/15/2021    FINDINGS:  AP view(s) of the pelvis.  There has been interval right hip arthroplasty. Hardware shows expected alignment with no complications. Soft tissue drain is present. There is degenerative disease in the left hip. The iliac crests are not included in the  field-of-view.      Impression:      Satisfactory appearance of a new right hip arthroplasty.    Signer Name: Oscar Bajwa MD   Signed: 7/15/2021 10:45 PM   Workstation Name: [facility]    Radiology [facility]    CT Pelvis Without Contrast [[id number]] Collected: 07/15/21 0905     Updated: 07/15/21 0908    Narrative:      CT Pelvis WO    INDICATION:    Right hip pain status post fall today. Right femoral neck fracture. Order requesting evaluation of fracture  extent.    TECHNIQUE:   CT of the pelvis without IV contrast. Coronal and sagittal reconstructions were obtained.  Radiation dose reduction techniques included automated exposure control or exposure modulation based on body size. Count of known CT and cardiac nuc med studies  performed in previous 12 months: 0.     COMPARISON:    Right hip x-rays 7/15/2021    FINDINGS:  There is a moderately impacted and angulated oblique fracture through the right femoral neck, primarily involving the subcapital and transcervical regions. No evidence of fracture extension into the intertrochanteric region. Moderate apex anterior  angulation at the fracture site. Right femoral head is normally located. Small right hip effusion.    Bony pelvis, left hip, sacrum, and sacroiliac joints are intact. Multilevel degenerative changes in the utilized lumbar spine. Pelvic soft tissues and musculature are unremarkable. Included visceral pelvis demonstrates moderate distention of the urinary  bladder. There is uncomplicated colonic diverticulosis. No free pelvic fluid.      Impression:      1. Moderately impacted and angulated oblique fracture of the right femoral neck, primarily involving the subcapital and transcervical regions. No evidence of fracture extension into the intertrochanteric region. Right hip normally located.  2. Bony pelvis, sacrum, and left hip appear intact.  3. Moderate distention urinary bladder.  4. No free pelvic fluid.    Signer Name: Garth Neumann MD   Signed: 7/15/2021 9:05 AM   Workstation Name: [id number]    [facility]    XR Hip With or Without Pelvis 2 - 3 View Right [[id number]] Collected: 07/15/21 0304     Updated: 07/15/21 0306    Narrative:      CR Hip Uni Comp Min 2 Vws RT    INDICATION:   Hip pain after fall.    COMPARISON:   None.    FINDINGS:  AP pelvis and 2 view(s) of the right hip.  Patient is osteopenic. There is an acute right femoral neck fracture. The fracture may extend into  the greater trochanter. No hip dislocation is seen. No other acute fractures are identified. There is no  sacroiliac joint diastasis.      Impression:      Acute right femoral neck fracture which may extend into the greater trochanter.    Signer Name: Oscar Bajwa MD   Signed: 7/15/2021 3:04 AM   Workstation Name: [email or web address]    [facility]        Results for orders placed during the hospital encounter of 07/15/21    Adult Transthoracic Echo Complete w/ Color, Spectral and Contrast if necessary per protocol    Interpretation Summary  · Estimated left ventricular EF = 60% Left ventricular systolic function is normal.  · Left ventricular wall thickness is consistent with borderline concentric hypertrophy.  · Left ventricular diastolic dysfunction is noted.  · Moderate to severe aortic valve stenosis is present.  · Aortic valve maximum pressure gradient is 59.2 mmHg. Aortic valve mean pressure gradient is 37.5 mmHg.  · Moderate mitral valve regurgitation is present.        Discharge Details        Discharge Medications      New Medications      Instructions Start Date   acetaminophen 500 MG tablet  Commonly known as: TYLENOL   1,000 mg, Oral, Every 8 Hours      aspirin 81 MG EC tablet   81 mg, Oral, Every 12 Hours Scheduled      cefdinir 300 MG capsule  Commonly known as: OMNICEF   300 mg, Oral, Every 12 Hours Scheduled      meloxicam 15 MG tablet  Commonly known as: MOBIC   15 mg, Oral, Daily   Start Date: July 20, 2021        Continue These Medications      Instructions Start Date   losartan 25 MG tablet  Commonly known as: COZAAR   1 tab per day      Mirabegron ER 50 MG tablet sustained-release 24 hour 24 hr tablet  Commonly known as: Myrbetriq   50 mg, Oral, Daily      Prolia 60 MG/ML solution prefilled syringe syringe  Generic drug: denosumab   60 mg, Subcutaneous, Every 6 Months      rosuvastatin 5 MG tablet  Commonly known as: CRESTOR   5 mg, Oral, Daily      VITAMIN D3 PO   Oral              Allergies   Allergen Reactions   • Nasal Spray Cough     Discharge Disposition:  Rehab Facility or Unit (DC - External)    Discharge Diet:  Diet Order   Procedures   • Diet Regular     Discharge Activity:   Activity Instructions     Activity as Tolerated      Activity as Tolerated      Other Activity Instructions      Activity Instructions: Protected weight bearing as tolerated right lower extremity, posterior precautions x6 weeks    Other Activity Restrictions      Type of Restriction: Other    Explain Other Restrictions: Protected weight bearing as tolerated right lower extremity, posterior precautions x6 weeks    Up WIth Assist      Up WIth Assist          CODE STATUS:    Code Status and Medical Interventions:   Ordered at: 07/19/21 1402     Level Of Support Discussed With:    Patient     Code Status:    CPR     Medical Interventions (Level of Support Prior to Arrest):    Full     Future Appointments   Date Time Provider Department Center   8/6/2021  9:30 AM Adam Olsen MD [facility]   9/3/2021  3:20 PM [facility]   9/30/2021 10:30 AM Schuyler Garcia MD [facility]       Additional Instructions for the Follow-ups that You Need to Schedule     Discharge Follow-up with PCP   As directed       Currently Documented PCP:    Schuyler Garcia MD    PCP Phone Number:    [phone]     Follow Up Details: F/U with PCP in 1 week after discharge from rehab         Discharge Follow-up with PCP   As directed       Currently Documented PCP:    Schuyler Garcia MD    PCP Phone Number:    [phone]     Follow Up Details: F/U with PCP 1 week after discharge from rehab         Discharge Follow-up with Specialty: JANE Couch; 3 Weeks   As directed      Specialty: JANE Couch    Follow Up: 3 Weeks         Discharge Follow-up with Specified Provider: Dr Olsen; 3 Weeks   As directed      To: Dr Olsen    Follow Up: 3 Weeks         Discharge  Follow-up with Specified Provider: Dr Olsen; 3 Weeks   As directed      To: Dr Olsen    Follow Up: 3 Weeks             Time Spent on Discharge:  40 minutes    Electronically signed by CHANTELLE Montaño, 07/19/21, 2:02 PM EDT.

## 2021-07-19 NOTE — NURSING NOTE
Pleasant, cooperative, verbalizes understanding of POC although forgetful. Family at bedside most of day. Pt cooperative and getting OOB to walk to BSC--walked in mccrary to second nurse's station today and tolerated well although gait is unsteady but appears to be improving. VSS, tele shows a SR. Pain controlled with current regimen. Began on flomax today and with good result, voiding spontaneously in BSC. Given miralax, bisacodyl, sennakot today in hopes of a BM. Pt did pass a large amount of flatus when up to the BSC. Dressing to hip is dry and intact. Anticipate d/c in next few days to an inpatient rehab facility.

## 2021-07-19 NOTE — DISCHARGE PLACEMENT REQUEST
"Lay Mckeon (89 y.o. Female)     Date of Birth Social Security Number Address Home Phone MRN    11/14/1930  [facility] [address]  [place] [address] [phone] [id number]    Faith Marital Status          Muslim        Admission Date Admission Type Admitting Provider Attending Provider Department, Room/Bed    7/15/21 Emergency Yamila Wu MD Mini, Jocelyn, MD [facility] 3G, S354/1    Discharge Date Discharge Disposition Discharge Destination         Rehab Facility or Unit (DC - External)              Attending Provider: Yamila Wu MD    Allergies: Nasal Spray    Isolation: None   Infection: None   Code Status: CPR    Ht: 162.6 cm (64.02\")   Wt: 61.2 kg (135 lb)    Admission Cmt: None   Principal Problem: Closed fracture of neck of right femur (CMS/[facility]) [S72.001A] More...                 Active Insurance as of 7/15/2021     Primary Coverage     Payor Plan Insurance Group Employer/Plan Group    MEDICARE MEDICARE A & B      Payor Plan Address Payor Plan Phone Number Payor Plan Fax Number Effective Dates    [address] [phone]  11/1/1995 - None Entered    [facility] [id number]       Subscriber Name Subscriber Birth Date Member ID       LAY MCKEON 11/14/1930 [id number]           Secondary Coverage     Payor Plan Insurance Group Employer/Plan Group    [facility] SUPP [id number]     Payor Plan Address Payor Plan Phone Number Payor Plan Fax Number Effective Dates    [address]   12/1/2016 - None Entered    [facility] [id number]       Subscriber Name Subscriber Birth Date Member ID       LAY MCKEON 11/14/1930 [id number]                 Emergency Contacts      (Rel.) Home Phone Work Phone Mobile Phone    NAIN HERNÁNDEZ (Power of ) [phone] -- [phone]            Emergency Contact Information     Name Relation Home Work Mobile    EDGAR NAIN Power of  [phone]          Insurance Information    "               MEDICARE/MEDICARE A & B Phone: [phone]    Subscriber: Luh Mckeon Subscriber#: [id number]    Group#:  Precert#:         KISHORE [employer]UE [employer]OSS/KISHORE [facility] SUPP Phone:     Subscriber: Luh Mckeon Subscriber#: [id number]    Group#: [id number] Precert#:              History & Physical      Day, Hilda SOLIS MD at 07/15/21 0311              [facility] Medicine Services  HISTORY AND PHYSICAL    Patient Name: Luh Mckeon  : 1930  MRN: [id number]  Primary Care Physician: Schuyler Garcia MD  Date of admission: 7/15/2021      Subjective   Subjective     Chief Complaint:  Fall, hip pain    HPI:  Luh Mckeon is a 89 y.o. female who presents with fall after trying to get into bed.  Pt states she fell onto right hip.  Did not hit head.  Did not lose consciousness.  Is not on blood thinners including asa.  Does not have recurrent falls.  Lives w/ daughter.  Has severe pain in right hip/groin currently.  No nu/ti in legs.  No chest pain, shortness of breath, f/c, cough, abd pain.            COVID Details:    Symptoms:    [x] NONE [] Fever []  Cough [] Shortness of breath [] Change in taste/smell      The patient has a COVID HM Topic on their chart, and they are fully vaccinated.      Review of Systems      All other systems reviewed and are negative.     Personal History     Past Medical History:   Diagnosis Date   • Ankle instability    • Chronic left shoulder pain 2018   • Disorder of tendon of shoulder region, left    • Dysphagia    • Esophagitis    • Femoral neck fracture (CMS/HCC) 7/15/2021   • Finger fracture, right     Dr Chandler casting and physical therapy   • Hemorrhoids    • Hypertension    • Localized, primary osteoarthritis of left shoulder region    • Right shoulder pain    • Shoulder joint replacement status     Right   • Stroke (CMS/HCC)     CVA       Past Surgical History:   Procedure Laterality Date   • APPENDECTOMY     • CATARACT EXTRACTION,  BILATERAL      2020   • ENDOSCOPY  07/2009    with biopsy/esophageal ring dilation   • HEMORRHOIDECTOMY      lanced    • HYSTERECTOMY      partial    • OOPHORECTOMY     • SHOULDER SURGERY Right     replacement   • SHOULDER SURGERY      Arthroscopy of shoulder:Right   • TONSILLECTOMY         Family History:   family history includes Bleeding Disorder in her brother and mother; Breast cancer (age of onset: 60) in her mother; Cancer in her mother and sister; Colon cancer in her brother; Coronary artery disease in her father; Heart attack in her father; Osteoarthritis in her mother; Rheum arthritis in her mother; Stroke in her mother. Otherwise pertinent FHx was reviewed and unremarkable.     Social History:  reports that she has never smoked. She has never used smokeless tobacco. She reports that she does not drink alcohol and does not use drugs.  Social History     Social History Narrative   • Not on file       Medications:  Available home medication information reviewed.  (Not in a hospital admission)      Allergies   Allergen Reactions   • Nasal Spray        Objective   Objective     Vital Signs:   Temp:  [98.1 °F (36.7 °C)] 98.1 °F (36.7 °C)  Heart Rate:  [72] 72  Resp:  [20] 20  BP: (186)/(88) 186/88       Physical Exam    gen; alert, oriented, nad  Heent; perrla, eomi, mmm  Cv; rrr, 3/6 sol rsb  L; ctab, no wheeze/crackles  Abd; soft, +bs, ntnd  Ext; rle shortened and externally rotated; sensation intact, pulses palpable  Skin ;cdi, warm  Neuro; grossly intact  Psych; mood and affect appropriate      Result Review:  I have personally reviewed the results from the time of this admission to 7/15/2021 03:40 EDT and agree with these findings:  [x]  Laboratory  []  Microbiology  [x]  Radiology  []  EKG/Telemetry   []  Cardiology/Vascular   []  Pathology  [x]  Old records  []  Other:  Most notable findings include:   Cbc normal, cmp normal  Hip xray; right femoral neck fracture possibly extending into greater  trochanter        LAB RESULTS:      Lab 07/15/21  0257   WBC 8.29   HEMOGLOBIN 14.4   HEMATOCRIT 43.9   PLATELETS 256   NEUTROS ABS 5.84   IMMATURE GRANS (ABS) 0.03   LYMPHS ABS 1.53   MONOS ABS 0.80   EOS ABS 0.06   MCV 93.8         Lab 07/15/21  0257   SODIUM 136   POTASSIUM 4.2   CHLORIDE 100   CO2 23.0   ANION GAP 13.0   BUN 12   CREATININE 0.76   GLUCOSE 109*   CALCIUM 9.0         Lab 07/15/21  0257   TOTAL PROTEIN 7.0   ALBUMIN 4.00   GLOBULIN 3.0   ALT (SGPT) 11   AST (SGOT) 20   BILIRUBIN 0.4   ALK PHOS 75                     UA    Urinalysis 7/29/20 3/24/21   Ketones, UA Negative Negative   Leukocytes, UA Small (1+) (A) Small (1+) (A)   (A) Abnormal value              Microbiology Results (last 10 days)     ** No results found for the last 240 hours. **          XR Hip With or Without Pelvis 2 - 3 View Right    Result Date: 7/15/2021  CR Hip Uni Comp Min 2 Vws RT INDICATION: Hip pain after fall. COMPARISON: None. FINDINGS: AP pelvis and 2 view(s) of the right hip.  Patient is osteopenic. There is an acute right femoral neck fracture. The fracture may extend into the greater trochanter. No hip dislocation is seen. No other acute fractures are identified. There is no sacroiliac joint diastasis.     Impression: Acute right femoral neck fracture which may extend into the greater trochanter. 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 Right femoral  Neck fracture  "possibly into greater trochanter;  -Dr. Olsen notified by ER  -pain control (nerve block in er)  -ekg performed  -coags, t/s  *not on anticoagulation  2. Murmur w/ \"disorder of mitral and aortic valves\"  Noted on hx;  -obtain ECHO  3. Htn;  -resume home meds  4. Hyperlipidemia;  -statin    DVT prophylaxis:  Mechanical lle      CODE STATUS:  Full   There are no questions and answers to display.       Admission Status:  I believe this patient meets INPATIENT status due to femoral neck fracture needing surgical repair.  I feel patient’s risk for adverse outcomes and need for care warrant INPATIENT evaluation and I predict the patient’s care encounter to likely last beyond 2 midnights.      Hilda Marley MD  07/15/21  Electronically signed by Hilda Marley MD, 07/15/21, 3:45 AM EDT.      Electronically signed by Hilda Marley MD at 07/15/21 0346          Physical Therapy Notes (most recent note)      Carson Garibay, PT at 21 1120  Version 1 of 1         Patient Name: Luh Mckeon  : 1930    MRN: [id number]                              Today's Date: 2021       Admit Date: 7/15/2021    Visit Dx:     ICD-10-CM ICD-9-CM   1. Closed fracture of neck of right femur, initial encounter (CMS/[facility])  S72.001A 820.8   2. Closed right hip fracture, initial encounter (CMS/[facility])  S72.001A 820.8     Patient Active Problem List   Diagnosis   • Generalized anxiety disorder   • Unsteady gait   • Risk for falls   • Hyperlipidemia LDL goal <100   • Benign essential hypertension   • Memory loss   • Urinary incontinence in female   • Candida onychomycosis   • AR (allergic rhinitis)   • Overweight (BMI 25.0-29.9)   • Chest pain   • Cramp in limb   • Disorder of mitral and aortic valves   • Disorder of skeletal muscle   • Diverticular disease of colon   • Dysphagia   • External hemorrhoids   •     • Hand joint pain   • Insomnia   • Knee pain   • Menopausal and postmenopausal disorder   • Osteoporosis   • Shoulder pain  "   • Vitamin B deficiency   • Medicare annual wellness visit, subsequent   • Dizziness   • Urgency of urination   • Fatigue   • Femoral neck fracture (CMS/HCC)   • Fall   • Closed fracture of neck of right femur (CMS/HCC)     Past Medical History:   Diagnosis Date   • Ankle instability    • Chronic left shoulder pain 1/26/2018   • Disorder of tendon of shoulder region, left    • Dysphagia    • Esophagitis    • Femoral neck fracture (CMS/HCC) 7/15/2021   • Finger fracture, right 2009    Dr Chandler casting and physical therapy   • Hemorrhoids    • Hypertension    • Localized, primary osteoarthritis of left shoulder region    • Right shoulder pain    • Shoulder joint replacement status     Right   • Stroke (CMS/[facility])     CVA     Past Surgical History:   Procedure Laterality Date   • APPENDECTOMY      Daughter denies   • CATARACT EXTRACTION, BILATERAL      2020   • ENDOSCOPY  07/2009    with biopsy/esophageal ring dilation   • HEMORRHOIDECTOMY      lanced    • HIP HEMIARTHROPLASTY Right 7/15/2021    Procedure: HIP HEMIARTHROPLASTY;  Surgeon: Adam Olsen MD;  [facility];  Service: Orthopedics;  Laterality: Right;   • HYSTERECTOMY      partial    • OOPHORECTOMY     • SHOULDER SURGERY Right     replacement   • SHOULDER SURGERY      Arthroscopy of shoulder:Right   • TONSILLECTOMY       General Information     Row Name 07/19/21 1142          Physical Therapy Time and Intention    Document Type  therapy note (daily note)  -SC     Mode of Treatment  physical therapy  -SC     Row Name 07/19/21 1142          General Information    Patient Profile Reviewed  yes  -SC     Existing Precautions/Restrictions  hip, posterior  -SC     Row Name 07/19/21 1142          Cognition    Orientation Status (Cognition)  oriented to;person;situation  -SC     Row Name 07/19/21 1142          Safety Issues, Functional Mobility    Impairments Affecting Function (Mobility)  balance;cognition;endurance/activity tolerance;pain  -SC      Comment, Safety Issues/Impairments (Mobility)  alert, following directions  -SC       User Key  (r) = Recorded By, (t) = Taken By, (c) = Cosigned By    Initials Name Provider Type    Carson Felton PT Physical Therapist        Mobility     Row Name 07/19/21 1143          Bed Mobility    Bed Mobility  supine-sit;scooting/bridging  -SC     Scooting/Bridging Jerauld (Bed Mobility)  modified independence  -SC     Supine-Sit [place] (Bed Mobility)  verbal cues;minimum assist (75% patient effort)  -SC     Assistive Device (Bed Mobility)  bed rails;head of bed elevated  -SC     Comment (Bed Mobility)  cues for sequencing and using bed rail  -SC     Row Name 07/19/21 1143          Transfers    Comment (Transfers)  STS from EOB/commode with cues for hand placement and leg position  -[facility] Name 07/19/21 1143          Sit-Stand Transfer    Sit-Stand J[place]uld (Transfers)  supervision;contact guard  -SC     Assistive Device (Sit-Stand Transfers)  walker, front-wheeled  -[facility] Name 07/19/21 1143          Gait/Stairs (Locomotion)    J[place] Level (Gait)  contact guard;verbal cues  -SC     Assistive Device (Gait)  walker, front-wheeled  -SC     Distance in Feet (Gait)  180  -SC     Deviations/Abnormal Patterns (Gait)  gait speed decreased  -SC     Bilateral Gait Deviations  forward flexed posture  -SC     Right Sided Gait Deviations  heel strike decreased;weight shift ability decreased  -SC     Comment (Gait/Stairs)  Gt training focused on controling walker with step throught gait pattern. Cues for full Wt shifting. No LOB noted  -SC     Row Name 07/19/21 1143          Mobility    Extremity Weight-bearing Status  right lower extremity  -SC     Right Lower Extremity (Weight-bearing Status)  weight-bearing as tolerated (WBAT)  -SC       User Key  (r) = Recorded By, (t) = Taken By, (c) = Cosigned By    Initials Name Provider Type    Carson Felton, PT Physical Therapist        Obj/Interventions        [facility] Name 07/19/21 1145          Motor Skills    Therapeutic Exercise  knee;hip;ankle  -SC     Row Name 07/19/21 1145          Hip (Therapeutic Exercise)    Hip (Therapeutic Exercise)  AROM (active range of motion)  -SC     Hip AROM (Therapeutic Exercise)  extension;flexion;aBduction;aDduction;right;10 repetitions;supine  -SC     Row Name 07/19/21 1145          Knee (Therapeutic Exercise)    Knee (Therapeutic Exercise)  strengthening exercise;isometric exercises  -SC     Knee Isometrics (Therapeutic Exercise)  bilateral;quad sets;10 repetitions  -SC     Knee Strengthening (Therapeutic Exercise)  right;heel slides;LAQ (long arc quad);10 repetitions  -SC     Row Name 07/19/21 1145          Balance    Balance Assessment  standing dynamic balance  -SC     Dynamic Standing Balance  mild impairment;supported  -SC     Comment, Balance  needs walker  -SC       User Key  (r) = Recorded By, (t) = Taken By, (c) = Cosigned By    Initials Name Provider Type    SC Carson Garibay, PT Physical Therapist        Goals/Plan    No documentation.       Clinical Impression     Row Name 07/19/21 1146          Pain    Additional Documentation  Pain Scale: FACES Pre/Post-Treatment (Group)  -SC     Row Name 07/19/21 1146          Pain Scale: Numbers Pre/Post-Treatment    Pretreatment Pain Rating  0/10 - no pain  -SC     Posttreatment Pain Rating  0/10 - no pain  -SC     Row Name 07/19/21 1146          Plan of Care Review    Plan of Care Reviewed With  patient  -SC     Progress  improving  -SC     Outcome Summary  Patient participated well in therapeutic activities. She has increased her ambulation to 180 feet with walker .  -SC     Row Name 07/19/21 1146          Therapy Assessment/Plan (PT)    Rehab Potential (PT)  good, to achieve stated therapy goals  -SC     Criteria for Skilled Interventions Met (PT)  yes  -SC     Row Name 07/19/21 1146          Positioning and Restraints    Pre-Treatment Position  in bed  -SC     Post Treatment  Position  chair  -SC     In Chair  notified nsg;reclined;sitting;call light within reach;encouraged to call for assist;exit alarm on;with family/caregiver  -SC       User Key  (r) = Recorded By, (t) = Taken By, (c) = Cosigned By    Initials Name Provider Type    Carson Felton PT Physical Therapist        Outcome Measures     Row Name 07/19/21 1148          How much help from another person do you currently need...    Turning from your back to your side while in flat bed without using bedrails?  3  -SC     Moving from lying on back to sitting on the side of a flat bed without bedrails?  3  -SC     Moving to and from a bed to a chair (including a wheelchair)?  3  -SC     Standing up from a chair using your arms (e.g., wheelchair, bedside chair)?  3  -SC     Climbing 3-5 steps with a railing?  3  -SC     To walk in hospital room?  3  -SC     AM-PAC 6 Clicks Score (PT)  18  -SC     Row Name 07/19/21 1148          Functional Assessment    Outcome Measure Options  AM-PAC 6 Clicks Basic Mobility (PT)  -SC       User Key  (r) = Recorded By, (t) = Taken By, (c) = Cosigned By    Initials Name Provider Type    Carson Felton PT Physical Therapist        Physical Therapy Education                 Title: PT OT SLP Therapies (In Progress)     Topic: Physical Therapy (In Progress)     Point: Mobility training (In Progress)     Learning Progress Summary           Patient LESLY Simon VU by SC at 7/19/2021 1148    Comment: reviewed safety with mobility    LESLY Simon D, NR by CT at 7/18/2021 0953    Comment: Memory issues impair retenion of information    LESLY Simon,D, NR by CT at 7/17/2021 1105    Acceptance, D,E, NR,NL by  at 7/16/2021 1330    Comment: Educated patient on posterior hip precautions, importance of PT, importance of mobility, and plan for POC.   Family LESLY Simon,D, NR by CT at 7/18/2021 0953    Comment: Memory issues impair retenion of information    LESLY Simon,D, NR by CT at 7/17/2021 1105                   Point:  Home exercise program (In Progress)     Learning Progress Summary           Patient LESLY Simon, VU by SC at 7/19/2021 1148    Comment: reviewed safety with mobility    Eager, E,D, NR by CT at 7/18/2021 0953    Comment: Memory issues impair retenion of information    Eager, E,D, NR by CT at 7/17/2021 1105    Acceptance, D,E, NR,NL by CS at 7/16/2021 1330    Comment: Educated patient on posterior hip precautions, importance of PT, importance of mobility, and plan for POC.   Family Eager, E,D, NR by CT at 7/18/2021 0953    Comment: Memory issues impair retenion of information    Eager, E,D, NR by CT at 7/17/2021 1105                   Point: Body mechanics (In Progress)     Learning Progress Summary           Patient LESLY Simon, VU by SC at 7/19/2021 1148    Comment: reviewed safety with mobility    Eager, E,D, NR by CT at 7/18/2021 0953    Comment: Memory issues impair retenion of information    Eager, E,D, NR by CT at 7/17/2021 1105    Acceptance, D,E, NR,NL by CS at 7/16/2021 1330    Comment: Educated patient on posterior hip precautions, importance of PT, importance of mobility, and plan for POC.   Family Eager, E,D, NR by CT at 7/18/2021 0953    Comment: Memory issues impair retenion of information    Eager, E,D, NR by CT at 7/17/2021 1105                   Point: Precautions (In Progress)     Learning Progress Summary           Patient Aurora, LESLY, VU by SC at 7/19/2021 1148    Comment: reviewed safety with mobility    Eager, E,D, NR by CT at 7/18/2021 0953    Comment: Memory issues impair retenion of information    Eager, E,D, NR by CT at 7/17/2021 1105    Acceptance, D,E, NR,NL by CS at 7/16/2021 1330    Comment: Educated patient on posterior hip precautions, importance of PT, importance of mobility, and plan for POC.   Family Eager, E,D, NR by CT at 7/18/2021 0953    Comment: Memory issues impair retenion of information    Eager, E,D, NR by CT at 7/17/2021 1104                               User Key     Initials  Effective Dates Name Provider Type Discipline    SC 21 -  Carson Garibay PT Physical Therapist PT    CT 21 -  Eduard Peña, ELIUD Physical Therapist PT    CS 21 -  Kaila Alfonso PT Physical Therapist PT              PT Recommendation and Plan     Plan of Care Reviewed With: patient  Progress: improving  Outcome Summary: Patient participated well in therapeutic activities. She has increased her ambulation to 180 feet with walker .     Time Calculation:   PT Charges     Row Name 21 1120             Time Calculation    Start Time  1120  -SC      PT Received On  21  -SC      PT Goal Re-Cert Due Date  21  -SC         Time Calculation- PT    Total Timed Code Minutes- PT  23 minute(s)  -SC         Timed Charges    34854 - PT Therapeutic Exercise Minutes  10  -SC      15438 - Gait Training Minutes   10  -SC      75978 - PT Therapeutic Activity Minutes  3  -SC         Total Minutes    Timed Charges Total Minutes  23  -SC       Total Minutes  23  -SC        User Key  (r) = Recorded By, (t) = Taken By, (c) = Cosigned By    Initials Name Provider Type    SC Carson Garibay, PT Physical Therapist        Therapy Charges for Today     Code Description Service Date Service Provider Modifiers Qty    15831759811 HC PT THER PROC EA 15 MIN 2021 Carson Garibay, PT GP 1    81111888148 HC GAIT TRAINING EA 15 MIN 2021 Carson Garibay, PT GP 1          PT G-Codes  Outcome Measure Options: AM-PAC 6 Clicks Basic Mobility (PT)  AM-PAC 6 Clicks Score (PT): 18  AM-PAC 6 Clicks Score (OT): 12    Carson Garibay PT  2021      Electronically signed by Carson Garibay PT at 21 1149          Occupational Therapy Notes (most recent note)      Sylwia Blanca, OT at 21 1315          Patient Name: Luh Mckeon  : 1930    MRN: [id number]                              Today's Date: 2021       Admit Date: 7/15/2021    Visit Dx:     ICD-10-CM ICD-9-CM      1. Closed fracture of neck of right femur, initial encounter (CMS/HCC)  S72.001A 820.8   2. Closed right hip fracture, initial encounter (CMS/HCC)  S72.001A 820.8     Patient Active Problem List   Diagnosis   • Generalized anxiety disorder   • Unsteady gait   • Risk for falls   • Hyperlipidemia LDL goal <100   • Benign essential hypertension   • Memory loss   • Urinary incontinence in female   • Candida onychomycosis   • AR (allergic rhinitis)   • Overweight (BMI 25.0-29.9)   • Chest pain   • Cramp in limb   • Disorder of mitral and aortic valves   • Disorder of skeletal muscle   • Diverticular disease of colon   • Dysphagia   • External hemorrhoids   •     • Hand joint pain   • Insomnia   • Knee pain   • Menopausal and postmenopausal disorder   • Osteoporosis   • Shoulder pain   • Vitamin B deficiency   • Medicare annual wellness visit, subsequent   • Dizziness   • Urgency of urination   • Fatigue   • Femoral neck fracture (CMS/[facility])   • Fall   • Closed fracture of neck of right femur (CMS/[facility])     Past Medical History:   Diagnosis Date   • Ankle instability    • Chronic left shoulder pain 1/26/2018   • Disorder of tendon of shoulder region, left    • Dysphagia    • Esophagitis    • Femoral neck fracture (CMS/[facility]) 7/15/2021   • Finger fracture, right 2009    Dr Chandler casting and physical therapy   • Hemorrhoids    • Hypertension    • Localized, primary osteoarthritis of left shoulder region    • Right shoulder pain    • Shoulder joint replacement status     Right   • Stroke (CMS/HCC)     CVA     Past Surgical History:   Procedure Laterality Date   • APPENDECTOMY      Daughter denies   • CATARACT EXTRACTION, BILATERAL      2020   • ENDOSCOPY  07/2009    with biopsy/esophageal ring dilation   • HEMORRHOIDECTOMY      lanced    • HIP HEMIARTHROPLASTY Right 7/15/2021    Procedure: HIP HEMIARTHROPLASTY;  Surgeon: Adam Olsen MD;  Location: [facility];  Service: Orthopedics;  Laterality: Right;   • HYSTERECTOMY    "   partial    • OOPHORECTOMY     • SHOULDER SURGERY Right     replacement   • SHOULDER SURGERY      Arthroscopy of shoulder:Right   • TONSILLECTOMY       General Information     Row Name 07/16/21 1400          OT Time and Intention    Document Type  evaluation  -TB     Mode of Treatment  occupational therapy;individual therapy  -TB     Row Name 07/16/21 1400          General Information    Patient Profile Reviewed  yes  -TB     Prior Level of Function  independent:;all household mobility;transfer;bed mobility;ADL's;driving;using stairs Pt lives alone at baseline and family reports independent with family support  -TB     Existing Precautions/Restrictions  fall;right;hip, posterior;brace worn when out of bed KI when up due to fascia iliaca block  -TB     Barriers to Rehab  cognitive status  -TB     Row Name 07/16/21 1400          Occupational Profile    Reason for Services/Referral (Occupational Profile)  to advance occupational engagement  -TB     Row Name 07/16/21 1400          Living Environment    Lives With  alone;other (see comments) family support  -TB     Row Name 07/16/21 1400          Home Main Entrance    Number of Stairs, Main Entrance  four  -TB     Stair Railings, Main Entrance  railings safe and in good condition  -TB     Row Name 07/16/21 1400          Stairs Within Home, Primary    Number of Stairs, Within Home, Primary  none  -TB     Row Name 07/16/21 1400          Cognition    Orientation Status (Cognition)  oriented to;person;disoriented to;place;situation;time;verbal cues/prompts needed for orientation \"July 1962\"  -TB     Row Name 07/16/21 1400          Safety Issues, Functional Mobility    Safety Issues Affecting Function (Mobility)  ability to follow commands;at risk behavior observed;awareness of need for assistance;insight into deficits/self-awareness;judgment;safety precaution awareness;safety precautions follow-through/compliance  -TB     Impairments Affecting Function (Mobility)  " cognition;range of motion (ROM)  -TB     Cognitive Impairments, Mobility Safety/Performance  attention;awareness, need for assistance;insight into deficits/self-awareness;judgment;safety precaution awareness;safety precaution follow-through  -TB     Comment, Safety Issues/Impairments (Mobility)  Mobility deferred due to acute confusion/agitation and poor command following.  -       User Key  (r) = Recorded By, (t) = Taken By, (c) = Cosigned By    Initials Name Provider Type     Sylwia Blanca, OT Occupational Therapist          Mobility/ADL's     Row Name 07/16/21 1405          Bed Mobility    Comment (Bed Mobility)  Deferred due to acute confusion/agitation.  -     Row Name 07/16/21 1405          Transfers    Comment (Transfers)  Deferred due to acute confusion/agitation.  -     Row Name 07/16/21 140          Functional Mobility    Functional Mobility- Comment  Deferred due to acute confusion/agitation.  -     Row Name 07/16/21 1405          Activities of Daily Living    BADL Assessment/Intervention  upper body dressing;lower body dressing;feeding;toileting  -     Row Name 07/16/21 1405          Mobility    Extremity Weight-bearing Status  right lower extremity  -TB     Right Lower Extremity (Weight-bearing Status)  weight-bearing as tolerated (WBAT) WBAT protected by RW AAT  -     Row Name 07/16/21 1405          Upper Body Dressing Assessment/Training    [place] Level (Upper Body Dressing)  doff;don;pajama/robe;maximum assist (25% patient effort);verbal cues  -TB     Position (Upper Body Dressing)  sitting up in bed  -     Row Name 07/16/21 1405          Lower Body Dressing Assessment/Training    [place] Level (Lower Body Dressing)  doff;don;socks;dependent (less than 25% patient effort);verbal cues  -TB     Position (Lower Body Dressing)  sitting up in bed  -TB     Comment (Lower Body Dressing)  s/p R YESSICA with PHP x6 weeks  -     Row Name 07/16/21 1405          Self-Feeding  Assessment/Training    [place] Level (Feeding)  set up;liquids to mouth  -TB     Position (Self-Feeding)  sitting up in bed  -TB     Row Name 07/16/21 1405          Toileting Assessment/Training    [place] Level (Toileting)  dependent (less than 25% patient effort)  -TB     Position (Toileting)  sitting up in bed  -TB     Comment (Toileting)  purwick  -TB       User Key  (r) = Recorded By, (t) = Taken By, (c) = Cosigned By    Initials Name Provider Type    TB Sylwia Blanca OT Occupational Therapist        Obj/Interventions     Row Name 07/16/21 1408          Sensory Assessment (Somatosensory)    Sensory Assessment (Somatosensory)  unable/difficult to assess Pt responds to touch  -TB     Row Name 07/16/21 1408          Vision Assessment/Intervention    Visual Impairment/Limitations  WFL  -TB     Row Name 07/16/21 1408          Range of Motion Comprehensive    General Range of Motion  bilateral upper extremity ROM WFL  -TB     Comment, General Range of Motion  BUE intact  -TB     Row Name 07/16/21 1408          Strength Comprehensive (MMT)    General Manual Muscle Testing (MMT) Assessment  no strength deficits identified  -TB     Comment, General Manual Muscle Testing (MMT) Assessment  BUE intact, functionally 4+/5  -TB       User Key  (r) = Recorded By, (t) = Taken By, (c) = Cosigned By    Initials Name Provider Type    TB Sylwia Blanca OT Occupational Therapist        Goals/Plan     Row Name 07/16/21 1416          Bed Mobility Goal 1 (OT)    Activity/Assistive Device (Bed Mobility Goal 1, OT)  sit to supine/supine to sit  -TB     [place] Level/Cues Needed (Bed Mobility Goal 1, OT)  moderate assist (50-74% patient effort);tactile cues required;verbal cues required  -TB     Time Frame (Bed Mobility Goal 1, OT)  by discharge  -TB     Strategies/Barriers (Bed Mobility Goal 1, OT)  RLE PHP  -TB     Progress/Outcomes (Bed Mobility Goal 1, OT)  goal ongoing  -TB     Row Name 07/16/21 1416           Transfer Goal 1 (OT)    Activity/Assistive Device (Transfer Goal 1, OT)  sit-to-stand/stand-to-sit;bed-to-chair/chair-to-bed;toilet;walker, rolling  -TB     [place] Level/Cues Needed (Transfer Goal 1, OT)  minimum assist (75% or more patient effort);tactile cues required;verbal cues required  -TB     Time Frame (Transfer Goal 1, OT)  by discharge  -TB     Strategies/Barriers (Transfers Goal 1, OT)  RLE PHP  -TB     Progress/Outcome (Transfer Goal 1, OT)  goal ongoing  -TB     Row Name 07/16/21 1416          Dressing Goal 1 (OT)    Activity/Device (Dressing Goal 1, OT)  lower body dressing  -TB     [place]/Cues Needed (Dressing Goal 1, OT)  moderate assist (50-74% patient effort);tactile cues required;verbal cues required  -TB     Time Frame (Dressing Goal 1, OT)  by discharge  -TB     Strategies/Barriers (Dressing Goal 1, OT)  RLE PHP. AE TBD in treatment.  -TB     Progress/Outcome (Dressing Goal 1, OT)  goal ongoing  -TB       User Key  (r) = Recorded By, (t) = Taken By, (c) = Cosigned By    Initials Name Provider Type    TB Sylwia Blanca, OT Occupational Therapist        Clinical Impression     Row Name 07/16/21 8721          Pain Assessment    Additional Documentation  Pain Scale: FACES Pre/Post-Treatment (Group)  -TB     Row Name 07/16/21 5086          Pain Scale: FACES Pre/Post-Treatment    Pain: FACES Scale, Pretreatment  0-->no hurt  -TB     Posttreatment Pain Rating  0-->no hurt  -TB     Pain Location - Side  Right  -TB     Pain Location  hip  -TB     Pre/Posttreatment Pain Comment  No indication of pain  -TB     Row Name 07/16/21 4485          Plan of Care Review    Plan of Care Reviewed With  patient;daughter;grandchild(ralph)  -TB     Outcome Summary  OT IE completed. Pt is alert, Ox1 with acute confusion and agitated by stimulation. OOB activity deferred for safety as pt unable to follow commands or maintain PHP. BUE AROM and strength are intact (4+/5) Max A UB dressing.  Dependent LB dressing and toileting. Set-up to bring drink to mouth. Education initated with family for RLE PHPs. Family provided all history and DME in place at home. OT will follow IP. Recommend rehab at d/c for best outcome.  -TB     Row Name 07/16/21 1409          Therapy Assessment/Plan (OT)    Rehab Potential (OT)  fair, will monitor progress closely  -TB     Criteria for Skilled Therapeutic Interventions Met (OT)  yes;skilled treatment is necessary  -TB     Therapy Frequency (OT)  daily  -TB     Row Name 07/16/21 1407          Therapy Plan Review/Discharge Plan (OT)    Equipment Needs Upon Discharge (OT)  shower chair  -TB     Anticipated Discharge Disposition (OT)  inpatient rehabilitation facility  -TB     Row Name 07/16/21 1407          Vital Signs    Pre Systolic BP Rehab  -- RN cleared OT  -TB     O2 Delivery Pre Treatment  room air  -TB     O2 Delivery Intra Treatment  room air  -TB     O2 Delivery Post Treatment  room air  -TB     Pre Patient Position  Supine  -TB     Intra Patient Position  Supine  -TB     Post Patient Position  Supine  -TB     Row Name 07/16/21 1404          Positioning and Restraints    Pre-Treatment Position  in bed  -TB     Post Treatment Position  bed  -TB     In Bed  fowlers;call light within reach;encouraged to call for assist;exit alarm on;with family/caregiver;with nsg  -TB       User Key  (r) = Recorded By, (t) = Taken By, (c) = Cosigned By    Initials Name Provider Type    TB Sylwia Blacna, OT Occupational Therapist        Outcome Measures     Row Name 07/16/21 8692          How much help from another is currently needed...    Putting on and taking off regular lower body clothing?  1  -TB     Bathing (including washing, rinsing, and drying)  2  -TB     Toileting (which includes using toilet bed pan or urinal)  1  -TB     Putting on and taking off regular upper body clothing  2  -TB     Taking care of personal grooming (such as brushing teeth)  3  -TB     Eating  meals  3  -TB     AM-PAC 6 Clicks Score (OT)  12  -TB     Row Name 07/16/21 0800          How much help from another person do you currently need...    Turning from your back to your side while in flat bed without using bedrails?  2  -LF     Moving from lying on back to sitting on the side of a flat bed without bedrails?  2  -LF     Moving to and from a bed to a chair (including a wheelchair)?  2  -LF     Standing up from a chair using your arms (e.g., wheelchair, bedside chair)?  2  -LF     Climbing 3-5 steps with a railing?  1  -LF     To walk in hospital room?  2  -LF     AM-PAC 6 Clicks Score (PT)  11  -LF     Row Name 07/16/21 1417          Functional Assessment    Outcome Measure Options  AM-PAC 6 Clicks Daily Activity (OT)  -       User Key  (r) = Recorded By, (t) = Taken By, (c) = Cosigned By    Initials Name Provider Type     Sylwia Blanca OT Occupational Therapist     Kera Justice RN Registered Nurse        Occupational Therapy Education                 Title: PT OT SLP Therapies (In Progress)     Topic: Occupational Therapy (In Progress)     Point: ADL training (In Progress)     Description:   Instruct learner(s) on proper safety adaptation and remediation techniques during self care or transfers.   Instruct in proper use of assistive devices.              Learning Progress Summary           Patient Acceptance, E, NR,NL by TB at 7/16/2021 1418   Family Acceptance, E, NR,NL by TB at 7/16/2021 1418                   Point: Precautions (In Progress)     Description:   Instruct learner(s) on prescribed precautions during self-care and functional transfers.              Learning Progress Summary           Patient Acceptance, E, NR,NL by TB at 7/16/2021 1418   Family Acceptance, E, NR,NL by TB at 7/16/2021 1418                               User Key     Initials Effective Dates Name Provider Type Discipline     06/16/21 -  Sylwia Blanca OT Occupational Therapist OT              OT  Recommendation and Plan  Therapy Frequency (OT): daily  Plan of Care Review  Plan of Care Reviewed With: patient, daughter, grandchild(ralph)  Outcome Summary: OT IE completed. Pt is alert, Ox1 with acute confusion and agitated by stimulation. OOB activity deferred for safety as pt unable to follow commands or maintain PHP. BUE AROM and strength are intact (4+/5) Max A UB dressing. Dependent LB dressing and toileting. Set-up to bring drink to mouth. Education initated with family for RLE PHPs. Family provided all history and DME in place at home. OT will follow IP. Recommend rehab at d/c for best outcome.     Time Calculation:   Time Calculation- OT     Row Name 21 1315             Time Calculation- OT    OT Start Time  1315  -TB      OT Received On  21  -TB      OT Goal Re-Cert Due Date  21  -TB         Untimed Charges    OT Eval/Re-eval Minutes  50  -TB         Total Minutes    Untimed Charges Total Minutes  50  -TB       Total Minutes  50  -TB        User Key  (r) = Recorded By, (t) = Taken By, (c) = Cosigned By    Initials Name Provider Type    TB Sylwia Blanca OT Occupational Therapist        Therapy Charges for Today     Code Description Service Date Service Provider Modifiers Qty    85383984014 HC OT EVAL MOD COMPLEXITY 4 2021 Sylwia Blanca OT GO 1               Sylwia Blanca OT  2021    Electronically signed by Sylwia Blanca OT at 21 1424       Speech Language Pathology Notes (most recent note)    No notes exist for this encounter.              Discharge Summary      Natalie Peña APRN at 21 1401              [facility] Medicine Services  DISCHARGE SUMMARY    Patient Name: Luh Mckeon  : 1930  MRN: [id number]    Date of Admission: 7/15/2021  Date of Discharge:  2021  Primary Care Physician: Schuyler Garcia MD    Consults     Date and Time Order Name Status Description    7/15/2021 10:01  PM Inpatient Consult to Hospitalist      7/15/2021  5:35 AM Inpatient Orthopedic Surgery Consult Completed         Hospital Course     Presenting Problem:   Femoral neck fracture (CMS/[facility]) [S72.009A]    Active Hospital Problems    Diagnosis  POA   • 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 female  history of HTN, HLD, osteoporosis and urinary frequency present after a fall. Imaging revealed a right femoral neck fracture.  Ortho consulted.  Patient is now s/p femur arthroplasty 715 per Dr. Olsen.  Pain is well controlled.  During hospitalization, patient found to have acute urinary retention with UTI.  Patient treated with Ancef perioperatively, continued on Omnicef until 7/21.    Discharge Follow Up Recommendations for labs/diagnostics:  - F/U with PCP in 1 week after discharge from rehab  - F/U with Dr Olsen in 3 weeks  - Protected weight bearing as tolerated right lower extremity, posterior precautions x6 weeks   - Replace dressing with Covaderm.  - Aspirin 81 mg bid for DVT prophylaxis   - Dressing to remain in place for 7 days. May remove on POD#7. If no drainage, may shower on POD#10. No submerging wound in water. If drainage is noted, sterile dressing should be placed and wound checked daily. No showering until wound has remained dry for 72 consecutive hours.     Day of Discharge     HPI:   Patient resting in chair, pleasant, talkative.  Patient denies pain today.  Daughters at the bedside.    Review of Systems   Gen- No fevers, chills  CV- No chest pain, palpitations  Resp- No cough, dyspnea  GI- No N/V/D, abd  pain  Otherwise ROS is negative except as mentioned in the HPI.    Vital Signs:   Temp:  [97.6 °F (36.4 °C)-98.3 °F (36.8 °C)] 98.1 °F (36.7 °C)  Heart Rate:  [71-85] 79  Resp:  [14-18] 18  BP: (128-145)/(68-88) 145/77     Physical Exam:  Constitutional: Awake, alert, NAD  Eyes: PERRLA, sclerae anicteric, no conjunctival injection  HENT: NCAT, mucous membranes moist  Neck: Supple, no thyromegaly, no lymphadenopathy, trachea midline  Respiratory: Clear to auscultation bilaterally, nonlabored respirations   Cardiovascular: RRR, 2/6 murmurs, no rubs, or gallops, palpable pedal pulses bilaterally  Gastrointestinal: Positive bowel sounds, soft, nontender, nondistended  Musculoskeletal: No bilateral ankle edema, no clubbing or cyanosis to extremities  Psychiatric: Appropriate affect, cooperative  Neurologic: Oriented x 3, strength symmetric in all extremities, Cranial Nerves grossly intact to confrontation, speech clear  Skin: No rashes    Assessment/Plan  Right femoral neck fracture-stable  - hemiarthroplasty 7/15 Dr Olsen  - PT/OT- able to ambulate in mccrary with gaitbelt and rolling walker  - aspirin 81 mg bid for DVT prophylaxis per surgeon's note until she for follow up    Acute urinary retention-resolved  UTI-improving  -Cefdinir, Myrbetriq     Confusion, likely from UTI/resolved to baseline  -improved with antibiotic therapy     HTN-stable  -Losartan 25 mg daily     HLD-stable  - Crestor 5 mg Daily     Osteoporosis-ongoing      Pertinent  and/or Most Recent Results     Results from last 7 days   Lab Units 07/17/21  0726 07/16/21  0404 07/15/21  0257   WBC 10*3/mm3 11.55*  --  8.29   HEMOGLOBIN g/dL 11.9* 11.7* 14.4   HEMATOCRIT % 36.6 36.3 43.9   PLATELETS 10*3/mm3 198  --  256   SODIUM mmol/L 134* 132* 136   POTASSIUM mmol/L 4.0 4.4 4.2   CHLORIDE mmol/L 103 101 100   CO2 mmol/L 21.0* 24.0 23.0   BUN mg/dL 7* 9 12   CREATININE mg/dL 0.46* 0.64 0.76   GLUCOSE mg/dL 98 110* 109*   CALCIUM mg/dL 8.0* 7.6* 9.0      Results from last 7 days   Lab Units 07/15/21  0406 07/15/21  0257   BILIRUBIN mg/dL  --  0.4   ALK PHOS U/L  --  75   ALT (SGPT) U/L  --  11   AST (SGOT) U/L  --  20   PROTIME seconds 12.1* >120.0*   INR  1.0 >10.00*       Brief Urine Lab Results  (Last result in the past 365 days)      Color   Clarity   Blood   Leuk Est   Nitrite   Protein   CREAT   Urine HCG        07/17/21 1030 Yellow Clear Trace Moderate (2+) Negative Negative               Microbiology Results Abnormal     Procedure Component Value - Date/Time    Urine Culture - Urine, Urine, Catheter In/Out [[id number]]  (Normal) Collected: 07/17/21 1030    Lab Status: Final result Specimen: Urine, Catheter In/Out Updated: 07/18/21 1214    Narrative:      Growth present not clinically significant for workup. No further testing will be performed.     COVID PRE-OP / PRE-PROCEDURE SCREENING ORDER (NO ISOLATION) - Swab, Nasopharynx [[id number]]  (Normal) Collected: 07/15/21 0426    Lab Status: Final result Specimen: Swab from Nasopharynx Updated: 07/15/21 0508    Narrative:      The following orders were created for panel order COVID PRE-OP / PRE-PROCEDURE SCREENING ORDER (NO ISOLATION) - Swab, Nasopharynx.  Procedure                               Abnormality         Status                     ---------                               -----------         ------                     COVID-19 and FLU A/B PCR...[[id number]]  Normal              Final result                 Please view results for these tests on the individual orders.    COVID-19 and FLU A/B PCR - Swab, Nasopharynx [[id number]]  (Normal) Collected: 07/15/21 0426    Lab Status: Final result Specimen: Swab from Nasopharynx Updated: 07/15/21 0508     COVID19 Not Detected     Influenza A PCR Not Detected     Influenza B PCR Not Detected    Narrative:      Fact sheet for providers: https://www.fda.gov/media/138545/download    Fact sheet for patients: https://www.fda.gov/media/280331/download    Test  performed by PCR.          Imaging Results (All)     Procedure Component Value Units Date/Time    XR Pelvis 1 or 2 View [[id number]] Collected: 07/15/21 2246     Updated: 07/15/21 2248    Narrative:      CR Pelvis 1 or 2 Vws    INDICATION:   Intraoperative exam for right hip arthroplasty.    COMPARISON:   7/15/2021    FINDINGS:  AP view(s) of the pelvis.  There is artifact from operative hardware. The femoral component of a right hip arthroplasty has been placed and is grossly properly aligned. Please see the operative report.      Signer Name: Oscar Bajwa MD   Signed: 7/15/2021 10:46 PM   Workstation Name: [facility]    Radiology [facility]    XR Pelvis 1 or 2 View [[id number]] Collected: 07/15/21 2245     Updated: 07/15/21 2247    Narrative:      CR Pelvis 1 or 2 Vws    INDICATION:   Status post right hip arthroplasty.    COMPARISON:   7/15/2021    FINDINGS:  AP view(s) of the pelvis.  There has been interval right hip arthroplasty. Hardware shows expected alignment with no complications. Soft tissue drain is present. There is degenerative disease in the left hip. The iliac crests are not included in the  field-of-view.      Impression:      Satisfactory appearance of a new right hip arthroplasty.    Signer Name: Oscar Bajwa MD   Signed: 7/15/2021 10:45 PM   Workstation Name: [facility]    Radiology [facility]    CT Pelvis Without Contrast [[id number]] Collected: 07/15/21 0905     Updated: 07/15/21 0908    Narrative:      CT Pelvis WO    INDICATION:    Right hip pain status post fall today. Right femoral neck fracture. Order requesting evaluation of fracture extent.    TECHNIQUE:   CT of the pelvis without IV contrast. Coronal and sagittal reconstructions were obtained.  Radiation dose reduction techniques included automated exposure control or exposure modulation based on body size. Count of known CT and cardiac nuc med studies  performed in previous 12 months: 0.      COMPARISON:    Right hip x-rays 7/15/2021    FINDINGS:  There is a moderately impacted and angulated oblique fracture through the right femoral neck, primarily involving the subcapital and transcervical regions. No evidence of fracture extension into the intertrochanteric region. Moderate apex anterior  angulation at the fracture site. Right femoral head is normally located. Small right hip effusion.    Bony pelvis, left hip, sacrum, and sacroiliac joints are intact. Multilevel degenerative changes in the utilized lumbar spine. Pelvic soft tissues and musculature are unremarkable. Included visceral pelvis demonstrates moderate distention of the urinary  bladder. There is uncomplicated colonic diverticulosis. No free pelvic fluid.      Impression:      1. Moderately impacted and angulated oblique fracture of the right femoral neck, primarily involving the subcapital and transcervical regions. No evidence of fracture extension into the intertrochanteric region. Right hip normally located.  2. Bony pelvis, sacrum, and left hip appear intact.  3. Moderate distention urinary bladder.  4. No free pelvic fluid.    Signer Name: Garth Neumann MD   Signed: 7/15/2021 9:05 AM   Workstation Name: [id number]    [facility]    XR Hip With or Without Pelvis 2 - 3 View Right [[id number]] Collected: 07/15/21 0304     Updated: 07/15/21 0306    Narrative:      CR Hip Uni Comp Min 2 Vws RT    INDICATION:   Hip pain after fall.    COMPARISON:   None.    FINDINGS:  AP pelvis and 2 view(s) of the right hip.  Patient is osteopenic. There is an acute right femoral neck fracture. The fracture may extend into the greater trochanter. No hip dislocation is seen. No other acute fractures are identified. There is no  sacroiliac joint diastasis.      Impression:      Acute right femoral neck fracture which may extend into the greater trochanter.    Signer Name: Oscar Bajwa MD   Signed: 7/15/2021 3:04 AM   Workstation  Name: [facility]    [facility]        Results for orders placed during the hospital encounter of 07/15/21    Adult Transthoracic Echo Complete w/ Color, Spectral and Contrast if necessary per protocol    Interpretation Summary  · Estimated left ventricular EF = 60% Left ventricular systolic function is normal.  · Left ventricular wall thickness is consistent with borderline concentric hypertrophy.  · Left ventricular diastolic dysfunction is noted.  · Moderate to severe aortic valve stenosis is present.  · Aortic valve maximum pressure gradient is 59.2 mmHg. Aortic valve mean pressure gradient is 37.5 mmHg.  · Moderate mitral valve regurgitation is present.        Discharge Details        Discharge Medications      New Medications      Instructions Start Date   acetaminophen 500 MG tablet  Commonly known as: TYLENOL   1,000 mg, Oral, Every 8 Hours      aspirin 81 MG EC tablet   81 mg, Oral, Every 12 Hours Scheduled      cefdinir 300 MG capsule  Commonly known as: OMNICEF   300 mg, Oral, Every 12 Hours Scheduled      meloxicam 15 MG tablet  Commonly known as: MOBIC   15 mg, Oral, Daily   Start Date: July 20, 2021        Continue These Medications      Instructions Start Date   losartan 25 MG tablet  Commonly known as: COZAAR   1 tab per day      Mirabegron ER 50 MG tablet sustained-release 24 hour 24 hr tablet  Commonly known as: Myrbetriq   50 mg, Oral, Daily      Prolia 60 MG/ML solution prefilled syringe syringe  Generic drug: denosumab   60 mg, Subcutaneous, Every 6 Months      rosuvastatin 5 MG tablet  Commonly known as: CRESTOR   5 mg, Oral, Daily      VITAMIN D3 PO   Oral             Allergies   Allergen Reactions   • Nasal Spray Cough     Discharge Disposition:  Rehab Facility or Unit (DC - External)    Discharge Diet:  Diet Order   Procedures   • Diet Regular     Discharge Activity:   Activity Instructions     Activity as Tolerated      Activity as Tolerated      Other Activity  Instructions      Activity Instructions: Protected weight bearing as tolerated right lower extremity, posterior precautions x6 weeks    Other Activity Restrictions      Type of Restriction: Other    Explain Other Restrictions: Protected weight bearing as tolerated right lower extremity, posterior precautions x6 weeks    Up WIth Assist      Up WIth Assist          CODE STATUS:    Code Status and Medical Interventions:   Ordered at: 07/19/21 1402     Level Of Support Discussed With:    Patient     Code Status:    CPR     Medical Interventions (Level of Support Prior to Arrest):    Full     Future Appointments   Date Time Provider Department Center   8/6/2021  9:30 AM Adam Olsen MD [facility]   9/3/2021  3:20 PM [facility]   9/30/2021 10:30 AM [facility] Garcia MD [facility]       Additional Instructions for the Follow-ups that You Need to Schedule     Discharge Follow-up with PCP   As directed       Currently Documented PCP:    [facility] Garcia MD    PCP Phone Number:    [phone]     Follow Up Details: F/U with PCP in 1 week after discharge from rehab         Discharge Follow-up with PCP   As directed       Currently Documented PCP:    [facility] Garcia MD    PCP Phone Number:    [phone]     Follow Up Details: F/U with PCP 1 week after discharge from rehab         Discharge Follow-up with Specialty: JANE Couch; 3 Weeks   As directed      Specialty: JANE Couch    Follow Up: 3 Weeks         Discharge Follow-up with Specified Provider: Dr Olsen; 3 Weeks   As directed      To: Dr Olsen    Follow Up: 3 Weeks         Discharge Follow-up with Specified Provider: Dr Olsen; 3 Weeks   As directed      To: Dr Olsen    Follow Up: 3 Weeks             Time Spent on Discharge:  40 minutes    Electronically signed by CHANTELLE Montaño, 07/19/21, 2:02 PM EDT.      Electronically signed by Natalie Peña APRN at 07/19/21 8030

## 2021-08-06 ENCOUNTER — OFFICE VISIT (OUTPATIENT)
Dept: ORTHOPEDIC SURGERY | Facility: CLINIC | Age: 86
End: 2021-08-06

## 2021-08-06 VITALS — TEMPERATURE: 97.8 F

## 2021-08-06 DIAGNOSIS — Z96.649 STATUS POST HIP HEMIARTHROPLASTY: Primary | ICD-10-CM

## 2021-08-06 PROCEDURE — 99024 POSTOP FOLLOW-UP VISIT: CPT | Performed by: ORTHOPAEDIC SURGERY

## 2021-08-06 RX ORDER — CLONIDINE 0.1 MG/24H
1 PATCH, EXTENDED RELEASE TRANSDERMAL WEEKLY
COMMUNITY
End: 2021-08-22

## 2021-08-06 RX ORDER — PHENAZOPYRIDINE HYDROCHLORIDE 100 MG/1
100 TABLET, FILM COATED ORAL 3 TIMES DAILY
COMMUNITY
End: 2021-08-25 | Stop reason: HOSPADM

## 2021-08-06 RX ORDER — ATORVASTATIN CALCIUM 10 MG/1
10 TABLET, FILM COATED ORAL DAILY
COMMUNITY
End: 2021-08-09

## 2021-08-06 RX ORDER — DOCUSATE SODIUM 100 MG/1
100 CAPSULE, LIQUID FILLED ORAL 2 TIMES DAILY
COMMUNITY
End: 2023-01-09 | Stop reason: SDUPTHER

## 2021-08-06 RX ORDER — ONDANSETRON 4 MG/1
4 TABLET, FILM COATED ORAL EVERY 8 HOURS PRN
COMMUNITY
End: 2021-08-22

## 2021-08-06 RX ORDER — METAXALONE 800 MG/1
800 TABLET ORAL 3 TIMES DAILY PRN
COMMUNITY
End: 2021-08-22

## 2021-08-06 RX ORDER — OXYBUTYNIN CHLORIDE 5 MG/1
5 TABLET ORAL
COMMUNITY
End: 2021-09-03

## 2021-08-06 NOTE — PROGRESS NOTES
Orthopaedic Clinic Note:  Hip Post Op    Chief Complaint   Patient presents with   • Post-op     3 weeks s/p HIP HEMIARTHROPLASTY 7/15/21        HPI    Ms. Mckeon is 3  week(s) s/p right hip hemiarthroplasty.  Patient rates her pain 0/10 on the pain scale.  She is ambulating with the assistance of a walker.  Denies fevers chills or constitutional symptoms.  Overall she is happy with her outcome.    Past Medical History:   Diagnosis Date   • Ankle instability    • Chronic left shoulder pain 1/26/2018   • Disorder of tendon of shoulder region, left    • Dysphagia    • Esophagitis    • Femoral neck fracture (CMS/HCC) 7/15/2021   • Finger fracture, right 2009    Dr Chandler casting and physical therapy   • Hemorrhoids    • Hypertension    • Localized, primary osteoarthritis of left shoulder region    • Right shoulder pain    • Shoulder joint replacement status     Right   • Stroke (CMS/HCC)     CVA      Past Surgical History:   Procedure Laterality Date   • APPENDECTOMY      Daughter denies   • CATARACT EXTRACTION, BILATERAL      2020   • ENDOSCOPY  07/2009    with biopsy/esophageal ring dilation   • HEMORRHOIDECTOMY      lanced    • HIP HEMIARTHROPLASTY Right 7/15/2021    Procedure: HIP HEMIARTHROPLASTY;  Surgeon: Adam Olsen MD;  Location: [facility];  Service: Orthopedics;  Laterality: Right;   • HYSTERECTOMY      partial    • OOPHORECTOMY     • SHOULDER SURGERY Right     replacement   • SHOULDER SURGERY      Arthroscopy of shoulder:Right   • TONSILLECTOMY        Family History   Problem Relation Age of Onset   • Cancer Mother    • Stroke Mother    • Bleeding Disorder Mother    • Osteoarthritis Mother    • Rheum arthritis Mother    • Breast cancer Mother 60   • Heart attack Father    • Coronary artery disease Father    • Cancer Sister         Bladder    • Bleeding Disorder Brother         2   • Colon cancer Brother         3   • Ovarian cancer Neg Hx      Social History     Socioeconomic History   • Marital  status:      Spouse name: Not on file   • Number of children: Not on file   • Years of education: Not on file   • Highest education level: Not on file   Tobacco Use   • Smoking status: Never Smoker   • Smokeless tobacco: Never Used   Substance and Sexual Activity   • Alcohol use: No   • Drug use: No   • Sexual activity: Defer      Current Outpatient Medications on File Prior to Visit   Medication Sig Dispense Refill   • acetaminophen (TYLENOL) 500 MG tablet Take 2 tablets by mouth Every 8 (Eight) Hours.     • aspirin 81 MG EC tablet Take 1 tablet by mouth Every 12 (Twelve) Hours.     • atorvastatin (LIPITOR) 10 MG tablet Take 10 mg by mouth Daily.     • Cholecalciferol (VITAMIN D3 PO) Take 400 Units by mouth.     • cloNIDine (CATAPRES-TTS) 0.1 MG/24HR patch Place 1 patch on the skin as directed by provider 1 (One) Time Per Week.     • docusate sodium (COLACE) 100 MG capsule Take 100 mg by mouth 2 (Two) Times a Day.     • losartan (COZAAR) 25 MG tablet 1 tab per day 90 tablet 3   • meloxicam (MOBIC) 15 MG tablet Take 1 tablet by mouth Daily.     • metaxalone (SKELAXIN) 800 MG tablet Take 800 mg by mouth 3 (Three) Times a Day As Needed for Muscle Spasms.     • ondansetron (ZOFRAN) 4 MG tablet Take 4 mg by mouth Every 8 (Eight) Hours As Needed for Nausea or Vomiting.     • oxybutynin (DITROPAN) 5 MG tablet Take 5 mg by mouth 3 (Three) Times a Day.     • phenazopyridine (PYRIDIUM) 95 MG tablet Take 95 mg by mouth 3 (Three) Times a Day As Needed for Bladder Spasms.     • denosumab (Prolia) 60 MG/ML solution prefilled syringe syringe Inject 60 mg under the skin into the appropriate area as directed Every 6 (Six) Months.     • Mirabegron ER (Myrbetriq) 50 MG tablet sustained-release 24 hour 24 hr tablet Take 50 mg by mouth Daily. 30 tablet 0   • [DISCONTINUED] rosuvastatin (CRESTOR) 5 MG tablet Take 1 tablet by mouth Daily. 90 tablet 3     No current facility-administered medications on file prior to visit.       Allergies   Allergen Reactions   • Nasal Spray Cough        Review of Systems   Constitutional: Negative.    HENT: Negative.    Eyes: Negative.    Respiratory: Negative.    Cardiovascular: Negative.    Gastrointestinal: Negative.    Endocrine: Negative.    Genitourinary: Negative.    Musculoskeletal: Positive for arthralgias.   Skin: Negative.    Allergic/Immunologic: Negative.    Neurological: Negative.    Hematological: Negative.    Psychiatric/Behavioral: Negative.         Physical Exam  Temperature 97.8 °F (36.6 °C).    There is no height or weight on file to calculate BMI.    GENERAL APPEARANCE: awake, alert, oriented, in no acute distress and well developed, well nourished  LUNGS:  breathing nonlabored  EXTREMITIES: no clubbing, cyanosis  PERIPHERAL PULSES: palpable dorsalis pedis and posterior tibial pulses bilaterally.    GAIT:  Normal            Hip Exam:  Right    RANGE OF MOTION:  EXTENSION/FLEXION:  normal (0-110 degrees)  IR:  20  ER:  35  PAIN WITH HIP MOTION:  no  PAIN WITH LOGROLL:  no     STRENGTH:  ABDUCTOR:  5/5  ADDUCTOR:  5/5  HIP FLEXION:  5/5    GREATER TROCHANTER BURSAL PAIN:  no    SENSATION TO LIGHT TOUCH:  DEEP PERONEAL/SUPERFICIAL PERONEAL/SURAL/SAPHENOUS/TIBIAL:   intact    EDEMA:  no  ERYTHEMA:  no  WOUNDS/INCISIONS:   yes, well healed surgical incision without evidence of erythema or drainage  _______________________________________________________________  _______________________________________________________________    RADIOGRAPHIC FINDINGS:   Indication: Status post right hip varsha-arthroplasty    Comparison: Todays xrays were compared to previous xrays from 7/15/2021     AP pelvis: Right: Demonstrate a well positioned hip hemiarthroplasty without evidence of wear, loosening, fracture or osteolysis, femoral head is concentrically reduced within the acetabulum and No significant changes compared to prior radiographs.;Left: mild joint space narrowing, minimal osteophyte formation  and No significant changes compared to prior radiographs.        Assessment/Plan:   Diagnosis Plan   1. Status post hip hemiarthroplasty  XR Pelvis 1 or 2 View     Patient doing well 3 weeks status post right hip hemiarthroplasty.  I recommend continuing the precautions for at least 3 more weeks.  She is doing extremely well this far out from surgery and I do not foresee any complications.  I will see her back in 3 months for repeat assessment x-ray P pelvis on return.  She is welcome to follow-up sooner should problems arise.    Adam Olsen MD  08/06/21  10:36 EDT

## 2021-08-09 RX ORDER — LOSARTAN POTASSIUM 25 MG/1
TABLET ORAL
Qty: 90 TABLET | Refills: 3 | Status: SHIPPED | OUTPATIENT
Start: 2021-08-09 | End: 2021-08-25 | Stop reason: HOSPADM

## 2021-08-09 RX ORDER — ROSUVASTATIN CALCIUM 5 MG/1
TABLET, COATED ORAL
Qty: 90 TABLET | Refills: 1 | Status: SHIPPED | OUTPATIENT
Start: 2021-08-09 | End: 2021-09-24 | Stop reason: SDUPTHER

## 2021-08-09 NOTE — TELEPHONE ENCOUNTER
LS: 03/24/21  NA: 09/30/21  Rosuvastatin not sent in epic and atorvastatin is on patients med list

## 2021-08-22 ENCOUNTER — APPOINTMENT (OUTPATIENT)
Dept: GENERAL RADIOLOGY | Facility: HOSPITAL | Age: 86
End: 2021-08-22

## 2021-08-22 ENCOUNTER — HOSPITAL ENCOUNTER (INPATIENT)
Facility: HOSPITAL | Age: 86
LOS: 2 days | Discharge: NURSING, REHAB OR LONG-TERM CARE | End: 2021-08-25
Attending: EMERGENCY MEDICINE | Admitting: INTERNAL MEDICINE

## 2021-08-22 DIAGNOSIS — R13.10 DYSPHAGIA, UNSPECIFIED TYPE: ICD-10-CM

## 2021-08-22 DIAGNOSIS — I48.91 ATRIAL FIBRILLATION WITH RAPID VENTRICULAR RESPONSE (HCC): Primary | ICD-10-CM

## 2021-08-22 DIAGNOSIS — K92.1 HEMATOCHEZIA: ICD-10-CM

## 2021-08-22 DIAGNOSIS — I95.9 HYPOTENSION, UNSPECIFIED HYPOTENSION TYPE: ICD-10-CM

## 2021-08-22 LAB
ABO GROUP BLD: NORMAL
ALBUMIN SERPL-MCNC: 3.6 G/DL (ref 3.5–5.2)
ALBUMIN/GLOB SERPL: 1.2 G/DL
ALP SERPL-CCNC: 82 U/L (ref 39–117)
ALT SERPL W P-5'-P-CCNC: 13 U/L (ref 1–33)
ANION GAP SERPL CALCULATED.3IONS-SCNC: 9 MMOL/L (ref 5–15)
AST SERPL-CCNC: 18 U/L (ref 1–32)
BACTERIA UR QL AUTO: NORMAL /HPF
BASOPHILS # BLD AUTO: 0.04 10*3/MM3 (ref 0–0.2)
BASOPHILS NFR BLD AUTO: 0.5 % (ref 0–1.5)
BILIRUB SERPL-MCNC: 0.2 MG/DL (ref 0–1.2)
BILIRUB UR QL STRIP: NEGATIVE
BLD GP AB SCN SERPL QL: NEGATIVE
BUN SERPL-MCNC: 10 MG/DL (ref 8–23)
BUN/CREAT SERPL: 13.9 (ref 7–25)
CALCIUM SPEC-SCNC: 8.7 MG/DL (ref 8.2–9.6)
CHLORIDE SERPL-SCNC: 103 MMOL/L (ref 98–107)
CLARITY UR: CLEAR
CO2 SERPL-SCNC: 25 MMOL/L (ref 22–29)
COLOR UR: ABNORMAL
CREAT SERPL-MCNC: 0.72 MG/DL (ref 0.57–1)
D-LACTATE SERPL-SCNC: 1.5 MMOL/L (ref 0.5–2)
DEPRECATED RDW RBC AUTO: 53.7 FL (ref 37–54)
DEVELOPER EXPIRATION DATE: NORMAL
DEVELOPER LOT NUMBER: NORMAL
EOSINOPHIL # BLD AUTO: 0.17 10*3/MM3 (ref 0–0.4)
EOSINOPHIL NFR BLD AUTO: 2.2 % (ref 0.3–6.2)
ERYTHROCYTE [DISTWIDTH] IN BLOOD BY AUTOMATED COUNT: 16.4 % (ref 12.3–15.4)
EXPIRATION DATE: NORMAL
FECAL OCCULT BLOOD SCREEN, POC: NEGATIVE
FERRITIN SERPL-MCNC: 67.37 NG/ML (ref 13–150)
FOLATE SERPL-MCNC: 11.6 NG/ML (ref 4.78–24.2)
GFR SERPL CREATININE-BSD FRML MDRD: 76 ML/MIN/1.73
GLOBULIN UR ELPH-MCNC: 2.9 GM/DL
GLUCOSE SERPL-MCNC: 109 MG/DL (ref 65–99)
GLUCOSE UR STRIP-MCNC: NEGATIVE MG/DL
HCT VFR BLD AUTO: 29.2 % (ref 34–46.6)
HGB BLD-MCNC: 8.4 G/DL (ref 12–15.9)
HGB UR QL STRIP.AUTO: NEGATIVE
HOLD SPECIMEN: NORMAL
HYALINE CASTS UR QL AUTO: NORMAL /LPF
IMM GRANULOCYTES # BLD AUTO: 0.06 10*3/MM3 (ref 0–0.05)
IMM GRANULOCYTES NFR BLD AUTO: 0.8 % (ref 0–0.5)
IRON 24H UR-MRATE: 20 MCG/DL (ref 37–145)
IRON SATN MFR SERPL: 5 % (ref 20–50)
KETONES UR QL STRIP: NEGATIVE
LEUKOCYTE ESTERASE UR QL STRIP.AUTO: NEGATIVE
LYMPHOCYTES # BLD AUTO: 1.42 10*3/MM3 (ref 0.7–3.1)
LYMPHOCYTES NFR BLD AUTO: 18.2 % (ref 19.6–45.3)
Lab: NORMAL
MCH RBC QN AUTO: 27.3 PG (ref 26.6–33)
MCHC RBC AUTO-ENTMCNC: 28.8 G/DL (ref 31.5–35.7)
MCV RBC AUTO: 94.8 FL (ref 79–97)
MONOCYTES # BLD AUTO: 1 10*3/MM3 (ref 0.1–0.9)
MONOCYTES NFR BLD AUTO: 12.8 % (ref 5–12)
NEGATIVE CONTROL: NEGATIVE
NEUTROPHILS NFR BLD AUTO: 5.1 10*3/MM3 (ref 1.7–7)
NEUTROPHILS NFR BLD AUTO: 65.5 % (ref 42.7–76)
NITRITE UR QL STRIP: POSITIVE
NRBC BLD AUTO-RTO: 0 /100 WBC (ref 0–0.2)
NT-PROBNP SERPL-MCNC: 1064 PG/ML (ref 0–1800)
PH UR STRIP.AUTO: 7.5 [PH] (ref 5–8)
PLATELET # BLD AUTO: 482 10*3/MM3 (ref 140–450)
PMV BLD AUTO: 9.6 FL (ref 6–12)
POSITIVE CONTROL: POSITIVE
POTASSIUM SERPL-SCNC: 4.4 MMOL/L (ref 3.5–5.2)
PROT SERPL-MCNC: 6.5 G/DL (ref 6–8.5)
PROT UR QL STRIP: NEGATIVE
RBC # BLD AUTO: 3.08 10*6/MM3 (ref 3.77–5.28)
RBC # UR: NORMAL /HPF
REF LAB TEST METHOD: NORMAL
RETICS # AUTO: 0.22 10*6/MM3 (ref 0.02–0.13)
RETICS/RBC NFR AUTO: 6.86 % (ref 0.7–1.9)
RH BLD: POSITIVE
SARS-COV-2 RNA RESP QL NAA+PROBE: NOT DETECTED
SODIUM SERPL-SCNC: 137 MMOL/L (ref 136–145)
SP GR UR STRIP: 1.01 (ref 1–1.03)
SQUAMOUS #/AREA URNS HPF: NORMAL /HPF
T&S EXPIRATION DATE: NORMAL
TIBC SERPL-MCNC: 390 MCG/DL (ref 298–536)
TRANSFERRIN SERPL-MCNC: 262 MG/DL (ref 200–360)
UROBILINOGEN UR QL STRIP: ABNORMAL
VIT B12 BLD-MCNC: 312 PG/ML (ref 211–946)
WBC # BLD AUTO: 7.79 10*3/MM3 (ref 3.4–10.8)
WBC UR QL AUTO: NORMAL /HPF
WHOLE BLOOD HOLD SPECIMEN: NORMAL

## 2021-08-22 PROCEDURE — 71045 X-RAY EXAM CHEST 1 VIEW: CPT

## 2021-08-22 PROCEDURE — 81001 URINALYSIS AUTO W/SCOPE: CPT | Performed by: EMERGENCY MEDICINE

## 2021-08-22 PROCEDURE — 82607 VITAMIN B-12: CPT | Performed by: NURSE PRACTITIONER

## 2021-08-22 PROCEDURE — 83540 ASSAY OF IRON: CPT | Performed by: NURSE PRACTITIONER

## 2021-08-22 PROCEDURE — 80053 COMPREHEN METABOLIC PANEL: CPT | Performed by: EMERGENCY MEDICINE

## 2021-08-22 PROCEDURE — 82746 ASSAY OF FOLIC ACID SERUM: CPT | Performed by: NURSE PRACTITIONER

## 2021-08-22 PROCEDURE — P9612 CATHETERIZE FOR URINE SPEC: HCPCS

## 2021-08-22 PROCEDURE — 99223 1ST HOSP IP/OBS HIGH 75: CPT | Performed by: HOSPITALIST

## 2021-08-22 PROCEDURE — 82270 OCCULT BLOOD FECES: CPT | Performed by: EMERGENCY MEDICINE

## 2021-08-22 PROCEDURE — U0003 INFECTIOUS AGENT DETECTION BY NUCLEIC ACID (DNA OR RNA); SEVERE ACUTE RESPIRATORY SYNDROME CORONAVIRUS 2 (SARS-COV-2) (CORONAVIRUS DISEASE [COVID-19]), AMPLIFIED PROBE TECHNIQUE, MAKING USE OF HIGH THROUGHPUT TECHNOLOGIES AS DESCRIBED BY CMS-2020-01-R: HCPCS | Performed by: EMERGENCY MEDICINE

## 2021-08-22 PROCEDURE — G0378 HOSPITAL OBSERVATION PER HR: HCPCS

## 2021-08-22 PROCEDURE — 93010 ELECTROCARDIOGRAM REPORT: CPT | Performed by: INTERNAL MEDICINE

## 2021-08-22 PROCEDURE — 83605 ASSAY OF LACTIC ACID: CPT | Performed by: EMERGENCY MEDICINE

## 2021-08-22 PROCEDURE — 99204 OFFICE O/P NEW MOD 45 MIN: CPT | Performed by: INTERNAL MEDICINE

## 2021-08-22 PROCEDURE — 85045 AUTOMATED RETICULOCYTE COUNT: CPT | Performed by: NURSE PRACTITIONER

## 2021-08-22 PROCEDURE — 25010000002 NA FERRIC GLUC CPLX PER 12.5 MG: Performed by: NURSE PRACTITIONER

## 2021-08-22 PROCEDURE — 82728 ASSAY OF FERRITIN: CPT | Performed by: NURSE PRACTITIONER

## 2021-08-22 PROCEDURE — 93005 ELECTROCARDIOGRAM TRACING: CPT | Performed by: NURSE PRACTITIONER

## 2021-08-22 PROCEDURE — 84466 ASSAY OF TRANSFERRIN: CPT | Performed by: NURSE PRACTITIONER

## 2021-08-22 PROCEDURE — U0005 INFEC AGEN DETEC AMPLI PROBE: HCPCS | Performed by: EMERGENCY MEDICINE

## 2021-08-22 PROCEDURE — 83880 ASSAY OF NATRIURETIC PEPTIDE: CPT | Performed by: EMERGENCY MEDICINE

## 2021-08-22 PROCEDURE — 99285 EMERGENCY DEPT VISIT HI MDM: CPT

## 2021-08-22 PROCEDURE — 86901 BLOOD TYPING SEROLOGIC RH(D): CPT | Performed by: EMERGENCY MEDICINE

## 2021-08-22 PROCEDURE — 99214 OFFICE O/P EST MOD 30 MIN: CPT | Performed by: NURSE PRACTITIONER

## 2021-08-22 PROCEDURE — 86850 RBC ANTIBODY SCREEN: CPT | Performed by: EMERGENCY MEDICINE

## 2021-08-22 PROCEDURE — 25010000002 DIGOXIN PER 500 MCG: Performed by: EMERGENCY MEDICINE

## 2021-08-22 PROCEDURE — 86900 BLOOD TYPING SEROLOGIC ABO: CPT | Performed by: EMERGENCY MEDICINE

## 2021-08-22 PROCEDURE — 85025 COMPLETE CBC W/AUTO DIFF WBC: CPT | Performed by: EMERGENCY MEDICINE

## 2021-08-22 PROCEDURE — 86923 COMPATIBILITY TEST ELECTRIC: CPT

## 2021-08-22 RX ORDER — SODIUM CHLORIDE 0.9 % (FLUSH) 0.9 %
10 SYRINGE (ML) INJECTION AS NEEDED
Status: DISCONTINUED | OUTPATIENT
Start: 2021-08-22 | End: 2021-08-25 | Stop reason: HOSPADM

## 2021-08-22 RX ORDER — POLYETHYLENE GLYCOL 3350 17 G/17G
17 POWDER, FOR SOLUTION ORAL
COMMUNITY
Start: 2023-03-22

## 2021-08-22 RX ORDER — DIGOXIN 0.25 MG/ML
500 INJECTION INTRAMUSCULAR; INTRAVENOUS ONCE
Status: COMPLETED | OUTPATIENT
Start: 2021-08-22 | End: 2021-08-22

## 2021-08-22 RX ORDER — ACETAMINOPHEN 650 MG/1
650 SUPPOSITORY RECTAL EVERY 4 HOURS PRN
Status: DISCONTINUED | OUTPATIENT
Start: 2021-08-22 | End: 2021-08-25 | Stop reason: HOSPADM

## 2021-08-22 RX ORDER — DILTIAZEM HCL IN NACL,ISO-OSM 125 MG/125
5-15 PLASTIC BAG, INJECTION (ML) INTRAVENOUS
Status: DISCONTINUED | OUTPATIENT
Start: 2021-08-22 | End: 2021-08-25

## 2021-08-22 RX ORDER — OMEPRAZOLE 40 MG/1
40 CAPSULE, DELAYED RELEASE ORAL DAILY
COMMUNITY
End: 2021-09-27 | Stop reason: SDUPTHER

## 2021-08-22 RX ORDER — PEG-3350, SODIUM SULFATE, SODIUM CHLORIDE, POTASSIUM CHLORIDE, SODIUM ASCORBATE AND ASCORBIC ACID 7.5-2.691G
1000 KIT ORAL
Status: COMPLETED | OUTPATIENT
Start: 2021-08-23 | End: 2021-08-23

## 2021-08-22 RX ORDER — LANOLIN ALCOHOL/MO/W.PET/CERES
3 CREAM (GRAM) TOPICAL NIGHTLY
COMMUNITY

## 2021-08-22 RX ORDER — ONDANSETRON 2 MG/ML
4 INJECTION INTRAMUSCULAR; INTRAVENOUS EVERY 6 HOURS PRN
Status: DISCONTINUED | OUTPATIENT
Start: 2021-08-22 | End: 2021-08-25 | Stop reason: HOSPADM

## 2021-08-22 RX ORDER — ROSUVASTATIN CALCIUM 10 MG/1
5 TABLET, COATED ORAL DAILY
Status: DISCONTINUED | OUTPATIENT
Start: 2021-08-22 | End: 2021-08-25 | Stop reason: HOSPADM

## 2021-08-22 RX ORDER — ACETAMINOPHEN 325 MG/1
650 TABLET ORAL EVERY 4 HOURS PRN
Status: DISCONTINUED | OUTPATIENT
Start: 2021-08-22 | End: 2021-08-25 | Stop reason: HOSPADM

## 2021-08-22 RX ORDER — FERROUS SULFATE 325(65) MG
325 TABLET ORAL 2 TIMES DAILY
COMMUNITY
End: 2021-09-30

## 2021-08-22 RX ORDER — SODIUM CHLORIDE 0.9 % (FLUSH) 0.9 %
10 SYRINGE (ML) INJECTION EVERY 12 HOURS SCHEDULED
Status: DISCONTINUED | OUTPATIENT
Start: 2021-08-22 | End: 2021-08-23 | Stop reason: SDUPTHER

## 2021-08-22 RX ORDER — ACETAMINOPHEN 160 MG/5ML
650 SOLUTION ORAL EVERY 4 HOURS PRN
Status: DISCONTINUED | OUTPATIENT
Start: 2021-08-22 | End: 2021-08-25 | Stop reason: HOSPADM

## 2021-08-22 RX ORDER — PEG-3350, SODIUM SULFATE, SODIUM CHLORIDE, POTASSIUM CHLORIDE, SODIUM ASCORBATE AND ASCORBIC ACID 7.5-2.691G
1000 KIT ORAL
Status: COMPLETED | OUTPATIENT
Start: 2021-08-22 | End: 2021-08-22

## 2021-08-22 RX ORDER — SODIUM CHLORIDE, SODIUM LACTATE, POTASSIUM CHLORIDE, CALCIUM CHLORIDE 600; 310; 30; 20 MG/100ML; MG/100ML; MG/100ML; MG/100ML
100 INJECTION, SOLUTION INTRAVENOUS CONTINUOUS
Status: DISCONTINUED | OUTPATIENT
Start: 2021-08-22 | End: 2021-08-23 | Stop reason: SDUPTHER

## 2021-08-22 RX ORDER — ONDANSETRON 4 MG/1
4 TABLET, FILM COATED ORAL EVERY 6 HOURS PRN
Status: DISCONTINUED | OUTPATIENT
Start: 2021-08-22 | End: 2021-08-25 | Stop reason: HOSPADM

## 2021-08-22 RX ORDER — ACETAMINOPHEN 325 MG/1
500 TABLET ORAL 3 TIMES DAILY
COMMUNITY
Start: 2023-03-22

## 2021-08-22 RX ORDER — OXYBUTYNIN CHLORIDE 5 MG/1
10 TABLET, EXTENDED RELEASE ORAL DAILY
Status: DISCONTINUED | OUTPATIENT
Start: 2021-08-22 | End: 2021-08-25 | Stop reason: HOSPADM

## 2021-08-22 RX ORDER — SODIUM CHLORIDE, SODIUM LACTATE, POTASSIUM CHLORIDE, CALCIUM CHLORIDE 600; 310; 30; 20 MG/100ML; MG/100ML; MG/100ML; MG/100ML
30 INJECTION, SOLUTION INTRAVENOUS CONTINUOUS PRN
Status: CANCELLED | OUTPATIENT
Start: 2021-08-22

## 2021-08-22 RX ORDER — BISACODYL 10 MG
10 SUPPOSITORY, RECTAL RECTAL DAILY PRN
COMMUNITY

## 2021-08-22 RX ADMIN — OXYBUTYNIN CHLORIDE 10 MG: 5 TABLET, EXTENDED RELEASE ORAL at 20:55

## 2021-08-22 RX ADMIN — DILTIAZEM HYDROCHLORIDE 30 MG: 30 TABLET, FILM COATED ORAL at 23:36

## 2021-08-22 RX ADMIN — SODIUM CHLORIDE, POTASSIUM CHLORIDE, SODIUM LACTATE AND CALCIUM CHLORIDE 100 ML/HR: 600; 310; 30; 20 INJECTION, SOLUTION INTRAVENOUS at 22:05

## 2021-08-22 RX ADMIN — DIGOXIN 500 MCG: 0.25 INJECTION INTRAMUSCULAR; INTRAVENOUS at 11:04

## 2021-08-22 RX ADMIN — ROSUVASTATIN CALCIUM 5 MG: 10 TABLET, COATED ORAL at 20:55

## 2021-08-22 RX ADMIN — SODIUM CHLORIDE, POTASSIUM CHLORIDE, SODIUM LACTATE AND CALCIUM CHLORIDE 1000 ML: 600; 310; 30; 20 INJECTION, SOLUTION INTRAVENOUS at 09:45

## 2021-08-22 RX ADMIN — POLYETHYLENE GLYCOL 3350, SODIUM SULFATE, SODIUM CHLORIDE, POTASSIUM CHLORIDE, ASCORBIC ACID, SODIUM ASCORBATE 1000 ML: KIT at 18:30

## 2021-08-22 RX ADMIN — Medication 5 MG/HR: at 11:16

## 2021-08-22 RX ADMIN — DILTIAZEM HYDROCHLORIDE 30 MG: 30 TABLET, FILM COATED ORAL at 18:30

## 2021-08-22 RX ADMIN — SODIUM CHLORIDE 1000 ML: 9 INJECTION, SOLUTION INTRAVENOUS at 22:05

## 2021-08-22 RX ADMIN — SODIUM CHLORIDE 250 MG: 9 INJECTION, SOLUTION INTRAVENOUS at 18:29

## 2021-08-22 RX ADMIN — DILTIAZEM HYDROCHLORIDE 30 MG: 30 TABLET, FILM COATED ORAL at 14:49

## 2021-08-23 ENCOUNTER — ANESTHESIA (OUTPATIENT)
Dept: GASTROENTEROLOGY | Facility: HOSPITAL | Age: 86
End: 2021-08-23

## 2021-08-23 ENCOUNTER — ANESTHESIA EVENT (OUTPATIENT)
Dept: GASTROENTEROLOGY | Facility: HOSPITAL | Age: 86
End: 2021-08-23

## 2021-08-23 LAB
ANION GAP SERPL CALCULATED.3IONS-SCNC: 11 MMOL/L (ref 5–15)
BUN SERPL-MCNC: 6 MG/DL (ref 8–23)
BUN/CREAT SERPL: 11.1 (ref 7–25)
CALCIUM SPEC-SCNC: 8.4 MG/DL (ref 8.2–9.6)
CHLORIDE SERPL-SCNC: 106 MMOL/L (ref 98–107)
CO2 SERPL-SCNC: 22 MMOL/L (ref 22–29)
CREAT SERPL-MCNC: 0.54 MG/DL (ref 0.57–1)
GFR SERPL CREATININE-BSD FRML MDRD: 106 ML/MIN/1.73
GLUCOSE SERPL-MCNC: 87 MG/DL (ref 65–99)
HCT VFR BLD AUTO: 26.9 % (ref 34–46.6)
HCT VFR BLD AUTO: 28.4 % (ref 34–46.6)
HCT VFR BLD AUTO: 31.8 % (ref 34–46.6)
HGB BLD-MCNC: 7.7 G/DL (ref 12–15.9)
HGB BLD-MCNC: 8.1 G/DL (ref 12–15.9)
HGB BLD-MCNC: 9.6 G/DL (ref 12–15.9)
MAGNESIUM SERPL-MCNC: 2.1 MG/DL (ref 1.6–2.4)
POTASSIUM SERPL-SCNC: 4.1 MMOL/L (ref 3.5–5.2)
SODIUM SERPL-SCNC: 139 MMOL/L (ref 136–145)

## 2021-08-23 PROCEDURE — 0DJD8ZZ INSPECTION OF LOWER INTESTINAL TRACT, VIA NATURAL OR ARTIFICIAL OPENING ENDOSCOPIC: ICD-10-PCS | Performed by: INTERNAL MEDICINE

## 2021-08-23 PROCEDURE — 88305 TISSUE EXAM BY PATHOLOGIST: CPT | Performed by: INTERNAL MEDICINE

## 2021-08-23 PROCEDURE — 85018 HEMOGLOBIN: CPT | Performed by: INTERNAL MEDICINE

## 2021-08-23 PROCEDURE — 25010000002 PROPOFOL 10 MG/ML EMULSION: Performed by: NURSE ANESTHETIST, CERTIFIED REGISTERED

## 2021-08-23 PROCEDURE — 86900 BLOOD TYPING SEROLOGIC ABO: CPT

## 2021-08-23 PROCEDURE — 36430 TRANSFUSION BLD/BLD COMPNT: CPT

## 2021-08-23 PROCEDURE — 43239 EGD BIOPSY SINGLE/MULTIPLE: CPT | Performed by: INTERNAL MEDICINE

## 2021-08-23 PROCEDURE — 25010000002 FUROSEMIDE PER 20 MG: Performed by: INTERNAL MEDICINE

## 2021-08-23 PROCEDURE — 85014 HEMATOCRIT: CPT | Performed by: INTERNAL MEDICINE

## 2021-08-23 PROCEDURE — 45378 DIAGNOSTIC COLONOSCOPY: CPT | Performed by: INTERNAL MEDICINE

## 2021-08-23 PROCEDURE — 83735 ASSAY OF MAGNESIUM: CPT | Performed by: INTERNAL MEDICINE

## 2021-08-23 PROCEDURE — 80048 BASIC METABOLIC PNL TOTAL CA: CPT | Performed by: INTERNAL MEDICINE

## 2021-08-23 PROCEDURE — P9016 RBC LEUKOCYTES REDUCED: HCPCS

## 2021-08-23 PROCEDURE — 99233 SBSQ HOSP IP/OBS HIGH 50: CPT | Performed by: INTERNAL MEDICINE

## 2021-08-23 PROCEDURE — 85018 HEMOGLOBIN: CPT | Performed by: HOSPITALIST

## 2021-08-23 PROCEDURE — 0DB28ZX EXCISION OF MIDDLE ESOPHAGUS, VIA NATURAL OR ARTIFICIAL OPENING ENDOSCOPIC, DIAGNOSTIC: ICD-10-PCS | Performed by: INTERNAL MEDICINE

## 2021-08-23 PROCEDURE — 85014 HEMATOCRIT: CPT | Performed by: HOSPITALIST

## 2021-08-23 RX ORDER — SODIUM CHLORIDE 0.9 % (FLUSH) 0.9 %
10 SYRINGE (ML) INJECTION AS NEEDED
Status: DISCONTINUED | OUTPATIENT
Start: 2021-08-23 | End: 2021-08-23 | Stop reason: HOSPADM

## 2021-08-23 RX ORDER — MAGNESIUM SULFATE HEPTAHYDRATE 40 MG/ML
2 INJECTION, SOLUTION INTRAVENOUS AS NEEDED
Status: DISCONTINUED | OUTPATIENT
Start: 2021-08-23 | End: 2021-08-25 | Stop reason: HOSPADM

## 2021-08-23 RX ORDER — PROPOFOL 10 MG/ML
VIAL (ML) INTRAVENOUS AS NEEDED
Status: DISCONTINUED | OUTPATIENT
Start: 2021-08-23 | End: 2021-08-23 | Stop reason: SURG

## 2021-08-23 RX ORDER — MIDAZOLAM HYDROCHLORIDE 1 MG/ML
0.5 INJECTION INTRAMUSCULAR; INTRAVENOUS
Status: DISCONTINUED | OUTPATIENT
Start: 2021-08-23 | End: 2021-08-23 | Stop reason: HOSPADM

## 2021-08-23 RX ORDER — MAGNESIUM SULFATE HEPTAHYDRATE 40 MG/ML
4 INJECTION, SOLUTION INTRAVENOUS AS NEEDED
Status: DISCONTINUED | OUTPATIENT
Start: 2021-08-23 | End: 2021-08-25 | Stop reason: HOSPADM

## 2021-08-23 RX ORDER — MAGNESIUM SULFATE 1 G/100ML
1 INJECTION INTRAVENOUS AS NEEDED
Status: DISCONTINUED | OUTPATIENT
Start: 2021-08-23 | End: 2021-08-25 | Stop reason: HOSPADM

## 2021-08-23 RX ORDER — SODIUM CHLORIDE 0.9 % (FLUSH) 0.9 %
10 SYRINGE (ML) INJECTION EVERY 12 HOURS SCHEDULED
Status: DISCONTINUED | OUTPATIENT
Start: 2021-08-23 | End: 2021-08-23 | Stop reason: HOSPADM

## 2021-08-23 RX ORDER — FAMOTIDINE 10 MG/ML
20 INJECTION, SOLUTION INTRAVENOUS ONCE
Status: DISCONTINUED | OUTPATIENT
Start: 2021-08-23 | End: 2021-08-23 | Stop reason: HOSPADM

## 2021-08-23 RX ORDER — FAMOTIDINE 20 MG/1
20 TABLET, FILM COATED ORAL ONCE
Status: CANCELLED | OUTPATIENT
Start: 2021-08-23 | End: 2021-08-23

## 2021-08-23 RX ORDER — LIDOCAINE HYDROCHLORIDE 10 MG/ML
0.5 INJECTION, SOLUTION EPIDURAL; INFILTRATION; INTRACAUDAL; PERINEURAL ONCE AS NEEDED
Status: DISCONTINUED | OUTPATIENT
Start: 2021-08-23 | End: 2021-08-23 | Stop reason: HOSPADM

## 2021-08-23 RX ORDER — FLUCONAZOLE 150 MG/1
150 TABLET ORAL ONCE
Status: COMPLETED | OUTPATIENT
Start: 2021-08-23 | End: 2021-08-23

## 2021-08-23 RX ORDER — ONDANSETRON 2 MG/ML
4 INJECTION INTRAMUSCULAR; INTRAVENOUS ONCE AS NEEDED
Status: DISCONTINUED | OUTPATIENT
Start: 2021-08-23 | End: 2021-08-23 | Stop reason: HOSPADM

## 2021-08-23 RX ORDER — HYDROCORTISONE ACETATE 25 MG/1
25 SUPPOSITORY RECTAL 2 TIMES DAILY
Status: DISCONTINUED | OUTPATIENT
Start: 2021-08-23 | End: 2021-08-25 | Stop reason: HOSPADM

## 2021-08-23 RX ORDER — SODIUM CHLORIDE, SODIUM LACTATE, POTASSIUM CHLORIDE, CALCIUM CHLORIDE 600; 310; 30; 20 MG/100ML; MG/100ML; MG/100ML; MG/100ML
9 INJECTION, SOLUTION INTRAVENOUS CONTINUOUS
Status: DISCONTINUED | OUTPATIENT
Start: 2021-08-23 | End: 2021-08-25 | Stop reason: HOSPADM

## 2021-08-23 RX ORDER — FUROSEMIDE 10 MG/ML
20 INJECTION INTRAMUSCULAR; INTRAVENOUS ONCE
Status: COMPLETED | OUTPATIENT
Start: 2021-08-23 | End: 2021-08-23

## 2021-08-23 RX ORDER — IPRATROPIUM BROMIDE AND ALBUTEROL SULFATE 2.5; .5 MG/3ML; MG/3ML
3 SOLUTION RESPIRATORY (INHALATION) ONCE AS NEEDED
Status: DISCONTINUED | OUTPATIENT
Start: 2021-08-23 | End: 2021-08-23 | Stop reason: HOSPADM

## 2021-08-23 RX ADMIN — ROSUVASTATIN CALCIUM 5 MG: 10 TABLET, COATED ORAL at 10:38

## 2021-08-23 RX ADMIN — PROPOFOL 10 MG: 10 INJECTION, EMULSION INTRAVENOUS at 08:49

## 2021-08-23 RX ADMIN — PROPOFOL 50 MCG/KG/MIN: 10 INJECTION, EMULSION INTRAVENOUS at 08:47

## 2021-08-23 RX ADMIN — HYDROCORTISONE ACETATE 25 MG: 25 SUPPOSITORY RECTAL at 13:16

## 2021-08-23 RX ADMIN — DILTIAZEM HYDROCHLORIDE 30 MG: 30 TABLET, FILM COATED ORAL at 11:35

## 2021-08-23 RX ADMIN — SODIUM CHLORIDE, POTASSIUM CHLORIDE, SODIUM LACTATE AND CALCIUM CHLORIDE: 600; 310; 30; 20 INJECTION, SOLUTION INTRAVENOUS at 08:45

## 2021-08-23 RX ADMIN — DILTIAZEM HYDROCHLORIDE 30 MG: 30 TABLET, FILM COATED ORAL at 18:30

## 2021-08-23 RX ADMIN — DILTIAZEM HYDROCHLORIDE 30 MG: 30 TABLET, FILM COATED ORAL at 05:31

## 2021-08-23 RX ADMIN — PROPOFOL 10 MG: 10 INJECTION, EMULSION INTRAVENOUS at 09:13

## 2021-08-23 RX ADMIN — PROPOFOL 10 MG: 10 INJECTION, EMULSION INTRAVENOUS at 09:06

## 2021-08-23 RX ADMIN — FLUCONAZOLE 150 MG: 150 TABLET ORAL at 13:16

## 2021-08-23 RX ADMIN — PROPOFOL 10 MG: 10 INJECTION, EMULSION INTRAVENOUS at 08:51

## 2021-08-23 RX ADMIN — FUROSEMIDE 20 MG: 10 INJECTION, SOLUTION INTRAMUSCULAR; INTRAVENOUS at 16:27

## 2021-08-23 RX ADMIN — OXYBUTYNIN CHLORIDE 10 MG: 5 TABLET, EXTENDED RELEASE ORAL at 10:39

## 2021-08-23 RX ADMIN — ACETAMINOPHEN 650 MG: 325 TABLET, FILM COATED ORAL at 10:38

## 2021-08-23 RX ADMIN — POLYETHYLENE GLYCOL 3350, SODIUM SULFATE, SODIUM CHLORIDE, POTASSIUM CHLORIDE, ASCORBIC ACID, SODIUM ASCORBATE 1000 ML: KIT at 04:18

## 2021-08-23 NOTE — ANESTHESIA POSTPROCEDURE EVALUATION
Patient: Luh Mckeon    Procedure Summary     Date: 08/23/21 Room / [facility]:  [facility]    Anesthesia Start: 0845 Anesthesia Stop: 0927    Procedures:       COLONOSCOPY (N/A )      ESOPHAGOGASTRODUODENOSCOPY (N/A ) Diagnosis:       Hematochezia      (Hematochezia [K92.1])    Surgeons: Aries Varela MD Provider: [facility] Carbone MD    Anesthesia Type: general ASA Status: 3          Anesthesia Type: general    Vitals  Vitals Value Taken Time   /62 08/23/21 0927   Temp 97.5 °F (36.4 °C) 08/23/21 0927   Pulse 61 08/23/21 0927   Resp 14 08/23/21 0927   SpO2 94 % 08/23/21 0927           Post Anesthesia Care and Evaluation    Patient location during evaluation: PACU  Patient participation: complete - patient participated  Level of consciousness: awake and alert  Pain management: adequate  Airway patency: patent  Anesthetic complications: No anesthetic complications  PONV Status: none  Cardiovascular status: hemodynamically stable and acceptable  Respiratory status: nonlabored ventilation, acceptable and nasal cannula  Hydration status: acceptable

## 2021-08-23 NOTE — ANESTHESIA PREPROCEDURE EVALUATION
Anesthesia Evaluation     Patient summary reviewed and Nursing notes reviewed   no history of anesthetic complications:  NPO Solid Status: > 8 hours  NPO Liquid Status: > 8 hours           Airway   Mallampati: II  TM distance: >3 FB  Neck ROM: full  No difficulty expected  Dental      Pulmonary - negative pulmonary ROS and normal exam   Cardiovascular - normal exam    (+) hypertension, dysrhythmias, hyperlipidemia,     ROS comment: · Estimated left ventricular EF = 60% Left ventricular systolic function is normal.  · Left ventricular wall thickness is consistent with borderline concentric hypertrophy.  · Left ventricular diastolic dysfunction is noted.  · Moderate to severe aortic valve stenosis is present.  · Aortic valve maximum pressure gradient is 59.2 mmHg. Aortic valve mean pressure gradient is 37.5 mmHg.  · Moderate mitral valve regurgitation is present.       Neuro/Psych  (+) CVA, dizziness/light headedness, psychiatric history,     GI/Hepatic/Renal/Endo    (+)  GERD,      Musculoskeletal     Abdominal    Substance History      OB/GYN          Other   arthritis,                    Anesthesia Plan    ASA 3     general     intravenous induction     Anesthetic plan, all risks, benefits, and alternatives have been provided, discussed and informed consent has been obtained with: patient.    Plan discussed with CRNA.

## 2021-08-24 LAB
ANION GAP SERPL CALCULATED.3IONS-SCNC: 12 MMOL/L (ref 5–15)
APTT PPP: 28.2 SECONDS (ref 50–95)
BASOPHILS # BLD AUTO: 0.04 10*3/MM3 (ref 0–0.2)
BASOPHILS NFR BLD AUTO: 0.4 % (ref 0–1.5)
BH BB BLOOD EXPIRATION DATE: NORMAL
BH BB BLOOD TYPE BARCODE: 6200
BH BB DISPENSE STATUS: NORMAL
BH BB PRODUCT CODE: NORMAL
BH BB UNIT NUMBER: NORMAL
BUN SERPL-MCNC: 11 MG/DL (ref 8–23)
BUN/CREAT SERPL: 15.9 (ref 7–25)
CALCIUM SPEC-SCNC: 8.4 MG/DL (ref 8.2–9.6)
CHLORIDE SERPL-SCNC: 103 MMOL/L (ref 98–107)
CO2 SERPL-SCNC: 22 MMOL/L (ref 22–29)
CREAT SERPL-MCNC: 0.69 MG/DL (ref 0.57–1)
CROSSMATCH INTERPRETATION: NORMAL
CYTO UR: NORMAL
DEPRECATED RDW RBC AUTO: 55.7 FL (ref 37–54)
DEPRECATED RDW RBC AUTO: 56.3 FL (ref 37–54)
EOSINOPHIL # BLD AUTO: 0.1 10*3/MM3 (ref 0–0.4)
EOSINOPHIL NFR BLD AUTO: 1 % (ref 0.3–6.2)
ERYTHROCYTE [DISTWIDTH] IN BLOOD BY AUTOMATED COUNT: 17.5 % (ref 12.3–15.4)
ERYTHROCYTE [DISTWIDTH] IN BLOOD BY AUTOMATED COUNT: 17.8 % (ref 12.3–15.4)
GFR SERPL CREATININE-BSD FRML MDRD: 80 ML/MIN/1.73
GLUCOSE SERPL-MCNC: 94 MG/DL (ref 65–99)
HCT VFR BLD AUTO: 31.7 % (ref 34–46.6)
HCT VFR BLD AUTO: 32.5 % (ref 34–46.6)
HCT VFR BLD AUTO: 32.5 % (ref 34–46.6)
HCT VFR BLD AUTO: 33.3 % (ref 34–46.6)
HGB BLD-MCNC: 10 G/DL (ref 12–15.9)
HGB BLD-MCNC: 9.6 G/DL (ref 12–15.9)
HGB BLD-MCNC: 9.8 G/DL (ref 12–15.9)
HGB BLD-MCNC: 9.8 G/DL (ref 12–15.9)
IMM GRANULOCYTES # BLD AUTO: 0.03 10*3/MM3 (ref 0–0.05)
IMM GRANULOCYTES NFR BLD AUTO: 0.3 % (ref 0–0.5)
INR PPP: 1.13 (ref 0.85–1.16)
LAB AP CASE REPORT: NORMAL
LAB AP CLINICAL INFORMATION: NORMAL
LYMPHOCYTES # BLD AUTO: 1.37 10*3/MM3 (ref 0.7–3.1)
LYMPHOCYTES NFR BLD AUTO: 13.8 % (ref 19.6–45.3)
MAGNESIUM SERPL-MCNC: 2 MG/DL (ref 1.6–2.4)
MCH RBC QN AUTO: 27.8 PG (ref 26.6–33)
MCH RBC QN AUTO: 27.9 PG (ref 26.6–33)
MCHC RBC AUTO-ENTMCNC: 30 G/DL (ref 31.5–35.7)
MCHC RBC AUTO-ENTMCNC: 30.2 G/DL (ref 31.5–35.7)
MCV RBC AUTO: 92.3 FL (ref 79–97)
MCV RBC AUTO: 93 FL (ref 79–97)
MONOCYTES # BLD AUTO: 0.89 10*3/MM3 (ref 0.1–0.9)
MONOCYTES NFR BLD AUTO: 8.9 % (ref 5–12)
NEUTROPHILS NFR BLD AUTO: 7.53 10*3/MM3 (ref 1.7–7)
NEUTROPHILS NFR BLD AUTO: 75.6 % (ref 42.7–76)
NRBC BLD AUTO-RTO: 0.2 /100 WBC (ref 0–0.2)
PATH REPORT.FINAL DX SPEC: NORMAL
PATH REPORT.GROSS SPEC: NORMAL
PLATELET # BLD AUTO: 449 10*3/MM3 (ref 140–450)
PLATELET # BLD AUTO: 465 10*3/MM3 (ref 140–450)
PMV BLD AUTO: 9.8 FL (ref 6–12)
PMV BLD AUTO: 9.9 FL (ref 6–12)
POTASSIUM SERPL-SCNC: 3.8 MMOL/L (ref 3.5–5.2)
PROTHROMBIN TIME: 14.2 SECONDS (ref 11.4–14.4)
RBC # BLD AUTO: 3.52 10*6/MM3 (ref 3.77–5.28)
RBC # BLD AUTO: 3.58 10*6/MM3 (ref 3.77–5.28)
SODIUM SERPL-SCNC: 137 MMOL/L (ref 136–145)
UFH PPP CHRO-ACNC: 0.1 IU/ML (ref 0.3–0.7)
UFH PPP CHRO-ACNC: 0.15 IU/ML (ref 0.3–0.7)
UNIT  ABO: NORMAL
UNIT  RH: NORMAL
WBC # BLD AUTO: 9.08 10*3/MM3 (ref 3.4–10.8)
WBC # BLD AUTO: 9.96 10*3/MM3 (ref 3.4–10.8)

## 2021-08-24 PROCEDURE — 25010000002 HEPARIN (PORCINE) 25000-0.45 UT/250ML-% SOLUTION

## 2021-08-24 PROCEDURE — 99232 SBSQ HOSP IP/OBS MODERATE 35: CPT | Performed by: PHYSICIAN ASSISTANT

## 2021-08-24 PROCEDURE — 85610 PROTHROMBIN TIME: CPT

## 2021-08-24 PROCEDURE — 85520 HEPARIN ASSAY: CPT | Performed by: INTERNAL MEDICINE

## 2021-08-24 PROCEDURE — 25010000002 NA FERRIC GLUC CPLX PER 12.5 MG: Performed by: PHYSICIAN ASSISTANT

## 2021-08-24 PROCEDURE — 83735 ASSAY OF MAGNESIUM: CPT | Performed by: INTERNAL MEDICINE

## 2021-08-24 PROCEDURE — 85520 HEPARIN ASSAY: CPT

## 2021-08-24 PROCEDURE — 93010 ELECTROCARDIOGRAM REPORT: CPT | Performed by: INTERNAL MEDICINE

## 2021-08-24 PROCEDURE — 97530 THERAPEUTIC ACTIVITIES: CPT

## 2021-08-24 PROCEDURE — 85018 HEMOGLOBIN: CPT | Performed by: INTERNAL MEDICINE

## 2021-08-24 PROCEDURE — 99233 SBSQ HOSP IP/OBS HIGH 50: CPT | Performed by: NURSE PRACTITIONER

## 2021-08-24 PROCEDURE — 80048 BASIC METABOLIC PNL TOTAL CA: CPT | Performed by: INTERNAL MEDICINE

## 2021-08-24 PROCEDURE — 93005 ELECTROCARDIOGRAM TRACING: CPT | Performed by: INTERNAL MEDICINE

## 2021-08-24 PROCEDURE — 92610 EVALUATE SWALLOWING FUNCTION: CPT

## 2021-08-24 PROCEDURE — 97110 THERAPEUTIC EXERCISES: CPT

## 2021-08-24 PROCEDURE — 97162 PT EVAL MOD COMPLEX 30 MIN: CPT

## 2021-08-24 PROCEDURE — 85014 HEMATOCRIT: CPT | Performed by: INTERNAL MEDICINE

## 2021-08-24 PROCEDURE — 97116 GAIT TRAINING THERAPY: CPT

## 2021-08-24 PROCEDURE — 85730 THROMBOPLASTIN TIME PARTIAL: CPT

## 2021-08-24 PROCEDURE — 85025 COMPLETE CBC W/AUTO DIFF WBC: CPT

## 2021-08-24 PROCEDURE — 85027 COMPLETE CBC AUTOMATED: CPT | Performed by: INTERNAL MEDICINE

## 2021-08-24 RX ORDER — HEPARIN SODIUM 10000 [USP'U]/100ML
15 INJECTION, SOLUTION INTRAVENOUS
Status: DISCONTINUED | OUTPATIENT
Start: 2021-08-24 | End: 2021-08-25

## 2021-08-24 RX ORDER — FLUCONAZOLE 200 MG/1
200 TABLET ORAL DAILY
Status: DISCONTINUED | OUTPATIENT
Start: 2021-08-24 | End: 2021-08-25 | Stop reason: HOSPADM

## 2021-08-24 RX ADMIN — DILTIAZEM HYDROCHLORIDE 30 MG: 30 TABLET, FILM COATED ORAL at 05:28

## 2021-08-24 RX ADMIN — HEPARIN SODIUM 12 UNITS/KG/HR: 10000 INJECTION, SOLUTION INTRAVENOUS at 12:50

## 2021-08-24 RX ADMIN — SODIUM CHLORIDE 250 MG: 9 INJECTION, SOLUTION INTRAVENOUS at 14:20

## 2021-08-24 RX ADMIN — ROSUVASTATIN CALCIUM 5 MG: 10 TABLET, COATED ORAL at 08:39

## 2021-08-24 RX ADMIN — OXYBUTYNIN CHLORIDE 10 MG: 5 TABLET, EXTENDED RELEASE ORAL at 08:39

## 2021-08-24 RX ADMIN — DILTIAZEM HYDROCHLORIDE 30 MG: 30 TABLET, FILM COATED ORAL at 12:50

## 2021-08-24 RX ADMIN — HYDROCORTISONE ACETATE 25 MG: 25 SUPPOSITORY RECTAL at 08:39

## 2021-08-24 RX ADMIN — DILTIAZEM HYDROCHLORIDE 30 MG: 30 TABLET, FILM COATED ORAL at 01:16

## 2021-08-24 RX ADMIN — HYDROCORTISONE ACETATE 25 MG: 25 SUPPOSITORY RECTAL at 20:36

## 2021-08-24 RX ADMIN — FLUCONAZOLE 200 MG: 200 TABLET ORAL at 13:03

## 2021-08-24 RX ADMIN — DILTIAZEM HYDROCHLORIDE 30 MG: 30 TABLET, FILM COATED ORAL at 17:49

## 2021-08-25 VITALS
DIASTOLIC BLOOD PRESSURE: 76 MMHG | SYSTOLIC BLOOD PRESSURE: 147 MMHG | HEART RATE: 69 BPM | RESPIRATION RATE: 20 BRPM | TEMPERATURE: 98 F | WEIGHT: 130 LBS | BODY MASS INDEX: 23.92 KG/M2 | OXYGEN SATURATION: 95 % | HEIGHT: 62 IN

## 2021-08-25 PROBLEM — I48.91 ATRIAL FIBRILLATION WITH RAPID VENTRICULAR RESPONSE: Status: RESOLVED | Noted: 2021-08-22 | Resolved: 2021-08-25

## 2021-08-25 LAB
ANION GAP SERPL CALCULATED.3IONS-SCNC: 11 MMOL/L (ref 5–15)
BASOPHILS # BLD AUTO: 0.05 10*3/MM3 (ref 0–0.2)
BASOPHILS NFR BLD AUTO: 0.5 % (ref 0–1.5)
BUN SERPL-MCNC: 11 MG/DL (ref 8–23)
BUN/CREAT SERPL: 15.1 (ref 7–25)
CALCIUM SPEC-SCNC: 8.8 MG/DL (ref 8.2–9.6)
CHLORIDE SERPL-SCNC: 103 MMOL/L (ref 98–107)
CO2 SERPL-SCNC: 20 MMOL/L (ref 22–29)
CREAT SERPL-MCNC: 0.73 MG/DL (ref 0.57–1)
DEPRECATED RDW RBC AUTO: 59.1 FL (ref 37–54)
EOSINOPHIL # BLD AUTO: 0.37 10*3/MM3 (ref 0–0.4)
EOSINOPHIL NFR BLD AUTO: 3.7 % (ref 0.3–6.2)
ERYTHROCYTE [DISTWIDTH] IN BLOOD BY AUTOMATED COUNT: 18.3 % (ref 12.3–15.4)
GFR SERPL CREATININE-BSD FRML MDRD: 75 ML/MIN/1.73
GLUCOSE SERPL-MCNC: 103 MG/DL (ref 65–99)
HCT VFR BLD AUTO: 34.2 % (ref 34–46.6)
HCT VFR BLD AUTO: 34.2 % (ref 34–46.6)
HCT VFR BLD AUTO: 36.2 % (ref 34–46.6)
HGB BLD-MCNC: 10.3 G/DL (ref 12–15.9)
HGB BLD-MCNC: 10.3 G/DL (ref 12–15.9)
HGB BLD-MCNC: 11 G/DL (ref 12–15.9)
IMM GRANULOCYTES # BLD AUTO: 0.06 10*3/MM3 (ref 0–0.05)
IMM GRANULOCYTES NFR BLD AUTO: 0.6 % (ref 0–0.5)
LYMPHOCYTES # BLD AUTO: 2.35 10*3/MM3 (ref 0.7–3.1)
LYMPHOCYTES NFR BLD AUTO: 23.5 % (ref 19.6–45.3)
MCH RBC QN AUTO: 27.9 PG (ref 26.6–33)
MCHC RBC AUTO-ENTMCNC: 30.1 G/DL (ref 31.5–35.7)
MCV RBC AUTO: 92.7 FL (ref 79–97)
MONOCYTES # BLD AUTO: 0.8 10*3/MM3 (ref 0.1–0.9)
MONOCYTES NFR BLD AUTO: 8 % (ref 5–12)
NEUTROPHILS NFR BLD AUTO: 6.35 10*3/MM3 (ref 1.7–7)
NEUTROPHILS NFR BLD AUTO: 63.7 % (ref 42.7–76)
NRBC BLD AUTO-RTO: 0.2 /100 WBC (ref 0–0.2)
PLATELET # BLD AUTO: 434 10*3/MM3 (ref 140–450)
PMV BLD AUTO: 9.6 FL (ref 6–12)
POTASSIUM SERPL-SCNC: 3.9 MMOL/L (ref 3.5–5.2)
RBC # BLD AUTO: 3.69 10*6/MM3 (ref 3.77–5.28)
SODIUM SERPL-SCNC: 134 MMOL/L (ref 136–145)
UFH PPP CHRO-ACNC: 0.55 IU/ML (ref 0.3–0.7)
UFH PPP CHRO-ACNC: 0.69 IU/ML (ref 0.3–0.7)
WBC # BLD AUTO: 9.98 10*3/MM3 (ref 3.4–10.8)

## 2021-08-25 PROCEDURE — 85018 HEMOGLOBIN: CPT | Performed by: INTERNAL MEDICINE

## 2021-08-25 PROCEDURE — 85014 HEMATOCRIT: CPT | Performed by: INTERNAL MEDICINE

## 2021-08-25 PROCEDURE — 85520 HEPARIN ASSAY: CPT

## 2021-08-25 PROCEDURE — 80048 BASIC METABOLIC PNL TOTAL CA: CPT | Performed by: NURSE PRACTITIONER

## 2021-08-25 PROCEDURE — 85025 COMPLETE CBC W/AUTO DIFF WBC: CPT

## 2021-08-25 PROCEDURE — 99239 HOSP IP/OBS DSCHRG MGMT >30: CPT | Performed by: NURSE PRACTITIONER

## 2021-08-25 PROCEDURE — 99232 SBSQ HOSP IP/OBS MODERATE 35: CPT | Performed by: PHYSICIAN ASSISTANT

## 2021-08-25 RX ORDER — FLUCONAZOLE 200 MG/1
200 TABLET ORAL DAILY
Qty: 4 TABLET | Refills: 0 | Status: SHIPPED | OUTPATIENT
Start: 2021-08-26 | End: 2021-08-30

## 2021-08-25 RX ORDER — DILTIAZEM HYDROCHLORIDE 120 MG/1
120 CAPSULE, COATED, EXTENDED RELEASE ORAL
Qty: 30 CAPSULE | Refills: 1 | Status: SHIPPED | OUTPATIENT
Start: 2021-08-25 | End: 2021-09-27 | Stop reason: SDUPTHER

## 2021-08-25 RX ORDER — DILTIAZEM HYDROCHLORIDE 120 MG/1
120 CAPSULE, COATED, EXTENDED RELEASE ORAL
Status: DISCONTINUED | OUTPATIENT
Start: 2021-08-25 | End: 2021-08-25 | Stop reason: HOSPADM

## 2021-08-25 RX ORDER — HYDROCORTISONE ACETATE 25 MG/1
25 SUPPOSITORY RECTAL 2 TIMES DAILY
Qty: 8 SUPPOSITORY | Refills: 0 | Status: SHIPPED | OUTPATIENT
Start: 2021-08-25 | End: 2021-08-29

## 2021-08-25 RX ADMIN — DILTIAZEM HYDROCHLORIDE 30 MG: 30 TABLET, FILM COATED ORAL at 13:00

## 2021-08-25 RX ADMIN — OXYBUTYNIN CHLORIDE 10 MG: 5 TABLET, EXTENDED RELEASE ORAL at 08:17

## 2021-08-25 RX ADMIN — DILTIAZEM HYDROCHLORIDE 30 MG: 30 TABLET, FILM COATED ORAL at 05:39

## 2021-08-25 RX ADMIN — APIXABAN 2.5 MG: 2.5 TABLET, FILM COATED ORAL at 14:52

## 2021-08-25 RX ADMIN — DILTIAZEM HYDROCHLORIDE 30 MG: 30 TABLET, FILM COATED ORAL at 00:00

## 2021-08-25 RX ADMIN — ROSUVASTATIN CALCIUM 5 MG: 10 TABLET, COATED ORAL at 08:17

## 2021-08-25 RX ADMIN — FLUCONAZOLE 200 MG: 200 TABLET ORAL at 08:17

## 2021-08-27 LAB
QT INTERVAL: 320 MS
QTC INTERVAL: 450 MS

## 2021-08-30 ENCOUNTER — TELEPHONE (OUTPATIENT)
Dept: INTERNAL MEDICINE | Facility: CLINIC | Age: 86
End: 2021-08-30

## 2021-08-30 NOTE — TELEPHONE ENCOUNTER
They can do video and reschedule the dexa- please have them reschedule dexa and if issue have provider doing video visit order dexa

## 2021-08-30 NOTE — TELEPHONE ENCOUNTER
According to the grandson it was a worker and she is not having symptoms and likely not even exposed so I would recommend they covid test her the day before and she could come next week sometime If something changes would have to reevaluate her

## 2021-08-30 NOTE — TELEPHONE ENCOUNTER
Called and spoke with Swetha. She is not capable of doing a video visit with patient since they are under quarantine until 09/12/21. Do you want to reschedule the hospital  Visit at a later date? She will reschedule DEXA.

## 2021-08-30 NOTE — TELEPHONE ENCOUNTER
PATIENT'S DAUGHTER NAIN HERNÁNDEZ CALLED AND HAD QUESTIONS REGARDING HER HOSPITAL FOLLOW UP AND DEXA SCAN BOTH SCHEDULED FOR SEPT. 3RD.    PATIENT IS AT THE [facility] FOR REHAB FOR HIP SURGERY.  THE [facility] IS CURRENTLY UNDER A QUARANTINE FOR COVID EXPOSURE FOR 14 DAYS BUT THEY WOULD ALLOW HER TO GO TO DOCTOR VISIT IF NECESSARY.    DAUGHTER WANTS TO KNOW IF PATIENT REALLY NEEDS A DEXA SCAN RIGHT NOW OR RESCHEDULE.  ALSO CAN A VIDEO VISIT BE DONE FOR HOSPITAL VISIT?

## 2021-08-31 NOTE — TELEPHONE ENCOUNTER
Swetha stated she was tested on Sunday and she is negative. I told her as long as she is asymptomatic she could bring her to the appt on 09/03 and if needed we would reschedule.

## 2021-09-03 ENCOUNTER — OFFICE VISIT (OUTPATIENT)
Dept: INTERNAL MEDICINE | Facility: CLINIC | Age: 86
End: 2021-09-03

## 2021-09-03 ENCOUNTER — APPOINTMENT (OUTPATIENT)
Dept: BONE DENSITY | Facility: HOSPITAL | Age: 86
End: 2021-09-03

## 2021-09-03 VITALS
WEIGHT: 138.4 LBS | DIASTOLIC BLOOD PRESSURE: 80 MMHG | HEIGHT: 62 IN | BODY MASS INDEX: 25.47 KG/M2 | SYSTOLIC BLOOD PRESSURE: 122 MMHG | HEART RATE: 66 BPM | TEMPERATURE: 97.7 F | OXYGEN SATURATION: 96 %

## 2021-09-03 DIAGNOSIS — I10 BENIGN ESSENTIAL HYPERTENSION: ICD-10-CM

## 2021-09-03 DIAGNOSIS — I48.0 PAF (PAROXYSMAL ATRIAL FIBRILLATION) (HCC): Primary | ICD-10-CM

## 2021-09-03 DIAGNOSIS — Z09 HOSPITAL DISCHARGE FOLLOW-UP: ICD-10-CM

## 2021-09-03 DIAGNOSIS — D50.9 IRON DEFICIENCY ANEMIA, UNSPECIFIED IRON DEFICIENCY ANEMIA TYPE: ICD-10-CM

## 2021-09-03 PROCEDURE — 99214 OFFICE O/P EST MOD 30 MIN: CPT | Performed by: NURSE PRACTITIONER

## 2021-09-03 RX ORDER — HYDROCORTISONE ACETATE 25 MG/1
25 SUPPOSITORY RECTAL 2 TIMES DAILY PRN
Start: 2021-09-03

## 2021-09-03 NOTE — PROGRESS NOTES
Luh Mckeon  11/14/1930  [id number]  Patient Care Team:  [facility] Garcia MD as PCP - General  [facility] Garcia MD as PCP - Family Medicine    Luh Mckeon is a pleasant 89 y.o. female who presents for evaluation of Hospital Follow Up Visit (DC'd 8/25/21 still at [facility])    This patient is accompanied by their daughter who contributes to the history of their care.  Chief Complaint   Patient presents with   • Hospital Follow Up Visit     DC'd 8/25/21 still at [facility]       HPI:   Patient here to follow up on hospitalization for a fib with RVR and anemia from 8/22/2021 to 8/25/2021..  She was sent for inpatient eval from the [facility] where she has been in rehab after hip fx and surgery.  She had worsening blood counts and tachycardia symptoms prompting ED visit.  Her inpatient records have been reviewed.  She was d/c'd to the [facility] 8/25/2021 and has been feeling well since.  Denies dizziness.  Using walker and ambulating well.  She is not having any hip pain.  At discharge she was put on Eliquis 2.5 mg twice daily and diltiazem 120 mg daily.  She was also treated for 7 days with fluconazole which has been completed.  She was to use Anusol twice daily for 5 days.  It is on her current med list but the patient does not think she has been getting that.  Overall sleeping better.   Cbc drawn 12/30/21:  hgb 10.4, hct 32.8  Past Medical History:   Diagnosis Date   • Ankle instability    • Chronic left shoulder pain 1/26/2018   • Disorder of tendon of shoulder region, left    • Dysphagia    • Esophagitis    • Femoral neck fracture (CMS/HCC) 7/15/2021   • Finger fracture, right 2009    Dr Chandler casting and physical therapy   • Hemorrhoids    • Hypertension    • Localized, primary osteoarthritis of left shoulder region    • Right shoulder pain    • Shoulder joint replacement status     Right   • Stroke (CMS/HCC)     CVA     Past Surgical History:   Procedure Laterality Date   •  APPENDECTOMY      Daughter denies   • CATARACT EXTRACTION, BILATERAL      2020   • COLONOSCOPY N/A 8/23/2021    Procedure: COLONOSCOPY;  Surgeon: Aries Varela MD;  Location:  [facility];  Service: Gastroenterology;  Laterality: N/A;   • ENDOSCOPY  07/2009    with biopsy/esophageal ring dilation   • ENDOSCOPY N/A 8/23/2021    Procedure: ESOPHAGOGASTRODUODENOSCOPY;  Surgeon: Aries Varela MD;  Location:  [facility];  Service: Gastroenterology;  Laterality: N/A;   • HEMORRHOIDECTOMY      lanced    • HIP HEMIARTHROPLASTY Right 7/15/2021    Procedure: HIP HEMIARTHROPLASTY;  Surgeon: Adam Olsen MD;  Location:  [facility];  Service: Orthopedics;  Laterality: Right;   • HYSTERECTOMY      partial    • OOPHORECTOMY     • SHOULDER SURGERY Right     replacement   • SHOULDER SURGERY      Arthroscopy of shoulder:Right   • TONSILLECTOMY       Family History   Problem Relation Age of Onset   • Cancer Mother    • Stroke Mother    • Bleeding Disorder Mother    • Osteoarthritis Mother    • Rheum arthritis Mother    • Breast cancer Mother 60   • Heart attack Father    • Coronary artery disease Father    • Cancer Sister         Bladder    • Bleeding Disorder Brother         2   • Colon cancer Brother         3   • Ovarian cancer Neg Hx      Social History     Tobacco Use   Smoking Status Never Smoker   Smokeless Tobacco Never Used     Allergies   Allergen Reactions   • Nasal Spray Cough       Current Outpatient Medications:   •  acetaminophen (TYLENOL) 325 MG tablet, Take 650 mg by mouth Every 4 (Four) Hours As Needed for Mild Pain ., Disp: , Rfl:   •  apixaban (ELIQUIS) 2.5 MG tablet tablet, Take 1 tablet by mouth Every 12 (Twelve) Hours. Indications: Atrial Fibrillation, Disp: 60 tablet, Rfl: 1  •  bisacodyl (DULCOLAX) 10 MG suppository, Insert 10 mg into the rectum Daily As Needed for Constipation., Disp: , Rfl:   •  cholecalciferol (VITAMIN D3) 10 MCG (400 UNIT) tablet, Take 1 tablet by mouth Every Morning.,  "Disp: , Rfl:   •  Cranberry 450 MG tablet, Take 1 tablet by mouth Daily., Disp: , Rfl:   •  dilTIAZem CD (CARDIZEM CD) 120 MG 24 hr capsule, Take 1 capsule by mouth Daily., Disp: 30 capsule, Rfl: 1  •  docusate sodium (COLACE) 100 MG capsule, Take 100 mg by mouth 2 (Two) Times a Day., Disp: , Rfl:   •  ferrous sulfate 325 (65 FE) MG tablet, Take 325 mg by mouth 2 (two) times a day., Disp: , Rfl:   •  hydrocortisone (ANUSOL-HC) 25 MG suppository, Insert 1 suppository into the rectum 2 (Two) Times a Day As Needed for Hemorrhoids., Disp: , Rfl:   •  melatonin 3 MG tablet, Take 3 mg by mouth Every Night., Disp: , Rfl:   •  Mirabegron ER (Myrbetriq) 50 MG tablet sustained-release 24 hour 24 hr tablet, Take 50 mg by mouth Daily., Disp: 30 tablet, Rfl: 0  •  omeprazole (priLOSEC) 40 MG capsule, Take 40 mg by mouth Daily., Disp: , Rfl:   •  polyethylene glycol (MiraLax) 17 GM/SCOOP powder, Take 17 g by mouth. Once a day on Mon, Wed, Fri and PRN, Disp: , Rfl:   •  rosuvastatin (CRESTOR) 5 MG tablet, TAKE 1 TABLET EVERY DAY (Patient taking differently: Take 5 mg by mouth Every Night.), Disp: 90 tablet, Rfl: 1    Review of Systems  /80 (BP Location: Left arm, Patient Position: Sitting, Cuff Size: Adult)   Pulse 66   Temp 97.7 °F (36.5 °C) (Infrared)   Ht 157.5 cm (62\")   Wt 62.8 kg (138 lb 6.4 oz)   SpO2 96%   BMI 25.31 kg/m²     Physical Exam  Constitutional:       Appearance: She is well-developed.   HENT:      Head: Normocephalic and atraumatic.      Comments: *wearing mask  Eyes:      Conjunctiva/sclera: Conjunctivae normal.      Pupils: Pupils are equal, round, and reactive to light.   Cardiovascular:      Rate and Rhythm: Normal rate and regular rhythm.      Pulses: Normal pulses.      Heart sounds: Murmur heard.   Systolic murmur is present with a grade of 2/6.     Pulmonary:      Effort: Pulmonary effort is normal.      Breath sounds: Normal breath sounds.   Musculoskeletal:         General: Normal range of " motion.      Cervical back: Normal range of motion and neck supple.      Right lower leg: No edema.      Left lower leg: No edema.      Comments: bilat lateral ankle edema but no swelling feet or legs   Skin:     General: Skin is warm and dry.   Neurological:      Mental Status: She is alert and oriented to person, place, and time.   Psychiatric:         Mood and Affect: Mood normal.         Behavior: Behavior normal.         Thought Content: Thought content normal.         Judgment: Judgment normal.         Procedures    Results Review:  I reviewed the patient's new clinical results.    PHQ-9 Total Score:      Assessment/Plan:  Diagnoses and all orders for this visit:    1. PAF (paroxysmal atrial fibrillation) (CMS/HCC) (Primary)  Comments:  SR today, continue Eliquis BID (off aspirin)    2. Iron deficiency anemia, unspecified iron deficiency anemia type  Comments:  repeat cbc next Tue or Wed    3. Benign essential hypertension  Comments:  stable on current meds    4. Hospital discharge follow-up    Other orders  -     hydrocortisone (ANUSOL-HC) 25 MG suppository; Insert 1 suppository into the rectum 2 (Two) Times a Day As Needed for Hemorrhoids.       There are no Patient Instructions on file for this visit.  Plan of care reviewed with patient at the conclusion of today's visit. Education was provided regarding diagnosis, management and any prescribed or recommended OTC medications.  Patient verbalizes understanding of and agreement with management plan.    Return for Next scheduled follow up.    *Note that portions of this note were completed with a voice recognition program.  Efforts were made to edit the dictation but occasionally words are transcribed.    CHANTELLE Mcelroy

## 2021-09-10 LAB
QT INTERVAL: 414 MS
QTC INTERVAL: 434 MS

## 2021-09-13 ENCOUNTER — READMISSION MANAGEMENT (OUTPATIENT)
Dept: CALL CENTER | Facility: HOSPITAL | Age: 86
End: 2021-09-13

## 2021-09-13 ENCOUNTER — TRANSCRIBE ORDERS (OUTPATIENT)
Dept: HOME HEALTH SERVICES | Facility: HOME HEALTHCARE | Age: 86
End: 2021-09-13

## 2021-09-13 ENCOUNTER — HOME HEALTH ADMISSION (OUTPATIENT)
Dept: HOME HEALTH SERVICES | Facility: HOME HEALTHCARE | Age: 86
End: 2021-09-13

## 2021-09-13 DIAGNOSIS — Z96.641 AFTERCARE FOLLOWING RIGHT HIP JOINT REPLACEMENT SURGERY: Primary | ICD-10-CM

## 2021-09-13 DIAGNOSIS — Z47.1 AFTERCARE FOLLOWING RIGHT HIP JOINT REPLACEMENT SURGERY: Primary | ICD-10-CM

## 2021-09-13 NOTE — OUTREACH NOTE
Prep Survey      Responses   [facility] facility patient discharged from?  Non-BH   Is LACE score < 7 ?  Non-BH Discharge   Emergency Room discharge w/ pulse ox?  No   Eligibility  [facility]   Date of Discharge  09/13/21   Discharge diagnosis  Symptomatic Anemia, Afib w/ RVR   Does the patient have one of the following disease processes/diagnoses(primary or secondary)?  Other   Prep survey completed?  Yes          Lisa Becerra RN

## 2021-09-14 ENCOUNTER — TRANSITIONAL CARE MANAGEMENT TELEPHONE ENCOUNTER (OUTPATIENT)
Dept: CALL CENTER | Facility: HOSPITAL | Age: 86
End: 2021-09-14

## 2021-09-14 ENCOUNTER — HOME CARE VISIT (OUTPATIENT)
Dept: HOME HEALTH SERVICES | Facility: HOME HEALTHCARE | Age: 86
End: 2021-09-14

## 2021-09-14 VITALS
OXYGEN SATURATION: 98 % | HEART RATE: 68 BPM | DIASTOLIC BLOOD PRESSURE: 62 MMHG | RESPIRATION RATE: 18 BRPM | SYSTOLIC BLOOD PRESSURE: 104 MMHG | TEMPERATURE: 98.2 F

## 2021-09-14 PROCEDURE — G0151 HHCP-SERV OF PT,EA 15 MIN: HCPCS

## 2021-09-14 NOTE — OUTREACH NOTE
Call Center TCM Note      Responses   [facility] patient discharged from?  Non-   Does the patient have one of the following disease processes/diagnoses(primary or secondary)?  Other   TCM attempt successful?  Yes   Call start time  1107   Call end time  1128   Discharge diagnosis  Symptomatic Anemia, Afib w/ RVR   Is patient permission given to speak with other caregiver?  Yes   List who call center can speak with  Swetha CARRERA   Person spoke with today (if not patient) and relationship  Swetha CARRERA    Meds reviewed with patient/caregiver?  Yes   Is the patient having any side effects they believe may be caused by any medication additions or changes?  No   Does the patient have all medications ordered at discharge?  Yes   Prescription comments  POA has contacted Pharmacy regarding Eliquis frequency and is currently waiting for a return call    Is the patient taking all medications as directed (includes completed medication regime)?  Yes   Does the patient have a primary care provider?   Yes   Does the patient have an appointment with their PCP within 7 days of discharge?  Greater than 7 days   Comments regarding PCP  Hosp dc fu apt on 9/24/21 with PCP ,  Spoke with PCP about combining [facility] apt with fu apt,  they could not combine but will reschedule the [facility] apt with patient ,  POA aware    What is preventing the patient from scheduling follow up appointments within 7 days of discharge?  Earlier appointment not available   Nursing Interventions  Verified appointment date/time/provider   Comments  Will call PCP re: trying to combine [facility] apt with DC fu apt    Psychosocial issues?  No   Did the patient receive a copy of their discharge instructions?  Yes   Nursing interventions  Reviewed instructions with patient   What is the patient's perception of their health status since discharge?  Improving   Is the patient/caregiver able to teach back signs and symptoms related to disease process for  when to call PCP?  Yes   Is the patient/caregiver able to teach back signs and symptoms related to disease process for when to call 911?  Yes   Is the patient/caregiver able to teach back the hierarchy of who to call/visit for symptoms/problems? PCP, Specialist, Home health nurse, Urgent Care, ED, 911  Yes   If the patient is a current smoker, are they able to teach back resources for cessation?  Not a smoker   TCM call completed?  Yes          Jonna Robles RN    9/14/2021, 11:36 EDT

## 2021-09-15 NOTE — HOME HEALTH
Luh Mckeon is a 89 y.o. female w/ hx of moderate-severe Aortic stenosis, hx recent right hip fracture repair, blood on toilet paper for last 1-2 weeks presented w/ tachycardia and anemia. Was noted in afib w/ rvr and cardiology consulted, converted w/ diltiazem. GI consulted. egd 8/23/21 showed white plaques esophgus (biopsied). c-scope 8/23/21 revealed nonbleeding diverticulosis and hemorrhoids. anusol suppositories prescribed. Transfusing 1 unit prbc w/ lasix 8/23/21

## 2021-09-16 ENCOUNTER — HOME CARE VISIT (OUTPATIENT)
Dept: HOME HEALTH SERVICES | Facility: HOME HEALTHCARE | Age: 86
End: 2021-09-16

## 2021-09-16 VITALS
TEMPERATURE: 97.8 F | OXYGEN SATURATION: 97 % | HEART RATE: 70 BPM | SYSTOLIC BLOOD PRESSURE: 148 MMHG | DIASTOLIC BLOOD PRESSURE: 80 MMHG

## 2021-09-16 VITALS
DIASTOLIC BLOOD PRESSURE: 80 MMHG | OXYGEN SATURATION: 97 % | TEMPERATURE: 97.8 F | RESPIRATION RATE: 18 BRPM | SYSTOLIC BLOOD PRESSURE: 148 MMHG | HEART RATE: 70 BPM

## 2021-09-16 PROCEDURE — G0151 HHCP-SERV OF PT,EA 15 MIN: HCPCS

## 2021-09-16 PROCEDURE — G0152 HHCP-SERV OF OT,EA 15 MIN: HCPCS

## 2021-09-17 ENCOUNTER — HOME CARE VISIT (OUTPATIENT)
Dept: HOME HEALTH SERVICES | Facility: HOME HEALTHCARE | Age: 86
End: 2021-09-17

## 2021-09-17 PROCEDURE — G0299 HHS/HOSPICE OF RN EA 15 MIN: HCPCS

## 2021-09-19 VITALS
SYSTOLIC BLOOD PRESSURE: 119 MMHG | TEMPERATURE: 97.7 F | OXYGEN SATURATION: 96 % | RESPIRATION RATE: 16 BRPM | DIASTOLIC BLOOD PRESSURE: 68 MMHG | HEART RATE: 72 BPM

## 2021-09-20 ENCOUNTER — HOME CARE VISIT (OUTPATIENT)
Dept: HOME HEALTH SERVICES | Facility: HOME HEALTHCARE | Age: 86
End: 2021-09-20

## 2021-09-20 VITALS
OXYGEN SATURATION: 96 % | DIASTOLIC BLOOD PRESSURE: 80 MMHG | TEMPERATURE: 98 F | RESPIRATION RATE: 18 BRPM | HEART RATE: 73 BPM | SYSTOLIC BLOOD PRESSURE: 140 MMHG

## 2021-09-20 PROCEDURE — G0151 HHCP-SERV OF PT,EA 15 MIN: HCPCS

## 2021-09-20 NOTE — HOME HEALTH
Doing pretty good today, have noticed small lump on right knee cap--advised to follow up with PCP on Friday; daughter reports patient went walking outside last week alone after PT session--advised not ambulating outdoors alone at this time

## 2021-09-22 ENCOUNTER — HOME CARE VISIT (OUTPATIENT)
Dept: HOME HEALTH SERVICES | Facility: HOME HEALTHCARE | Age: 86
End: 2021-09-22

## 2021-09-22 VITALS
DIASTOLIC BLOOD PRESSURE: 84 MMHG | OXYGEN SATURATION: 96 % | SYSTOLIC BLOOD PRESSURE: 118 MMHG | HEART RATE: 74 BPM | TEMPERATURE: 98.4 F

## 2021-09-22 PROCEDURE — G0152 HHCP-SERV OF OT,EA 15 MIN: HCPCS

## 2021-09-23 ENCOUNTER — HOME CARE VISIT (OUTPATIENT)
Dept: HOME HEALTH SERVICES | Facility: HOME HEALTHCARE | Age: 86
End: 2021-09-23

## 2021-09-23 VITALS
RESPIRATION RATE: 16 BRPM | OXYGEN SATURATION: 99 % | HEART RATE: 70 BPM | DIASTOLIC BLOOD PRESSURE: 80 MMHG | TEMPERATURE: 97.7 F | SYSTOLIC BLOOD PRESSURE: 146 MMHG

## 2021-09-23 PROCEDURE — G0151 HHCP-SERV OF PT,EA 15 MIN: HCPCS

## 2021-09-24 ENCOUNTER — OFFICE VISIT (OUTPATIENT)
Dept: INTERNAL MEDICINE | Facility: CLINIC | Age: 86
End: 2021-09-24

## 2021-09-24 VITALS
HEART RATE: 62 BPM | DIASTOLIC BLOOD PRESSURE: 87 MMHG | BODY MASS INDEX: 25.76 KG/M2 | TEMPERATURE: 98.6 F | HEIGHT: 62 IN | WEIGHT: 140 LBS | SYSTOLIC BLOOD PRESSURE: 128 MMHG

## 2021-09-24 DIAGNOSIS — R26.81 UNSTEADY GAIT: ICD-10-CM

## 2021-09-24 DIAGNOSIS — D50.0 IRON DEFICIENCY ANEMIA DUE TO CHRONIC BLOOD LOSS: ICD-10-CM

## 2021-09-24 DIAGNOSIS — E78.5 HYPERLIPIDEMIA LDL GOAL <100: ICD-10-CM

## 2021-09-24 DIAGNOSIS — R41.3 MEMORY LOSS: ICD-10-CM

## 2021-09-24 DIAGNOSIS — I10 BENIGN ESSENTIAL HYPERTENSION: Primary | ICD-10-CM

## 2021-09-24 DIAGNOSIS — I70.203 ATHEROSCLEROSIS OF NATIVE ARTERY OF BOTH LOWER EXTREMITIES, WITH UNSPECIFIED PRESENCE OF CLINICAL MANIFESTATION (HCC): ICD-10-CM

## 2021-09-24 DIAGNOSIS — R25.2 CRAMP IN LIMB: ICD-10-CM

## 2021-09-24 PROCEDURE — 99215 OFFICE O/P EST HI 40 MIN: CPT | Performed by: INTERNAL MEDICINE

## 2021-09-24 RX ORDER — DONEPEZIL HYDROCHLORIDE 5 MG/1
5 TABLET, FILM COATED ORAL NIGHTLY
Qty: 90 TABLET | Refills: 1 | Status: SHIPPED | OUTPATIENT
Start: 2021-09-24 | End: 2021-09-27 | Stop reason: SDUPTHER

## 2021-09-24 RX ORDER — ROSUVASTATIN CALCIUM 5 MG/1
5 TABLET, COATED ORAL NIGHTLY
Qty: 90 TABLET | Refills: 1 | Status: SHIPPED | OUTPATIENT
Start: 2021-09-24 | End: 2021-09-27 | Stop reason: SDUPTHER

## 2021-09-24 NOTE — PROGRESS NOTES
Chief Complaint   Patient presents with   • Hospital Follow Up Visit   • Med Refill     Counseling was given to patient and family for the following topics: diagnostic results, instructions for management, risk factor reductions, prognosis, patient and family education, impressions, risks and benefits of treatment options and importance of treatment compliance . Total time of the encounter was 28 minutes and 42 minutes was spend counseling.    History of Present Illness  89 y.o. white female presents for follow up from hospital. She denies more bleeding. She denies chest pain    Review of Systems   Constitutional: Negative for chills and fever.   Respiratory: Negative for cough and shortness of breath.    Cardiovascular: Negative for chest pain and palpitations.   Gastrointestinal: Negative for abdominal pain, nausea and vomiting.   Musculoskeletal: Positive for gait problem.   Skin: Positive for rash.   Neurological: Negative for dizziness, light-headedness and headaches.   Psychiatric/Behavioral: Positive for decreased concentration.   All other systems reviewed and are negative.    .    PMSFH:  The following portions of the patient's history were reviewed and updated as appropriate: allergies, current medications, past family history, past medical history, past social history, past surgical history and problem list.      Current Outpatient Medications:   •  acetaminophen (TYLENOL) 325 MG tablet, Take 650 mg by mouth Every 4 (Four) Hours As Needed for Mild Pain ., Disp: , Rfl:   •  apixaban (ELIQUIS) 2.5 MG tablet tablet, Take 1 tablet by mouth Every 12 (Twelve) Hours. Indications: Atrial Fibrillation, Disp: 60 tablet, Rfl: 1  •  bisacodyl (DULCOLAX) 10 MG suppository, Insert 10 mg into the rectum Daily As Needed for Constipation., Disp: , Rfl:   •  cholecalciferol (VITAMIN D3) 10 MCG (400 UNIT) tablet, Take 1 tablet by mouth Every Morning., Disp: , Rfl:   •  Cranberry 450 MG tablet, Take 1 tablet by mouth Daily.,  "Disp: , Rfl:   •  dilTIAZem CD (CARDIZEM CD) 120 MG 24 hr capsule, Take 1 capsule by mouth Daily., Disp: 30 capsule, Rfl: 1  •  docusate sodium (COLACE) 100 MG capsule, Take 100 mg by mouth 2 (Two) Times a Day., Disp: , Rfl:   •  ferrous sulfate 325 (65 FE) MG tablet, Take 325 mg by mouth 2 (two) times a day., Disp: , Rfl:   •  hydrocortisone (ANUSOL-HC) 25 MG suppository, Insert 1 suppository into the rectum 2 (Two) Times a Day As Needed for Hemorrhoids., Disp: , Rfl:   •  melatonin 3 MG tablet, Take 3 mg by mouth Every Night., Disp: , Rfl:   •  Mirabegron ER (Myrbetriq) 50 MG tablet sustained-release 24 hour 24 hr tablet, Take 50 mg by mouth Daily., Disp: 30 tablet, Rfl: 0  •  omeprazole (priLOSEC) 40 MG capsule, Take 40 mg by mouth Daily., Disp: , Rfl:   •  polyethylene glycol (MiraLax) 17 GM/SCOOP powder, Take 17 g by mouth. Once a day on Mon, Wed, Fri and PRN, Disp: , Rfl:   •  rosuvastatin (CRESTOR) 5 MG tablet, Take 1 tablet by mouth Every Night., Disp: 90 tablet, Rfl: 1  •  donepezil (Aricept) 5 MG tablet, Take 1 tablet by mouth Every Night., Disp: 90 tablet, Rfl: 1    VITALS:  /87   Pulse 62   Temp 98.6 °F (37 °C)   Ht 157.5 cm (62.01\")   Wt 63.5 kg (140 lb)   BMI 25.60 kg/m²     Physical Exam  Vitals and nursing note reviewed.   Constitutional:       Appearance: Normal appearance. She is well-developed.   HENT:      Head: Normocephalic.   Eyes:      Extraocular Movements: Extraocular movements intact.      Conjunctiva/sclera: Conjunctivae normal.   Cardiovascular:      Rate and Rhythm: Normal rate and regular rhythm.      Heart sounds: Murmur (III/VI SM) heard.     Pulmonary:      Effort: Pulmonary effort is normal. No respiratory distress.      Breath sounds: Normal breath sounds.   Abdominal:      General: Bowel sounds are normal.      Palpations: Abdomen is soft.      Tenderness: There is no abdominal tenderness.   Musculoskeletal:         General: Swelling (mild anterior right knee swelling " ) present.   Skin:     General: Skin is warm and dry.   Neurological:      Mental Status: She is alert.      Comments: Her MMSE was decreased at 21/30   Psychiatric:         Mood and Affect: Mood normal.         Behavior: Behavior normal.         LABS:  Results for orders placed or performed during the hospital encounter of 08/22/21   COVID-19,CEPHEID/ALYSON,[facility] IN-HOUSE(OR EMERGENT/ADD-ON),NP SWAB IN TRANSPORT MEDIA 3-4 HR TAT - Swab, Nasopharynx    Specimen: Nasopharynx; Swab   Result Value Ref Range    COVID19 Not Detected Not Detected - Ref. Range   Comprehensive Metabolic Panel    Specimen: Blood   Result Value Ref Range    Glucose 109 (H) 65 - 99 mg/dL    BUN 10 8 - 23 mg/dL    Creatinine 0.72 0.57 - 1.00 mg/dL    Sodium 137 136 - 145 mmol/L    Potassium 4.4 3.5 - 5.2 mmol/L    Chloride 103 98 - 107 mmol/L    CO2 25.0 22.0 - 29.0 mmol/L    Calcium 8.7 8.2 - 9.6 mg/dL    Total Protein 6.5 6.0 - 8.5 g/dL    Albumin 3.60 3.50 - 5.20 g/dL    ALT (SGPT) 13 1 - 33 U/L    AST (SGOT) 18 1 - 32 U/L    Alkaline Phosphatase 82 39 - 117 U/L    Total Bilirubin 0.2 0.0 - 1.2 mg/dL    eGFR Non African Amer 76 >60 mL/min/1.73    Globulin 2.9 gm/dL    A/G Ratio 1.2 g/dL    BUN/Creatinine Ratio 13.9 7.0 - 25.0    Anion Gap 9.0 5.0 - 15.0 mmol/L   CBC Auto Differential    Specimen: Blood   Result Value Ref Range    WBC 7.79 3.40 - 10.80 10*3/mm3    RBC 3.08 (L) 3.77 - 5.28 10*6/mm3    Hemoglobin 8.4 (L) 12.0 - 15.9 g/dL    Hematocrit 29.2 (L) 34.0 - 46.6 %    MCV 94.8 79.0 - 97.0 fL    MCH 27.3 26.6 - 33.0 pg    MCHC 28.8 (L) 31.5 - 35.7 g/dL    RDW 16.4 (H) 12.3 - 15.4 %    RDW-SD 53.7 37.0 - 54.0 fl    MPV 9.6 6.0 - 12.0 fL    Platelets 482 (H) 140 - 450 10*3/mm3    Neutrophil % 65.5 42.7 - 76.0 %    Lymphocyte % 18.2 (L) 19.6 - 45.3 %    Monocyte % 12.8 (H) 5.0 - 12.0 %    Eosinophil % 2.2 0.3 - 6.2 %    Basophil % 0.5 0.0 - 1.5 %    Immature Grans % 0.8 (H) 0.0 - 0.5 %    Neutrophils, Absolute 5.10 1.70 - 7.00 10*3/mm3     Lymphocytes, Absolute 1.42 0.70 - 3.10 10*3/mm3    Monocytes, Absolute 1.00 (H) 0.10 - 0.90 10*3/mm3    Eosinophils, Absolute 0.17 0.00 - 0.40 10*3/mm3    Basophils, Absolute 0.04 0.00 - 0.20 10*3/mm3    Immature Grans, Absolute 0.06 (H) 0.00 - 0.05 10*3/mm3    nRBC 0.0 0.0 - 0.2 /100 WBC   Lactic Acid, Plasma    Specimen: Blood   Result Value Ref Range    Lactate 1.5 0.5 - 2.0 mmol/L   BNP    Specimen: Blood   Result Value Ref Range    proBNP 1,064.0 0.0-1,800.0 pg/mL   Urinalysis With Microscopic If Indicated (No Culture) - Urine, Catheter    Specimen: Urine, Catheter   Result Value Ref Range    Color, UA Dark Yellow (A) Yellow, Straw    Appearance, UA Clear Clear    pH, UA 7.5 5.0 - 8.0    Specific Gravity, UA 1.008 1.001 - 1.030    Glucose, UA Negative Negative    Ketones, UA Negative Negative    Bilirubin, UA Negative Negative    Blood, UA Negative Negative    Protein, UA Negative Negative    Leuk Esterase, UA Negative Negative    Nitrite, UA Positive (A) Negative    Urobilinogen, UA 1.0 E.U./dL 0.2 - 1.0 E.U./dL   Urinalysis, Microscopic Only - Urine, Catheter    Specimen: Urine, Catheter   Result Value Ref Range    RBC, UA 0-2 None Seen, 0-2 /HPF    WBC, UA None Seen None Seen, 0-2 /HPF    Bacteria, UA None Seen None Seen, Trace /HPF    Squamous Epithelial Cells, UA None Seen None Seen, 0-2 /HPF    Hyaline Casts, UA None Seen 0 - 6 /LPF    Methodology Automated Microscopy    Vitamin B12    Specimen: Blood   Result Value Ref Range    Vitamin B-12 312 211 - 946 pg/mL   Folate    Specimen: Blood   Result Value Ref Range    Folate 11.60 4.78 - 24.20 ng/mL   Ferritin    Specimen: Blood   Result Value Ref Range    Ferritin 67.37 13.00 - 150.00 ng/mL   Iron Profile    Specimen: Blood   Result Value Ref Range    Iron 20 (L) 37 - 145 mcg/dL    Iron Saturation 5 (L) 20 - 50 %    Transferrin 262 200 - 360 mg/dL    TIBC 390 298 - 536 mcg/dL   Reticulocytes    Specimen: Blood   Result Value Ref Range    Reticulocyte %  6.86 (H) 0.70 - 1.90 %    Reticulocyte Absolute 0.2154 (H) 0.0200 - 0.1300 10*6/mm3   Hemoglobin & Hematocrit, Blood    Specimen: Blood   Result Value Ref Range    Hemoglobin 7.7 (L) 12.0 - 15.9 g/dL    Hematocrit 26.9 (L) 34.0 - 46.6 %   Hemoglobin & Hematocrit, Blood    Specimen: Blood   Result Value Ref Range    Hemoglobin 8.1 (L) 12.0 - 15.9 g/dL    Hematocrit 28.4 (L) 34.0 - 46.6 %   Basic Metabolic Panel    Specimen: Blood   Result Value Ref Range    Glucose 87 65 - 99 mg/dL    BUN 6 (L) 8 - 23 mg/dL    Creatinine 0.54 (L) 0.57 - 1.00 mg/dL    Sodium 139 136 - 145 mmol/L    Potassium 4.1 3.5 - 5.2 mmol/L    Chloride 106 98 - 107 mmol/L    CO2 22.0 22.0 - 29.0 mmol/L    Calcium 8.4 8.2 - 9.6 mg/dL    eGFR Non African Amer 106 >60 mL/min/1.73    BUN/Creatinine Ratio 11.1 7.0 - 25.0    Anion Gap 11.0 5.0 - 15.0 mmol/L   Magnesium    Specimen: Blood   Result Value Ref Range    Magnesium 2.1 1.6 - 2.4 mg/dL   Hemoglobin & Hematocrit, Blood    Specimen: Blood   Result Value Ref Range    Hemoglobin 9.6 (L) 12.0 - 15.9 g/dL    Hematocrit 31.8 (L) 34.0 - 46.6 %   CBC (No Diff)    Specimen: Blood   Result Value Ref Range    WBC 9.08 3.40 - 10.80 10*3/mm3    RBC 3.58 (L) 3.77 - 5.28 10*6/mm3    Hemoglobin 10.0 (L) 12.0 - 15.9 g/dL    Hematocrit 33.3 (L) 34.0 - 46.6 %    MCV 93.0 79.0 - 97.0 fL    MCH 27.9 26.6 - 33.0 pg    MCHC 30.0 (L) 31.5 - 35.7 g/dL    RDW 17.5 (H) 12.3 - 15.4 %    RDW-SD 55.7 (H) 37.0 - 54.0 fl    MPV 9.9 6.0 - 12.0 fL    Platelets 449 140 - 450 10*3/mm3   Basic Metabolic Panel    Specimen: Blood   Result Value Ref Range    Glucose 94 65 - 99 mg/dL    BUN 11 8 - 23 mg/dL    Creatinine 0.69 0.57 - 1.00 mg/dL    Sodium 137 136 - 145 mmol/L    Potassium 3.8 3.5 - 5.2 mmol/L    Chloride 103 98 - 107 mmol/L    CO2 22.0 22.0 - 29.0 mmol/L    Calcium 8.4 8.2 - 9.6 mg/dL    eGFR Non African Amer 80 >60 mL/min/1.73    BUN/Creatinine Ratio 15.9 7.0 - 25.0    Anion Gap 12.0 5.0 - 15.0 mmol/L   Magnesium     Specimen: Blood   Result Value Ref Range    Magnesium 2.0 1.6 - 2.4 mg/dL   Hemoglobin & Hematocrit, Blood    Specimen: Blood   Result Value Ref Range    Hemoglobin 9.8 (L) 12.0 - 15.9 g/dL    Hematocrit 32.5 (L) 34.0 - 46.6 %   Heparin Anti-Xa    Specimen: Blood   Result Value Ref Range    Heparin Anti-Xa (UFH) 0.10 (L) 0.30 - 0.70 IU/ml   Protime-INR    Specimen: Blood   Result Value Ref Range    Protime 14.2 11.4 - 14.4 Seconds    INR 1.13 0.85 - 1.16   aPTT    Specimen: Blood   Result Value Ref Range    PTT 28.2 (L) 50.0 - 95.0 seconds   CBC Auto Differential    Specimen: Blood   Result Value Ref Range    WBC 9.96 3.40 - 10.80 10*3/mm3    RBC 3.52 (L) 3.77 - 5.28 10*6/mm3    Hemoglobin 9.8 (L) 12.0 - 15.9 g/dL    Hematocrit 32.5 (L) 34.0 - 46.6 %    MCV 92.3 79.0 - 97.0 fL    MCH 27.8 26.6 - 33.0 pg    MCHC 30.2 (L) 31.5 - 35.7 g/dL    RDW 17.8 (H) 12.3 - 15.4 %    RDW-SD 56.3 (H) 37.0 - 54.0 fl    MPV 9.8 6.0 - 12.0 fL    Platelets 465 (H) 140 - 450 10*3/mm3    Neutrophil % 75.6 42.7 - 76.0 %    Lymphocyte % 13.8 (L) 19.6 - 45.3 %    Monocyte % 8.9 5.0 - 12.0 %    Eosinophil % 1.0 0.3 - 6.2 %    Basophil % 0.4 0.0 - 1.5 %    Immature Grans % 0.3 0.0 - 0.5 %    Neutrophils, Absolute 7.53 (H) 1.70 - 7.00 10*3/mm3    Lymphocytes, Absolute 1.37 0.70 - 3.10 10*3/mm3    Monocytes, Absolute 0.89 0.10 - 0.90 10*3/mm3    Eosinophils, Absolute 0.10 0.00 - 0.40 10*3/mm3    Basophils, Absolute 0.04 0.00 - 0.20 10*3/mm3    Immature Grans, Absolute 0.03 0.00 - 0.05 10*3/mm3    nRBC 0.2 0.0 - 0.2 /100 WBC   Hemoglobin & Hematocrit, Blood    Specimen: Blood   Result Value Ref Range    Hemoglobin 9.6 (L) 12.0 - 15.9 g/dL    Hematocrit 31.7 (L) 34.0 - 46.6 %   Heparin Anti-Xa    Specimen: Blood   Result Value Ref Range    Heparin Anti-Xa (UFH) 0.15 (L) 0.30 - 0.70 IU/ml   Hemoglobin & Hematocrit, Blood    Specimen: Blood   Result Value Ref Range    Hemoglobin 10.3 (L) 12.0 - 15.9 g/dL    Hematocrit 34.2 34.0 - 46.6 %   Basic  Metabolic Panel    Specimen: Blood   Result Value Ref Range    Glucose 103 (H) 65 - 99 mg/dL    BUN 11 8 - 23 mg/dL    Creatinine 0.73 0.57 - 1.00 mg/dL    Sodium 134 (L) 136 - 145 mmol/L    Potassium 3.9 3.5 - 5.2 mmol/L    Chloride 103 98 - 107 mmol/L    CO2 20.0 (L) 22.0 - 29.0 mmol/L    Calcium 8.8 8.2 - 9.6 mg/dL    eGFR Non African Amer 75 >60 mL/min/1.73    BUN/Creatinine Ratio 15.1 7.0 - 25.0    Anion Gap 11.0 5.0 - 15.0 mmol/L   CBC Auto Differential    Specimen: Blood   Result Value Ref Range    WBC 9.98 3.40 - 10.80 10*3/mm3    RBC 3.69 (L) 3.77 - 5.28 10*6/mm3    Hemoglobin 10.3 (L) 12.0 - 15.9 g/dL    Hematocrit 34.2 34.0 - 46.6 %    MCV 92.7 79.0 - 97.0 fL    MCH 27.9 26.6 - 33.0 pg    MCHC 30.1 (L) 31.5 - 35.7 g/dL    RDW 18.3 (H) 12.3 - 15.4 %    RDW-SD 59.1 (H) 37.0 - 54.0 fl    MPV 9.6 6.0 - 12.0 fL    Platelets 434 140 - 450 10*3/mm3    Neutrophil % 63.7 42.7 - 76.0 %    Lymphocyte % 23.5 19.6 - 45.3 %    Monocyte % 8.0 5.0 - 12.0 %    Eosinophil % 3.7 0.3 - 6.2 %    Basophil % 0.5 0.0 - 1.5 %    Immature Grans % 0.6 (H) 0.0 - 0.5 %    Neutrophils, Absolute 6.35 1.70 - 7.00 10*3/mm3    Lymphocytes, Absolute 2.35 0.70 - 3.10 10*3/mm3    Monocytes, Absolute 0.80 0.10 - 0.90 10*3/mm3    Eosinophils, Absolute 0.37 0.00 - 0.40 10*3/mm3    Basophils, Absolute 0.05 0.00 - 0.20 10*3/mm3    Immature Grans, Absolute 0.06 (H) 0.00 - 0.05 10*3/mm3    nRBC 0.2 0.0 - 0.2 /100 WBC   Heparin Anti-Xa    Specimen: Blood   Result Value Ref Range    Heparin Anti-Xa (UFH) 0.55 0.30 - 0.70 IU/ml   Hemoglobin & Hematocrit, Blood    Specimen: Blood   Result Value Ref Range    Hemoglobin 11.0 (L) 12.0 - 15.9 g/dL    Hematocrit 36.2 34.0 - 46.6 %   Heparin Anti-Xa    Specimen: Blood   Result Value Ref Range    Heparin Anti-Xa (UFH) 0.69 0.30 - 0.70 IU/ml   POC Occult Blood Stool    Specimen: Per Rectum; Stool   Result Value Ref Range    Fecal Occult Blood Negative Negative    Lot Number [id number]     Expiration Date 2-24      DEVELOPER LOT NUMBER none     DEVELOPER EXPIRATION DATE none     Positive Control Positive Positive    Negative Control Negative Negative   ECG 12 Lead   Result Value Ref Range    QT Interval 414 ms    QTC Interval 434 ms   ECG 12 Lead   Result Value Ref Range    QT Interval 320 ms    QTC Interval 450 ms   Type & Screen    Specimen: Blood   Result Value Ref Range    ABO Type A     RH type Positive     Antibody Screen Negative     T&S Expiration Date 8/25/2021 11:59:59 PM    Prepare RBC, 1 Units   Result Value Ref Range    Product Code W4197O39     Unit Number [id number]     UNIT  ABO A     UNIT  RH POS     Crossmatch Interpretation Compatible     Dispense Status PT     Blood Expiration Date [id number]     Blood Type Barcode 6200    Tissue Pathology Exam    Specimen: Esophagus; Tissue   Result Value Ref Range    Case Report       Surgical Pathology Report                         Case: [id number]                                  Authorizing Provider:  Aries Varela MD        Collected:           08/23/2021 08:56 AM          Ordering Location:     [facility]   Received:            08/23/2021 09:36 AM                                 [facility]                                                                  Pathologist:           Meliton Rodriguez MD                                                           Specimen:    Esophagus, Esophagus bx @ 39 for pathology                                                 Clinical Information       Atrial fibrillation with rapid ventricular response, hypotension, hematochezia      Final Diagnosis       ESOPHAGUS, 39 CM, BIOPSY:                Benign squamous mucosa with Candida esophagitis with reactive epithelial changes                                Negative for eosinophilic esophagitis (0-1 eosinophils/hpf)                Negative for intestinal metaplasia, dysplasia and malignancy      Gross Description       Received in formalin labeled as esophagus  biopsy at 39 is a 0.5 x 0.2 x 0.2 cm single white tissue fragment, submitted entirely in block 1A.  LDP         Microscopic Description       The slides are reviewed and demonstrate histopathologic features supporting the above rendered diagnosis.       Green Top (Gel)   Result Value Ref Range    Extra Tube Hold for add-ons.    Lavender Top   Result Value Ref Range    Extra Tube hold for add-on    Gold Top - SST   Result Value Ref Range    Extra Tube Hold for add-ons.    Gray Top   Result Value Ref Range    Extra Tube Hold for add-ons.         Procedures         ASSESSMENT/PLAN:  Problems Addressed this Visit        Cardiac and Vasculature    Benign essential hypertension - Primary     Hypertension is unchanged.  Continue current treatment regimen.  Weight loss.  Regular aerobic exercise.  Blood pressure will be reassessed at the next regular appointment.         Relevant Orders    Microalbumin / Creatinine Urine Ratio - Urine, Clean Catch    Hyperlipidemia LDL goal <100     Lipid abnormalities are unchanged.  Nutritional counseling was provided.  Lipids will be reassessed in 1 year Plan labs Monday and in 1 year. .         Relevant Medications    rosuvastatin (CRESTOR) 5 MG tablet    Other Relevant Orders    Comprehensive Metabolic Panel    Lipid Panel    TSH Rfx On Abnormal To Free T4       Neuro    Memory loss     She has memory loss and agreed to start aricept but declined Neurology.          Relevant Orders    Vitamin B12       Symptoms and Signs    Cramp in limb     Follow up labs.         Relevant Orders    Magnesium    Unsteady gait     Encourage to get up slowly and get bearings before taking first step. Keep home well lit with clear floors to avoid tripping. Use assist devices for all walking especially from bed to bathroom. Use Home health PT and OT           Other Visit Diagnoses     Iron deficiency anemia due to chronic blood loss        Relevant Orders    CBC & Differential    Iron Profile    Ferritin     Atherosclerosis of native artery of both lower extremities, with unspecified presence of clinical manifestation (CMS/HCC)        Push PT and follow with Dr Miles       Diagnoses       Codes Comments    Benign essential hypertension    -  Primary ICD-10-CM: I10  ICD-9-CM: 401.1     Iron deficiency anemia due to chronic blood loss     ICD-10-CM: D50.0  ICD-9-CM: 280.0     Hyperlipidemia LDL goal <100     ICD-10-CM: E78.5  ICD-9-CM: 272.4     Unsteady gait     ICD-10-CM: R26.81  ICD-9-CM: 781.2     Cramp in limb     ICD-10-CM: R25.2  ICD-9-CM: 729.82     Memory loss     ICD-10-CM: R41.3  ICD-9-CM: 780.93     Atherosclerosis of native artery of both lower extremities, with unspecified presence of clinical manifestation (CMS/HCC)     ICD-10-CM: I70.203  ICD-9-CM: 440.20 Push PT and follow with Dr Miles           FOLLOW-UP:  Return for Next scheduled follow up.      Electronically signed by:    Schuyler Garcia MD

## 2021-09-25 NOTE — ASSESSMENT & PLAN NOTE
Encourage to get up slowly and get bearings before taking first step. Keep home well lit with clear floors to avoid tripping. Use assist devices for all walking especially from bed to bathroom. Use Home health PT and OT

## 2021-09-25 NOTE — ASSESSMENT & PLAN NOTE
Lipid abnormalities are unchanged.  Nutritional counseling was provided.  Lipids will be reassessed in 1 year Plan labs Monday and in 1 year. .

## 2021-09-27 ENCOUNTER — LAB (OUTPATIENT)
Dept: LAB | Facility: HOSPITAL | Age: 86
End: 2021-09-27

## 2021-09-27 ENCOUNTER — APPOINTMENT (OUTPATIENT)
Dept: LAB | Facility: HOSPITAL | Age: 86
End: 2021-09-27

## 2021-09-27 DIAGNOSIS — R41.3 MEMORY LOSS: ICD-10-CM

## 2021-09-27 DIAGNOSIS — E78.5 HYPERLIPIDEMIA LDL GOAL <100: ICD-10-CM

## 2021-09-27 DIAGNOSIS — D50.0 IRON DEFICIENCY ANEMIA DUE TO CHRONIC BLOOD LOSS: ICD-10-CM

## 2021-09-27 DIAGNOSIS — I10 BENIGN ESSENTIAL HYPERTENSION: ICD-10-CM

## 2021-09-27 DIAGNOSIS — R25.2 CRAMP IN LIMB: ICD-10-CM

## 2021-09-27 LAB
ALBUMIN SERPL-MCNC: 4 G/DL (ref 3.5–5.2)
ALBUMIN UR-MCNC: <1.2 MG/DL
ALBUMIN/GLOB SERPL: 1.4 G/DL
ALP SERPL-CCNC: 73 U/L (ref 39–117)
ALT SERPL W P-5'-P-CCNC: 9 U/L (ref 1–33)
ANION GAP SERPL CALCULATED.3IONS-SCNC: 10.5 MMOL/L (ref 5–15)
AST SERPL-CCNC: 17 U/L (ref 1–32)
BASOPHILS # BLD AUTO: 0.02 10*3/MM3 (ref 0–0.2)
BASOPHILS NFR BLD AUTO: 0.4 % (ref 0–1.5)
BILIRUB SERPL-MCNC: 0.2 MG/DL (ref 0–1.2)
BUN SERPL-MCNC: 10 MG/DL (ref 8–23)
BUN/CREAT SERPL: 13 (ref 7–25)
CALCIUM SPEC-SCNC: 9.2 MG/DL (ref 8.2–9.6)
CHLORIDE SERPL-SCNC: 101 MMOL/L (ref 98–107)
CHOLEST SERPL-MCNC: 158 MG/DL (ref 0–200)
CO2 SERPL-SCNC: 25.5 MMOL/L (ref 22–29)
CREAT SERPL-MCNC: 0.77 MG/DL (ref 0.57–1)
CREAT UR-MCNC: 91.6 MG/DL
DEPRECATED RDW RBC AUTO: 50.2 FL (ref 37–54)
EOSINOPHIL # BLD AUTO: 0.13 10*3/MM3 (ref 0–0.4)
EOSINOPHIL NFR BLD AUTO: 2.9 % (ref 0.3–6.2)
ERYTHROCYTE [DISTWIDTH] IN BLOOD BY AUTOMATED COUNT: 15.4 % (ref 12.3–15.4)
FERRITIN SERPL-MCNC: 122 NG/ML (ref 13–150)
GFR SERPL CREATININE-BSD FRML MDRD: 70 ML/MIN/1.73
GLOBULIN UR ELPH-MCNC: 2.9 GM/DL
GLUCOSE SERPL-MCNC: 95 MG/DL (ref 65–99)
HCT VFR BLD AUTO: 41.2 % (ref 34–46.6)
HDLC SERPL-MCNC: 59 MG/DL (ref 40–60)
HGB BLD-MCNC: 13.3 G/DL (ref 12–15.9)
IMM GRANULOCYTES # BLD AUTO: 0.01 10*3/MM3 (ref 0–0.05)
IMM GRANULOCYTES NFR BLD AUTO: 0.2 % (ref 0–0.5)
IRON 24H UR-MRATE: 65 MCG/DL (ref 37–145)
IRON SATN MFR SERPL: 21 % (ref 20–50)
LDLC SERPL CALC-MCNC: 85 MG/DL (ref 0–100)
LDLC/HDLC SERPL: 1.43 {RATIO}
LYMPHOCYTES # BLD AUTO: 1.46 10*3/MM3 (ref 0.7–3.1)
LYMPHOCYTES NFR BLD AUTO: 32.7 % (ref 19.6–45.3)
MAGNESIUM SERPL-MCNC: 2.3 MG/DL (ref 1.6–2.4)
MCH RBC QN AUTO: 28.9 PG (ref 26.6–33)
MCHC RBC AUTO-ENTMCNC: 32.3 G/DL (ref 31.5–35.7)
MCV RBC AUTO: 89.4 FL (ref 79–97)
MICROALBUMIN/CREAT UR: NORMAL MG/G{CREAT}
MONOCYTES # BLD AUTO: 0.49 10*3/MM3 (ref 0.1–0.9)
MONOCYTES NFR BLD AUTO: 11 % (ref 5–12)
NEUTROPHILS NFR BLD AUTO: 2.35 10*3/MM3 (ref 1.7–7)
NEUTROPHILS NFR BLD AUTO: 52.8 % (ref 42.7–76)
NRBC BLD AUTO-RTO: 0 /100 WBC (ref 0–0.2)
PLATELET # BLD AUTO: 287 10*3/MM3 (ref 140–450)
PMV BLD AUTO: 10.8 FL (ref 6–12)
POTASSIUM SERPL-SCNC: 4.2 MMOL/L (ref 3.5–5.2)
PROT SERPL-MCNC: 6.9 G/DL (ref 6–8.5)
RBC # BLD AUTO: 4.61 10*6/MM3 (ref 3.77–5.28)
SODIUM SERPL-SCNC: 137 MMOL/L (ref 136–145)
TIBC SERPL-MCNC: 310 MCG/DL (ref 298–536)
TRANSFERRIN SERPL-MCNC: 208 MG/DL (ref 200–360)
TRIGL SERPL-MCNC: 72 MG/DL (ref 0–150)
TSH SERPL DL<=0.05 MIU/L-ACNC: 1 UIU/ML (ref 0.27–4.2)
VIT B12 BLD-MCNC: 343 PG/ML (ref 211–946)
VLDLC SERPL-MCNC: 14 MG/DL (ref 5–40)
WBC # BLD AUTO: 4.46 10*3/MM3 (ref 3.4–10.8)

## 2021-09-27 PROCEDURE — 84443 ASSAY THYROID STIM HORMONE: CPT

## 2021-09-27 PROCEDURE — 80061 LIPID PANEL: CPT

## 2021-09-27 PROCEDURE — 82728 ASSAY OF FERRITIN: CPT

## 2021-09-27 PROCEDURE — 83540 ASSAY OF IRON: CPT

## 2021-09-27 PROCEDURE — 82570 ASSAY OF URINE CREATININE: CPT

## 2021-09-27 PROCEDURE — 80053 COMPREHEN METABOLIC PANEL: CPT

## 2021-09-27 PROCEDURE — 82043 UR ALBUMIN QUANTITATIVE: CPT

## 2021-09-27 PROCEDURE — 82607 VITAMIN B-12: CPT

## 2021-09-27 PROCEDURE — 84466 ASSAY OF TRANSFERRIN: CPT

## 2021-09-27 PROCEDURE — 83735 ASSAY OF MAGNESIUM: CPT

## 2021-09-27 PROCEDURE — 85025 COMPLETE CBC W/AUTO DIFF WBC: CPT

## 2021-09-27 RX ORDER — DONEPEZIL HYDROCHLORIDE 5 MG/1
5 TABLET, FILM COATED ORAL NIGHTLY
Qty: 90 TABLET | Refills: 1 | Status: SHIPPED | OUTPATIENT
Start: 2021-09-27 | End: 2021-09-30 | Stop reason: SDUPTHER

## 2021-09-27 RX ORDER — ROSUVASTATIN CALCIUM 5 MG/1
5 TABLET, COATED ORAL NIGHTLY
Qty: 90 TABLET | Refills: 1 | Status: SHIPPED | OUTPATIENT
Start: 2021-09-27 | End: 2021-09-30 | Stop reason: SDUPTHER

## 2021-09-27 RX ORDER — DILTIAZEM HYDROCHLORIDE 120 MG/1
120 CAPSULE, COATED, EXTENDED RELEASE ORAL
Qty: 90 CAPSULE | Refills: 1 | Status: SHIPPED | OUTPATIENT
Start: 2021-09-27 | End: 2021-09-30 | Stop reason: SDUPTHER

## 2021-09-27 RX ORDER — OMEPRAZOLE 40 MG/1
40 CAPSULE, DELAYED RELEASE ORAL DAILY
Qty: 90 CAPSULE | Refills: 1 | Status: SHIPPED | OUTPATIENT
Start: 2021-09-27 | End: 2021-09-30 | Stop reason: SDUPTHER

## 2021-09-28 ENCOUNTER — HOME CARE VISIT (OUTPATIENT)
Dept: HOME HEALTH SERVICES | Facility: HOME HEALTHCARE | Age: 86
End: 2021-09-28

## 2021-09-28 VITALS
SYSTOLIC BLOOD PRESSURE: 126 MMHG | DIASTOLIC BLOOD PRESSURE: 86 MMHG | OXYGEN SATURATION: 97 % | HEART RATE: 68 BPM | TEMPERATURE: 97.7 F | RESPIRATION RATE: 18 BRPM

## 2021-09-28 VITALS
TEMPERATURE: 97.3 F | OXYGEN SATURATION: 97 % | HEART RATE: 80 BPM | DIASTOLIC BLOOD PRESSURE: 85 MMHG | SYSTOLIC BLOOD PRESSURE: 131 MMHG

## 2021-09-28 PROCEDURE — G0151 HHCP-SERV OF PT,EA 15 MIN: HCPCS

## 2021-09-28 PROCEDURE — G0152 HHCP-SERV OF OT,EA 15 MIN: HCPCS

## 2021-09-30 ENCOUNTER — TELEMEDICINE (OUTPATIENT)
Dept: INTERNAL MEDICINE | Facility: CLINIC | Age: 86
End: 2021-09-30

## 2021-09-30 VITALS — SYSTOLIC BLOOD PRESSURE: 123 MMHG | HEART RATE: 72 BPM | DIASTOLIC BLOOD PRESSURE: 78 MMHG

## 2021-09-30 DIAGNOSIS — I48.0 PAROXYSMAL ATRIAL FIBRILLATION (HCC): ICD-10-CM

## 2021-09-30 DIAGNOSIS — R26.81 UNSTEADY GAIT: ICD-10-CM

## 2021-09-30 DIAGNOSIS — Z00.00 MEDICARE ANNUAL WELLNESS VISIT, SUBSEQUENT: Primary | ICD-10-CM

## 2021-09-30 DIAGNOSIS — I10 BENIGN ESSENTIAL HYPERTENSION: ICD-10-CM

## 2021-09-30 DIAGNOSIS — Z91.81 AT HIGH RISK FOR FALLS: ICD-10-CM

## 2021-09-30 DIAGNOSIS — E78.5 HYPERLIPIDEMIA LDL GOAL <100: ICD-10-CM

## 2021-09-30 DIAGNOSIS — Z91.81 RISK FOR FALLS: ICD-10-CM

## 2021-09-30 PROCEDURE — G0439 PPPS, SUBSEQ VISIT: HCPCS | Performed by: INTERNAL MEDICINE

## 2021-09-30 RX ORDER — ROSUVASTATIN CALCIUM 5 MG/1
5 TABLET, COATED ORAL NIGHTLY
Qty: 90 TABLET | Refills: 1 | Status: SHIPPED | OUTPATIENT
Start: 2021-09-30 | End: 2022-09-12

## 2021-09-30 RX ORDER — DILTIAZEM HYDROCHLORIDE 120 MG/1
120 CAPSULE, COATED, EXTENDED RELEASE ORAL
Qty: 90 CAPSULE | Refills: 1 | Status: SHIPPED | OUTPATIENT
Start: 2021-09-30 | End: 2022-03-14

## 2021-09-30 RX ORDER — OMEPRAZOLE 40 MG/1
40 CAPSULE, DELAYED RELEASE ORAL DAILY
Qty: 90 CAPSULE | Refills: 1 | Status: SHIPPED | OUTPATIENT
Start: 2021-09-30 | End: 2022-02-07

## 2021-09-30 RX ORDER — DONEPEZIL HYDROCHLORIDE 5 MG/1
5 TABLET, FILM COATED ORAL NIGHTLY
Qty: 90 TABLET | Refills: 1 | Status: SHIPPED | OUTPATIENT
Start: 2021-09-30 | End: 2021-11-09

## 2021-09-30 NOTE — ASSESSMENT & PLAN NOTE
Doing ok with eliquis and no further bleeding. Her blood count was good and I sent in refill for her today.

## 2021-09-30 NOTE — PROGRESS NOTES
The ABCs of the Annual Wellness Visit  Subsequent Medicare Wellness Visit    Chief Complaint   Patient presents with   • Medicare Wellness-subsequent    This was an audio and video enabled telemedicine encounter.She and her daughter agreed yes consent to the combined video and telephone visit carried out with donna. It was started with video and did some of the visit that way and rest on the phone You have chosen to receive care through a telephone visit. Do you consent to use a telephone visit for your medical care today? Yes                                                                        Subjective    History of Present Illness:  Luh Mckeon is a 89 y.o. female who presents for a Subsequent Medicare Wellness Visit.She was at [facility] and then [place] and is now getting Home Health with PT and OT from [facility]. Her daughter and caregivers are very helpful for her.     The following portions of the patient's history were reviewed and   updated as appropriate: allergies, current medications, past family history, past medical history, past social history, past surgical history and problem list.    Compared to one year ago, the patient feels her physical   health is better.    Compared to one year ago, the patient feels her mental   health is better.    Recent Hospitalizations:  This patient has had a [facility] admission record on file within the last 365 days.    Current Medical Providers:  Patient Care Team:  Schuyler Garcia MD as PCP - General  Schuyler Garcia MD as PCP - Family Medicine    Outpatient Medications Prior to Visit   Medication Sig Dispense Refill   • acetaminophen (TYLENOL) 325 MG tablet Take 650 mg by mouth Every 4 (Four) Hours As Needed for Mild Pain .     • bisacodyl (DULCOLAX) 10 MG suppository Insert 10 mg into the rectum Daily As Needed for Constipation.     • cholecalciferol (VITAMIN D3) 10 MCG (400 UNIT) tablet Take 1 tablet by mouth Every Morning.      • Cranberry 450 MG tablet Take 1 tablet by mouth Daily.     • docusate sodium (COLACE) 100 MG capsule Take 100 mg by mouth 2 (Two) Times a Day.     • hydrocortisone (ANUSOL-HC) 25 MG suppository Insert 1 suppository into the rectum 2 (Two) Times a Day As Needed for Hemorrhoids.     • melatonin 3 MG tablet Take 3 mg by mouth Every Night.     • polyethylene glycol (MiraLax) 17 GM/SCOOP powder Take 17 g by mouth. Once a day on Mon, Wed, Fri and PRN     • apixaban (ELIQUIS) 2.5 MG tablet tablet Take 1 tablet by mouth Every 12 (Twelve) Hours. Indications: Atrial Fibrillation 60 tablet 1   • dilTIAZem CD (CARDIZEM CD) 120 MG 24 hr capsule Take 1 capsule by mouth Daily. 90 capsule 1   • donepezil (Aricept) 5 MG tablet Take 1 tablet by mouth Every Night. 90 tablet 1   • ferrous sulfate 325 (65 FE) MG tablet Take 325 mg by mouth 2 (two) times a day.     • Mirabegron ER (Myrbetriq) 50 MG tablet sustained-release 24 hour 24 hr tablet Take 50 mg by mouth Daily. 90 tablet 1   • omeprazole (priLOSEC) 40 MG capsule Take 1 capsule by mouth Daily. 90 capsule 1   • rosuvastatin (CRESTOR) 5 MG tablet Take 1 tablet by mouth Every Night. 90 tablet 1     No facility-administered medications prior to visit.       No opioid medication identified on active medication list. I have reviewed chart for other potential  high risk medication/s and harmful drug interactions in the elderly.          Aspirin is not on active medication list.  Aspirin use is indicated based on review of current medical condition/s. Pros and cons of this therapy have been discussed with this patient. Benefits of this medication outweigh potential harm.  Patient has been instructed to start taking this medication..    Patient Active Problem List   Diagnosis   • Generalized anxiety disorder   • Unsteady gait   • Risk for falls   • Hyperlipidemia LDL goal <100   • Benign essential hypertension   • Memory loss   • Urinary incontinence in female   • Candida  onychomycosis   • AR (allergic rhinitis)   • Overweight (BMI 25.0-29.9)   • Chest pain   • Cramp in limb   • Disorder of mitral and aortic valves   • Disorder of skeletal muscle   • Diverticular disease of colon   • Dysphagia   • External hemorrhoids   •     • Hand joint pain   • Insomnia   • Knee pain   • Menopausal and postmenopausal disorder   • Osteoporosis   • Shoulder pain   • Vitamin B deficiency   • Medicare annual wellness visit, subsequent   • Dizziness   • Urgency of urination   • Fatigue   • Hematochezia   • Paroxysmal atrial fibrillation (HCC)     Advance Care Planning  Advance Directive is on file.  ACP discussion was held with the patient during this visit. Patient has an advance directive in EMR which is still valid.     Review of Systems   Constitutional: Negative for diaphoresis, fatigue and fever.   HENT: Negative for hearing loss.    Musculoskeletal: Positive for arthralgias and gait problem.   Neurological: Negative for dizziness.   Psychiatric/Behavioral: Positive for decreased concentration. Negative for dysphoric mood.        Objective    Vitals:    09/30/21 1045   BP: 123/78   Pulse: 72     BMI Readings from Last 1 Encounters:   09/24/21 25.60 kg/m²   BMI is above normal parameters. Recommendations include: educational material and exercise counseling    Does the patient have evidence of cognitive impairment? Yes    Physical Exam  Constitutional:       Appearance: Normal appearance.   Neurological:      General: No focal deficit present.      Mental Status: She is alert.   Psychiatric:         Mood and Affect: Mood normal.         Behavior: Behavior normal.       Lab Results   Component Value Date    TRIG 72 09/27/2021    HDL 59 09/27/2021    LDL 85 09/27/2021    VLDL 14 09/27/2021            HEALTH RISK ASSESSMENT    Smoking Status:  Social History     Tobacco Use   Smoking Status Never Smoker   Smokeless Tobacco Never Used     Alcohol Consumption:  Social History     Substance and Sexual  Activity   Alcohol Use No     Fall Risk Screen:    INGRIDADI Fall Risk Assessment was completed, and patient is at HIGH risk for falls. Assessment completed on:9/30/2021    Depression Screening:  PHQ-2/PHQ-9 Depression Screening 9/30/2021   Little interest or pleasure in doing things 0   Feeling down, depressed, or hopeless 0   Total Score 0       Health Habits and Functional and Cognitive Screening:  Functional & Cognitive Status 9/30/2021   Do you have difficulty preparing food and eating? Yes   Do you have difficulty bathing yourself, getting dressed or grooming yourself? Yes   Do you have difficulty using the toilet? No   Do you have difficulty moving around from place to place? No   Do you have trouble with steps or getting out of a bed or a chair? No   Current Diet Well Balanced Diet   Dental Exam Up to date        Dental Exam Comment she will see Dentist next month   Eye Exam Up to date        Eye Exam Comment she will get on with Dr Perdomo in 2022   Exercise (times per week) 7 times per week   Current Exercises Include Aerobics   Current Exercise Activities Include -   Do you need help using the phone?  No   Are you deaf or do you have serious difficulty hearing?  No   Do you need help with transportation? Yes   Do you need help shopping? Yes   Do you need help preparing meals?  Yes   Do you need help with housework?  Yes   Do you need help with laundry? Yes   Do you need help taking your medications? Yes   Do you need help managing money? Yes   Do you ever drive or ride in a car without wearing a seat belt? Yes   Have you felt unusual stress, anger or loneliness in the last month? No   Who do you live with? Other   If you need help, do you have trouble finding someone available to you? Yes   Have you been bothered in the last four weeks by sexual problems? No   Do you have difficulty concentrating, remembering or making decisions? Yes       Age-appropriate Screening Schedule:  Refer to the list below for future  screening recommendations based on patient's age, sex and/or medical conditions. Orders for these recommended tests are listed in the plan section. The patient has been provided with a written plan.    Health Maintenance   Topic Date Due   • ZOSTER VACCINE (2 of 3) 05/25/2009   • DXA SCAN  07/05/2020   • INFLUENZA VACCINE  10/01/2021   • LIPID PANEL  09/27/2022   • TDAP/TD VACCINES (4 - Td or Tdap) 07/14/2030              Assessment/Plan   CMS Preventative Services Quick Reference  Risk Factors Identified During Encounter  Cardiovascular Disease  Dementia/Memory   Fall Risk-High or Moderate  Obesity/Overweight   Polypharmacy  The above risks/problems have been discussed with the patient.  Follow up actions/plans if indicated are seen below in the Assessment/Plan Section.  Pertinent information has been shared with the patient in the After Visit Summary.    Diagnoses and all orders for this visit:    1. Medicare annual wellness visit, subsequent (Primary)  Assessment & Plan:  Her hearing was good overall and declined evaluation. Her mood is good and we did discuss her memory loss and decreased MMSE. She has not started aricept yet but I have sent in prescription for her She was at [facility] and then [place] and is now getting Home Health with PT and OT from [facility]. Her daughter and caregivers are very helpful for her.   I have gone over all her medications with her and her daughter and we have stopped her iron because her iron was good. She will see Dr Miles on 10/7/2021 for cardiology and Dr Perdomo for eye exam in early 2022. She is rescheduling her bone density. Age-appropriate Counseling:  Discussed preventative medicine issues with patient including regular exercise, healthy diet, stress reduction, adequate sleep and recommended age-appropriate screening studies.  Immunizations reviewed.            2. At high risk for falls  Comments:  She is getting Home Health for PT and OT and she follows  regularly for vision checks.     3. Risk for falls  Assessment & Plan:  She is getting PT and OT and will see DR Olsen in November for Orthopedics.       4. Hyperlipidemia LDL goal <100  Assessment & Plan:  Doing well on crestor.       5. Benign essential hypertension  Assessment & Plan:  Hypertension is improving with treatment.  Continue current treatment regimen.  Regular aerobic exercise.  Blood pressure will be reassessed at the next regular appointment.      6. Paroxysmal atrial fibrillation (HCC)  Assessment & Plan:  Doing ok with eliquis and no further bleeding. Her blood count was good and I sent in refill for her today.       7. Unsteady gait  Assessment & Plan:  She was at [facility] and then [place] and is now getting Home Health with PT and OT from [facility]. Her daughter and caregivers are very helpful for her. Encourage to get up slowly and get bearings before taking first step. Keep home well lit with clear floors to avoid tripping. Use assist devices for all walking especially from bed to bathroom.       Other orders  -     Discontinue: apixaban (ELIQUIS) 2.5 MG tablet tablet; Take 1 tablet by mouth Every 12 (Twelve) Hours. Indications: Atrial Fibrillation  Dispense: 60 tablet; Refill: 1  -     apixaban (ELIQUIS) 2.5 MG tablet tablet; Take 1 tablet by mouth Every 12 (Twelve) Hours. Indications: Atrial Fibrillation  Dispense: 60 tablet; Refill: 5  -     rosuvastatin (CRESTOR) 5 MG tablet; Take 1 tablet by mouth Every Night.  Dispense: 90 tablet; Refill: 1  -     omeprazole (priLOSEC) 40 MG capsule; Take 1 capsule by mouth Daily.  Dispense: 90 capsule; Refill: 1  -     Mirabegron ER (Myrbetriq) 50 MG tablet sustained-release 24 hour 24 hr tablet; Take 50 mg by mouth Daily.  Dispense: 90 tablet; Refill: 1  -     donepezil (Aricept) 5 MG tablet; Take 1 tablet by mouth Every Night.  Dispense: 90 tablet; Refill: 1  -     dilTIAZem CD (CARDIZEM CD) 120 MG 24 hr capsule; Take 1 capsule by  mouth Daily.  Dispense: 90 capsule; Refill: 1      Follow Up:   Return in 6 months (on 3/30/2022).     An After Visit Summary and PPPS were made available to the patient.

## 2021-09-30 NOTE — PROGRESS NOTES
Chief Complaint  No chief complaint on file.    Subjective     {Problem List  Visit Diagnosis   Encounters  Notes  Medications  Labs  Result Review Imaging  Media :23}     Luh Mckeon presents to [facility] INTERNAL MEDICINE  History of Present Illness    Objective   Vital Signs:   There were no vitals taken for this visit.    Physical Exam   Result Review :{Labs  Result Review  Imaging  Med Tab  Media  Procedures :23}   {The following data was reviewed by (Optional):84867}  {Ambulatory Labs (Optional):88736}  {Data reviewed (Optional):54055:::1}          Assessment and Plan {CC Problem List  Visit Diagnosis   ROS  Review (Popup)  Health Maintenance  Quality  BestPractice  Medications  SmartSets  SnapShot Encounters  Media :23}   There are no diagnoses linked to this encounter.  {Time Spent (Optional):98395}  Follow Up {Instructions Charge Capture  Follow-up Communications :23}  No follow-ups on file.  Patient was given instructions and counseling regarding her condition or for health maintenance advice. Please see specific information pulled into the AVS if appropriate.

## 2021-09-30 NOTE — ASSESSMENT & PLAN NOTE
Her hearing was good overall and declined evaluation. Her mood is good and we did discuss her memory loss and decreased MMSE. She has not started aricept yet but I have sent in prescription for her She was at [facility] and then [place] and is now getting Home Health with PT and OT from [facility]. Her daughter and caregivers are very helpful for her.   I have gone over all her medications with her and her daughter and we have stopped her iron because her iron was good. She will see Dr Miles on 10/7/2021 for cardiology and Dr Perdomo for eye exam in early 2022. She is rescheduling her bone density. Age-appropriate Counseling:  Discussed preventative medicine issues with patient including regular exercise, healthy diet, stress reduction, adequate sleep and recommended age-appropriate screening studies.  Immunizations reviewed.

## 2021-09-30 NOTE — PATIENT INSTRUCTIONS

## 2021-09-30 NOTE — ASSESSMENT & PLAN NOTE
She was at [facility] and then [place] and is now getting Home Health with PT and OT from [facility]. Her daughter and caregivers are very helpful for her. Encourage to get up slowly and get bearings before taking first step. Keep home well lit with clear floors to avoid tripping. Use assist devices for all walking especially from bed to bathroom.

## 2021-10-01 ENCOUNTER — HOME CARE VISIT (OUTPATIENT)
Dept: HOME HEALTH SERVICES | Facility: HOME HEALTHCARE | Age: 86
End: 2021-10-01

## 2021-10-01 VITALS
SYSTOLIC BLOOD PRESSURE: 110 MMHG | HEART RATE: 63 BPM | OXYGEN SATURATION: 97 % | RESPIRATION RATE: 18 BRPM | DIASTOLIC BLOOD PRESSURE: 72 MMHG | TEMPERATURE: 97.7 F

## 2021-10-01 PROCEDURE — G0151 HHCP-SERV OF PT,EA 15 MIN: HCPCS

## 2021-10-04 ENCOUNTER — TELEPHONE (OUTPATIENT)
Dept: INTERNAL MEDICINE | Facility: CLINIC | Age: 86
End: 2021-10-04

## 2021-10-04 ENCOUNTER — HOME CARE VISIT (OUTPATIENT)
Dept: HOME HEALTH SERVICES | Facility: HOME HEALTHCARE | Age: 86
End: 2021-10-04

## 2021-10-04 VITALS
TEMPERATURE: 97.8 F | RESPIRATION RATE: 18 BRPM | OXYGEN SATURATION: 98 % | SYSTOLIC BLOOD PRESSURE: 128 MMHG | HEART RATE: 72 BPM | DIASTOLIC BLOOD PRESSURE: 80 MMHG

## 2021-10-04 PROCEDURE — G0151 HHCP-SERV OF PT,EA 15 MIN: HCPCS

## 2021-10-04 NOTE — TELEPHONE ENCOUNTER
Caller: NAIN HERNÁNDEZ    Relationship: Emergency Contact    Best call back number: [phone]    What is the best time to reach you: ANYTIME     Who are you requesting to speak with (clinical staff, provider,  specific staff member):     What was the call regarding: PATIENT'S DAUGHTER IS CALLING IN REGARDS TO THE donepezil (Aricept) 5 MG tablet. SHE SAYS THAT THE FIRST DAY THAT SHE TOOK THE MEDICATION SHE NOTICED THAT THE PATIENT'S URINE WAS BLACK BUT IT CLEARED UP THE NEXT DAY. THE PATIENT'S SLEEP HOWEVER HAS REMAINED AFFECTED SINCE TAKING THE MEDICATION. LAY USUALLY TAKES 3 MG OF MELATONIN A NIGHT AND IT HAS HELPED HER SLEEPING UP UNTIL NOW. NAIN WOULD LIKE TO KNOW WHAT SHE SHOULD DO. SHOULD SHE INCREASE THE MELATONIN AND BY HOW MUCH? IS THERE A WAY TO KNOW THAT THIS NEW MEDICATION IS DOING WHAT IT SHOULD, AND IF IT IS TOO SOON TO TELL, HOW LONG DOES IT TAKE FOR THIS MEDICATION TO BEGIN WORKING?     Do you require a callback: YES

## 2021-10-04 NOTE — TELEPHONE ENCOUNTER
If urine is normal now it is ok. However not typical side effect. I would hold on increasing melatonin. All meds for memory have side effects but hopefully she will do ok. If it changes her mood or sleep in a d bad way we would have to stop meddications but would try and take for a few weeks and then revisit. If side effects worsen stop medication

## 2021-10-05 ENCOUNTER — HOME CARE VISIT (OUTPATIENT)
Dept: HOME HEALTH SERVICES | Facility: HOME HEALTHCARE | Age: 86
End: 2021-10-05

## 2021-10-05 PROCEDURE — G0152 HHCP-SERV OF OT,EA 15 MIN: HCPCS

## 2021-10-06 VITALS — DIASTOLIC BLOOD PRESSURE: 80 MMHG | OXYGEN SATURATION: 95 % | HEART RATE: 81 BPM | SYSTOLIC BLOOD PRESSURE: 136 MMHG

## 2021-10-06 NOTE — PROGRESS NOTES
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     PROBLEM LIST:   1. Paroxysmal atrial fibrillation with RVR  1. Diagnosed 8/22/21 with ER presentation  2. CHADSVASC- 6  3. 7/15/21 echo EF 60%. Borderline LVH. Mod to severe AS.  Maximum gradient 59.2 mmHg, mean gradient 37.5 mmHg.  Moderate MR.  4. 8/22/21 Spontaneous cardioversion after IV Cardizem drip and IV digoxin administered  2. Hypertension  3. Dyslipidemia - 2013 LDL goal <100  1. 3/21 150/100/62/70  2. 9/21 158/72/59/85  4. Aortic stenosis  1. Normal perfusion study 2014  2. Echo 7/21 with mod to severe AS   5. History of TIA  1. Reportedly 15 years ago.  2. Unknown cause  6. BRBPR  1. Noted upon admission for A. fib with RVR on 8/22/2021.  EGD and colonoscopy revealed white plaques in esophagus, diverticulosis, hemorrhoids, polyps x2.  Did not appreciate active bleed.  7. Arthritis  8. Memory loss  9. Surgeries:  1. Appendectomy  2. Bilateral cataract surgery  3. Hemorrhoidectomy  4. Right hip hemiarthroplasty due to fracture  5. Hysterectomy   6. Right shoulder replacement  7. Tonsillectomy       CC:   Chief Complaint   Patient presents with   • Benign Essential Hypertension   • Paroxysmal Atrial Fibrillation       Allergies  Allergies   Allergen Reactions   • Nasal Spray Cough       Current Medications    Current Outpatient Medications:   •  acetaminophen (TYLENOL) 325 MG tablet, Take 650 mg by mouth Every 4 (Four) Hours As Needed for Mild Pain ., Disp: , Rfl:   •  apixaban (ELIQUIS) 2.5 MG tablet tablet, Take 1 tablet by mouth Every 12 (Twelve) Hours. Indications: Atrial Fibrillation, Disp: 60 tablet, Rfl: 5  •  bisacodyl (DULCOLAX) 10 MG  suppository, Insert 10 mg into the rectum Daily As Needed for Constipation., Disp: , Rfl:   •  cholecalciferol (VITAMIN D3) 10 MCG (400 UNIT) tablet, Take 1 tablet by mouth Every Morning., Disp: , Rfl:   •  Cranberry 450 MG tablet, Take 1 tablet by mouth Daily., Disp: , Rfl:   •  dilTIAZem CD (CARDIZEM CD) 120 MG 24 hr capsule, Take 1 capsule by mouth Daily., Disp: 90 capsule, Rfl: 1  •  docusate sodium (COLACE) 100 MG capsule, Take 100 mg by mouth 2 (Two) Times a Day., Disp: , Rfl:   •  donepezil (Aricept) 5 MG tablet, Take 1 tablet by mouth Every Night., Disp: 90 tablet, Rfl: 1  •  hydrocortisone (ANUSOL-HC) 25 MG suppository, Insert 1 suppository into the rectum 2 (Two) Times a Day As Needed for Hemorrhoids., Disp: , Rfl:   •  melatonin 3 MG tablet, Take 3 mg by mouth Every Night., Disp: , Rfl:   •  Mirabegron ER (Myrbetriq) 50 MG tablet sustained-release 24 hour 24 hr tablet, Take 50 mg by mouth Daily., Disp: 90 tablet, Rfl: 1  •  omeprazole (priLOSEC) 40 MG capsule, Take 1 capsule by mouth Daily., Disp: 90 capsule, Rfl: 1  •  polyethylene glycol (MiraLax) 17 GM/SCOOP powder, Take 17 g by mouth. Once a day on Mon, Wed, Fri and PRN, Disp: , Rfl:   •  rosuvastatin (CRESTOR) 5 MG tablet, Take 1 tablet by mouth Every Night., Disp: 90 tablet, Rfl: 1      History of Present Illness   Luh Mckeon is a 89 y.o. year old female here for follow up.  Today, the patient states that she has been doing well since her prior hospitalization on 8/22/2021 for atrial fibrillation with RVR.  Notably, during that hospitalization the patient had a lower GI bleed requiring discontinuation of her Eliquis therapy and GI consultation.      Since this hospitalization, the patient states that she has not had any episodes of atrial fibrillation.  She denies any history of chest symptoms during her episode of atrial fibrillation.  Currently, the patient states that she is tolerating her Eliquis 2.5 and her Cardizem well without any  "issues.    Of note, the patient has been at physical therapy for the last 5 to 6 weeks for strengthening of her hip status post ORIF for right hip fracture.  She denies any chest pain, palpitations, shortness of breath, and chest symptoms during physical therapy.    Pt denies any chest pain, dyspnea, dyspnea on exertion, orthopnea, PND, palpitations, lower extremity edema, or claudication.      OBJECTIVE:  Vitals:    10/07/21 1408   BP: 128/76   BP Location: Left arm   Patient Position: Sitting   Cuff Size: Adult   Pulse: 70   SpO2: 96%   Weight: 64 kg (141 lb)   Height: 157.5 cm (62\")     Body mass index is 25.79 kg/m².    Vitals and nursing note reviewed.   Constitutional:       General: Awake. Not in acute distress.     Appearance: Healthy appearance. Well-developed and not in distress.   Eyes:      General: No scleral icterus.     Conjunctiva/sclera: Conjunctivae normal.      Pupils: Pupils are equal, round, and reactive to light.   HENT:      Head: Normocephalic and atraumatic.      Nose: Nose normal.    Mouth/Throat:      Pharynx: Uvula midline.   Neck:      Thyroid: No thyromegaly.      Vascular: No carotid bruit.      Trachea: No tracheal deviation.      Lymphadenopathy: No cervical adenopathy.   Pulmonary:      Effort: Pulmonary effort is normal.      Breath sounds: Normal breath sounds. No wheezing. No rhonchi. No rales.   Cardiovascular:      Normal rate. Regular rhythm. Normal S1. Normal S2.      Murmurs: There is a systolic murmur.      No gallop. No click. No rub.   Pulses:     Intact distal pulses.   Edema:     Peripheral edema absent.   Abdominal:      General: Bowel sounds are normal.      Palpations: Abdomen is soft. There is no abdominal mass.      Tenderness: There is no abdominal tenderness. There is no guarding.   Musculoskeletal:         General: No tenderness.      Extremities: No clubbing present.     Cervical back: Normal range of motion and neck supple. Skin:     General: Skin is warm and " dry. There is no cyanosis.      Findings: No erythema or rash.   Neurological:      Mental Status: Alert, oriented to person, place, and time and oriented to person, place and time.   Psychiatric:         Attention and Perception: Attention normal.         Mood and Affect: Mood normal.         Speech: Speech normal.         Behavior: Behavior normal. Behavior is cooperative.         Diagnostic Data:  Procedures      ASSESSMENT:   Diagnosis Plan   1. Disorder of mitral and aortic valves     2. Paroxysmal atrial fibrillation (HCC)     3. Hyperlipidemia LDL goal <100     4. Benign essential hypertension     5. External hemorrhoids         PLAN:  1.  Disorder of mitral and aortic valves -most recent echo performed on 7/21 revealed moderate to severe aortic stenoses.  However, in the context of patient not experiencing any significant shortness of breath and functional limitations.  Defer intervention at this time.  Patient will need repeat echo in 1 year and reassessment if new or worsening anginal/atypical anginal symptoms were to occur.  2.  Paroxysmal atrial fibrillation-patient currently anticoagulated with Eliquis 2.5 twice daily and rate controlled on Cardizem 120 daily.  Patient denies any recurrent episodes of atrial fibrillation since admission.  3.  Hyperlipidemia -patient most recent lipid panel within goal.  Continue current lipid-lowering therapy.  4.  History of GI bleed-patient just completed iron therapy and there is noted resolution of hemoglobin/hematocrit upon most recent CBC.  Patient was instructed to continue monitoring for episodes of black/bloody stool.  5.  Hypertension - patient in-=clinic blood pressures appreciated at 128/76.  Continue current medical regimen.  6.  External hemorrhoids-patient denies any recent episodes of black/bright red blood in stool.    Scribe: Meliton Ley PA-C for Dr. Juan Manuel Miles M.D. [facility]    Juan Manuel Miles MD, [facility]

## 2021-10-07 ENCOUNTER — OFFICE VISIT (OUTPATIENT)
Dept: CARDIOLOGY | Facility: CLINIC | Age: 86
End: 2021-10-07

## 2021-10-07 VITALS
SYSTOLIC BLOOD PRESSURE: 128 MMHG | WEIGHT: 141 LBS | OXYGEN SATURATION: 96 % | BODY MASS INDEX: 25.95 KG/M2 | HEART RATE: 70 BPM | HEIGHT: 62 IN | DIASTOLIC BLOOD PRESSURE: 76 MMHG

## 2021-10-07 DIAGNOSIS — I08.0 DISORDER OF MITRAL AND AORTIC VALVES: Primary | ICD-10-CM

## 2021-10-07 DIAGNOSIS — I48.0 PAROXYSMAL ATRIAL FIBRILLATION (HCC): ICD-10-CM

## 2021-10-07 DIAGNOSIS — K64.4 EXTERNAL HEMORRHOIDS: ICD-10-CM

## 2021-10-07 DIAGNOSIS — K92.2 GASTROINTESTINAL HEMORRHAGE, UNSPECIFIED GASTROINTESTINAL HEMORRHAGE TYPE: ICD-10-CM

## 2021-10-07 DIAGNOSIS — I10 BENIGN ESSENTIAL HYPERTENSION: ICD-10-CM

## 2021-10-07 DIAGNOSIS — E78.5 HYPERLIPIDEMIA LDL GOAL <100: ICD-10-CM

## 2021-10-07 PROCEDURE — 99214 OFFICE O/P EST MOD 30 MIN: CPT | Performed by: INTERNAL MEDICINE

## 2021-10-09 ENCOUNTER — FLU SHOT (OUTPATIENT)
Dept: INTERNAL MEDICINE | Facility: CLINIC | Age: 86
End: 2021-10-09

## 2021-10-09 DIAGNOSIS — Z23 NEED FOR INFLUENZA VACCINATION: Primary | ICD-10-CM

## 2021-10-09 PROCEDURE — G0008 ADMIN INFLUENZA VIRUS VAC: HCPCS | Performed by: INTERNAL MEDICINE

## 2021-10-09 PROCEDURE — 90662 IIV NO PRSV INCREASED AG IM: CPT | Performed by: INTERNAL MEDICINE

## 2021-10-12 ENCOUNTER — HOME CARE VISIT (OUTPATIENT)
Dept: HOME HEALTH SERVICES | Facility: HOME HEALTHCARE | Age: 86
End: 2021-10-12

## 2021-10-12 VITALS
DIASTOLIC BLOOD PRESSURE: 86 MMHG | TEMPERATURE: 98 F | OXYGEN SATURATION: 95 % | SYSTOLIC BLOOD PRESSURE: 154 MMHG | HEART RATE: 69 BPM | RESPIRATION RATE: 18 BRPM

## 2021-10-12 VITALS
HEART RATE: 76 BPM | SYSTOLIC BLOOD PRESSURE: 156 MMHG | DIASTOLIC BLOOD PRESSURE: 84 MMHG | TEMPERATURE: 98.4 F | OXYGEN SATURATION: 93 %

## 2021-10-12 PROCEDURE — G0151 HHCP-SERV OF PT,EA 15 MIN: HCPCS

## 2021-10-12 PROCEDURE — G0152 HHCP-SERV OF OT,EA 15 MIN: HCPCS

## 2021-10-20 ENCOUNTER — TELEPHONE (OUTPATIENT)
Dept: INTERNAL MEDICINE | Facility: CLINIC | Age: 86
End: 2021-10-20

## 2021-10-20 DIAGNOSIS — R30.0 DYSURIA: Primary | ICD-10-CM

## 2021-10-20 NOTE — TELEPHONE ENCOUNTER
Caller: NAIN HERNÁNDEZ    Relationship: Emergency Contact    Best call back number: [phone]     What orders are you requesting (i.e. lab or imaging): URINE TEST FOR POSSIBLE UTI    In what timeframe would the patient need to come in: ASAP    Where will you receive your lab/imaging services: [facility] OR ANOTHER [facility] FACILITY    Additional notes: PLEASE RETURN CALL TO NAIN TO LET THE ORDER HAS BEEN PUT IN OR TO DISCUSS

## 2021-10-25 ENCOUNTER — TELEPHONE (OUTPATIENT)
Dept: INTERNAL MEDICINE | Facility: CLINIC | Age: 86
End: 2021-10-25

## 2021-10-25 ENCOUNTER — LAB (OUTPATIENT)
Dept: LAB | Facility: HOSPITAL | Age: 86
End: 2021-10-25

## 2021-10-25 DIAGNOSIS — R30.0 DYSURIA: ICD-10-CM

## 2021-10-25 PROCEDURE — 87086 URINE CULTURE/COLONY COUNT: CPT

## 2021-10-25 PROCEDURE — 81001 URINALYSIS AUTO W/SCOPE: CPT

## 2021-10-25 PROCEDURE — 87077 CULTURE AEROBIC IDENTIFY: CPT

## 2021-10-25 PROCEDURE — 87186 SC STD MICRODIL/AGAR DIL: CPT

## 2021-10-25 NOTE — TELEPHONE ENCOUNTER
Caller: NAIN HERNÁNDEZ    Relationship: Emergency Contact    Best call back number: [phone]    What is the best time to reach you: ANYTIME     Who are you requesting to speak with (clinical staff, provider,  specific staff member): DOCTOR REAGAN OR NURSE         What was the call regarding: THE PATIENTS DAUGHTER IS CALLING TO REQUEST A CALL BACK SHE WOULD LIKE TO DISCUSS THE MEDICATION  ARICEPT THE PATIENTS DAUGHTER STATES THAT SINCE THE PATIENT STARTED TAKING THE MEDICATION 3 OR 4 WEEKS AGO SHE HAS BECAME COMBATIVE AND SHE IS NOT SLEEPING WELL. PLEASE CALL PATIENTS DAUGHTER TO DISCUSS    Do you require a callback: YES

## 2021-10-26 LAB
BACTERIA UR QL AUTO: ABNORMAL /HPF
BILIRUB UR QL STRIP: NEGATIVE
CLARITY UR: CLEAR
COLOR UR: YELLOW
GLUCOSE UR STRIP-MCNC: NEGATIVE MG/DL
HGB UR QL STRIP.AUTO: ABNORMAL
HYALINE CASTS UR QL AUTO: ABNORMAL /LPF
KETONES UR QL STRIP: NEGATIVE
LEUKOCYTE ESTERASE UR QL STRIP.AUTO: ABNORMAL
NITRITE UR QL STRIP: POSITIVE
PH UR STRIP.AUTO: 7 [PH] (ref 5–8)
PROT UR QL STRIP: NEGATIVE
RBC # UR: ABNORMAL /HPF
REF LAB TEST METHOD: ABNORMAL
SP GR UR STRIP: 1.01 (ref 1–1.03)
SQUAMOUS #/AREA URNS HPF: ABNORMAL /HPF
UROBILINOGEN UR QL STRIP: ABNORMAL
WBC UR QL AUTO: ABNORMAL /HPF

## 2021-10-27 ENCOUNTER — TELEPHONE (OUTPATIENT)
Dept: INTERNAL MEDICINE | Facility: CLINIC | Age: 86
End: 2021-10-27

## 2021-10-27 RX ORDER — CIPROFLOXACIN 250 MG/1
250 TABLET, FILM COATED ORAL 2 TIMES DAILY
Qty: 14 TABLET | Refills: 0 | Status: SHIPPED | OUTPATIENT
Start: 2021-10-27 | End: 2021-12-02

## 2021-10-27 NOTE — TELEPHONE ENCOUNTER
Caller: NAIN HERNÁNDEZ    Relationship: Emergency Contact    Best call back number: [phone]    What test was performed: URINALYSIS     When was the test performed: 10/25/2021    Where was the test performed: IN OFFICE     Additional notes: DAUGHTER WOULD LIKE A CALL BACK TO GO OVER HER MOTHER'S TEST RESULTS

## 2021-10-27 NOTE — TELEPHONE ENCOUNTER
Looks like she has uti but sensitivities are not back please follow up on them and we could treat when they get back

## 2021-10-28 LAB — BACTERIA SPEC AEROBE CULT: ABNORMAL

## 2021-11-02 ENCOUNTER — TELEPHONE (OUTPATIENT)
Dept: INTERNAL MEDICINE | Facility: CLINIC | Age: 86
End: 2021-11-02

## 2021-11-02 NOTE — TELEPHONE ENCOUNTER
Caller: NAIN HERNÁNDEZ    Relationship: Emergency Contact    Best call back number: [phone]    What is the best time to reach you: ANYTIME    Who are you requesting to speak with (clinical staff, provider,  specific staff member): CLINICAL     What was the call regarding: PATIENT'S DAUGHTER CALLED AND WOULD LIKE TO KNOW IF HER MOTHER NEEDS TO BE CHECKED TO SEE IF HER UTI HAS CLEARED UP OR HOW WOULD THEY CAN TELL IT'S CLEARED UP    Do you require a callback:YES

## 2021-11-02 NOTE — TELEPHONE ENCOUNTER
The antibiotic had good coverage for the uti so unless she had moderate symptoms we would not repeat the urine

## 2021-11-08 ENCOUNTER — TELEPHONE (OUTPATIENT)
Dept: ORTHOPEDIC SURGERY | Facility: CLINIC | Age: 86
End: 2021-11-08

## 2021-11-08 NOTE — TELEPHONE ENCOUNTER
Pt's daughter called in to state that Pt has fallen a couple times here recently. PCP recommended her to not drive due to her dementia getting worse. Daughter just wanted to make sure Dr Olsen was on the same page just in case Pt asked him about driving.

## 2021-11-08 NOTE — TELEPHONE ENCOUNTER
Dr. Olsen,   Please see message below, Patient has an appointment with you tomorrow and daughter wanted to give you the heads up about her mother.

## 2021-11-09 ENCOUNTER — OFFICE VISIT (OUTPATIENT)
Dept: ORTHOPEDIC SURGERY | Facility: CLINIC | Age: 86
End: 2021-11-09

## 2021-11-09 VITALS
HEART RATE: 79 BPM | HEIGHT: 62 IN | BODY MASS INDEX: 24.07 KG/M2 | WEIGHT: 130.8 LBS | SYSTOLIC BLOOD PRESSURE: 147 MMHG | DIASTOLIC BLOOD PRESSURE: 77 MMHG

## 2021-11-09 DIAGNOSIS — Z96.649 STATUS POST HIP HEMIARTHROPLASTY: Primary | ICD-10-CM

## 2021-11-09 PROCEDURE — 99212 OFFICE O/P EST SF 10 MIN: CPT | Performed by: ORTHOPAEDIC SURGERY

## 2021-11-09 NOTE — PROGRESS NOTES
Orthopaedic Clinic Note: Hip Established Patient    Chief Complaint   Patient presents with   • Follow-up     Late 3 month f/u, s/p RIGHT HIP HEMIARTHROPLASTY 7/15/21        HPI    It has been 3  month(s) since Ms. Mckeon's last visit. She returns to clinic today for 3 Month follow-up status post right total hip arthroplasty.  She comes clinic today ambulating with the assistance of a cane.  She rates her pain 0/10 on the pain scale.  She denies any fever chills or constitutional symptoms.  Overall she is happy with her outcome.    Past Medical History:   Diagnosis Date   • Ankle instability    • Chronic left shoulder pain 1/26/2018   • Disorder of tendon of shoulder region, left    • Dysphagia    • Esophagitis    • Femoral neck fracture (HCC) 7/15/2021   • Finger fracture, right 2009    Dr Chandler casting and physical therapy   • Hemorrhoids    • Hypertension    • Localized, primary osteoarthritis of left shoulder region    • Paroxysmal atrial fibrillation (HCC) 9/30/2021   • Right shoulder pain    • Shoulder joint replacement status     Right   • Stroke (HCC)     CVA      Past Surgical History:   Procedure Laterality Date   • APPENDECTOMY      Daughter denies   • CATARACT EXTRACTION, BILATERAL      2020   • COLONOSCOPY N/A 8/23/2021    Procedure: COLONOSCOPY;  Surgeon: Aries Varela MD;  Location:  [facility];  Service: Gastroenterology;  Laterality: N/A;   • ENDOSCOPY  07/2009    with biopsy/esophageal ring dilation   • ENDOSCOPY N/A 8/23/2021    Procedure: ESOPHAGOGASTRODUODENOSCOPY;  Surgeon: Aries Varela MD;  Location:  [facility];  Service: Gastroenterology;  Laterality: N/A;   • HEMORRHOIDECTOMY      lanced    • HIP HEMIARTHROPLASTY Right 7/15/2021    Procedure: HIP HEMIARTHROPLASTY;  Surgeon: Adam Olsen MD;  Location:  [facility];  Service: Orthopedics;  Laterality: Right;   • HYSTERECTOMY      partial    • OOPHORECTOMY     • SHOULDER SURGERY Right     replacement   • SHOULDER SURGERY       Arthroscopy of shoulder:Right   • TONSILLECTOMY        Family History   Problem Relation Age of Onset   • Cancer Mother    • Stroke Mother    • Bleeding Disorder Mother    • Osteoarthritis Mother    • Rheum arthritis Mother    • Breast cancer Mother 60   • Heart attack Father    • Coronary artery disease Father    • Cancer Sister         Bladder    • Bleeding Disorder Brother         2   • Colon cancer Brother         3   • Ovarian cancer Neg Hx      Social History     Socioeconomic History   • Marital status:    Tobacco Use   • Smoking status: Never Smoker   • Smokeless tobacco: Never Used   Vaping Use   • Vaping Use: Never used   Substance and Sexual Activity   • Alcohol use: No   • Drug use: No   • Sexual activity: Defer      Current Outpatient Medications on File Prior to Visit   Medication Sig Dispense Refill   • acetaminophen (TYLENOL) 325 MG tablet Take 650 mg by mouth Every 4 (Four) Hours As Needed for Mild Pain .     • apixaban (ELIQUIS) 2.5 MG tablet tablet Take 1 tablet by mouth Every 12 (Twelve) Hours. Indications: Atrial Fibrillation 60 tablet 5   • bisacodyl (DULCOLAX) 10 MG suppository Insert 10 mg into the rectum Daily As Needed for Constipation.     • cholecalciferol (VITAMIN D3) 10 MCG (400 UNIT) tablet Take 1 tablet by mouth Every Morning.     • Cranberry 450 MG tablet Take 1 tablet by mouth Daily.     • dilTIAZem CD (CARDIZEM CD) 120 MG 24 hr capsule Take 1 capsule by mouth Daily. 90 capsule 1   • docusate sodium (COLACE) 100 MG capsule Take 100 mg by mouth 2 (Two) Times a Day.     • hydrocortisone (ANUSOL-HC) 25 MG suppository Insert 1 suppository into the rectum 2 (Two) Times a Day As Needed for Hemorrhoids.     • melatonin 3 MG tablet Take 3 mg by mouth Every Night.     • Mirabegron ER (Myrbetriq) 50 MG tablet sustained-release 24 hour 24 hr tablet Take 50 mg by mouth Daily. 90 tablet 1   • omeprazole (priLOSEC) 40 MG capsule Take 1 capsule by mouth Daily. 90 capsule 1   •  "polyethylene glycol (MiraLax) 17 GM/SCOOP powder Take 17 g by mouth. Once a day on Mon, Wed, Fri and PRN     • rosuvastatin (CRESTOR) 5 MG tablet Take 1 tablet by mouth Every Night. 90 tablet 1   • ciprofloxacin (Cipro) 250 MG tablet Take 1 tablet by mouth 2 (Two) Times a Day. 14 tablet 0   • [DISCONTINUED] donepezil (Aricept) 5 MG tablet Take 1 tablet by mouth Every Night. 90 tablet 1     No current facility-administered medications on file prior to visit.      Allergies   Allergen Reactions   • Nasal Spray Cough        Review of Systems   Constitutional: Negative.    HENT: Negative.    Eyes: Negative.    Respiratory: Negative.    Cardiovascular: Negative.    Gastrointestinal: Negative.    Endocrine: Negative.    Genitourinary: Negative.    Musculoskeletal: Positive for arthralgias.   Skin: Negative.    Allergic/Immunologic: Negative.    Neurological: Negative.    Hematological: Negative.    Psychiatric/Behavioral: Negative.         The patient's Review of Systems was personally reviewed and confirmed as accurate.    Physical Exam  Blood pressure 147/77, pulse 79, height 157.5 cm (62.01\"), weight 59.3 kg (130 lb 12.8 oz).    Body mass index is 23.92 kg/m².    GENERAL APPEARANCE: awake, alert, oriented, in no acute distress and well developed, well nourished  LUNGS:  breathing nonlabored  EXTREMITIES: no clubbing, cyanosis  PERIPHERAL PULSES: palpable dorsalis pedis and posterior tibial pulses bilaterally.    GAIT:  Normal            Hip Exam:  Right    RANGE OF MOTION:  EXTENSION/FLEXION:  normal (0-110 degrees)  IR (at 90 degrees of flexion):  20  ER (at 90 degrees of flexion):  40  PAIN WITH HIP MOTION:  no  PAIN WITH LOGROLL:  no     STINCHFIELD TEST: negative    STRENGTH:  ABDUCTOR:  5/5  ADDUCTOR:  5/5  HIP FLEXION:  5/5    GREATER TROCHANTER BURSAL PAIN:  no    SENSATION TO LIGHT TOUCH:  DEEP PERONEAL/SUPERFICIAL PERONEAL/SURAL/SAPHENOUS/TIBIAL:   intact    EDEMA:  no  ERYTHEMA:  no  WOUNDS/INCISIONS:   yes, " well healed surgical incision without evidence of erythema or drainage  _________________________________________________________________  _________________________________________________________________    RADIOGRAPHIC FINDINGS:   Indication: Status post right hip hemiarthroplasty    Comparison: Todays xrays were compared to previous xrays from 8/6/2021    AP pelvis: Right: Demonstrate a well positioned hemiarthroplasty without evidence of wear, loosening, fracture or osteolysis, femoral head is concentrically reduced within the acetabulum and No significant changes compared to prior radiographs.;Left: Mild arthritic findings with preservation of the joint space.      Assessment/Plan:   Diagnosis Plan   1. Status post hip hemiarthroplasty  XR Pelvis 1 or 2 View     Patient doing well 3 and half month status post right hip hemiarthroplasty.  I recommend continued activity as tolerated without restrictions.  I will see the patient back in 9 months for 1 year bursae with x-ray view pelvis on return.  She is welcome to follow-up sooner should problems arise.    Adam Olsen MD  11/09/21  11:52 EST

## 2021-11-23 ENCOUNTER — HOSPITAL ENCOUNTER (OUTPATIENT)
Dept: BONE DENSITY | Facility: HOSPITAL | Age: 86
Discharge: HOME | End: 2021-11-23
Admitting: INTERNAL MEDICINE

## 2021-11-23 DIAGNOSIS — M81.0 OSTEOPOROSIS WITHOUT PATHOLOGICAL FRACTURE: ICD-10-CM

## 2021-11-23 PROCEDURE — 77080 DXA BONE DENSITY AXIAL: CPT

## 2021-12-02 ENCOUNTER — OFFICE VISIT (OUTPATIENT)
Dept: INTERNAL MEDICINE | Facility: CLINIC | Age: 86
End: 2021-12-02

## 2021-12-02 VITALS
BODY MASS INDEX: 26 KG/M2 | HEIGHT: 62 IN | TEMPERATURE: 97.7 F | HEART RATE: 89 BPM | SYSTOLIC BLOOD PRESSURE: 126 MMHG | WEIGHT: 141.3 LBS | DIASTOLIC BLOOD PRESSURE: 80 MMHG

## 2021-12-02 DIAGNOSIS — R53.83 OTHER FATIGUE: ICD-10-CM

## 2021-12-02 DIAGNOSIS — I10 BENIGN ESSENTIAL HYPERTENSION: ICD-10-CM

## 2021-12-02 DIAGNOSIS — R11.0 NAUSEA: Primary | ICD-10-CM

## 2021-12-02 PROCEDURE — 99213 OFFICE O/P EST LOW 20 MIN: CPT | Performed by: NURSE PRACTITIONER

## 2021-12-02 RX ORDER — ONDANSETRON 4 MG/1
4 TABLET, ORALLY DISINTEGRATING ORAL EVERY 8 HOURS PRN
Qty: 15 TABLET | Refills: 0 | Status: SHIPPED | OUTPATIENT
Start: 2021-12-02 | End: 2022-02-11

## 2021-12-02 NOTE — PATIENT INSTRUCTIONS
Zofran for nausea  Force fluids and nutrition at this time  Continue medications for blood pressure as prescribed      Dehydration, Adult  Dehydration is a condition in which there is not enough water or other fluids in the body. This happens when a person loses more fluids than he or she takes in. Important organs, such as the kidneys, brain, and heart, cannot function without a proper amount of fluids. Any loss of fluids from the body can lead to dehydration.  Dehydration can be mild, moderate, or severe. It should be treated right away to prevent it from becoming severe.  What are the causes?  Dehydration may be caused by:  · Conditions that cause loss of water or other fluids, such as diarrhea, vomiting, or sweating or urinating a lot.  · Not drinking enough fluids, especially when you are ill or doing activities that require a lot of energy.  · Other illnesses and conditions, such as fever or infection.  · Certain medicines, such as medicines that remove excess fluid from the body (diuretics).  · Lack of safe drinking water.  · Not being able to get enough water and food.  What increases the risk?  The following factors may make you more likely to develop this condition:  · Having a long-term (chronic) illness that has not been treated properly, such as diabetes, heart disease, or kidney disease.  · Being 65 years of age or older.  · Having a disability.  · Living in a place that is high in altitude, where thinner, drier air causes more fluid loss.  · Doing exercises that put stress on your body for a long time (endurance sports).  What are the signs or symptoms?  Symptoms of dehydration depend on how severe it is.  Mild or moderate dehydration  · Thirst.  · Dry lips or dry mouth.  · Dizziness or light-headedness, especially when standing up from a seated position.  · Muscle cramps.  · Dark urine. Urine may be the color of tea.  · Less urine or tears produced than usual.  · Headache.  Severe  dehydration  · Changes in skin. Your skin may be cold and clammy, blotchy, or pale. Your skin also may not return to normal after being lightly pinched and released.  · Little or no tears, urine, or sweat.  · Changes in vital signs, such as rapid breathing and low blood pressure. Your pulse may be weak or may be faster than 100 beats a minute when you are sitting still.  · Other changes, such as:  ? Feeling very thirsty.  ? Sunken eyes.  ? Cold hands and feet.  ? Confusion.  ? Being very tired (lethargic) or having trouble waking from sleep.  ? Short-term weight loss.  ? Loss of consciousness.  How is this diagnosed?  This condition is diagnosed based on your symptoms and a physical exam. You may have blood and urine tests to help confirm the diagnosis.  How is this treated?  Treatment for this condition depends on how severe it is. Treatment should be started right away. Do not wait until dehydration becomes severe. Severe dehydration is an emergency and needs to be treated in a hospital.  · Mild or moderate dehydration can be treated at home. You may be asked to:  ? Drink more fluids.  ? Drink an oral rehydration solution (ORS). This drink helps restore proper amounts of fluids and salts and minerals in the blood (electrolytes).  · Severe dehydration can be treated:  ? With IV fluids.  ? By correcting abnormal levels of electrolytes. This is often done by giving electrolytes through a tube that is passed through your nose and into your stomach (nasogastric tube, or NG tube).  ? By treating the underlying cause of dehydration.  Follow these instructions at home:  Oral rehydration solution  If told by your health care provider, drink an ORS:  · Make an ORS by following instructions on the package.  · Start by drinking small amounts, about ½ cup (120 mL) every 5-10 minutes.  · Slowly increase how much you drink until you have taken the amount recommended by your health care provider.  Eating and drinking               · Drink enough clear fluid to keep your urine pale yellow. If you were told to drink an ORS, finish the ORS first and then start slowly drinking other clear fluids. Drink fluids such as:  ? Water. Do not drink only water. Doing that can lead to hyponatremia, which is having too little salt (sodium) in the body.  ? Water from ice chips you suck on.  ? Fruit juice that you have added water to (diluted fruit juice).  ? Low-calorie sports drinks.  · Eat foods that contain a healthy balance of electrolytes, such as bananas, oranges, potatoes, tomatoes, and spinach.  · Do not drink alcohol.  · Avoid the following:  ? Drinks that contain a lot of sugar. These include high-calorie sports drinks, fruit juice that is not diluted, and soda.  ? Caffeine.  ? Foods that are greasy or contain a lot of fat or sugar.  General instructions  · Take over-the-counter and prescription medicines only as told by your health care provider.  · Do not take sodium tablets. Doing that can lead to having too much sodium in the body (hypernatremia).  · Return to your normal activities as told by your health care provider. Ask your health care provider what activities are safe for you.  · Keep all follow-up visits as told by your health care provider. This is important.  Contact a health care provider if:  · You have muscle cramps, pain, or discomfort, such as:  ? Pain in your abdomen and the pain gets worse or stays in one area (localizes).  ? Stiff neck.  · You have a rash.  · You are more irritable than usual.  · You are sleepier or have a harder time waking than usual.  · You feel weak or dizzy.  · You feel very thirsty.  Get help right away if you have:  · Any symptoms of severe dehydration.  · Symptoms of vomiting, such as:  ? You cannot eat or drink without vomiting.  ? Vomiting gets worse or does not go away.  ? Vomit includes blood or green matter (bile).  · Symptoms that get worse with treatment.  · A fever.  · A severe  headache.  · Problems with urination or bowel movements, such as:  ? Diarrhea that gets worse or does not go away.  ? Blood in your stool (feces). This may cause stool to look black and tarry.  ? Not urinating, or urinating only a small amount of very dark urine, within 6-8 hours.  · Trouble breathing.  These symptoms may represent a serious problem that is an emergency. Do not wait to see if the symptoms will go away. Get medical help right away. Call your local emergency services (911 in the [place] Do not drive yourself to the hospital.  Summary  · Dehydration is a condition in which there is not enough water or other fluids in the body. This happens when a person loses more fluids than he or she takes in.  · Treatment for this condition depends on how severe it is. Treatment should be started right away. Do not wait until dehydration becomes severe.  · Drink enough clear fluid to keep your urine pale yellow. If you were told to drink an oral rehydration solution (ORS), finish the ORS first and then start slowly drinking other clear fluids.  · Take over-the-counter and prescription medicines only as told by your health care provider.  · Get help right away if you have any symptoms of severe dehydration.  This information is not intended to replace advice given to you by your health care provider. Make sure you discuss any questions you have with your health care provider.  Document Revised: 07/30/2020 Document Reviewed: 07/30/2020  [employer] Patient Education © 2021 [employer]

## 2021-12-20 ENCOUNTER — TELEPHONE (OUTPATIENT)
Dept: INTERNAL MEDICINE | Facility: CLINIC | Age: 86
End: 2021-12-20

## 2022-01-03 NOTE — TELEPHONE ENCOUNTER
I have called and told her I do not make people incompetent and thatit is a very difficult process and she voiced understanding

## 2022-02-07 RX ORDER — OMEPRAZOLE 40 MG/1
CAPSULE, DELAYED RELEASE ORAL
Qty: 90 CAPSULE | Refills: 1 | Status: SHIPPED | OUTPATIENT
Start: 2022-02-07 | End: 2022-08-10

## 2022-02-10 NOTE — PROGRESS NOTES
Acute pain service Inpatient Progress Note    Patient Name: Luh Mckeon  :  1930  MRN:  [id number]        Acute Pain  Service Inpatient Progress Note:    Analgesia:Excellent  Pain Score:0/10  LOC: alert and awake  Resp Status: room air  Cardiac: VS stable  Side Effects:None  Catheter Site:clean, dressing intact and dry  Cath type: peripheral nerve cath(MOOG pump)  Infusion rate: 8ml/hr  Catheter Plan:Catheter to remain Insitu and Continue catheter infusion rate unchanged                                            
Detail Level: Zone

## 2022-02-11 ENCOUNTER — OFFICE VISIT (OUTPATIENT)
Dept: INTERNAL MEDICINE | Facility: CLINIC | Age: 87
End: 2022-02-11

## 2022-02-11 VITALS
TEMPERATURE: 97.3 F | DIASTOLIC BLOOD PRESSURE: 86 MMHG | HEART RATE: 70 BPM | HEIGHT: 62 IN | SYSTOLIC BLOOD PRESSURE: 126 MMHG | BODY MASS INDEX: 25.76 KG/M2 | WEIGHT: 140 LBS

## 2022-02-11 DIAGNOSIS — I10 BENIGN ESSENTIAL HYPERTENSION: ICD-10-CM

## 2022-02-11 DIAGNOSIS — R42 DIZZINESS: Primary | ICD-10-CM

## 2022-02-11 DIAGNOSIS — R26.81 UNSTEADY GAIT: ICD-10-CM

## 2022-02-11 PROCEDURE — 99214 OFFICE O/P EST MOD 30 MIN: CPT | Performed by: INTERNAL MEDICINE

## 2022-02-11 RX ORDER — MECLIZINE HYDROCHLORIDE 25 MG/1
25 TABLET ORAL NIGHTLY PRN
Qty: 30 TABLET | Refills: 0 | Status: SHIPPED | OUTPATIENT
Start: 2022-02-11 | End: 2022-02-28 | Stop reason: SDUPTHER

## 2022-02-11 NOTE — ASSESSMENT & PLAN NOTE
Blood pressure last few months 110-170s to 50-90's. Encouraged to use deep breathing when elevated rather than add more medicines and do not want to bottom blood pressure out

## 2022-02-11 NOTE — PROGRESS NOTES
Chief Complaint   Patient presents with   • Dizziness     x 2 months, caregiver wonders if its diltiazem causing the problem   • Medication Problem     is melatonin safe to take       History of Present Illness  89 y.o. white female presents for evaluation of episodic dizziness and it seems to happen with certain positions. And worse at night.     Review of Systems   Constitutional: Negative for chills and fever.   Respiratory: Negative for cough and shortness of breath.    Cardiovascular: Negative for chest pain and palpitations.   Gastrointestinal: Negative for abdominal pain, nausea and vomiting.   Skin: Negative for rash.   Neurological: Positive for dizziness. Negative for light-headedness and headaches.   Psychiatric/Behavioral: Positive for decreased concentration and sleep disturbance.   All other systems reviewed and are negative.    .    PMSFH:  The following portions of the patient's history were reviewed and updated as appropriate: allergies, current medications, past family history, past medical history, past social history, past surgical history and problem list.      Current Outpatient Medications:   •  acetaminophen (TYLENOL) 325 MG tablet, Take 650 mg by mouth Every 4 (Four) Hours As Needed for Mild Pain ., Disp: , Rfl:   •  apixaban (ELIQUIS) 2.5 MG tablet tablet, Take 1 tablet by mouth Every 12 (Twelve) Hours. Indications: Atrial Fibrillation, Disp: 60 tablet, Rfl: 5  •  bisacodyl (DULCOLAX) 10 MG suppository, Insert 10 mg into the rectum Daily As Needed for Constipation., Disp: , Rfl:   •  cholecalciferol (VITAMIN D3) 10 MCG (400 UNIT) tablet, Take 1 tablet by mouth Every Morning., Disp: , Rfl:   •  Cranberry 450 MG tablet, Take 1 tablet by mouth Daily., Disp: , Rfl:   •  dilTIAZem CD (CARDIZEM CD) 120 MG 24 hr capsule, Take 1 capsule by mouth Daily., Disp: 90 capsule, Rfl: 1  •  docusate sodium (COLACE) 100 MG capsule, Take 100 mg by mouth 2 (Two) Times a Day., Disp: , Rfl:   •  hydrocortisone  "(ANUSOL-HC) 25 MG suppository, Insert 1 suppository into the rectum 2 (Two) Times a Day As Needed for Hemorrhoids., Disp: , Rfl:   •  melatonin 3 MG tablet, Take 3 mg by mouth Every Night., Disp: , Rfl:   •  Mirabegron ER (Myrbetriq) 50 MG tablet sustained-release 24 hour 24 hr tablet, Take 50 mg by mouth Daily., Disp: 90 tablet, Rfl: 1  •  omeprazole (priLOSEC) 40 MG capsule, TAKE 1 CAPSULE EVERY DAY, Disp: 90 capsule, Rfl: 1  •  polyethylene glycol (MiraLax) 17 GM/SCOOP powder, Take 17 g by mouth. Once a day on Mon, Wed, Fri and PRN, Disp: , Rfl:   •  rosuvastatin (CRESTOR) 5 MG tablet, Take 1 tablet by mouth Every Night., Disp: 90 tablet, Rfl: 1  •  meclizine (ANTIVERT) 25 MG tablet, Take 1 tablet by mouth At Night As Needed for Dizziness for up to 30 days., Disp: 30 tablet, Rfl: 0    VITALS:  /86   Pulse 70   Temp 97.3 °F (36.3 °C)   Ht 157.5 cm (62.01\")   Wt 63.5 kg (140 lb)   BMI 25.60 kg/m²     Physical Exam  Constitutional:       Appearance: Normal appearance.   HENT:      Right Ear: Tympanic membrane and ear canal normal.      Left Ear: Tympanic membrane normal.   Neck:      Vascular: No carotid bruit.   Cardiovascular:      Rate and Rhythm: Normal rate and regular rhythm.      Heart sounds: Murmur (III/VI SM ) heard.       Pulmonary:      Effort: Pulmonary effort is normal.      Breath sounds: Normal breath sounds.   Neurological:      Mental Status: She is alert.      Comments: Alert and no focal deficit          LABS:      Procedures         ASSESSMENT/PLAN:  Problems Addressed this Visit        Cardiac and Vasculature    Benign essential hypertension     Blood pressure last few months 110-170s to 50-90's. Encouraged to use deep breathing when elevated rather than add more medicines and do not want to bottom blood pressure out             Symptoms and Signs    Dizziness - Primary     Previously improved with PT and will use meclizine as needed in the evening only. If worsens  go to ER          " Relevant Medications    meclizine (ANTIVERT) 25 MG tablet    Other Relevant Orders    Ambulatory Referral to Physical Therapy (Completed)    Unsteady gait     Proceed with PT and encouraged her to use assist device of walker especially at night         Relevant Orders    Ambulatory Referral to Physical Therapy (Completed)      Diagnoses       Codes Comments    Dizziness    -  Primary ICD-10-CM: R42  ICD-9-CM: 780.4     Benign essential hypertension     ICD-10-CM: I10  ICD-9-CM: 401.1     Unsteady gait     ICD-10-CM: R26.81  ICD-9-CM: 781.2           FOLLOW-UP:  Return in about 4 months (around 6/11/2022) for Recheck.      Electronically signed by:    Schuyler Garcia MD        Answers for HPI/ROS submitted by the patient on 2/8/2022  Please describe your symptoms.: Dizziness off and on feeling like drunk. Had these feelings before fall several years ago and saw an ENT DR. for procedures to help. Daughter cannot remember who she saw & patient is now facing dementia and cannot remember even having these spells before. Vertigo?  Have you had these symptoms before?: Yes  How long have you been having these symptoms?: Greater than 2 weeks  Please list any medications you are currently taking for this condition.: None  for this condition.  Please describe any probable cause for these symptoms. : Patient thinks her current meds are causing these problems. Doubtful.  What is the primary reason for your visit?: Other

## 2022-02-11 NOTE — ASSESSMENT & PLAN NOTE
Previously improved with PT and will use meclizine as needed in the evening only. If worsens  go to ER

## 2022-02-28 ENCOUNTER — TELEPHONE (OUTPATIENT)
Dept: INTERNAL MEDICINE | Facility: CLINIC | Age: 87
End: 2022-02-28

## 2022-02-28 DIAGNOSIS — R42 DIZZINESS: ICD-10-CM

## 2022-02-28 RX ORDER — MECLIZINE HYDROCHLORIDE 25 MG/1
25 TABLET ORAL NIGHTLY PRN
Qty: 90 TABLET | Refills: 1 | Status: SHIPPED | OUTPATIENT
Start: 2022-02-28 | End: 2022-08-09

## 2022-02-28 RX ORDER — MECLIZINE HYDROCHLORIDE 25 MG/1
25 TABLET ORAL NIGHTLY PRN
Qty: 30 TABLET | Refills: 0 | Status: SHIPPED | OUTPATIENT
Start: 2022-02-28 | End: 2022-02-28 | Stop reason: SDUPTHER

## 2022-02-28 NOTE — TELEPHONE ENCOUNTER
Caller: NAIN HERNÁNDEZ    Relationship: Emergency Contact    Best call back number: [phone]    Requested Prescriptions:   Requested Prescriptions     Pending Prescriptions Disp Refills   • meclizine (ANTIVERT) 25 MG tablet 30 tablet 0     Sig: Take 1 tablet by mouth At Night As Needed for Dizziness for up to 30 days.        Pharmacy where request should be sent: [address] [phone] [place] [phone]      Additional details provided by patient: PATIENT HAS BEEN GIVEN THIS MEDICATION FOR DIZZINESS; SHE SAID IT HAS HELPED ALONG WITH  PHYSICAL THERAPY  WHICH SHE WENT TO A COUPLE TIMES AND WAS RELEASED.  SHE SAID SHE HAS NOT HAD ANY MORE DIZZY SPELLS SINCE SHE HAS BEEN ON THIS MEDICATION; DO YOU WANT THE PATIENT TO STAY ON THIS MEDICATION ? ALSO IF THIS IS GOING TO BE A MAINTENANCE MEDICATION, THEY USE [employer] MAIL ORDER; OTHERWISE, THEY NEED A REFILL SENT TO [facility]     Does the patient have less than a 3 day supply:  [x] Yes  [] No    Shashi Armenta Rep   02/28/22 13:43 EST       PLEASE CALL TO ADVISE

## 2022-03-14 RX ORDER — DILTIAZEM HYDROCHLORIDE 120 MG/1
CAPSULE, COATED, EXTENDED RELEASE ORAL
Qty: 90 CAPSULE | Refills: 1 | Status: SHIPPED | OUTPATIENT
Start: 2022-03-14 | End: 2022-03-15 | Stop reason: SDUPTHER

## 2022-03-29 NOTE — TELEPHONE ENCOUNTER
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 Plan: Stable. Continue to observe. NAIN WOULD LIKE SOMEONE TO CALL HER REGARDING HER MOM. PT STATES SHE IS ITCHING ALL OVER BUT DOES NOT HAVE A RASH. STATES THIS HAS BEEN OCCURRING OVER THE LAST WEEK.  SHE IS TAKING PREVASTATIN 20MG EVERY 3RD DAY. BOTTLE SAYS AS TOLERATED ON SO NAIN IS NOT SURE IF SHE IS TAKING THE MED CORRECTLY. ALSO, NAIN IS NOT SURE WHY HER MOM IS TAKING THIS AND WOULD LIKE CLARIFICATION.

## 2022-05-02 RX ORDER — APIXABAN 2.5 MG/1
TABLET, FILM COATED ORAL
Qty: 180 TABLET | Refills: 1 | Status: SHIPPED | OUTPATIENT
Start: 2022-05-02 | End: 2023-01-03 | Stop reason: SDUPTHER

## 2022-05-02 NOTE — TELEPHONE ENCOUNTER
Rx Refill Note  Requested Prescriptions     Pending Prescriptions Disp Refills   • Eliquis 2.5 MG tablet tablet [Pharmacy Med Name: ELIQUIS 2.5 MG Tablet] 180 tablet      Sig: TAKE 1 TABLET BY MOUTH EVERY 12 (TWELVE) HOURS. INDICATIONS: ATRIAL FIBRILLATION      Last office visit with prescribing clinician: 2/11/2022      Next office visit with prescribing clinician: 6/13/2022            Dayna Can LPN  05/02/22, 14:54 EDT

## 2022-05-16 RX ORDER — DILTIAZEM HYDROCHLORIDE 120 MG/1
CAPSULE, COATED, EXTENDED RELEASE ORAL
Qty: 30 CAPSULE | Refills: 1 | Status: SHIPPED | OUTPATIENT
Start: 2022-05-16 | End: 2022-10-06

## 2022-06-13 ENCOUNTER — TELEPHONE (OUTPATIENT)
Dept: INTERNAL MEDICINE | Facility: CLINIC | Age: 87
End: 2022-06-13

## 2022-06-13 ENCOUNTER — OFFICE VISIT (OUTPATIENT)
Dept: INTERNAL MEDICINE | Facility: CLINIC | Age: 87
End: 2022-06-13

## 2022-06-13 VITALS
SYSTOLIC BLOOD PRESSURE: 121 MMHG | WEIGHT: 143 LBS | TEMPERATURE: 98.7 F | HEIGHT: 62 IN | DIASTOLIC BLOOD PRESSURE: 78 MMHG | BODY MASS INDEX: 26.31 KG/M2 | HEART RATE: 65 BPM

## 2022-06-13 DIAGNOSIS — R42 DIZZINESS: ICD-10-CM

## 2022-06-13 DIAGNOSIS — E78.5 HYPERLIPIDEMIA LDL GOAL <100: ICD-10-CM

## 2022-06-13 DIAGNOSIS — I48.0 PAROXYSMAL ATRIAL FIBRILLATION: ICD-10-CM

## 2022-06-13 DIAGNOSIS — R41.3 MEMORY LOSS: ICD-10-CM

## 2022-06-13 DIAGNOSIS — I10 BENIGN ESSENTIAL HYPERTENSION: Primary | ICD-10-CM

## 2022-06-13 PROCEDURE — 99214 OFFICE O/P EST MOD 30 MIN: CPT | Performed by: INTERNAL MEDICINE

## 2022-06-13 NOTE — ASSESSMENT & PLAN NOTE
Lipid abnormalities are unchanged.  Nutritional counseling was provided.  Lipids will be reassessed in 3 monthsto 4 months .

## 2022-06-13 NOTE — PROGRESS NOTES
Chief Complaint   Patient presents with   • Hypertension       History of Present Illness  89 y.o. edd presents for follow up of improved dizziness and blood pressure. She has some sense people are in her house at night sometimes.     Review of Systems   Neurological: Negative for dizziness and headaches.   Psychiatric/Behavioral: Positive for sleep disturbance. The patient is nervous/anxious.         Hearing people in the house some nights that are not there      .    PMSFH:  The following portions of the patient's history were reviewed and updated as appropriate: allergies, current medications, past family history, past medical history, past social history, past surgical history and problem list.      Current Outpatient Medications:   •  acetaminophen (TYLENOL) 325 MG tablet, Take 650 mg by mouth Every 4 (Four) Hours As Needed for Mild Pain ., Disp: , Rfl:   •  bisacodyl (DULCOLAX) 10 MG suppository, Insert 10 mg into the rectum Daily As Needed for Constipation., Disp: , Rfl:   •  cholecalciferol (VITAMIN D3) 10 MCG (400 UNIT) tablet, Take 1 tablet by mouth Every Morning., Disp: , Rfl:   •  Cranberry 450 MG tablet, Take 1 tablet by mouth Daily., Disp: , Rfl:   •  dilTIAZem CD (CARDIZEM CD) 120 MG 24 hr capsule, TAKE ONE CAPSULE BY MOUTH DAILY, Disp: 30 capsule, Rfl: 1  •  docusate sodium (COLACE) 100 MG capsule, Take 100 mg by mouth 2 (Two) Times a Day., Disp: , Rfl:   •  Eliquis 2.5 MG tablet tablet, TAKE 1 TABLET BY MOUTH EVERY 12 (TWELVE) HOURS. INDICATIONS: ATRIAL FIBRILLATION, Disp: 180 tablet, Rfl: 1  •  hydrocortisone (ANUSOL-HC) 25 MG suppository, Insert 1 suppository into the rectum 2 (Two) Times a Day As Needed for Hemorrhoids., Disp: , Rfl:   •  meclizine (ANTIVERT) 25 MG tablet, Take 1 tablet by mouth At Night As Needed for Dizziness., Disp: 90 tablet, Rfl: 1  •  melatonin 3 MG tablet, Take 3 mg by mouth Every Night., Disp: , Rfl:   •  Mirabegron ER (Myrbetriq) 50 MG tablet sustained-release 24 hour  "24 hr tablet, Take 50 mg by mouth Daily., Disp: 90 tablet, Rfl: 1  •  omeprazole (priLOSEC) 40 MG capsule, TAKE 1 CAPSULE EVERY DAY, Disp: 90 capsule, Rfl: 1  •  polyethylene glycol (MIRALAX) 17 GM/SCOOP powder, Take 17 g by mouth. Once a day on Mon, Wed, Fri and PRN, Disp: , Rfl:   •  rosuvastatin (CRESTOR) 5 MG tablet, Take 1 tablet by mouth Every Night., Disp: 90 tablet, Rfl: 1    VITALS:  /78   Pulse 65   Temp 98.7 °F (37.1 °C)   Ht 157.5 cm (62.01\")   Wt 64.9 kg (143 lb)   BMI 26.15 kg/m²     Physical Exam  Constitutional:       Appearance: Normal appearance.   Eyes:      Conjunctiva/sclera: Conjunctivae normal.   Cardiovascular:      Rate and Rhythm: Normal rate and regular rhythm.      Comments: II/VI SM   Pulmonary:      Effort: Pulmonary effort is normal.      Breath sounds: Normal breath sounds.   Abdominal:      General: Bowel sounds are normal.      Palpations: Abdomen is soft.   Neurological:      General: No focal deficit present.      Mental Status: She is alert.   Psychiatric:         Mood and Affect: Mood normal.         Behavior: Behavior normal.         LABS:      Procedures         ASSESSMENT/PLAN:  Problems Addressed this Visit        Cardiac and Vasculature    Benign essential hypertension - Primary     Hypertension is improving with treatment.  Continue current treatment regimen.  Regular aerobic exercise.  Blood pressure will be reassessed at the next regular appointment.           Relevant Orders    Lipid Panel    Microalbumin / Creatinine Urine Ratio - Urine, Clean Catch    Hyperlipidemia LDL goal <100     Lipid abnormalities are unchanged.  Nutritional counseling was provided.  Lipids will be reassessed in 3 monthsto 4 months .           Relevant Orders    CBC & Differential    Comprehensive Metabolic Panel    Paroxysmal atrial fibrillation (HCC)     Doing ok with eliquis               Neuro    Memory loss     Encouraged exercise and to do some social activity            " Relevant Orders    Vitamin B12    TSH Rfx On Abnormal To Free T4       Symptoms and Signs    Dizziness     Improved and will stop the meclizine              Diagnoses       Codes Comments    Benign essential hypertension    -  Primary ICD-10-CM: I10  ICD-9-CM: 401.1     Paroxysmal atrial fibrillation (HCC)     ICD-10-CM: I48.0  ICD-9-CM: 427.31     Dizziness     ICD-10-CM: R42  ICD-9-CM: 780.4     Memory loss     ICD-10-CM: R41.3  ICD-9-CM: 780.93     Hyperlipidemia LDL goal <100     ICD-10-CM: E78.5  ICD-9-CM: 272.4           FOLLOW-UP:  Return in about 4 months (around 10/13/2022).      Electronically signed by:    Schuyler Garcia MD

## 2022-06-24 RX ORDER — MIRABEGRON 50 MG/1
TABLET, FILM COATED, EXTENDED RELEASE ORAL
Qty: 90 TABLET | Refills: 1 | Status: SHIPPED | OUTPATIENT
Start: 2022-06-24 | End: 2023-01-09 | Stop reason: SDUPTHER

## 2022-08-09 ENCOUNTER — OFFICE VISIT (OUTPATIENT)
Dept: ORTHOPEDIC SURGERY | Facility: CLINIC | Age: 87
End: 2022-08-09

## 2022-08-09 VITALS
DIASTOLIC BLOOD PRESSURE: 64 MMHG | SYSTOLIC BLOOD PRESSURE: 120 MMHG | WEIGHT: 144 LBS | BODY MASS INDEX: 26.5 KG/M2 | HEIGHT: 62 IN

## 2022-08-09 DIAGNOSIS — Z96.649 STATUS POST HIP HEMIARTHROPLASTY: Primary | ICD-10-CM

## 2022-08-09 PROCEDURE — 99212 OFFICE O/P EST SF 10 MIN: CPT | Performed by: ORTHOPAEDIC SURGERY

## 2022-08-09 ASSESSMENT — HOOS JR
HOOS JR SCORE: 100
HOOS JR SCORE: 0

## 2022-08-09 NOTE — PROGRESS NOTES
Orthopaedic Clinic Note: Hip Established Patient    Chief Complaint   Patient presents with   • Follow-up     9 month follow up; 1 year Status post hip hemiarthroplasty 7/15/21        HPI    It has been 9  month(s) since Ms. Mckeon's last visit. She returns to clinic today for follow-up right hip and knee arthroplasty.  She is a year out from surgery.  Rates her pain 0/10 on pain scale.  She is ambulating with the assistance of a cane.  Denies fevers chills or constitutional symptoms.  Overall she is happy with her outcome.    Past Medical History:   Diagnosis Date   • Ankle instability    • Chronic left shoulder pain 1/26/2018   • Disorder of tendon of shoulder region, left    • Dysphagia    • Esophagitis    • Femoral neck fracture (HCC) 7/15/2021   • Finger fracture, right 2009    Dr Chandler casting and physical therapy   • Hemorrhoids    • Hypertension    • Localized, primary osteoarthritis of left shoulder region    • Paroxysmal atrial fibrillation (HCC) 9/30/2021   • Right shoulder pain    • Shoulder joint replacement status     Right   • Stroke (HCC)     CVA      Past Surgical History:   Procedure Laterality Date   • APPENDECTOMY      Daughter denies   • CATARACT EXTRACTION, BILATERAL      2020   • COLONOSCOPY N/A 8/23/2021    Procedure: COLONOSCOPY;  Surgeon: Aries Varela MD;  Location:  [facility];  Service: Gastroenterology;  Laterality: N/A;   • ENDOSCOPY  07/2009    with biopsy/esophageal ring dilation   • ENDOSCOPY N/A 8/23/2021    Procedure: ESOPHAGOGASTRODUODENOSCOPY;  Surgeon: Aries Varela MD;  Location:  [facility];  Service: Gastroenterology;  Laterality: N/A;   • HEMORRHOIDECTOMY      lanced    • HIP HEMIARTHROPLASTY Right 7/15/2021    Procedure: HIP HEMIARTHROPLASTY;  Surgeon: Adam Olsen MD;  Location:  [facility];  Service: Orthopedics;  Laterality: Right;   • HYSTERECTOMY      partial    • OOPHORECTOMY     • SHOULDER SURGERY Right     replacement   • SHOULDER SURGERY       Arthroscopy of shoulder:Right   • TONSILLECTOMY        Family History   Problem Relation Age of Onset   • Cancer Mother    • Stroke Mother    • Bleeding Disorder Mother    • Osteoarthritis Mother    • Rheum arthritis Mother    • Breast cancer Mother 60   • Heart attack Father    • Coronary artery disease Father    • Cancer Sister         Bladder    • Bleeding Disorder Brother         2   • Colon cancer Brother         3   • Ovarian cancer Neg Hx      Social History     Socioeconomic History   • Marital status:    Tobacco Use   • Smoking status: Never Smoker   • Smokeless tobacco: Never Used   • Tobacco comment:  was smoker   Vaping Use   • Vaping Use: Never used   Substance and Sexual Activity   • Alcohol use: No   • Drug use: No   • Sexual activity: Not Currently     Partners: Male      Current Outpatient Medications on File Prior to Visit   Medication Sig Dispense Refill   • acetaminophen (TYLENOL) 325 MG tablet Take 650 mg by mouth Every 4 (Four) Hours As Needed for Mild Pain .     • bisacodyl (DULCOLAX) 10 MG suppository Insert 10 mg into the rectum Daily As Needed for Constipation.     • cholecalciferol (VITAMIN D3) 10 MCG (400 UNIT) tablet Take 1 tablet by mouth Every Morning.     • Cranberry 450 MG tablet Take 1 tablet by mouth Daily.     • dilTIAZem CD (CARDIZEM CD) 120 MG 24 hr capsule TAKE ONE CAPSULE BY MOUTH DAILY 30 capsule 1   • docusate sodium (COLACE) 100 MG capsule Take 100 mg by mouth 2 (Two) Times a Day.     • Eliquis 2.5 MG tablet tablet TAKE 1 TABLET BY MOUTH EVERY 12 (TWELVE) HOURS. INDICATIONS: ATRIAL FIBRILLATION 180 tablet 1   • hydrocortisone (ANUSOL-HC) 25 MG suppository Insert 1 suppository into the rectum 2 (Two) Times a Day As Needed for Hemorrhoids.     • melatonin 3 MG tablet Take 3 mg by mouth Every Night.     • Myrbetriq 50 MG tablet sustained-release 24 hour 24 hr tablet TAKE 1 TABLET EVERY DAY 90 tablet 1   • omeprazole (priLOSEC) 40 MG capsule TAKE 1 CAPSULE EVERY  "DAY 90 capsule 1   • polyethylene glycol (MIRALAX) 17 GM/SCOOP powder Take 17 g by mouth. Once a day on Mon, Wed, Fri and PRN     • rosuvastatin (CRESTOR) 5 MG tablet Take 1 tablet by mouth Every Night. 90 tablet 1   • [DISCONTINUED] meclizine (ANTIVERT) 25 MG tablet Take 1 tablet by mouth At Night As Needed for Dizziness. 90 tablet 1     No current facility-administered medications on file prior to visit.      Allergies   Allergen Reactions   • Nasal Spray Cough        Review of Systems   Constitutional: Negative.    HENT: Negative.    Eyes: Negative.    Respiratory: Negative.    Cardiovascular: Negative.    Gastrointestinal: Negative.    Endocrine: Negative.    Genitourinary: Negative.    Musculoskeletal: Positive for arthralgias.   Skin: Negative.    Allergic/Immunologic: Negative.    Neurological: Negative.    Hematological: Negative.    Psychiatric/Behavioral: Negative.         The patient's Review of Systems was personally reviewed and confirmed as accurate.    Physical Exam  Blood pressure 120/64, height 157.5 cm (62.01\"), weight 65.3 kg (144 lb).    Body mass index is 26.33 kg/m².    GENERAL APPEARANCE: awake, alert, oriented, in no acute distress and well developed, well nourished  LUNGS:  breathing nonlabored  EXTREMITIES: no clubbing, cyanosis  PERIPHERAL PULSES: palpable dorsalis pedis and posterior tibial pulses bilaterally.    GAIT:  Antalgic            Hip Exam:  Right    RANGE OF MOTION:  EXTENSION/FLEXION:  normal (0-110 degrees)  IR (at 90 degrees of flexion):  20  ER (at 90 degrees of flexion):  40  PAIN WITH HIP MOTION:  no  PAIN WITH LOGROLL:  no     STINCHFIELD TEST: negative    STRENGTH:  ABDUCTOR:  5/5  ADDUCTOR:  5/5  HIP FLEXION:  5/5    GREATER TROCHANTER BURSAL PAIN:  no    SENSATION TO LIGHT TOUCH:  DEEP PERONEAL/SUPERFICIAL PERONEAL/SURAL/SAPHENOUS/TIBIAL:   intact    EDEMA:  no  ERYTHEMA:  no  WOUNDS/INCISIONS:   yes, well healed surgical incision without evidence of erythema or " drainage  _________________________________________________________________  _________________________________________________________________    RADIOGRAPHIC FINDINGS:   Indication: Status post right hip hemiarthroplasty    Comparison: Todays xrays were compared to previous xrays from 11/9/2021     AP pelvis: Right: Demonstrate a well positioned hip hemiarthroplasty without evidence of wear, loosening, fracture or osteolysis, femoral head is concentrically reduced within the acetabulum and No significant changes compared to prior radiographs.;Left: mild joint space narrowing, minimal osteophyte formation and No significant changes compared to prior radiographs.      Assessment/Plan:   Diagnosis Plan   1. Status post hip hemiarthroplasty  XR Pelvis 1 or 2 View     Patient is doing well 1 year status post right hip hemiarthroplasty.  Recommend continued activity as tolerated without restrictions.  I will see the patient back on an as-needed basis.    Adam Olsen MD  08/09/22  12:57 EDT

## 2022-08-10 RX ORDER — OMEPRAZOLE 40 MG/1
CAPSULE, DELAYED RELEASE ORAL
Qty: 90 CAPSULE | Refills: 1 | Status: SHIPPED | OUTPATIENT
Start: 2022-08-10 | End: 2023-01-16 | Stop reason: SDUPTHER

## 2022-08-10 NOTE — TELEPHONE ENCOUNTER
Rx Refill Note  Requested Prescriptions     Pending Prescriptions Disp Refills   • omeprazole (priLOSEC) 40 MG capsule [Pharmacy Med Name: OMEPRAZOLE 40 MG Capsule Delayed Release] 90 capsule 1     Sig: TAKE 1 CAPSULE EVERY DAY      Last office visit with prescribing clinician: 6/13/2022      Next office visit with prescribing clinician: 10/14/2022            Yina Cedillo LPN  08/10/22, 09:35 EDT

## 2022-09-12 RX ORDER — ROSUVASTATIN CALCIUM 5 MG/1
5 TABLET, COATED ORAL NIGHTLY
Qty: 90 TABLET | Refills: 1 | Status: SHIPPED | OUTPATIENT
Start: 2022-09-12 | End: 2022-10-14

## 2022-10-06 RX ORDER — DILTIAZEM HYDROCHLORIDE 120 MG/1
CAPSULE, COATED, EXTENDED RELEASE ORAL
Qty: 90 CAPSULE | Refills: 1 | Status: SHIPPED | OUTPATIENT
Start: 2022-10-06 | End: 2023-01-09 | Stop reason: SDUPTHER

## 2022-10-06 NOTE — TELEPHONE ENCOUNTER
Rx Refill Note  Requested Prescriptions     Pending Prescriptions Disp Refills   • dilTIAZem CD (CARDIZEM CD) 120 MG 24 hr capsule [Pharmacy Med Name: DILTIAZEM HYDROCHLORIDE  MG Capsule Extended Release 24 Hour] 90 capsule      Sig: TAKE 1 CAPSULE EVERY DAY      Last office visit with prescribing clinician: 6/13/2022      Next office visit with prescribing clinician: 10/14/2022            Yina Cedillo LPN  10/06/22, 07:46 EDT

## 2022-10-07 ENCOUNTER — OFFICE VISIT (OUTPATIENT)
Dept: CARDIOLOGY | Facility: CLINIC | Age: 87
End: 2022-10-07

## 2022-10-07 ENCOUNTER — LAB (OUTPATIENT)
Dept: LAB | Facility: HOSPITAL | Age: 87
End: 2022-10-07

## 2022-10-07 VITALS
HEIGHT: 63 IN | DIASTOLIC BLOOD PRESSURE: 64 MMHG | WEIGHT: 147 LBS | OXYGEN SATURATION: 98 % | SYSTOLIC BLOOD PRESSURE: 110 MMHG | HEART RATE: 69 BPM | BODY MASS INDEX: 26.05 KG/M2

## 2022-10-07 DIAGNOSIS — I10 PRIMARY HYPERTENSION: ICD-10-CM

## 2022-10-07 DIAGNOSIS — E78.2 MIXED HYPERLIPIDEMIA: ICD-10-CM

## 2022-10-07 DIAGNOSIS — E78.5 HYPERLIPIDEMIA LDL GOAL <100: ICD-10-CM

## 2022-10-07 DIAGNOSIS — I48.19 PERSISTENT ATRIAL FIBRILLATION: Primary | ICD-10-CM

## 2022-10-07 DIAGNOSIS — R41.3 MEMORY LOSS: ICD-10-CM

## 2022-10-07 DIAGNOSIS — I10 BENIGN ESSENTIAL HYPERTENSION: ICD-10-CM

## 2022-10-07 LAB
ALBUMIN SERPL-MCNC: 4.3 G/DL (ref 3.5–5.2)
ALBUMIN UR-MCNC: <1.2 MG/DL
ALBUMIN/GLOB SERPL: 1.3 G/DL
ALP SERPL-CCNC: 71 U/L (ref 39–117)
ALT SERPL W P-5'-P-CCNC: 10 U/L (ref 1–33)
ANION GAP SERPL CALCULATED.3IONS-SCNC: 10.8 MMOL/L (ref 5–15)
AST SERPL-CCNC: 17 U/L (ref 1–32)
BASOPHILS # BLD AUTO: 0.05 10*3/MM3 (ref 0–0.2)
BASOPHILS NFR BLD AUTO: 0.7 % (ref 0–1.5)
BILIRUB SERPL-MCNC: 0.3 MG/DL (ref 0–1.2)
BUN SERPL-MCNC: 13 MG/DL (ref 8–23)
BUN/CREAT SERPL: 15.7 (ref 7–25)
CALCIUM SPEC-SCNC: 9.8 MG/DL (ref 8.2–9.6)
CHLORIDE SERPL-SCNC: 99 MMOL/L (ref 98–107)
CHOLEST SERPL-MCNC: 193 MG/DL (ref 0–200)
CO2 SERPL-SCNC: 27.2 MMOL/L (ref 22–29)
CREAT SERPL-MCNC: 0.83 MG/DL (ref 0.57–1)
CREAT UR-MCNC: 68.5 MG/DL
DEPRECATED RDW RBC AUTO: 38.8 FL (ref 37–54)
EGFRCR SERPLBLD CKD-EPI 2021: 66.6 ML/MIN/1.73
EOSINOPHIL # BLD AUTO: 0.07 10*3/MM3 (ref 0–0.4)
EOSINOPHIL NFR BLD AUTO: 1 % (ref 0.3–6.2)
ERYTHROCYTE [DISTWIDTH] IN BLOOD BY AUTOMATED COUNT: 11.9 % (ref 12.3–15.4)
GLOBULIN UR ELPH-MCNC: 3.3 GM/DL
GLUCOSE SERPL-MCNC: 96 MG/DL (ref 65–99)
HCT VFR BLD AUTO: 42.4 % (ref 34–46.6)
HDLC SERPL-MCNC: 70 MG/DL (ref 40–60)
HGB BLD-MCNC: 14.6 G/DL (ref 12–15.9)
IMM GRANULOCYTES # BLD AUTO: 0.02 10*3/MM3 (ref 0–0.05)
IMM GRANULOCYTES NFR BLD AUTO: 0.3 % (ref 0–0.5)
LDLC SERPL CALC-MCNC: 106 MG/DL (ref 0–100)
LDLC/HDLC SERPL: 1.48 {RATIO}
LYMPHOCYTES # BLD AUTO: 1.53 10*3/MM3 (ref 0.7–3.1)
LYMPHOCYTES NFR BLD AUTO: 20.9 % (ref 19.6–45.3)
MCH RBC QN AUTO: 30.5 PG (ref 26.6–33)
MCHC RBC AUTO-ENTMCNC: 34.4 G/DL (ref 31.5–35.7)
MCV RBC AUTO: 88.5 FL (ref 79–97)
MICROALBUMIN/CREAT UR: NORMAL MG/G{CREAT}
MONOCYTES # BLD AUTO: 0.72 10*3/MM3 (ref 0.1–0.9)
MONOCYTES NFR BLD AUTO: 9.8 % (ref 5–12)
NEUTROPHILS NFR BLD AUTO: 4.93 10*3/MM3 (ref 1.7–7)
NEUTROPHILS NFR BLD AUTO: 67.3 % (ref 42.7–76)
NRBC BLD AUTO-RTO: 0 /100 WBC (ref 0–0.2)
PLATELET # BLD AUTO: 318 10*3/MM3 (ref 140–450)
PMV BLD AUTO: 10.6 FL (ref 6–12)
POTASSIUM SERPL-SCNC: 4.3 MMOL/L (ref 3.5–5.2)
PROT SERPL-MCNC: 7.6 G/DL (ref 6–8.5)
RBC # BLD AUTO: 4.79 10*6/MM3 (ref 3.77–5.28)
SODIUM SERPL-SCNC: 137 MMOL/L (ref 136–145)
TRIGL SERPL-MCNC: 96 MG/DL (ref 0–150)
TSH SERPL DL<=0.05 MIU/L-ACNC: 0.96 UIU/ML (ref 0.27–4.2)
VIT B12 BLD-MCNC: 341 PG/ML (ref 211–946)
VLDLC SERPL-MCNC: 17 MG/DL (ref 5–40)
WBC NRBC COR # BLD: 7.32 10*3/MM3 (ref 3.4–10.8)

## 2022-10-07 PROCEDURE — 84443 ASSAY THYROID STIM HORMONE: CPT

## 2022-10-07 PROCEDURE — 82607 VITAMIN B-12: CPT

## 2022-10-07 PROCEDURE — 82043 UR ALBUMIN QUANTITATIVE: CPT

## 2022-10-07 PROCEDURE — 80053 COMPREHEN METABOLIC PANEL: CPT

## 2022-10-07 PROCEDURE — 99214 OFFICE O/P EST MOD 30 MIN: CPT | Performed by: INTERNAL MEDICINE

## 2022-10-07 PROCEDURE — 85025 COMPLETE CBC W/AUTO DIFF WBC: CPT

## 2022-10-07 PROCEDURE — 80061 LIPID PANEL: CPT

## 2022-10-07 PROCEDURE — 82570 ASSAY OF URINE CREATININE: CPT

## 2022-10-07 RX ORDER — DENOSUMAB 60 MG/ML
1 INJECTION SUBCUTANEOUS
COMMUNITY
Start: 2022-06-16

## 2022-10-07 NOTE — PROGRESS NOTES
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     PROBLEM LIST:   1. Paroxysmal atrial fibrillation with RVR  1. Diagnosed 8/22/21 with ER presentation  2. CHADSVASC- 6  3. 7/15/21 echo EF 60%. Borderline LVH. Mod to severe AS.  Maximum gradient 59.2 mmHg, mean gradient 37.5 mmHg.  Moderate MR.  4. 8/22/21 Spontaneous cardioversion after IV Cardizem drip and IV digoxin administered  2. Hypertension  3. Dyslipidemia - 2013 LDL goal <100  1. 3/21 150/100/62/70  2. 9/21 158/72/59/85  4. Aortic stenosis  1. Normal perfusion study 2014  2. Echo 7/21 with mod to severe AS   5. History of TIA  1. Reportedly 15 years ago.  2. Unknown cause  6. BRBPR  1. Noted upon admission for A. fib with RVR on 8/22/2021.  EGD and colonoscopy revealed white plaques in esophagus, diverticulosis, hemorrhoids, polyps x2.  Did not appreciate active bleed.  7. Surgeries:  1. Appendectomy  2. Bilateral cataract surgery  3. Hemorrhoidectomy  4. Right hip hemiarthroplasty due to fracture  5. Hysterectomy   6. Right shoulder replacement  7. Tonsillectomy       CC: f/u afib, VHD, HTN, HLD    Allergies  Allergies   Allergen Reactions   • Nasal Spray Cough       Current Medications    Current Outpatient Medications:   •  acetaminophen (TYLENOL) 325 MG tablet, Take 650 mg by mouth Every 4 (Four) Hours As Needed for Mild Pain ., Disp: , Rfl:   •  bisacodyl (DULCOLAX) 10 MG suppository, Insert 10 mg into the rectum Daily As Needed for Constipation., Disp: , Rfl:   •  cholecalciferol (VITAMIN D3) 10 MCG (400 UNIT) tablet, Take 1 tablet by mouth Every Morning., Disp: , Rfl:   •  Cholecalciferol 50 MCG (2000 UT) capsule, Take 1 tablet by mouth  "Daily., Disp: , Rfl:   •  Cranberry 450 MG tablet, Take 1 tablet by mouth Daily., Disp: , Rfl:   •  denosumab (Prolia) 60 MG/ML solution prefilled syringe syringe, Inject 1 mL under the skin into the appropriate area as directed Every 6 (Six) Months., Disp: , Rfl:   •  dilTIAZem CD (CARDIZEM CD) 120 MG 24 hr capsule, TAKE 1 CAPSULE EVERY DAY, Disp: 90 capsule, Rfl: 1  •  docusate sodium (COLACE) 100 MG capsule, Take 100 mg by mouth 2 (Two) Times a Day., Disp: , Rfl:   •  Eliquis 2.5 MG tablet tablet, TAKE 1 TABLET BY MOUTH EVERY 12 (TWELVE) HOURS. INDICATIONS: ATRIAL FIBRILLATION, Disp: 180 tablet, Rfl: 1  •  hydrocortisone (ANUSOL-HC) 25 MG suppository, Insert 1 suppository into the rectum 2 (Two) Times a Day As Needed for Hemorrhoids., Disp: , Rfl:   •  melatonin 3 MG tablet, Take 3 mg by mouth Every Night., Disp: , Rfl:   •  Myrbetriq 50 MG tablet sustained-release 24 hour 24 hr tablet, TAKE 1 TABLET EVERY DAY, Disp: 90 tablet, Rfl: 1  •  omeprazole (priLOSEC) 40 MG capsule, TAKE 1 CAPSULE EVERY DAY, Disp: 90 capsule, Rfl: 1  •  polyethylene glycol (MIRALAX) 17 GM/SCOOP powder, Take 17 g by mouth. Once a day on Mon, Wed, Fri and PRN, Disp: , Rfl:   •  rosuvastatin (CRESTOR) 5 MG tablet, TAKE 1 TABLET BY MOUTH EVERY NIGHT., Disp: 90 tablet, Rfl: 1      History of Present Illness   Luh Mckeon is a 89 y.o. year old female here for follow up.    Pt denies any chest pain, dyspnea, dyspnea on exertion, orthopnea, PND, palpitations, lower extremity edema, or claudication.  She has had no crescendo cardiac symptoms.  She is accompanied by her family in the office today.  She is continues to live alone but does have as sitters during the day.    OBJECTIVE:  Vitals:    10/07/22 1530   BP: 110/64   BP Location: Left arm   Patient Position: Sitting   Pulse: 69   SpO2: 98%   Weight: 66.7 kg (147 lb)   Height: 160 cm (63\")     Body mass index is 26.04 kg/m².    Constitutional:       Appearance: Healthy appearance. Not " in distress.   Neck:      Vascular: No JVR. JVD normal.   Pulmonary:      Effort: Pulmonary effort is normal.      Breath sounds: Normal breath sounds. No wheezing. No rhonchi. No rales.   Chest:      Chest wall: Not tender to palpatation.   Cardiovascular:      PMI at left midclavicular line. Normal rate. Irregularly irregular rhythm. Normal S1. Normal S2.      Murmurs: There is a systolic murmur.      No gallop. No click. No rub.   Pulses:     Intact distal pulses.   Edema:     Peripheral edema absent.   Abdominal:      General: Bowel sounds are normal.      Palpations: Abdomen is soft.      Tenderness: There is no abdominal tenderness.   Musculoskeletal: Normal range of motion.         General: No tenderness. Skin:     General: Skin is warm and dry.   Neurological:      General: No focal deficit present.      Mental Status: Alert and oriented to person, place and time.         Diagnostic Data:  Procedures      ASSESSMENT:   Diagnosis Plan   1. Persistent atrial fibrillation (HCC)     2. Primary hypertension     3. Mixed hyperlipidemia         PLAN:  Chronic A. fib rate controlled anticoagulated    Hypertension controlled current diltiazem    Mixed dyslipidemia controlled on statin therapy              Juan Manuel Miles MD, FACC

## 2022-10-14 ENCOUNTER — OFFICE VISIT (OUTPATIENT)
Dept: INTERNAL MEDICINE | Facility: CLINIC | Age: 87
End: 2022-10-14

## 2022-10-14 VITALS
BODY MASS INDEX: 25.34 KG/M2 | HEART RATE: 58 BPM | WEIGHT: 143 LBS | HEIGHT: 63 IN | TEMPERATURE: 96.9 F | SYSTOLIC BLOOD PRESSURE: 121 MMHG | DIASTOLIC BLOOD PRESSURE: 68 MMHG

## 2022-10-14 DIAGNOSIS — R41.3 MEMORY LOSS: ICD-10-CM

## 2022-10-14 DIAGNOSIS — Z91.81 RISK FOR FALLS: ICD-10-CM

## 2022-10-14 DIAGNOSIS — E78.5 HYPERLIPIDEMIA LDL GOAL <100: ICD-10-CM

## 2022-10-14 DIAGNOSIS — I70.203 ATHEROSCLEROSIS OF NATIVE ARTERY OF BOTH LOWER EXTREMITIES, WITH UNSPECIFIED PRESENCE OF CLINICAL MANIFESTATION: ICD-10-CM

## 2022-10-14 DIAGNOSIS — I10 BENIGN ESSENTIAL HYPERTENSION: ICD-10-CM

## 2022-10-14 DIAGNOSIS — E66.3 OVERWEIGHT (BMI 25.0-29.9): ICD-10-CM

## 2022-10-14 DIAGNOSIS — Z00.00 MEDICARE ANNUAL WELLNESS VISIT, SUBSEQUENT: Primary | ICD-10-CM

## 2022-10-14 DIAGNOSIS — I48.0 PAROXYSMAL ATRIAL FIBRILLATION: ICD-10-CM

## 2022-10-14 DIAGNOSIS — F03.90 DEMENTIA WITHOUT BEHAVIORAL DISTURBANCE: ICD-10-CM

## 2022-10-14 DIAGNOSIS — R26.81 UNSTEADY GAIT: ICD-10-CM

## 2022-10-14 PROCEDURE — 1170F FXNL STATUS ASSESSED: CPT | Performed by: INTERNAL MEDICINE

## 2022-10-14 PROCEDURE — 99213 OFFICE O/P EST LOW 20 MIN: CPT | Performed by: INTERNAL MEDICINE

## 2022-10-14 PROCEDURE — 1126F AMNT PAIN NOTED NONE PRSNT: CPT | Performed by: INTERNAL MEDICINE

## 2022-10-14 PROCEDURE — G0008 ADMIN INFLUENZA VIRUS VAC: HCPCS | Performed by: INTERNAL MEDICINE

## 2022-10-14 PROCEDURE — G0439 PPPS, SUBSEQ VISIT: HCPCS | Performed by: INTERNAL MEDICINE

## 2022-10-14 PROCEDURE — 90662 IIV NO PRSV INCREASED AG IM: CPT | Performed by: INTERNAL MEDICINE

## 2022-10-14 PROCEDURE — 1159F MED LIST DOCD IN RCRD: CPT | Performed by: INTERNAL MEDICINE

## 2022-10-14 RX ORDER — ROSUVASTATIN CALCIUM 10 MG/1
10 TABLET, COATED ORAL DAILY
Qty: 30 TABLET | Refills: 3 | Status: SHIPPED | OUTPATIENT
Start: 2022-10-14 | End: 2023-01-09 | Stop reason: SDUPTHER

## 2022-10-14 NOTE — PROGRESS NOTES
QUICK REFERENCE INFORMATION:  The ABCs of the Annual Wellness Visit    Subsequent Medicare Wellness Visit    HEALTH RISK ASSESSMENT    11/14/1930    Recent Hospitalizations:  No hospitalization(s) within the last year..        Current Medical Providers:  Patient Care Team:  Schuyler Garcia MD as PCP - General  Schuyler Garcia MD as PCP - Family Medicine  Juan Manuel Miles MD as Consulting Physician (Cardiology)        Smoking Status:  Social History     Tobacco Use   Smoking Status Never   Smokeless Tobacco Never   Tobacco Comments     was smoker       Alcohol Consumption:  Social History     Substance and Sexual Activity   Alcohol Use No       Depression Screen:   PHQ-2/PHQ-9 Depression Screening 10/14/2022   Retired PHQ-9 Total Score -   Retired Total Score -   Little Interest or Pleasure in Doing Things 1-->several days   Feeling Down, Depressed or Hopeless 1-->several days   Trouble Falling or Staying Asleep, or Sleeping Too Much 3-->nearly every day   Feeling Tired or Having Little Energy 1-->several days   Poor Appetite or Overeating 1-->several days   Feeling Bad about Yourself - or that You are a Failure or Have Let Yourself or Your Family Down 1-->several days   Trouble Concentrating on Things, Such as Reading the Newspaper or Watching Television 0-->not at all   Moving or Speaking So Slowly that Other People Could Have Noticed? Or the Opposite - Being So Fidgety 0-->not at all   Thoughts that You Would be Better Off Dead or of Hurting Yourself in Some Way 0-->not at all   PHQ-9: Brief Depression Severity Measure Score 8   If You Checked Off Any Problems, How Difficult Have These Problems Made It For You to Do Your Work, Take Care of Things at Home, or Get Along with Other People? not difficult at all       Health Habits and Functional and Cognitive Screening:  Functional & Cognitive Status 10/14/2022   Do you have difficulty preparing food and eating? Yes   Do you have difficulty bathing  yourself, getting dressed or grooming yourself? Yes   Do you have difficulty using the toilet? No   Do you have difficulty moving around from place to place? No   Do you have trouble with steps or getting out of a bed or a chair? No   Current Diet Well Balanced Diet   Dental Exam Up to date        Dental Exam Comment -   Eye Exam Up to date        Eye Exam Comment -   Exercise (times per week) 7 times per week   Current Exercises Include Walking   Current Exercise Activities Include -   Do you need help using the phone?  Yes   Are you deaf or do you have serious difficulty hearing?  No   Do you need help with transportation? Yes   Do you need help shopping? Yes   Do you need help preparing meals?  Yes   Do you need help with housework?  Yes   Do you need help with laundry? No   Do you need help taking your medications? Yes   Do you need help managing money? Yes   Do you ever drive or ride in a car without wearing a seat belt? No   Have you felt unusual stress, anger or loneliness in the last month? Yes   Who do you live with? Alone   If you need help, do you have trouble finding someone available to you? No   Have you been bothered in the last four weeks by sexual problems? No   Do you have difficulty concentrating, remembering or making decisions? -       Fall Risk Screen:  ALEXIS Fall Risk Assessment was completed, and patient is at LOW risk for falls.Assessment completed on:10/14/2022    ACE III MINI        Does the patient have evidence of cognitive impairment? Yes    Aspirin use counseling: Does not need ASA (and currently is not on it)    Recent Lab Results:  CMP:  Lab Results   Component Value Date    BUN 13 10/07/2022    CREATININE 0.83 10/07/2022    EGFRIFNONA 70 09/27/2021    BCR 15.7 10/07/2022     10/07/2022    K 4.3 10/07/2022    CO2 27.2 10/07/2022    CALCIUM 9.8 (H) 10/07/2022    ALBUMIN 4.30 10/07/2022    BILITOT 0.3 10/07/2022    ALKPHOS 71 10/07/2022    AST 17 10/07/2022    ALT 10 10/07/2022      HbA1c:  No results found for: HGBA1C  Microalbumin:  Lab Results   Component Value Date    MICROALBUR <1.2 10/07/2022     Lipid Panel  Lab Results   Component Value Date    CHOL 193 10/07/2022    TRIG 96 10/07/2022    HDL 70 (H) 10/07/2022     (H) 10/07/2022    AST 17 10/07/2022    ALT 10 10/07/2022       Visual Acuity:  No results found.    Age-appropriate Screening Schedule:  Refer to the list below for future screening recommendations based on patient's age, sex and/or medical conditions. Orders for these recommended tests are listed in the plan section. The patient has been provided with a written plan.    Health Maintenance   Topic Date Due   • ZOSTER VACCINE (2 of 3) 05/25/2009   • LIPID PANEL  10/07/2023   • DXA SCAN  11/23/2023   • TDAP/TD VACCINES (4 - Td or Tdap) 07/14/2030   • INFLUENZA VACCINE  Completed        Subjective   History of Present Illness    Luh Mckeon is a 89 y.o. female who presents for a Subsequent Wellness Visit.Her cholesterol has worsened some.     CHRONIC CONDITIONS    The following portions of the patient's history were reviewed and updated as appropriate: allergies, current medications, past family history, past medical history, past social history, past surgical history and problem list.    Outpatient Medications Prior to Visit   Medication Sig Dispense Refill   • acetaminophen (TYLENOL) 325 MG tablet Take 650 mg by mouth Every 4 (Four) Hours As Needed for Mild Pain .     • bisacodyl (DULCOLAX) 10 MG suppository Insert 10 mg into the rectum Daily As Needed for Constipation.     • cholecalciferol (VITAMIN D3) 10 MCG (400 UNIT) tablet Take 1 tablet by mouth Every Morning.     • Cholecalciferol 50 MCG (2000 UT) capsule Take 1 tablet by mouth Daily.     • Cranberry 450 MG tablet Take 1 tablet by mouth Daily.     • denosumab (Prolia) 60 MG/ML solution prefilled syringe syringe Inject 1 mL under the skin into the appropriate area as directed Every 6 (Six) Months.     •  dilTIAZem CD (CARDIZEM CD) 120 MG 24 hr capsule TAKE 1 CAPSULE EVERY DAY 90 capsule 1   • docusate sodium (COLACE) 100 MG capsule Take 100 mg by mouth 2 (Two) Times a Day.     • Eliquis 2.5 MG tablet tablet TAKE 1 TABLET BY MOUTH EVERY 12 (TWELVE) HOURS. INDICATIONS: ATRIAL FIBRILLATION 180 tablet 1   • hydrocortisone (ANUSOL-HC) 25 MG suppository Insert 1 suppository into the rectum 2 (Two) Times a Day As Needed for Hemorrhoids.     • melatonin 3 MG tablet Take 3 mg by mouth Every Night.     • Myrbetriq 50 MG tablet sustained-release 24 hour 24 hr tablet TAKE 1 TABLET EVERY DAY 90 tablet 1   • omeprazole (priLOSEC) 40 MG capsule TAKE 1 CAPSULE EVERY DAY 90 capsule 1   • polyethylene glycol (MIRALAX) 17 GM/SCOOP powder Take 17 g by mouth. Once a day on Mon, Wed, Fri and PRN     • rosuvastatin (CRESTOR) 5 MG tablet TAKE 1 TABLET BY MOUTH EVERY NIGHT. 90 tablet 1     No facility-administered medications prior to visit.       Patient Active Problem List   Diagnosis   • Generalized anxiety disorder   • Unsteady gait   • Risk for falls   • Hyperlipidemia LDL goal <100   • Benign essential hypertension   • Memory loss   • Urinary incontinence in female   • Candida onychomycosis   • AR (allergic rhinitis)   • Overweight (BMI 25.0-29.9)   • Chest pain   • Cramp in limb   • Disorder of mitral and aortic valves   • Disorder of skeletal muscle   • Diverticular disease of colon   • Dysphagia   • External hemorrhoids   •     • Hand joint pain   • Insomnia   • Knee pain   • Menopausal and postmenopausal disorder   • Osteoporosis   • Shoulder pain   • Vitamin B deficiency   • Medicare annual wellness visit, subsequent   • Dizziness   • Urgency of urination   • Fatigue   • Hematochezia   • Paroxysmal atrial fibrillation (HCC)   • Dementia without behavioral disturbance (HCC)       Advance Care Planning:  ACP discussion was held with the patient during this visit. Patient has an advance directive in EMR which is still valid.  "    Identification of Risk Factors:  Risk factors include: Cardiovascular risk  Dementia/Memory   Fall Risk  Inactivity/Sedentary  Obesity/Overweight   Polypharmacy.    Review of Systems   Constitutional: Negative for chills and fever.   HENT: Positive for hearing loss.    Respiratory: Negative for cough and shortness of breath.    Cardiovascular: Negative for chest pain and palpitations.   Gastrointestinal: Negative for abdominal pain, nausea and vomiting.   Skin: Negative for rash.   Neurological: Positive for weakness. Negative for dizziness, light-headedness and headaches.   Psychiatric/Behavioral: Positive for decreased concentration. Negative for dysphoric mood.   All other systems reviewed and are negative.      Compared to one year ago, the patient feels her physical health is the same.  Compared to one year ago, the patient feels her mental health is the same.    Objective     Physical Exam  Constitutional:       Appearance: Normal appearance.   HENT:      Right Ear: Tympanic membrane and ear canal normal.      Left Ear: Tympanic membrane and ear canal normal.   Eyes:      Conjunctiva/sclera: Conjunctivae normal.   Neck:      Vascular: No carotid bruit.   Cardiovascular:      Rate and Rhythm: Normal rate and regular rhythm.      Heart sounds: Murmur (II/VI SM ) heard.   Pulmonary:      Effort: No respiratory distress.   Abdominal:      General: Bowel sounds are normal.      Palpations: Abdomen is soft.   Neurological:      Mental Status: She is alert.      Comments: Slow get up and go and her MMSE was 23/30    Psychiatric:         Mood and Affect: Mood normal.          Procedures     Vitals:    10/14/22 1338 10/14/22 1412   BP: 140/98 121/68   Pulse: 58    Temp: 96.9 °F (36.1 °C)    Weight: 64.9 kg (143 lb)    Height: 160 cm (62.99\")    PainSc: 0-No pain        BMI is >= 25 and <30. (Overweight) The following options were offered after discussion;: weight loss educational material (shared in after visit " summary), exercise counseling/recommendations and nutrition counseling/recommendations      Assessment & Plan   Problem List Items Addressed This Visit        Advance Directives and General Issues    Risk for falls    Current Assessment & Plan     Encourage to get up slowly and get bearings before taking first step. Keep home well lit with clear floors to avoid tripping. Use assist devices for all walking especially from bed to bathroom. She uses cane and does exercises and will consider the recommended PT            Cardiac and Vasculature    Benign essential hypertension    Current Assessment & Plan     Hypertension is unchanged.  Continue current treatment regimen.  Regular aerobic exercise.  Blood pressure will be reassessed at the next regular appointment.         Relevant Medications    dilTIAZem CD (CARDIZEM CD) 120 MG 24 hr capsule    Hyperlipidemia LDL goal <100    Current Assessment & Plan     Acute issue Lipid abnormalities are worsening.  Nutritional counseling was provided. and Pharmacotherapy as ordered.  Lipids will be reassessed in 1 yearIncrease rosuvastatin from 5 mg to 10 mg and went over indication to help reduce stroke and heart attacks with she and her daughter. .         Relevant Medications    rosuvastatin (CRESTOR) 10 MG tablet    Paroxysmal atrial fibrillation (HCC)    Current Assessment & Plan     She saw Dr Miles earlier this month and stable          Relevant Medications    dilTIAZem CD (CARDIZEM CD) 120 MG 24 hr capsule       Endocrine and Metabolic    Overweight (BMI 25.0-29.9)    Current Assessment & Plan     Patient's (Body mass index is 25.34 kg/m².) indicates that they are overweight with health conditions that include hypertension, dyslipidemias and osteoarthritis . Weight is unchanged. BMI is is above average; BMI management plan is completed. We discussed portion control and increasing exercise.             Health Encounters    Medicare annual wellness visit, subsequent -  Primary    Current Assessment & Plan     We went over fall risk and possible PT. Her mood is ok and her MMSE regarding memory was 23/30. Encouraged to get regular eye exams. I did not detect a particular worrisome lesion on skin exam Age-appropriate Counseling:  Discussed preventative medicine issues with patient including regular exercise, healthy diet, stress reduction, adequate sleep and recommended age-appropriate screening studies.  Immunizations reviewed.                 Neuro    Dementia without behavioral disturbance (HCC)    Current Assessment & Plan     Psychological condition is unchanged.  Continue current treatment regimen.  Regular aerobic exercise.  Psychological condition  will be reassessed at the next regular appointment.         Memory loss    Current Assessment & Plan     Encouraged to get hearing as good as possible and go to [facility] for socializing and exercise.             Symptoms and Signs    Unsteady gait    Current Assessment & Plan     Use cane and be careful and call if willing to do PT.        Other Visit Diagnoses     Atherosclerosis of native artery of both lower extremities, with unspecified presence of clinical manifestation (HCC)        Increase rosuvastatin and increase exercise.         Patient Self-Management and Personalized Health Advice  The patient has been provided with information about: diet, exercise, weight management, prevention of cardiac or vascular disease and fall prevention and preventive services including:   · Annual Wellness Visit (AWV).    Outpatient Encounter Medications as of 10/14/2022   Medication Sig Dispense Refill   • acetaminophen (TYLENOL) 325 MG tablet Take 650 mg by mouth Every 4 (Four) Hours As Needed for Mild Pain .     • bisacodyl (DULCOLAX) 10 MG suppository Insert 10 mg into the rectum Daily As Needed for Constipation.     • cholecalciferol (VITAMIN D3) 10 MCG (400 UNIT) tablet Take 1 tablet by mouth Every Morning.     • Cholecalciferol  50 MCG (2000 UT) capsule Take 1 tablet by mouth Daily.     • Cranberry 450 MG tablet Take 1 tablet by mouth Daily.     • denosumab (Prolia) 60 MG/ML solution prefilled syringe syringe Inject 1 mL under the skin into the appropriate area as directed Every 6 (Six) Months.     • dilTIAZem CD (CARDIZEM CD) 120 MG 24 hr capsule TAKE 1 CAPSULE EVERY DAY 90 capsule 1   • docusate sodium (COLACE) 100 MG capsule Take 100 mg by mouth 2 (Two) Times a Day.     • Eliquis 2.5 MG tablet tablet TAKE 1 TABLET BY MOUTH EVERY 12 (TWELVE) HOURS. INDICATIONS: ATRIAL FIBRILLATION 180 tablet 1   • hydrocortisone (ANUSOL-HC) 25 MG suppository Insert 1 suppository into the rectum 2 (Two) Times a Day As Needed for Hemorrhoids.     • melatonin 3 MG tablet Take 3 mg by mouth Every Night.     • Myrbetriq 50 MG tablet sustained-release 24 hour 24 hr tablet TAKE 1 TABLET EVERY DAY 90 tablet 1   • omeprazole (priLOSEC) 40 MG capsule TAKE 1 CAPSULE EVERY DAY 90 capsule 1   • polyethylene glycol (MIRALAX) 17 GM/SCOOP powder Take 17 g by mouth. Once a day on Mon, Wed, Fri and PRN     • [DISCONTINUED] rosuvastatin (CRESTOR) 5 MG tablet TAKE 1 TABLET BY MOUTH EVERY NIGHT. 90 tablet 1   • rosuvastatin (CRESTOR) 10 MG tablet Take 1 tablet by mouth Daily. 30 tablet 3     No facility-administered encounter medications on file as of 10/14/2022.       Reviewed use of high risk medication in the elderly: yes  Reviewed for potential of harmful drug interactions in the elderly: yes    Follow Up:  Return in about 6 months (around 4/14/2023) for Recheck.     There are no Patient Instructions on file for this visit.    An After Visit Summary and PPPS with all of these plans were given to the patient.

## 2022-10-15 NOTE — ASSESSMENT & PLAN NOTE
Encouraged to get hearing as good as possible and [facility] to [facility] and exercise.

## 2022-10-15 NOTE — ASSESSMENT & PLAN NOTE
We went over fall risk and possible PT. Her mood is ok and her MMSE regarding memory was 23/30. Encouraged to get regular eye exams. I did not detect a particular worrisome lesion on skin exam Age-appropriate Counseling:  Discussed preventative medicine issues with patient including regular exercise, healthy diet, stress reduction, adequate sleep and recommended age-appropriate screening studies.  Immunizations reviewed.

## 2022-10-15 NOTE — ASSESSMENT & PLAN NOTE
Patient's (Body mass index is 25.34 kg/m².) indicates that they are overweight with health conditions that include hypertension, dyslipidemias and osteoarthritis . Weight is unchanged. BMI is is above average; BMI management plan is completed. We discussed portion control and increasing exercise.

## 2022-10-15 NOTE — ASSESSMENT & PLAN NOTE
Acute issue Lipid abnormalities are worsening.  Nutritional counseling was provided. and Pharmacotherapy as ordered.  Lipids will be reassessed in 1 yearIncrease rosuvastatin from 5 mg to 10 mg and went over indication to help reduce stroke and heart attacks with she and her daughter. .

## 2022-10-15 NOTE — ASSESSMENT & PLAN NOTE
Encourage to get up slowly and get bearings before taking first step. Keep home well lit with clear floors to avoid tripping. Use assist devices for all walking especially from bed to bathroom. She uses cane and does exercises and will consider the recommended PT

## 2023-01-03 NOTE — TELEPHONE ENCOUNTER
Caller: Luh Mckeon    Relationship: Self    Best call back number: [phone]    Requested Prescriptions:   Requested Prescriptions     Pending Prescriptions Disp Refills   • apixaban (Eliquis) 2.5 MG tablet tablet 180 tablet 1      Pharmacy where request should be sent: [facility] [address] [place] [address] [phone] [place] [phone] FX     PATIENT NEEDS A WEEK OF ELIQUIS SO MAIL ORDER CAN GET THE OTHER 30 DAY SUPPLY    Does the patient have less than a 3 day supply:  [] Yes  [x] No    Would you like a call back once the refill request has been completed: [] Yes [] No    If the office needs to give you a call back, can they leave a voicemail: [] Yes [] No    Shashi Hooper Rep   01/03/23 15:08 EST

## 2023-01-09 DIAGNOSIS — E78.5 HYPERLIPIDEMIA LDL GOAL <100: ICD-10-CM

## 2023-01-09 RX ORDER — DILTIAZEM HYDROCHLORIDE 120 MG/1
120 CAPSULE, COATED, EXTENDED RELEASE ORAL DAILY
Qty: 90 CAPSULE | Refills: 1 | Status: SHIPPED | OUTPATIENT
Start: 2023-01-09

## 2023-01-09 RX ORDER — DOCUSATE SODIUM 100 MG/1
100 CAPSULE, LIQUID FILLED ORAL DAILY
Qty: 90 CAPSULE | Refills: 1 | Status: SHIPPED | OUTPATIENT
Start: 2023-01-09

## 2023-01-09 RX ORDER — ROSUVASTATIN CALCIUM 10 MG/1
10 TABLET, COATED ORAL DAILY
Qty: 30 TABLET | Refills: 3 | Status: SHIPPED | OUTPATIENT
Start: 2023-01-09 | End: 2023-01-10 | Stop reason: SDUPTHER

## 2023-01-09 NOTE — TELEPHONE ENCOUNTER
Caller: Luh Mckeon    Relationship: Self    Best call back number: [phone]    Requested Prescriptions:   Requested Prescriptions     Pending Prescriptions Disp Refills   • apixaban (Eliquis) 2.5 MG tablet tablet 180 tablet 1     Sig: Take 1 tablet by mouth Every 12 (Twelve) Hours.   • Mirabegron ER (Myrbetriq) 50 MG tablet sustained-release 24 hour 24 hr tablet 90 tablet 1     Sig: Take 50 mg by mouth Daily.   • dilTIAZem CD (CARDIZEM CD) 120 MG 24 hr capsule 90 capsule 1     Sig: Take 1 capsule by mouth Daily.   • docusate sodium (COLACE) 100 MG capsule       Sig: Take 1 capsule by mouth.   • rosuvastatin (CRESTOR) 10 MG tablet 30 tablet 3     Sig: Take 1 tablet by mouth Daily.        Pharmacy where request should be sent: [facility] [address] [phone] [place] [phone] FX     Does the patient have less than a 3 day supply:  [x] Yes  [] No    Would you like a call back once the refill request has been completed: [x] Yes [] No    If the office needs to give you a call back, can they leave a voicemail: [x] Yes [] No    Shashi Hernandez Rep   01/09/23 14:25 EST

## 2023-01-09 NOTE — TELEPHONE ENCOUNTER
Rx Refill Note  Requested Prescriptions     Pending Prescriptions Disp Refills   • apixaban (Eliquis) 2.5 MG tablet tablet 180 tablet 1     Sig: Take 1 tablet by mouth Every 12 (Twelve) Hours.   • Mirabegron ER (Myrbetriq) 50 MG tablet sustained-release 24 hour 24 hr tablet 90 tablet 1     Sig: Take 50 mg by mouth Daily.   • dilTIAZem CD (CARDIZEM CD) 120 MG 24 hr capsule 90 capsule 1     Sig: Take 1 capsule by mouth Daily.   • docusate sodium (COLACE) 100 MG capsule       Sig: Take 1 capsule by mouth.   • rosuvastatin (CRESTOR) 10 MG tablet 30 tablet 3     Sig: Take 1 tablet by mouth Daily.      Last office visit with prescribing clinician: 10/14/2022   Last telemedicine visit with prescribing clinician: 4/14/2023   Next office visit with prescribing clinician: 4/14/2023                         Would you like a call back once the refill request has been completed: [] Yes [] No    If the office needs to give you a call back, can they leave a voicemail: [] Yes [] No    Quirino Novoa MA  01/09/23, 15:05 EST

## 2023-01-09 NOTE — TELEPHONE ENCOUNTER
Caller: Luh Mckeon Martinez    Relationship: Self    Best call back number: [phone]    Requested Prescriptions:   Requested Prescriptions     Pending Prescriptions Disp Refills   • apixaban (Eliquis) 2.5 MG tablet tablet 180 tablet 1     Sig: Take 1 tablet by mouth Every 12 (Twelve) Hours.      Pharmacy where request should be sent: [facility] - [address] [phone] [facility] [phone]      Additional details provided by patient:     PATIENT ADVISED SHE IS COMPLETELY OUT OF THE MEDICATION.    PATIENT WOULD LIKE THE 90-DAY SUPPLY.    PATIENT IS REQUESTING A 1 WEEK SUPPLY FROM [facility] IN [address].    Does the patient have less than a 3 day supply:  [x] Yes  [] No    Would you like a call back once the refill request has been completed: [x] Yes [] No    If the office needs to give you a call back, can they leave a voicemail: [x] Yes [] No    Shashi Hopkins Rep   01/09/23 13:17 EST

## 2023-01-10 ENCOUNTER — TELEPHONE (OUTPATIENT)
Dept: INTERNAL MEDICINE | Facility: CLINIC | Age: 88
End: 2023-01-10
Payer: MEDICARE

## 2023-01-10 DIAGNOSIS — E78.5 HYPERLIPIDEMIA LDL GOAL <100: ICD-10-CM

## 2023-01-10 RX ORDER — ROSUVASTATIN CALCIUM 10 MG/1
10 TABLET, COATED ORAL DAILY
Qty: 30 TABLET | Refills: 3 | Status: SHIPPED | OUTPATIENT
Start: 2023-01-10 | End: 2023-01-11 | Stop reason: SDUPTHER

## 2023-01-10 NOTE — TELEPHONE ENCOUNTER
Pts daughter stated pt has been taking 5 mg of rosuvastatin and 10 mg was sent in. Which mg is it suppose to be and whichever it is it needs to be sent in as 90 day supply.

## 2023-01-10 NOTE — TELEPHONE ENCOUNTER
Rx Refill Note  Requested Prescriptions     Signed Prescriptions Disp Refills   • apixaban (Eliquis) 2.5 MG tablet tablet 14 tablet 0     Sig: Take 1 tablet by mouth Every 12 (Twelve) Hours.     Authorizing Provider: ELA KIRK     Ordering User: GALILEO NOVOA      Last office visit with prescribing clinician: 10/14/2022   Last telemedicine visit with prescribing clinician: 4/14/2023   Next office visit with prescribing clinician: 4/14/2023                         Would you like a call back once the refill request has been completed: [] Yes [] No    If the office needs to give you a call back, can they leave a voicemail: [] Yes [] No    Galileo Novoa MA  01/10/23, 16:40 EST     *sent a 7 day supply to [facility]

## 2023-01-10 NOTE — TELEPHONE ENCOUNTER
Caller: NAIN HERNÁNDEZ    Relationship: Emergency Contact    Best call back number: [phone]    What is the best time to reach you: ANYTIME    Who are you requesting to speak with (clinical staff, provider,  specific staff member): CLINICAL STAFF    Do you know the name of the person who called: DAUGHTER    What was the call regarding: PATIENTS DAUGHTER STATES THAT SHE WOULD LIKE A CALL ABOUT HER MOTHERS rosuvastatin (CRESTOR) 10 MG tablet DOSAGE.    Do you require a callback: YES

## 2023-01-10 NOTE — TELEPHONE ENCOUNTER
She is supposed to take 10 mg I believe because LDL up last time. I will send In that. I did refill the 7 days of eliquis but do they need bigger prescription sent in or did cardiology do it

## 2023-01-10 NOTE — TELEPHONE ENCOUNTER
Spoke with daughter of pt. Verified the eliquis 7 day supply to [facility] and the regular full [facility].    Advised Dr. Garcia will clarify the strength of the rosuvastatin the pt needs, whether it be the 5mg or 10 mg. I see GARCIA Márquez sent a message to Dr. Garcia to clarify.    Daughter understood and verbalized understanding.

## 2023-01-10 NOTE — TELEPHONE ENCOUNTER
Pt requested urgent refill of eliqu[facility] to [facility], a 7 day supply (14 capsules, since it's taken twice daily)

## 2023-01-11 ENCOUNTER — TELEPHONE (OUTPATIENT)
Dept: INTERNAL MEDICINE | Facility: CLINIC | Age: 88
End: 2023-01-11
Payer: MEDICARE

## 2023-01-11 RX ORDER — ROSUVASTATIN CALCIUM 10 MG/1
10 TABLET, COATED ORAL DAILY
Qty: 90 TABLET | Refills: 3 | Status: SHIPPED | OUTPATIENT
Start: 2023-01-11 | End: 2023-02-22

## 2023-01-11 NOTE — TELEPHONE ENCOUNTER
Called and left voicemail with Swetha, daughter. Advised that we [facility]. I'm calling [facility] to have them cancel the rx's sent there by error.    If she calls back, we need to verify the default pharmacy. They requested we [facility] to [facility] for other rx's; should all future rx's be [facility]?

## 2023-01-16 RX ORDER — OMEPRAZOLE 40 MG/1
40 CAPSULE, DELAYED RELEASE ORAL DAILY
Qty: 90 CAPSULE | Refills: 1 | Status: SHIPPED | OUTPATIENT
Start: 2023-01-16

## 2023-02-22 ENCOUNTER — TELEPHONE (OUTPATIENT)
Dept: INTERNAL MEDICINE | Facility: CLINIC | Age: 88
End: 2023-02-22

## 2023-02-22 RX ORDER — ROSUVASTATIN CALCIUM 5 MG/1
5 TABLET, COATED ORAL DAILY
Qty: 90 TABLET | Refills: 1 | Status: SHIPPED | OUTPATIENT
Start: 2023-02-22 | End: 2023-10-03

## 2023-02-22 NOTE — TELEPHONE ENCOUNTER
Caller: NAIN HERNÁNDEZ    Relationship: Emergency Contact    Best call back number: [phone]    What medications are you currently taking:   Current Outpatient Medications on File Prior to Visit   Medication Sig Dispense Refill   • acetaminophen (TYLENOL) 325 MG tablet Take 650 mg by mouth Every 4 (Four) Hours As Needed for Mild Pain .     • apixaban (Eliquis) 2.5 MG tablet tablet Take 1 tablet by mouth Every 12 (Twelve) Hours. 14 tablet 0   • apixaban (Eliquis) 2.5 MG tablet tablet Take 1 tablet by mouth Every 12 (Twelve) Hours. 180 tablet 3   • bisacodyl (DULCOLAX) 10 MG suppository Insert 10 mg into the rectum Daily As Needed for Constipation.     • cholecalciferol (VITAMIN D3) 10 MCG (400 UNIT) tablet Take 1 tablet by mouth Every Morning.     • Cholecalciferol 50 MCG (2000 UT) capsule Take 1 tablet by mouth Daily.     • Cranberry 450 MG tablet Take 1 tablet by mouth Daily.     • denosumab (Prolia) 60 MG/ML solution prefilled syringe syringe Inject 1 mL under the skin into the appropriate area as directed Every 6 (Six) Months.     • dilTIAZem CD (CARDIZEM CD) 120 MG 24 hr capsule Take 1 capsule by mouth Daily. 90 capsule 1   • docusate sodium (COLACE) 100 MG capsule Take 1 capsule by mouth Daily. 90 capsule 1   • hydrocortisone (ANUSOL-HC) 25 MG suppository Insert 1 suppository into the rectum 2 (Two) Times a Day As Needed for Hemorrhoids.     • melatonin 3 MG tablet Take 3 mg by mouth Every Night.     • Mirabegron ER (Myrbetriq) 50 MG tablet sustained-release 24 hour 24 hr tablet Take 50 mg by mouth Daily. 90 tablet 1   • omeprazole (priLOSEC) 40 MG capsule Take 1 capsule by mouth Daily. 90 capsule 1   • polyethylene glycol (MIRALAX) 17 GM/SCOOP powder Take 17 g by mouth. Once a day on Mon, Wed, Fri and PRN     • rosuvastatin (CRESTOR) 10 MG tablet Take 1 tablet by mouth Daily. 90 tablet 3     No current facility-administered medications on file prior to visit.          When did you start taking these  medications: JANUARY    Which medication are you concerned about: ROSUVASTATIN 10MG    Who prescribed you this medication: REAGAN    What are your concerns: EVER PATIENT STARTED TAKING THIS MEDICATION PATIENT MOOD AND DEMEANOR HAS CHANGED AND NOW PATIENT IS COMPLAINING WITH PAINS IN HER JOINTS AND LEGS. NAIN WOULD LIKE PATIENT TO GO BACK TO 5MG OR SOMETHING ELSE.     PLEASE ADVISE AND CALL NAIN BACK.

## 2023-02-22 NOTE — TELEPHONE ENCOUNTER
Ok tell her to hold rosuvastatin for 2 weeks and start 400 mg of coenzyme q10 daily and I will send in the lower dose of 5 mg for her to start rosuvastatin 5 mg in 2 weeks

## 2023-03-01 PROCEDURE — 87086 URINE CULTURE/COLONY COUNT: CPT | Performed by: NURSE PRACTITIONER

## 2023-03-01 PROCEDURE — 87186 SC STD MICRODIL/AGAR DIL: CPT | Performed by: NURSE PRACTITIONER

## 2023-03-01 PROCEDURE — 87088 URINE BACTERIA CULTURE: CPT | Performed by: NURSE PRACTITIONER

## 2023-03-17 ENCOUNTER — OFFICE VISIT (OUTPATIENT)
Dept: INTERNAL MEDICINE | Facility: CLINIC | Age: 88
End: 2023-03-17
Payer: MEDICARE

## 2023-03-17 ENCOUNTER — HOME HEALTH ADMISSION (OUTPATIENT)
Dept: HOME HEALTH SERVICES | Facility: HOME HEALTHCARE | Age: 88
End: 2023-03-17
Payer: MEDICARE

## 2023-03-17 ENCOUNTER — HOSPITAL ENCOUNTER (OUTPATIENT)
Dept: GENERAL RADIOLOGY | Facility: HOSPITAL | Age: 88
Discharge: HOME | End: 2023-03-17
Admitting: INTERNAL MEDICINE
Payer: MEDICARE

## 2023-03-17 VITALS
HEIGHT: 64 IN | TEMPERATURE: 98.2 F | WEIGHT: 147.2 LBS | DIASTOLIC BLOOD PRESSURE: 76 MMHG | SYSTOLIC BLOOD PRESSURE: 128 MMHG | BODY MASS INDEX: 25.13 KG/M2 | HEART RATE: 64 BPM | OXYGEN SATURATION: 98 %
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
## 2023-03-17 DIAGNOSIS — M25.551 HIP PAIN, ACUTE, RIGHT: ICD-10-CM

## 2023-03-17 DIAGNOSIS — I10 BENIGN ESSENTIAL HYPERTENSION: ICD-10-CM

## 2023-03-17 DIAGNOSIS — M81.0 AGE-RELATED OSTEOPOROSIS WITHOUT CURRENT PATHOLOGICAL FRACTURE: ICD-10-CM

## 2023-03-17 DIAGNOSIS — M54.50 ACUTE RIGHT-SIDED LOW BACK PAIN WITHOUT SCIATICA: ICD-10-CM

## 2023-03-17 DIAGNOSIS — M25.551 HIP PAIN, ACUTE, RIGHT: Primary | ICD-10-CM

## 2023-03-17 DIAGNOSIS — I48.0 PAROXYSMAL ATRIAL FIBRILLATION: ICD-10-CM

## 2023-03-17 DIAGNOSIS — M16.11 PRIMARY OSTEOARTHRITIS OF RIGHT HIP: Chronic | ICD-10-CM

## 2023-03-17 PROCEDURE — 1160F RVW MEDS BY RX/DR IN RCRD: CPT | Performed by: INTERNAL MEDICINE

## 2023-03-17 PROCEDURE — 1159F MED LIST DOCD IN RCRD: CPT | Performed by: INTERNAL MEDICINE

## 2023-03-17 PROCEDURE — 99214 OFFICE O/P EST MOD 30 MIN: CPT | Performed by: INTERNAL MEDICINE

## 2023-03-17 PROCEDURE — 73502 X-RAY EXAM HIP UNI 2-3 VIEWS: CPT

## 2023-03-17 NOTE — PATIENT INSTRUCTIONS
Patient Instructions   Problem List Items Addressed This Visit          Cardiac and Vasculature    Benign essential hypertension    Current Assessment & Plan     She will continue diltiazem.         Relevant Medications    dilTIAZem CD (CARDIZEM CD) 120 MG 24 hr capsule    Paroxysmal atrial fibrillation (HCC)    Current Assessment & Plan     She will continue Eliquis twice per day.         Relevant Medications    dilTIAZem CD (CARDIZEM CD) 120 MG 24 hr capsule       Musculoskeletal and Injuries    Fall at home (Chronic)    Current Assessment & Plan     We discussed fall prevention. We are ordering Home Health for physical therapy to help improve strength and gait.         Relevant Orders    XR Hip With or Without Pelvis 2 - 3 View Right    Ambulatory Referral to Home [facility]    Hip pain, acute, right - Primary    Current Assessment & Plan     She is encouraged to take 500 mg Tylenol twice per day. She is encouraged to use moist heat pack on the hip and low back. We are ordering Home Health physical therapy to help with increasing strength and improving gait and decreasing pain.         Relevant Orders    XR Hip With or Without Pelvis 2 - 3 View Right    Ambulatory Referral to Home Health    Acute right-sided low back pain without sciatica    Current Assessment & Plan     She is encouraged to take 500 mg Tylenol twice per day. She is encouraged to use moist heat pack on the hip and low back. We are ordering Home Health physical therapy to help with increasing strength and improving gait and decreasing pain.         Relevant Orders    XR Hip With or Without Pelvis 2 - 3 View Right    Ambulatory Referral to Home Health       Fall Prevention in the Home, Adult  Falls can cause injuries and affect people of all ages. There are many simple things that you can do to make your home safe and to help prevent falls. Ask for help when making these changes, if needed.  What actions can I take to prevent falls?  General  instructions  Use good lighting in all rooms. Replace any light bulbs that burn out, turn on lights if it is dark, and use night-lights.  Place frequently used items in easy-to-reach places. Lower the shelves around your home if necessary.  Set up furniture so that there are clear paths around it. Avoid moving your furniture around.  Remove throw rugs and other tripping hazards from the floor.  Avoid walking on wet floors.  Fix any uneven floor surfaces.  Add color or contrast paint or tape to grab bars and handrails in your home. Place contrasting color strips on the first and last steps of staircases.  When you use a stepladder, make sure that it is completely opened and that the sides and supports are firmly locked. Have someone hold the ladder while you are using it. Do not climb a closed stepladder.  Know where your pets are when moving through your home.  What can I do in the bathroom?     Keep the floor dry. Immediately clean up any water that is on the floor.  Remove soap buildup in the tub or shower regularly.  Use nonskid mats or decals on the floor of the tub or shower.  Attach bath mats securely with double-sided, nonslip rug tape.  If you need to sit down while you are in the shower, use a plastic, nonslip stool.  Install grab bars by the toilet and in the tub and shower. Do not use towel bars as grab bars.  What can I do in the bedroom?  Make sure that a bedside light is easy to reach.  Do not use oversized bedding that reaches the floor.  Have a firm chair that has side arms to use for getting dressed.  What can I do in the kitchen?  Clean up any spills right away.  If you need to reach for something above you, use a sturdy step stool that has a grab bar.  Keep electrical cables out of the way.  Do not use floor polish or wax that makes floors slippery. If you must use wax, make sure that it is non-skid floor wax.  What can I do with my stairs?  Do not leave any items on the stairs.  Make sure that  you have a light switch at the top and the bottom of the stairs. Have them installed if you do not have them.  Make sure that there are handrails on both sides of the stairs. Fix handrails that are broken or loose. Make sure that handrails are as long as the staircases.  Install non-slip stair treads on all stairs in your home.  Avoid having throw rugs at the top or bottom of stairs, or secure the rugs with carpet tape to prevent them from moving.  Choose a carpet design that does not hide the edge of steps on the stairs.  Check any carpeting to make sure that it is firmly attached to the stairs. Fix any carpet that is loose or worn.  What can I do on the outside of my home?  Use bright outdoor lighting.  Regularly repair the edges of walkways and driveways and fix any cracks.  Remove high doorway thresholds.  Trim any shrubbery on the main path into your home.  Regularly check that handrails are securely fastened and in good repair. Both sides of all steps should have handrails.  Install guardrails along the edges of any raised decks or porches.  Clear walkways of debris and clutter, including tools and rocks.  Have leaves, snow, and ice cleared regularly.  Use sand or salt on walkways during winter months.  In the garage, clean up any spills right away, including grease or oil spills.  What other actions can I take?  Wear closed-toe shoes that fit well and support your feet. Wear shoes that have rubber soles or low heels.  Use mobility aids as needed, such as canes, walkers, scooters, and crutches.  Review your medicines with your health care provider. Some medicines can cause dizziness or changes in blood pressure, which increase your risk of falling.  Talk with your health care provider about other ways that you can decrease your risk of falls. This may include working with a physical therapist or  to improve your strength, balance, and endurance.  Where to find more information  Centers for Disease  Control and Prevention, [place]: www.cdc.gov  National [place] on Aging: www.gerald.nih.gov  Contact a health care provider if:  You are afraid of falling at home.  You feel weak, drowsy, or dizzy at home.  You fall at home.  Summary  There are many simple things that you can do to make your home safe and to help prevent falls.  Ways to make your home safe include removing tripping hazards and installing grab bars in the bathroom.  Ask for help when making these changes in your home.  This information is not intended to replace advice given to you by your health care provider. Make sure you discuss any questions you have with your health care provider.  Document Revised: 09/19/2022 Document Reviewed: 07/21/2021  Elsevier Patient Education © 2022 Elsevier Inc.

## 2023-03-17 NOTE — ASSESSMENT & PLAN NOTE
She is encouraged to take 500 mg Tylenol twice per day. She is encouraged to use moist heat pack on the hip and low back. We are ordering Home Health physical therapy to help with increasing strength and improving gait and decreasing pain.

## 2023-03-17 NOTE — PROGRESS NOTES
[facility]     Luh Mckeon  11/14/1930   [id number]      Patient Care Team:  Schuyler Garcia MD as PCP - General  Schuyelr Garcia MD as PCP - Family Medicine  Juan Manuel Miles MD as Consulting Physician (Cardiology)    Chief Complaint   Patient presents with   • Hip Injury     Pt fell on right side   • Tailbone Pain     Pt had a fall            HPI  Patient is a 89 y.o. female who presents The patient presents for an acute visit after a fall at home. She is accompanied by her daughter, Swetha Avery.     Fall  The patient fell on 03/11/2023 and hurt her right hip and tailbone. Swetha states the patient was asleep and must have been getting up to go to the bathroom. She has a portable commode next to her bed. Swetha was not told of the fall until the following day. The patient was able to get up by herself. She has a caregiver come every morning at 9 AM. Swetha states the patient had trouble getting up but was able to get up on her own. The caregiver came, and she told her about it when she got there. The patient normally calls Swetha if she has a fall, and she has a Life Alert but did not push the button for them. She presumes it was not bad enough to call anyone. The patient was not confused afterwards. She does not think she hit her head. The patient is able to walk okay. Swetha reports the patient has used a cane since she fell 1.5 years ago and broke her right hip. She healed fine and everything has been fine since then. The patient rates the pain as a soreness. It is hard to sleep sometimes. The patient's caregiver tries to give her Tylenol 500 mg once per day, but the patient does not like to take extra medicine. She is on Eliquis. The patient has a small bruise. They have not noticed any swelling since she fell. She has been complaining more of pain in the buttocks. The patient's caregiver noticed that she was having more trouble walking yesterday. She did not sleep much last night. The  patient states she has not had a lot of pain. Swetha states the patient was walking daily on the treadmill, but they had to stop that because they wanted to give her a break and let her rest for a while. The patient has always been good about doing that. She likes to get out and walk outside, but it has been too cool to do that. The  would like to have PT come to her home. She had [facility] before.       CHRONIC CONDITIONS      Past Medical History:   Diagnosis Date   • Ankle instability    • Chronic left shoulder pain 1/26/2018   • Disorder of tendon of shoulder region, left    • Dysphagia    • Esophagitis    • Femoral neck fracture (HCC) 7/15/2021   • Finger fracture, right 2009    Dr Chandler casting and physical therapy   • Hemorrhoids    • Hypertension    • Localized, primary osteoarthritis of left shoulder region    • Osteoarthritis of right hip 3/20/2023   • Paroxysmal atrial fibrillation (HCC) 9/30/2021   • Right shoulder pain    • Shoulder joint replacement status     Right   • Stroke (HCC)     CVA       Past Surgical History:   Procedure Laterality Date   • APPENDECTOMY      Daughter denies   • CATARACT EXTRACTION, BILATERAL      2020   • COLONOSCOPY N/A 8/23/2021    Procedure: COLONOSCOPY;  Surgeon: Aries Varela MD;  Location:  [facility];  Service: Gastroenterology;  Laterality: N/A;   • ENDOSCOPY  07/2009    with biopsy/esophageal ring dilation   • ENDOSCOPY N/A 8/23/2021    Procedure: ESOPHAGOGASTRODUODENOSCOPY;  Surgeon: Aries Varela MD;  Location:  [facility];  Service: Gastroenterology;  Laterality: N/A;   • HEMORRHOIDECTOMY      lanced    • HIP HEMIARTHROPLASTY Right 7/15/2021    Procedure: HIP HEMIARTHROPLASTY;  Surgeon: Adam Olsen MD;  Location:  [facility];  Service: Orthopedics;  Laterality: Right;   • HYSTERECTOMY      partial    • OOPHORECTOMY     • SHOULDER SURGERY Right     replacement   • SHOULDER SURGERY      Arthroscopy of shoulder:Right   •  "TONSILLECTOMY         Family History   Problem Relation Age of Onset   • Cancer Mother    • Stroke Mother    • Bleeding Disorder Mother    • Osteoarthritis Mother    • Rheum arthritis Mother    • Breast cancer Mother 60   • Heart attack Father    • Coronary artery disease Father    • Cancer Sister         Bladder    • Bleeding Disorder Brother         2   • Colon cancer Brother         3   • Ovarian cancer Neg Hx        Social History     Socioeconomic History   • Marital status:    Tobacco Use   • Smoking status: Never   • Smokeless tobacco: Never   • Tobacco comments:      was smoker   Vaping Use   • Vaping Use: Never used   Substance and Sexual Activity   • Alcohol use: No   • Drug use: No   • Sexual activity: Not Currently     Partners: Male       Allergies   Allergen Reactions   • Nasal Spray Cough         Vital Signs  Vitals:    03/17/23 1407   BP: 128/76   BP Location: Left arm   Patient Position: Sitting   Cuff Size: Adult   Pulse: 64   Temp: 98.2 °F (36.8 °C)   TempSrc: Infrared   SpO2: 98%   Weight: 66.8 kg (147 lb 3.2 oz)   Height: 161.3 cm (63.5\")   PainSc:   5   PainLoc: Hip     Body mass index is 25.66 kg/m².  BMI is >= 25 and <30. (Overweight) The following options were offered after discussion;: exercise counseling/recommendations and nutrition counseling/recommendations        Current Outpatient Medications:   •  acetaminophen (TYLENOL) 325 MG tablet, Take 2 tablets by mouth Every 4 (Four) Hours As Needed for Mild Pain., Disp: , Rfl:   •  apixaban (Eliquis) 2.5 MG tablet tablet, Take 1 tablet by mouth Every 12 (Twelve) Hours., Disp: 14 tablet, Rfl: 0  •  bisacodyl (DULCOLAX) 10 MG suppository, Insert 1 suppository into the rectum Daily As Needed for Constipation., Disp: , Rfl:   •  cholecalciferol (VITAMIN D3) 10 MCG (400 UNIT) tablet, Take 1 tablet by mouth Every Morning., Disp: , Rfl:   •  Cranberry 450 MG tablet, Take 1 tablet by mouth Daily., Disp: , Rfl:   •  denosumab (Prolia) 60 " MG/ML solution prefilled syringe syringe, Inject 1 mL under the skin into the appropriate area as directed Every 6 (Six) Months., Disp: , Rfl:   •  dilTIAZem CD (CARDIZEM CD) 120 MG 24 hr capsule, Take 1 capsule by mouth Daily., Disp: 90 capsule, Rfl: 1  •  docusate sodium (COLACE) 100 MG capsule, Take 1 capsule by mouth Daily., Disp: 90 capsule, Rfl: 1  •  hydrocortisone (ANUSOL-HC) 25 MG suppository, Insert 1 suppository into the rectum 2 (Two) Times a Day As Needed for Hemorrhoids., Disp: , Rfl:   •  melatonin 3 MG tablet, Take 1 tablet by mouth Every Night., Disp: , Rfl:   •  Mirabegron ER (Myrbetriq) 50 MG tablet sustained-release 24 hour 24 hr tablet, Take 50 mg by mouth Daily., Disp: 90 tablet, Rfl: 1  •  omeprazole (priLOSEC) 40 MG capsule, Take 1 capsule by mouth Daily., Disp: 90 capsule, Rfl: 1  •  polyethylene glycol (MIRALAX) 17 GM/SCOOP powder, Take 17 g by mouth. Once a day on Mon, Wed, Fri and PRN, Disp: , Rfl:   •  rosuvastatin (CRESTOR) 5 MG tablet, Take 1 tablet by mouth Daily., Disp: 90 tablet, Rfl: 1  •  Cholecalciferol 50 MCG (2000 UT) capsule, Take 1 tablet by mouth Daily., Disp: , Rfl:     Physical Exam:    Physical Exam  Vitals and nursing note reviewed.   Constitutional:       Appearance: She is well-developed.   HENT:      Head: Normocephalic.   Eyes:      Conjunctiva/sclera: Conjunctivae normal.      Pupils: Pupils are equal, round, and reactive to light.   Neck:      Thyroid: No thyromegaly.   Cardiovascular:      Rate and Rhythm: Normal rate and regular rhythm.      Heart sounds: Normal heart sounds.   Pulmonary:      Effort: Pulmonary effort is normal.      Breath sounds: Normal breath sounds.   Musculoskeletal:         General: Normal range of motion.      Cervical back: Normal range of motion and neck supple.      Comments: There is mild tenderness of the right low back. No trochanteric tenderness and no coccyx tenderness.   Lymphadenopathy:      Cervical: No cervical adenopathy.    Skin:     Comments: Ecchymosis right low back, upper right buttock area. No ecchymosis on the tailbone area.    Neurological:      Mental Status: She is alert and oriented to person, place, and time.      Comments: Abnormal gait, using cane. General weakness, advanced age.   Psychiatric:         Thought Content: Thought content normal.          ACE III MINI        Results Review:    None    CMP:  Lab Results   Component Value Date    BUN 13 10/07/2022    CREATININE 0.83 10/07/2022    EGFRIFNONA 70 09/27/2021    BCR 15.7 10/07/2022     10/07/2022    K 4.3 10/07/2022    CO2 27.2 10/07/2022    CALCIUM 9.8 (H) 10/07/2022    ALBUMIN 4.30 10/07/2022    BILITOT 0.3 10/07/2022    ALKPHOS 71 10/07/2022    AST 17 10/07/2022    ALT 10 10/07/2022     HbA1c:  No results found for: HGBA1C  Microalbumin:  Lab Results   Component Value Date    MICROALBUR <1.2 10/07/2022     Lipid Panel  Lab Results   Component Value Date    CHOL 193 10/07/2022    TRIG 96 10/07/2022    HDL 70 (H) 10/07/2022     (H) 10/07/2022    AST 17 10/07/2022    ALT 10 10/07/2022       Medication Review: Medications reviewed and noted  Patient Instructions   Problem List Items Addressed This Visit        Cardiac and Vasculature    Benign essential hypertension    Current Assessment & Plan     She will continue diltiazem.         Relevant Medications    dilTIAZem CD (CARDIZEM CD) 120 MG 24 hr capsule    Paroxysmal atrial fibrillation (HCC)    Current Assessment & Plan     She will continue Eliquis twice per day.         Relevant Medications    dilTIAZem CD (CARDIZEM CD) 120 MG 24 hr capsule       Musculoskeletal and Injuries    Fall at home (Chronic)    Current Assessment & Plan     We discussed fall prevention. We are ordering Home Health for physical therapy to help improve strength and gait.         Relevant Orders    XR Hip With or Without Pelvis 2 - 3 View Right (Completed)    Ambulatory Referral to Home Health    Osteoarthritis of right hip  (Chronic)    Current Assessment & Plan     See above.         Hip pain, acute, right - Primary    Current Assessment & Plan     She is encouraged to take 500 mg Tylenol twice per day. She is encouraged to use moist heat pack on the hip and low back. We are ordering Home Health physical therapy to help with increasing strength and improving gait and decreasing pain.         Relevant Orders    XR Hip With or Without Pelvis 2 - 3 View Right (Completed)    Ambulatory Referral to Home Health    Acute right-sided low back pain without sciatica    Current Assessment & Plan     She is encouraged to take 500 mg Tylenol twice per day. She is encouraged to use moist heat pack on the hip and low back. We are ordering Home Health physical therapy to help with increasing strength and improving gait and decreasing pain.         Relevant Orders    XR Hip With or Without Pelvis 2 - 3 View Right (Completed)    Ambulatory Referral to Home [facility]    Osteoporosis    Current Assessment & Plan     Continue to stay active around the house.  Do some arm and leg exercises every day.  Continue Prolia injections.               Diagnosis Plan   1. Hip pain, acute, right  XR Hip With or Without Pelvis 2 - 3 View Right    Ambulatory Referral to Home Health      2. Acute right-sided low back pain without sciatica  XR Hip With or Without Pelvis 2 - 3 View Right    Ambulatory Referral to Home Health      3. Fall in home, initial encounter  XR Hip With or Without Pelvis 2 - 3 View Right    Ambulatory Referral to Home Health      4. Benign essential hypertension        5. Paroxysmal atrial fibrillation (HCC)        6. Age-related osteoporosis without current pathological fracture        7. Primary osteoarthritis of right hip          Follow-up: She will come back to see Dr. Garcia on 04/14/2023 as planned.        Plan of care reviewed with patient at the conclusion of today's visit. Education was provided regarding diagnosis, management, and any  prescribed or recommended OTC medications.Patient verbalizes understanding of and agreement with management plan.         Radha Villeda MD      Transcribed from ambient dictation for Radha Villeda MD by Jaymie Garcia.  03/17/23   16:33 EDT    Patient or patient representative verbalized consent to the visit recording.  I have personally performed the services described in this document as transcribed by the above individual, and it is both accurate and complete.  Radha Villeda MD  3/20/2023  08:19 EDT

## 2023-03-17 NOTE — ASSESSMENT & PLAN NOTE
We discussed fall prevention. We are ordering Home Health for physical therapy to help improve strength and gait.

## 2023-03-20 ENCOUNTER — TELEPHONE (OUTPATIENT)
Dept: INTERNAL MEDICINE | Facility: CLINIC | Age: 88
End: 2023-03-20
Payer: MEDICARE

## 2023-03-20 PROBLEM — M16.11 OSTEOARTHRITIS OF RIGHT HIP: Chronic | Status: ACTIVE | Noted: 2023-03-20

## 2023-03-20 NOTE — TELEPHONE ENCOUNTER
DIAGNOSIS ON THE ORDER WILL NOT WORK THEY ARE NOT APPROVED BY MEDICARE.    AMIRAH, PLEASE CALL NATALIE BACK AND SHE WILL DISCUSS THIS WITH YOU. [phone]

## 2023-03-20 NOTE — ASSESSMENT & PLAN NOTE
Continue to stay active around the house.  Do some arm and leg exercises every day.  Continue Prolia injections.

## 2023-03-20 NOTE — TELEPHONE ENCOUNTER
PT DAUGHTER (NAIN HERNÁNDEZ) CALLED AND STATED THE PT WAS WAITING ON XRAY  RESULTS AND ANY UPDATE ON THE REFERRAL TO HOME HEALTH.    ALSO WANTED TO LET THE DR KNOW THAT THE 2 TYLENOL A DAY IS NOT WORKING FOR THE PT AND WANTS TO KNOW IF THEY CAN INCREASE THE TYLENOL TO 3X A DAY.    PLEASE CALL NAIN HERNÁNDEZ BACK [phone]

## 2023-03-20 NOTE — TELEPHONE ENCOUNTER
"Pt called in requesting a few things:    - She asked if her recent x-ray results were ready to be reviewed    - Home health, for the referral is needing additional information. Home health provided this:    \"Thank you for your referral on Prudence Mckeon, we are happy to accept the referral, however we need more specific information on the diagnosis, as the diagnosis listed will not qualify according to Medicare guidelines.  Acute right hip pain and Acute right sided low back pain w/o sciatica and unfortunately not [facility] approved diagnosis to receive HH services.   If she has osteoarthritis of right hip or osteoarthritis of multiple joints or osteoporosis, those are acceptable diagnosis and could have contributed to her fall.   Can you please amend your visit note and sign, so we can proceed with HH services.    Thank you.  Wendy Armando RN  Clinical Nurse Specialist-[facility] Intake\"    - Pt asked if she could increase her tylenol to increase from 2 times per day to 3 times per day as she is still experiencing pain  "

## 2023-03-22 ENCOUNTER — HOME CARE VISIT (OUTPATIENT)
Dept: HOME HEALTH SERVICES | Facility: HOME HEALTHCARE | Age: 88
End: 2023-03-22
Payer: MEDICARE

## 2023-03-22 VITALS
DIASTOLIC BLOOD PRESSURE: 70 MMHG | TEMPERATURE: 97.5 F | OXYGEN SATURATION: 96 % | RESPIRATION RATE: 18 BRPM | HEART RATE: 68 BPM | SYSTOLIC BLOOD PRESSURE: 124 MMHG

## 2023-03-22 PROCEDURE — G0151 HHCP-SERV OF PT,EA 15 MIN: HCPCS

## 2023-03-23 NOTE — HOME HEALTH
SOC Note:  89 y/o female referred to PT from PCP after unwitnessed fall at home that occurred at night while transferring bed <> BSC; patient lives in single story home, has daughter nearby to assist as needed, has paid caregiver 7 days/wk from 9a-6p; patient has hx significant for right hip ORIF, memory loss, right TSA, osteoarthritis, HTN; PLOF is mod IND with functional mobility using FWW or SPC; supvn for bathing, IND with dressing; goal is to reduce pain in hip/back, improve balance, improve BLE strength    Home Health ordered for: disciplines PT    Reason for Hosp/Primary Dx/Co-morbidities: right hip osteoarthritis, s/p fall    Focus of Care: right hip osteoarthritis with PT to address deficits in balance, pain, and strength    Current Functional status/mobility/DME: CGA for functional mobility and ADLs in home due to risk for falls    HB status/Living Arrangements: fall risk, weakness, pain, assist for mobility    Skin Integrity/wound status: NA    Code Status: full code    Fall Risk: high risk    POC confirmed with Dr. Villeda on date 3/22/23    Plan for next visit: Progress BLE strengthening and balance training

## 2023-03-27 ENCOUNTER — HOME CARE VISIT (OUTPATIENT)
Dept: HOME HEALTH SERVICES | Facility: HOME HEALTHCARE | Age: 88
End: 2023-03-27
Payer: MEDICARE

## 2023-03-27 VITALS
HEART RATE: 68 BPM | DIASTOLIC BLOOD PRESSURE: 74 MMHG | RESPIRATION RATE: 18 BRPM | SYSTOLIC BLOOD PRESSURE: 124 MMHG | TEMPERATURE: 96.8 F | OXYGEN SATURATION: 97 %

## 2023-03-27 PROCEDURE — G0151 HHCP-SERV OF PT,EA 15 MIN: HCPCS

## 2023-03-28 ENCOUNTER — HOSPITAL ENCOUNTER (OUTPATIENT)
Dept: GENERAL RADIOLOGY | Facility: HOSPITAL | Age: 88
Discharge: HOME | End: 2023-03-28
Admitting: INTERNAL MEDICINE
Payer: MEDICARE

## 2023-03-28 ENCOUNTER — TELEPHONE (OUTPATIENT)
Dept: INTERNAL MEDICINE | Facility: CLINIC | Age: 88
End: 2023-03-28
Payer: MEDICARE

## 2023-03-28 DIAGNOSIS — M54.50 ACUTE MIDLINE LOW BACK PAIN, UNSPECIFIED WHETHER SCIATICA PRESENT: Primary | ICD-10-CM

## 2023-03-28 DIAGNOSIS — M54.50 ACUTE MIDLINE LOW BACK PAIN, UNSPECIFIED WHETHER SCIATICA PRESENT: ICD-10-CM

## 2023-03-28 PROCEDURE — 72100 X-RAY EXAM L-S SPINE 2/3 VWS: CPT

## 2023-03-28 NOTE — TELEPHONE ENCOUNTER
Dandre is a physical therapist with [facility]    He states he saw her yesterday 03/27/23, she had a fall a couple of weeks ago and ended up seeing Dr Villeda due to Dr Garcia not being available    She had hip and pelvis Xrays which were clear    Patient is still reporting a lot of pain that refers to her buttocks and calf muscles    He had sent Dr Villeda and Dr Garcia a message yesterday requesting they order a lumbar spine xray to see if anything is going on in that area and wanted to follow up because he has not gotten a response     [phone]

## 2023-03-28 NOTE — TELEPHONE ENCOUNTER
SPOKE WITH DAUGHTER NAIN HERNÁNDEZ AND LET HER KNOW THAT DR KIRK IS WANTING XRAY OF THE LUMBAR SPINE.      APPT HAS BEEN MADE FOR DR KIRK ON 03/31/23

## 2023-03-31 ENCOUNTER — OFFICE VISIT (OUTPATIENT)
Dept: INTERNAL MEDICINE | Facility: CLINIC | Age: 88
End: 2023-03-31
Payer: MEDICARE

## 2023-03-31 ENCOUNTER — HOME CARE VISIT (OUTPATIENT)
Dept: HOME HEALTH SERVICES | Facility: HOME HEALTHCARE | Age: 88
End: 2023-03-31
Payer: MEDICARE

## 2023-03-31 VITALS
TEMPERATURE: 97.3 F | DIASTOLIC BLOOD PRESSURE: 92 MMHG | BODY MASS INDEX: 25.27 KG/M2 | SYSTOLIC BLOOD PRESSURE: 132 MMHG | HEART RATE: 52 BPM | WEIGHT: 148 LBS | HEIGHT: 64 IN

## 2023-03-31 VITALS
TEMPERATURE: 96.9 F | RESPIRATION RATE: 18 BRPM | DIASTOLIC BLOOD PRESSURE: 84 MMHG | HEART RATE: 66 BPM | OXYGEN SATURATION: 98 % | SYSTOLIC BLOOD PRESSURE: 130 MMHG

## 2023-03-31 DIAGNOSIS — R26.81 UNSTEADY GAIT: ICD-10-CM

## 2023-03-31 DIAGNOSIS — I10 BENIGN ESSENTIAL HYPERTENSION: Primary | ICD-10-CM

## 2023-03-31 DIAGNOSIS — M16.11 PRIMARY OSTEOARTHRITIS OF RIGHT HIP: Chronic | ICD-10-CM

## 2023-03-31 DIAGNOSIS — M54.50 ACUTE RIGHT-SIDED LOW BACK PAIN WITHOUT SCIATICA: ICD-10-CM

## 2023-03-31 PROCEDURE — 99214 OFFICE O/P EST MOD 30 MIN: CPT | Performed by: INTERNAL MEDICINE

## 2023-03-31 PROCEDURE — G0151 HHCP-SERV OF PT,EA 15 MIN: HCPCS

## 2023-03-31 PROCEDURE — 1160F RVW MEDS BY RX/DR IN RCRD: CPT | Performed by: INTERNAL MEDICINE

## 2023-03-31 PROCEDURE — 1159F MED LIST DOCD IN RCRD: CPT | Performed by: INTERNAL MEDICINE

## 2023-03-31 NOTE — PROGRESS NOTES
Chief Complaint   Patient presents with   • x ray results       History of Present Illness  89 y.o. female presents for evaluation of hip pain and leg pain and back pain. It is worse in the evening and morning     Review of Systems   Musculoskeletal: Positive for arthralgias, back pain, gait problem and myalgias.   Psychiatric/Behavioral: Positive for decreased concentration.     .    PMSFH:  The following portions of the patient's history were reviewed and updated as appropriate: allergies, current medications, past family history, past medical history, past social history, past surgical history and problem list.      Current Outpatient Medications:   •  acetaminophen (TYLENOL) 325 MG tablet, Take 500 mg by mouth 3 (Three) Times a Day., Disp: , Rfl:   •  apixaban (Eliquis) 2.5 MG tablet tablet, Take 1 tablet by mouth Every 12 (Twelve) Hours., Disp: 14 tablet, Rfl: 0  •  bisacodyl (DULCOLAX) 10 MG suppository, Insert 1 suppository into the rectum Daily As Needed for Constipation., Disp: , Rfl:   •  cholecalciferol (VITAMIN D3) 10 MCG (400 UNIT) tablet, Take 1 tablet by mouth Every Morning., Disp: , Rfl:   •  Cholecalciferol 50 MCG (2000 UT) capsule, Take 1 tablet by mouth Daily., Disp: , Rfl:   •  Coenzyme Q10 (EQL CoQ10) 200 MG capsule, Take 200 mg by mouth 2 (Two) Times a Day., Disp: , Rfl:   •  Cranberry 450 MG tablet, Take 1 tablet by mouth Daily., Disp: , Rfl:   •  denosumab (Prolia) 60 MG/ML solution prefilled syringe syringe, Inject 1 mL under the skin into the appropriate area as directed Every 6 (Six) Months., Disp: , Rfl:   •  dilTIAZem CD (CARDIZEM CD) 120 MG 24 hr capsule, Take 1 capsule by mouth Daily., Disp: 90 capsule, Rfl: 1  •  docusate sodium (COLACE) 100 MG capsule, Take 1 capsule by mouth Daily., Disp: 90 capsule, Rfl: 1  •  hydrocortisone (ANUSOL-HC) 25 MG suppository, Insert 1 suppository into the rectum 2 (Two) Times a Day As Needed for Hemorrhoids., Disp: , Rfl:   •  melatonin 3 MG tablet,  "Take 1 tablet by mouth Every Night., Disp: , Rfl:   •  Mirabegron ER (Myrbetriq) 50 MG tablet sustained-release 24 hour 24 hr tablet, Take 50 mg by mouth Daily., Disp: 90 tablet, Rfl: 1  •  omeprazole (priLOSEC) 40 MG capsule, Take 1 capsule by mouth Daily., Disp: 90 capsule, Rfl: 1  •  polyethylene glycol (MIRALAX) 17 GM/SCOOP powder, Take 17 g by mouth. Once a day on Mon, Wed, Fri and PRN for constipation, Disp: , Rfl:   •  rosuvastatin (CRESTOR) 5 MG tablet, Take 1 tablet by mouth Daily., Disp: 90 tablet, Rfl: 1    VITALS:  /92   Pulse 52   Temp 97.3 °F (36.3 °C)   Ht 161.3 cm (63.5\")   Wt 67.1 kg (148 lb)   BMI 25.80 kg/m²     Physical Exam  Musculoskeletal:         General: Tenderness (low  and mild hip tender right side ) present.         LABS:    CMP:  Lab Results   Component Value Date    BUN 13 10/07/2022    CREATININE 0.83 10/07/2022    EGFRIFNONA 70 09/27/2021     10/07/2022    K 4.3 10/07/2022    CL 99 10/07/2022    CALCIUM 9.8 (H) 10/07/2022    ALBUMIN 4.30 10/07/2022    BILITOT 0.3 10/07/2022    ALKPHOS 71 10/07/2022    AST 17 10/07/2022    ALT 10 10/07/2022     CBC:  Lab Results   Component Value Date    WBC 7.32 10/07/2022    RBC 4.79 10/07/2022    HGB 14.6 10/07/2022    HCT 42.4 10/07/2022    MCV 88.5 10/07/2022    MCH 30.5 10/07/2022    MCHC 34.4 10/07/2022    RDW 11.9 (L) 10/07/2022     10/07/2022     Procedures         ASSESSMENT/PLAN:  Problems Addressed this Visit        Cardiac and Vasculature    Benign essential hypertension - Primary     Hypertension is worsening.  Continue current treatment regimen.  Weight loss.  Regular aerobic exercise.  Blood pressure will be reassessed in 2 weeks Monitor blood pressure.            Musculoskeletal and Injuries    Osteoarthritis of right hip (Chronic)     I went over the xray and PT plan          Acute right-sided low back pain without sciatica     I have gone over hip and back xray and will do PT and try salonpas. See " in 2 weeks and then consider Dr Ndiaye             Symptoms and Signs    Unsteady gait     Encourage to get up slowly and get bearings before taking first step. Keep home well lit with clear floors to avoid tripping. Use assist devices for all walking especially from bed to bathroom.         Diagnoses       Codes Comments    Benign essential hypertension    -  Primary ICD-10-CM: I10  ICD-9-CM: 401.1     Primary osteoarthritis of right hip     ICD-10-CM: M16.11  ICD-9-CM: 715.15     Acute right-sided low back pain without sciatica     ICD-10-CM: M54.50  ICD-9-CM: 724.2     Unsteady gait     ICD-10-CM: R26.81  ICD-9-CM: 781.2           FOLLOW-UP:  No follow-ups on file.      Electronically signed by:    Schuyler Garcia MD

## 2023-03-31 NOTE — ASSESSMENT & PLAN NOTE
I have gone over hip and back xray and will do PT and try salonpas. See in 2 weeks and then consider Dr Ndiaye

## 2023-03-31 NOTE — ASSESSMENT & PLAN NOTE
Hypertension is worsening.  Continue current treatment regimen.  Weight loss.  Regular aerobic exercise.  Blood pressure will be reassessed in 2 weeks Monitor blood pressure.

## 2023-04-05 ENCOUNTER — HOME CARE VISIT (OUTPATIENT)
Dept: HOME HEALTH SERVICES | Facility: HOME HEALTHCARE | Age: 88
End: 2023-04-05
Payer: MEDICARE

## 2023-04-05 VITALS
SYSTOLIC BLOOD PRESSURE: 124 MMHG | RESPIRATION RATE: 18 BRPM | OXYGEN SATURATION: 64 % | DIASTOLIC BLOOD PRESSURE: 78 MMHG | HEART RATE: 97 BPM | TEMPERATURE: 96.8 F

## 2023-04-05 PROCEDURE — G0151 HHCP-SERV OF PT,EA 15 MIN: HCPCS

## 2023-04-07 ENCOUNTER — HOME CARE VISIT (OUTPATIENT)
Dept: HOME HEALTH SERVICES | Facility: HOME HEALTHCARE | Age: 88
End: 2023-04-07
Payer: MEDICARE

## 2023-04-07 VITALS
DIASTOLIC BLOOD PRESSURE: 84 MMHG | TEMPERATURE: 97.3 F | HEART RATE: 68 BPM | OXYGEN SATURATION: 99 % | SYSTOLIC BLOOD PRESSURE: 134 MMHG | RESPIRATION RATE: 18 BRPM

## 2023-04-07 PROCEDURE — G0151 HHCP-SERV OF PT,EA 15 MIN: HCPCS

## 2023-04-13 ENCOUNTER — HOME CARE VISIT (OUTPATIENT)
Dept: HOME HEALTH SERVICES | Facility: HOME HEALTHCARE | Age: 88
End: 2023-04-13
Payer: MEDICARE

## 2023-04-13 VITALS
SYSTOLIC BLOOD PRESSURE: 118 MMHG | OXYGEN SATURATION: 97 % | TEMPERATURE: 97.5 F | HEART RATE: 65 BPM | DIASTOLIC BLOOD PRESSURE: 74 MMHG | RESPIRATION RATE: 18 BRPM

## 2023-04-13 PROCEDURE — G0151 HHCP-SERV OF PT,EA 15 MIN: HCPCS

## 2023-04-14 ENCOUNTER — OFFICE VISIT (OUTPATIENT)
Dept: INTERNAL MEDICINE | Facility: CLINIC | Age: 88
End: 2023-04-14
Payer: MEDICARE

## 2023-04-14 ENCOUNTER — LAB (OUTPATIENT)
Dept: LAB | Facility: HOSPITAL | Age: 88
End: 2023-04-14
Payer: MEDICARE

## 2023-04-14 VITALS
WEIGHT: 148 LBS | DIASTOLIC BLOOD PRESSURE: 80 MMHG | BODY MASS INDEX: 25.27 KG/M2 | HEART RATE: 74 BPM | TEMPERATURE: 98.2 F | HEIGHT: 64 IN | SYSTOLIC BLOOD PRESSURE: 129 MMHG

## 2023-04-14 DIAGNOSIS — I48.0 PAROXYSMAL ATRIAL FIBRILLATION: ICD-10-CM

## 2023-04-14 DIAGNOSIS — R25.2 LEG CRAMPS: ICD-10-CM

## 2023-04-14 DIAGNOSIS — E78.5 HYPERLIPIDEMIA LDL GOAL <100: ICD-10-CM

## 2023-04-14 DIAGNOSIS — I10 BENIGN ESSENTIAL HYPERTENSION: Primary | ICD-10-CM

## 2023-04-14 DIAGNOSIS — I10 BENIGN ESSENTIAL HYPERTENSION: ICD-10-CM

## 2023-04-14 DIAGNOSIS — M54.50 ACUTE RIGHT-SIDED LOW BACK PAIN WITHOUT SCIATICA: ICD-10-CM

## 2023-04-14 LAB
BASOPHILS # BLD AUTO: 0.04 10*3/MM3 (ref 0–0.2)
BASOPHILS NFR BLD AUTO: 0.5 % (ref 0–1.5)
DEPRECATED RDW RBC AUTO: 44.1 FL (ref 37–54)
EOSINOPHIL # BLD AUTO: 0.12 10*3/MM3 (ref 0–0.4)
EOSINOPHIL NFR BLD AUTO: 1.6 % (ref 0.3–6.2)
ERYTHROCYTE [DISTWIDTH] IN BLOOD BY AUTOMATED COUNT: 12.9 % (ref 12.3–15.4)
HCT VFR BLD AUTO: 41.8 % (ref 34–46.6)
HGB BLD-MCNC: 13.8 G/DL (ref 12–15.9)
IMM GRANULOCYTES # BLD AUTO: 0.02 10*3/MM3 (ref 0–0.05)
IMM GRANULOCYTES NFR BLD AUTO: 0.3 % (ref 0–0.5)
LYMPHOCYTES # BLD AUTO: 1.87 10*3/MM3 (ref 0.7–3.1)
LYMPHOCYTES NFR BLD AUTO: 25.3 % (ref 19.6–45.3)
MCH RBC QN AUTO: 30.7 PG (ref 26.6–33)
MCHC RBC AUTO-ENTMCNC: 33 G/DL (ref 31.5–35.7)
MCV RBC AUTO: 92.9 FL (ref 79–97)
MONOCYTES # BLD AUTO: 0.87 10*3/MM3 (ref 0.1–0.9)
MONOCYTES NFR BLD AUTO: 11.8 % (ref 5–12)
NEUTROPHILS NFR BLD AUTO: 4.47 10*3/MM3 (ref 1.7–7)
NEUTROPHILS NFR BLD AUTO: 60.5 % (ref 42.7–76)
NRBC BLD AUTO-RTO: 0 /100 WBC (ref 0–0.2)
PLATELET # BLD AUTO: 258 10*3/MM3 (ref 140–450)
PMV BLD AUTO: 10.9 FL (ref 6–12)
RBC # BLD AUTO: 4.5 10*6/MM3 (ref 3.77–5.28)
WBC NRBC COR # BLD: 7.39 10*3/MM3 (ref 3.4–10.8)

## 2023-04-14 PROCEDURE — 83735 ASSAY OF MAGNESIUM: CPT

## 2023-04-14 PROCEDURE — 80053 COMPREHEN METABOLIC PANEL: CPT

## 2023-04-14 PROCEDURE — 99214 OFFICE O/P EST MOD 30 MIN: CPT | Performed by: INTERNAL MEDICINE

## 2023-04-14 PROCEDURE — 86141 C-REACTIVE PROTEIN HS: CPT

## 2023-04-14 PROCEDURE — 85025 COMPLETE CBC W/AUTO DIFF WBC: CPT

## 2023-04-14 PROCEDURE — 1160F RVW MEDS BY RX/DR IN RCRD: CPT | Performed by: INTERNAL MEDICINE

## 2023-04-14 PROCEDURE — 1159F MED LIST DOCD IN RCRD: CPT | Performed by: INTERNAL MEDICINE

## 2023-04-14 NOTE — PROGRESS NOTES
Chief Complaint   Patient presents with   • Back Pain       History of Present Illness  89 y.o. female presents for follow up of back pain.     Review of Systems   Genitourinary: Negative for dysuria.   Musculoskeletal: Positive for back pain and gait problem.   Psychiatric/Behavioral: Positive for decreased concentration.     .    PMSFH:  The following portions of the patient's history were reviewed and updated as appropriate: allergies, current medications, past family history, past medical history, past social history, past surgical history and problem list.      Current Outpatient Medications:   •  acetaminophen (TYLENOL) 325 MG tablet, Take 500 mg by mouth 3 (Three) Times a Day., Disp: , Rfl:   •  apixaban (Eliquis) 2.5 MG tablet tablet, Take 1 tablet by mouth Every 12 (Twelve) Hours., Disp: 14 tablet, Rfl: 0  •  bisacodyl (DULCOLAX) 10 MG suppository, Insert 1 suppository into the rectum Daily As Needed for Constipation., Disp: , Rfl:   •  cholecalciferol (VITAMIN D3) 10 MCG (400 UNIT) tablet, Take 1 tablet by mouth Every Morning., Disp: , Rfl:   •  Cholecalciferol 50 MCG (2000 UT) capsule, Take 1 tablet by mouth Daily., Disp: , Rfl:   •  Coenzyme Q10 (EQL CoQ10) 200 MG capsule, Take 200 mg by mouth 2 (Two) Times a Day., Disp: , Rfl:   •  Cranberry 450 MG tablet, Take 1 tablet by mouth Daily., Disp: , Rfl:   •  denosumab (Prolia) 60 MG/ML solution prefilled syringe syringe, Inject 1 mL under the skin into the appropriate area as directed Every 6 (Six) Months., Disp: , Rfl:   •  dilTIAZem CD (CARDIZEM CD) 120 MG 24 hr capsule, Take 1 capsule by mouth Daily., Disp: 90 capsule, Rfl: 1  •  docusate sodium (COLACE) 100 MG capsule, Take 1 capsule by mouth Daily., Disp: 90 capsule, Rfl: 1  •  hydrocortisone (ANUSOL-HC) 25 MG suppository, Insert 1 suppository into the rectum 2 (Two) Times a Day As Needed for Hemorrhoids., Disp: , Rfl:   •  melatonin 3 MG tablet, Take 1 tablet by mouth Every Night., Disp: , Rfl:   •   "Mirabegron ER (Myrbetriq) 50 MG tablet sustained-release 24 hour 24 hr tablet, Take 50 mg by mouth Daily., Disp: 90 tablet, Rfl: 1  •  omeprazole (priLOSEC) 40 MG capsule, Take 1 capsule by mouth Daily., Disp: 90 capsule, Rfl: 1  •  polyethylene glycol (MIRALAX) 17 GM/SCOOP powder, Take 17 g by mouth. Once a day on Mon, Wed, Fri and PRN for constipation, Disp: , Rfl:   •  rosuvastatin (CRESTOR) 5 MG tablet, Take 1 tablet by mouth Daily., Disp: 90 tablet, Rfl: 1    VITALS:  /80   Pulse 74   Temp 98.2 °F (36.8 °C)   Ht 161.3 cm (63.5\")   Wt 67.1 kg (148 lb)   BMI 25.80 kg/m²     Physical Exam  Vitals and nursing note reviewed.   Constitutional:       Appearance: Normal appearance. She is well-developed.   HENT:      Head: Normocephalic.   Eyes:      Extraocular Movements: Extraocular movements intact.      Conjunctiva/sclera: Conjunctivae normal.   Cardiovascular:      Rate and Rhythm: Normal rate and regular rhythm.      Heart sounds: Normal heart sounds.   Pulmonary:      Effort: Pulmonary effort is normal. No respiratory distress.      Breath sounds: Normal breath sounds.   Abdominal:      General: Bowel sounds are normal.      Palpations: Abdomen is soft.      Tenderness: There is no abdominal tenderness.   Musculoskeletal:         General: Tenderness (low  ) present.   Skin:     General: Skin is warm and dry.   Neurological:      General: No focal deficit present.      Mental Status: She is alert.   Psychiatric:         Mood and Affect: Mood normal.         Behavior: Behavior normal.         LABS:    CMP:  Lab Results   Component Value Date    BUN 13 10/07/2022    CREATININE 0.83 10/07/2022    EGFRIFNONA 70 09/27/2021     10/07/2022    K 4.3 10/07/2022    CL 99 10/07/2022    CALCIUM 9.8 (H) 10/07/2022    ALBUMIN 4.30 10/07/2022    BILITOT 0.3 10/07/2022    ALKPHOS 71 10/07/2022    AST 17 10/07/2022    ALT 10 10/07/2022     Procedures         ASSESSMENT/PLAN:  Problems Addressed this " Visit        Cardiac and Vasculature    Benign essential hypertension - Primary     Hypertension is unchanged.  Continue current treatment regimen.  Blood pressure will be reassessed at the next regular appointment.         Relevant Orders    Comprehensive Metabolic Panel    Hyperlipidemia LDL goal <100     Lipid abnormalities are unchanged.  Nutritional counseling was provided.  Lipids will be reassessed in 6 months.         Relevant Orders    CBC & Differential    High Sensitivity CRP    Paroxysmal atrial fibrillation (HCC)     Overall stable.             Musculoskeletal and Injuries    Acute right-sided low back pain without sciatica     She has some arthritic changes on back. She is doing Home health PT and improving. If she is not doing better in couple weeks proceed with seeing Specialist.         Other Visit Diagnoses     Leg cramps        Follow labs.     Relevant Orders    Magnesium      Diagnoses       Codes Comments    Benign essential hypertension    -  Primary ICD-10-CM: I10  ICD-9-CM: 401.1     Hyperlipidemia LDL goal <100     ICD-10-CM: E78.5  ICD-9-CM: 272.4     Paroxysmal atrial fibrillation (HCC)     ICD-10-CM: I48.0  ICD-9-CM: 427.31     Leg cramps     ICD-10-CM: R25.2  ICD-9-CM: 729.82 Follow labs.     Acute right-sided low back pain without sciatica     ICD-10-CM: M54.50  ICD-9-CM: 724.2           FOLLOW-UP:  Return in about 6 months (around 10/14/2023) for Medicare Wellness, Labs this visit.      Electronically signed by:    Schuyler Garcia MD

## 2023-04-15 LAB
ALBUMIN SERPL-MCNC: 4.3 G/DL (ref 3.5–5.2)
ALBUMIN/GLOB SERPL: 1.5 G/DL
ALP SERPL-CCNC: 58 U/L (ref 39–117)
ALT SERPL W P-5'-P-CCNC: 13 U/L (ref 1–33)
ANION GAP SERPL CALCULATED.3IONS-SCNC: 11.8 MMOL/L (ref 5–15)
AST SERPL-CCNC: 18 U/L (ref 1–32)
BILIRUB SERPL-MCNC: 0.3 MG/DL (ref 0–1.2)
BUN SERPL-MCNC: 13 MG/DL (ref 8–23)
BUN/CREAT SERPL: 16 (ref 7–25)
CALCIUM SPEC-SCNC: 9.8 MG/DL (ref 8.2–9.6)
CHLORIDE SERPL-SCNC: 97 MMOL/L (ref 98–107)
CO2 SERPL-SCNC: 25.2 MMOL/L (ref 22–29)
CREAT SERPL-MCNC: 0.81 MG/DL (ref 0.57–1)
CRP SERPL-MCNC: 0.2 MG/DL (ref 0.01–0.5)
EGFRCR SERPLBLD CKD-EPI 2021: 68.2 ML/MIN/1.73
GLOBULIN UR ELPH-MCNC: 2.8 GM/DL
GLUCOSE SERPL-MCNC: 99 MG/DL (ref 65–99)
MAGNESIUM SERPL-MCNC: 2.3 MG/DL (ref 1.7–2.3)
POTASSIUM SERPL-SCNC: 4.4 MMOL/L (ref 3.5–5.2)
PROT SERPL-MCNC: 7.1 G/DL (ref 6–8.5)
SODIUM SERPL-SCNC: 134 MMOL/L (ref 136–145)

## 2023-04-15 NOTE — ASSESSMENT & PLAN NOTE
She has some arthritic changes on back. She is doing Home health PT and improving. If she is not doing better in couple weeks proceed with seeing Specialist.

## 2023-04-18 ENCOUNTER — TELEPHONE (OUTPATIENT)
Dept: INTERNAL MEDICINE | Facility: CLINIC | Age: 88
End: 2023-04-18

## 2023-04-18 DIAGNOSIS — M54.50 ACUTE RIGHT-SIDED LOW BACK PAIN WITHOUT SCIATICA: Primary | ICD-10-CM

## 2023-04-18 NOTE — TELEPHONE ENCOUNTER
Caller: NAIN HERNÁNDEZ    Relationship: Emergency Contact    Best call back number:[phone]     What is the best time to reach you: ASAP    Who are you requesting to speak with (clinical staff, provider,  specific staff member): DR KIKR    Do you know the name of the person who called:     What was the call regarding: NAIN STATES THE PATIENT NEEDS HELP WITH PAIN CONTROL. NAIN IS WANTING TO KNOW IF A NURSE COULD COME OUT? PLEASE CALL NAIN ASAP    Do you require a callback: YES

## 2023-04-19 ENCOUNTER — HOME CARE VISIT (OUTPATIENT)
Dept: HOME HEALTH SERVICES | Facility: HOME HEALTHCARE | Age: 88
End: 2023-04-19
Payer: MEDICARE

## 2023-04-19 VITALS
RESPIRATION RATE: 18 BRPM | DIASTOLIC BLOOD PRESSURE: 92 MMHG | TEMPERATURE: 97.1 F | SYSTOLIC BLOOD PRESSURE: 146 MMHG | OXYGEN SATURATION: 97 % | HEART RATE: 76 BPM

## 2023-04-19 PROCEDURE — G0151 HHCP-SERV OF PT,EA 15 MIN: HCPCS

## 2023-04-19 NOTE — TELEPHONE ENCOUNTER
Not sure we could try and steroid injection possibly her and will put in refeerral if could get her back in to see me this week

## 2023-04-19 NOTE — TELEPHONE ENCOUNTER
Caller: NAIN HERNÁNDEZ    Relationship: Emergency Contact    Best call back number: [phone]    Additional notes: PATIENT'S DAUGHTER IS REQUESTING TO SPEAK WITH DR. KIRK ABOUT PATIENT'S PAIN. PATIENT'S DAUGHTER STATES PATIENT HAS BEEN IN TEARS FOR THE PAST TWO DAYS DUE TO HER PAIN. PATIENT'S DAUGHTER STATES SHE WOULD LIKE TO ASK IF PCP CAN GET PATIENT AN APPOINTMENT WITH NEUROLOGY THAT WAS DISCUSSED ON 4/14/23 AND POSSIBLY GET AN MRI SCHEDULED FOR PATIENT AS SOON AS POSSIBLE.

## 2023-04-19 NOTE — TELEPHONE ENCOUNTER
Called and spoke to Swetha, appointment scheduled for the patient 4/21/23 at 11:15 AM    She wants to know if it would be okay that she bring the patient when she comes in for her wellness visit at 1:45 so that the patient won't have to wait and they can be seen at the same time    I let her know about the referral and steroid injection, she verbalized understanding

## 2023-04-20 NOTE — TELEPHONE ENCOUNTER
Swetha called back and said that her mother seems to be doing better at the moment. She wants to know can she bring her in with her at 1:45. Luh is on the schedule at 11:15 tomorrow and Swetha does not want her to sit and wait until her appt which is at 1:45pm.     Swetha said that if Dr. Garcia was not able to give a steroid injection tomorrow, would it still be necessary for Luh to keep her appointment.

## 2023-04-21 ENCOUNTER — OFFICE VISIT (OUTPATIENT)
Dept: INTERNAL MEDICINE | Facility: CLINIC | Age: 88
End: 2023-04-21
Payer: MEDICARE

## 2023-04-21 VITALS
SYSTOLIC BLOOD PRESSURE: 110 MMHG | HEART RATE: 60 BPM | DIASTOLIC BLOOD PRESSURE: 74 MMHG | HEIGHT: 64 IN | WEIGHT: 148 LBS | BODY MASS INDEX: 25.27 KG/M2 | TEMPERATURE: 98.4 F

## 2023-04-21 DIAGNOSIS — G57.71 COMPLEX REGIONAL PAIN SYNDROME TYPE 2 OF BOTH LOWER EXTREMITIES: ICD-10-CM

## 2023-04-21 DIAGNOSIS — M54.50 ACUTE RIGHT-SIDED LOW BACK PAIN WITHOUT SCIATICA: ICD-10-CM

## 2023-04-21 DIAGNOSIS — G57.72 COMPLEX REGIONAL PAIN SYNDROME TYPE 2 OF BOTH LOWER EXTREMITIES: ICD-10-CM

## 2023-04-21 DIAGNOSIS — I10 BENIGN ESSENTIAL HYPERTENSION: Primary | ICD-10-CM

## 2023-04-21 RX ORDER — TRIAMCINOLONE ACETONIDE 40 MG/ML
80 INJECTION, SUSPENSION INTRA-ARTICULAR; INTRAMUSCULAR ONCE
Status: COMPLETED | OUTPATIENT
Start: 2023-04-21 | End: 2023-04-21

## 2023-04-21 RX ADMIN — TRIAMCINOLONE ACETONIDE 80 MG: 40 INJECTION, SUSPENSION INTRA-ARTICULAR; INTRAMUSCULAR at 15:35

## 2023-04-21 NOTE — PROGRESS NOTES
Chief Complaint   Patient presents with   • Back Pain       History of Present Illness  89 y.o. female presents for evaluation of back pain and some pain down legs.     Review of Systems   Musculoskeletal: Positive for back pain, gait problem and myalgias.   Neurological: Positive for weakness.   Psychiatric/Behavioral: Positive for decreased concentration.     .    PMSFH:  The following portions of the patient's history were reviewed and updated as appropriate: allergies, current medications, past family history, past medical history, past social history, past surgical history and problem list.      Current Outpatient Medications:   •  acetaminophen (TYLENOL) 325 MG tablet, Take 500 mg by mouth 3 (Three) Times a Day., Disp: , Rfl:   •  apixaban (Eliquis) 2.5 MG tablet tablet, Take 1 tablet by mouth Every 12 (Twelve) Hours., Disp: 14 tablet, Rfl: 0  •  bisacodyl (DULCOLAX) 10 MG suppository, Insert 1 suppository into the rectum Daily As Needed for Constipation., Disp: , Rfl:   •  cholecalciferol (VITAMIN D3) 10 MCG (400 UNIT) tablet, Take 1 tablet by mouth Every Morning., Disp: , Rfl:   •  Cholecalciferol 50 MCG (2000 UT) capsule, Take 1 tablet by mouth Daily., Disp: , Rfl:   •  Coenzyme Q10 (EQL CoQ10) 200 MG capsule, Take 200 mg by mouth 2 (Two) Times a Day., Disp: , Rfl:   •  Cranberry 450 MG tablet, Take 1 tablet by mouth Daily., Disp: , Rfl:   •  denosumab (Prolia) 60 MG/ML solution prefilled syringe syringe, Inject 1 mL under the skin into the appropriate area as directed Every 6 (Six) Months., Disp: , Rfl:   •  dilTIAZem CD (CARDIZEM CD) 120 MG 24 hr capsule, Take 1 capsule by mouth Daily., Disp: 90 capsule, Rfl: 1  •  docusate sodium (COLACE) 100 MG capsule, Take 1 capsule by mouth Daily., Disp: 90 capsule, Rfl: 1  •  hydrocortisone (ANUSOL-HC) 25 MG suppository, Insert 1 suppository into the rectum 2 (Two) Times a Day As Needed for Hemorrhoids., Disp: , Rfl:   •  melatonin 3 MG tablet, Take 1 tablet by  "mouth Every Night., Disp: , Rfl:   •  Mirabegron ER (Myrbetriq) 50 MG tablet sustained-release 24 hour 24 hr tablet, Take 50 mg by mouth Daily., Disp: 90 tablet, Rfl: 1  •  omeprazole (priLOSEC) 40 MG capsule, Take 1 capsule by mouth Daily., Disp: 90 capsule, Rfl: 1  •  polyethylene glycol (MIRALAX) 17 GM/SCOOP powder, Take 17 g by mouth. Once a day on Mon, Wed, Fri and PRN for constipation, Disp: , Rfl:   •  rosuvastatin (CRESTOR) 5 MG tablet, Take 1 tablet by mouth Daily., Disp: 90 tablet, Rfl: 1  No current facility-administered medications for this visit.    VITALS:  /74   Pulse 60   Temp 98.4 °F (36.9 °C)   Ht 161.3 cm (63.5\")   Wt 67.1 kg (148 lb)   BMI 25.80 kg/m²     Physical Exam  Constitutional:       Appearance: Normal appearance.   Musculoskeletal:         General: Tenderness (low back pain and devan mild pain down legs ) present.   Neurological:      General: No focal deficit present.      Mental Status: She is alert and oriented to person, place, and time.         LABS:    CMP:  Lab Results   Component Value Date    BUN 13 04/14/2023    CREATININE 0.81 04/14/2023    EGFRIFNONA 70 09/27/2021     (L) 04/14/2023    K 4.4 04/14/2023    CL 97 (L) 04/14/2023    CALCIUM 9.8 (H) 04/14/2023    ALBUMIN 4.3 04/14/2023    BILITOT 0.3 04/14/2023    ALKPHOS 58 04/14/2023    AST 18 04/14/2023    ALT 13 04/14/2023     Procedures         ASSESSMENT/PLAN:  Problems Addressed this Visit        Cardiac and Vasculature    Benign essential hypertension - Primary     Hypertension is unchanged.  Continue current treatment regimen.  Blood pressure will be reassessed at the next regular appointment.            Musculoskeletal and Injuries    Acute right-sided low back pain without sciatica     Acute issue and give kenalog shot.          Relevant Medications    triamcinolone acetonide (KENALOG-40) injection 80 mg (Completed)       Neuro    Complex regional pain syndrome type 2 of both lower extremities     Proceed " with seeing Dr Ndiaye and give kenalog. Shot.          Relevant Medications    triamcinolone acetonide (KENALOG-40) injection 80 mg (Completed)   Diagnoses       Codes Comments    Benign essential hypertension    -  Primary ICD-10-CM: I10  ICD-9-CM: 401.1     Acute right-sided low back pain without sciatica     ICD-10-CM: M54.50  ICD-9-CM: 724.2     Complex regional pain syndrome type 2 of both lower extremities     ICD-10-CM: G57.71, G57.72  ICD-9-CM: 355.71           FOLLOW-UP:  No follow-ups on file.      Electronically signed by:    Schuyler Garcia MD

## 2023-04-26 ENCOUNTER — HOME CARE VISIT (OUTPATIENT)
Dept: HOME HEALTH SERVICES | Facility: HOME HEALTHCARE | Age: 88
End: 2023-04-26
Payer: MEDICARE

## 2023-04-26 VITALS
OXYGEN SATURATION: 94 % | HEART RATE: 68 BPM | RESPIRATION RATE: 18 BRPM | DIASTOLIC BLOOD PRESSURE: 70 MMHG | TEMPERATURE: 97 F | SYSTOLIC BLOOD PRESSURE: 124 MMHG

## 2023-04-26 PROCEDURE — G0151 HHCP-SERV OF PT,EA 15 MIN: HCPCS

## 2023-05-03 ENCOUNTER — HOME CARE VISIT (OUTPATIENT)
Dept: HOME HEALTH SERVICES | Facility: HOME HEALTHCARE | Age: 88
End: 2023-05-03
Payer: MEDICARE

## 2023-05-03 VITALS
HEART RATE: 68 BPM | SYSTOLIC BLOOD PRESSURE: 124 MMHG | TEMPERATURE: 96.8 F | RESPIRATION RATE: 18 BRPM | DIASTOLIC BLOOD PRESSURE: 72 MMHG | OXYGEN SATURATION: 98 %

## 2023-05-03 PROCEDURE — G0151 HHCP-SERV OF PT,EA 15 MIN: HCPCS

## 2023-05-10 ENCOUNTER — HOME CARE VISIT (OUTPATIENT)
Dept: HOME HEALTH SERVICES | Facility: HOME HEALTHCARE | Age: 88
End: 2023-05-10
Payer: MEDICARE

## 2023-05-10 VITALS
SYSTOLIC BLOOD PRESSURE: 132 MMHG | DIASTOLIC BLOOD PRESSURE: 84 MMHG | HEART RATE: 70 BPM | TEMPERATURE: 96.8 F | RESPIRATION RATE: 18 BRPM | OXYGEN SATURATION: 97 %

## 2023-05-10 PROCEDURE — G0151 HHCP-SERV OF PT,EA 15 MIN: HCPCS

## 2023-05-11 ENCOUNTER — TELEPHONE (OUTPATIENT)
Dept: INTERNAL MEDICINE | Facility: CLINIC | Age: 88
End: 2023-05-11

## 2023-05-11 NOTE — TELEPHONE ENCOUNTER
Caller: NAIN HERNÁNDEZ    Relationship: Emergency Contact    Best call back number: [phone]    What is the best time to reach you: ANYTIME    Who are you requesting to speak with (clinical staff, provider,  specific staff member): CLINICAL    Do you know the name of the person who called: NAIN    What was the call regarding: PATIENT HAS HAD TROUBLE SLEEPING. LAST NIGHT SHE THOUGHT SHE HAD A VISITOR IN HER SLEEP. NAIN WOULD LIKE TO SPEAK WITH SOMEONE TO SEE WHAT CAN BE DONE.    Do you require a callback: YES

## 2023-05-11 NOTE — TELEPHONE ENCOUNTER
Ideally would redirect her and consider OTC supplement of tryptophan 500 mg in the evening. The stronger sleep meds have fall risk and can worsen confusion but if worsens could make follow up to discuss options

## 2023-05-19 ENCOUNTER — HOME CARE VISIT (OUTPATIENT)
Dept: HOME HEALTH SERVICES | Facility: HOME HEALTHCARE | Age: 88
End: 2023-05-19
Payer: MEDICARE

## 2023-05-19 VITALS
RESPIRATION RATE: 18 BRPM | TEMPERATURE: 97.3 F | HEART RATE: 76 BPM | SYSTOLIC BLOOD PRESSURE: 124 MMHG | OXYGEN SATURATION: 98 % | DIASTOLIC BLOOD PRESSURE: 76 MMHG

## 2023-05-19 PROCEDURE — G0151 HHCP-SERV OF PT,EA 15 MIN: HCPCS

## 2023-05-19 NOTE — Clinical Note
60 Day Summary    Home Health need continues for: PT to address weakness and mobility issues related to right hip OA    Primary diagnoses/co-morbidities/recent procedures in past 60 days that impact current episode: right hip OA    Current level of functional ability: ambulates short distances with FWW and SBA due to weakness in BLE, fatigue, and risk for falls; also reports pain in bilateral calves after prolonged standing or activity    Homebound status and living arrangements: fall risk, fatigue, weakness, assist of one to ambulate, unable to drive    Goals accomplished and/or measurable progress toward unmet goals in past 60 days: Patient demonstrates reduced back/hip/leg pain with improved IND with mobility and balance    Skilled need:  PT need continues to address bilateral hip weakness, frequent falls in home, and deficits in overall functional mobility/balance    Focus of care for next 60 days for each discipline ordered: PT to address right hip OA    Skin integrity/wound status: unremarkable    Code status: full code    Most recent fall risk: high risk    Estimated date when home care services will end 6/21/23    Frequency:  1w4    Notable: Patient scheduled to see Dr. Ndiaye (neurosurgeon) on 5/23/23

## 2023-05-20 NOTE — HOME HEALTH
60 Day Summary    Home Health need continues for: PT to address weakness and mobility issues related to right hip OA    Primary diagnoses/co-morbidities/recent procedures in past 60 days that impact current episode: right hip OA    Current level of functional ability: ambulates short distances with FWW and SBA due to weakness in BLE, fatigue, and risk for falls; also reports pain in bilateral calves after prolonged standing or activity    Homebound status and living arrangements: fall risk, fatigue, weakness, assist of one to ambulate, unable to drive    Goals accomplished and/or measurable progress toward unmet goals in past 60 days: Patient demonstrates reduced back/hip/leg pain with improved IND with mobility and balance    Skilled need:  PT need continues to address bilateral hip weakness, frequent falls in home, and deficits in overall functional mobility/balance    Focus of care for next 60 days for each discipline ordered: PT to address right hip OA    Skin integrity/wound status: unremarkable    Code status: full code    Most recent fall risk: high risk    Estimated date when home care services will end 6/21/23    Plan of Care confirmed with Dr. Garcia on 5/19/23.

## 2023-05-24 ENCOUNTER — HOME CARE VISIT (OUTPATIENT)
Dept: HOME HEALTH SERVICES | Facility: HOME HEALTHCARE | Age: 88
End: 2023-05-24
Payer: MEDICARE

## 2023-05-24 VITALS
RESPIRATION RATE: 18 BRPM | OXYGEN SATURATION: 97 % | TEMPERATURE: 97.3 F | DIASTOLIC BLOOD PRESSURE: 82 MMHG | SYSTOLIC BLOOD PRESSURE: 124 MMHG | HEART RATE: 73 BPM

## 2023-05-24 PROCEDURE — G0151 HHCP-SERV OF PT,EA 15 MIN: HCPCS

## 2023-06-02 ENCOUNTER — HOME CARE VISIT (OUTPATIENT)
Dept: HOME HEALTH SERVICES | Facility: HOME HEALTHCARE | Age: 88
End: 2023-06-02
Payer: MEDICARE

## 2023-06-02 VITALS
TEMPERATURE: 96.8 F | OXYGEN SATURATION: 96 % | RESPIRATION RATE: 18 BRPM | HEART RATE: 77 BPM | SYSTOLIC BLOOD PRESSURE: 98 MMHG | DIASTOLIC BLOOD PRESSURE: 66 MMHG

## 2023-06-02 PROCEDURE — G0151 HHCP-SERV OF PT,EA 15 MIN: HCPCS

## 2023-06-07 ENCOUNTER — HOME CARE VISIT (OUTPATIENT)
Dept: HOME HEALTH SERVICES | Facility: HOME HEALTHCARE | Age: 88
End: 2023-06-07
Payer: MEDICARE

## 2023-06-07 VITALS
HEART RATE: 80 BPM | TEMPERATURE: 96.9 F | DIASTOLIC BLOOD PRESSURE: 84 MMHG | OXYGEN SATURATION: 98 % | SYSTOLIC BLOOD PRESSURE: 133 MMHG | RESPIRATION RATE: 18 BRPM

## 2023-06-07 PROCEDURE — G0151 HHCP-SERV OF PT,EA 15 MIN: HCPCS

## 2023-06-08 ENCOUNTER — TRANSCRIBE ORDERS (OUTPATIENT)
Dept: ADMINISTRATIVE | Facility: HOSPITAL | Age: 88
End: 2023-06-08
Payer: MEDICARE

## 2023-06-08 DIAGNOSIS — M81.0 SENILE OSTEOPOROSIS: Primary | ICD-10-CM

## 2023-06-14 ENCOUNTER — HOME CARE VISIT (OUTPATIENT)
Dept: HOME HEALTH SERVICES | Facility: HOME HEALTHCARE | Age: 88
End: 2023-06-14
Payer: MEDICARE

## 2023-06-14 VITALS
TEMPERATURE: 96.8 F | DIASTOLIC BLOOD PRESSURE: 73 MMHG | OXYGEN SATURATION: 97 % | RESPIRATION RATE: 18 BRPM | SYSTOLIC BLOOD PRESSURE: 114 MMHG | HEART RATE: 68 BPM

## 2023-06-14 PROCEDURE — G0151 HHCP-SERV OF PT,EA 15 MIN: HCPCS

## 2023-06-14 NOTE — Clinical Note
Discharge Summary/Summary of Care Provided: Skilled PT for deficits in pain, strength, balance, functional mobility, and activity tolerance  Patient received home health for diagnosis: right hip OA, frequent falls  Current level of functional ability: mod IND with ambulation in home with FWW  Living arrangements: lives in single-story home, has daily caregivers during waking hours, daughter lives nearby to assist PRN  Progress towards goals and/or Were all goals met? Max potential achieved at this time   If not all goals met, barriers that prevented patient from meeting goals: Intermittent back and bilateral leg pain, frequent falls  SDOH concerns (i.e. Caregiver availability, social isolation, environment, income, transportation access, food insecurity etc.) NA  Follow-up appointment plans and community resources provided: NA  Other imporant information. NA

## 2023-06-26 ENCOUNTER — TELEPHONE (OUTPATIENT)
Dept: INTERNAL MEDICINE | Facility: CLINIC | Age: 88
End: 2023-06-26

## 2023-06-26 DIAGNOSIS — R30.0 DYSURIA: Primary | ICD-10-CM

## 2023-06-26 DIAGNOSIS — N39.0 ACUTE UTI: ICD-10-CM

## 2023-06-26 NOTE — TELEPHONE ENCOUNTER
Caller: NAIN HERNÁNDEZ    Relationship: Emergency Contact    Best call back number: [phone]     What medication are you requesting: SOMETHING FOR SYMPTOMS    What are your current symptoms: BURNING,CLOUDY,FREQUENCY TO URINATE,    How long have you been experiencing symptoms: STARTED TODAY    Have you had these symptoms before:    [x] Yes  [] No    Have you been treated for these symptoms before:   [x] Yes  [] No    If a prescription is needed, what is your preferred pharmacy and phone number: [facility] [address] [place] [address] [phone] [place] [phone]

## 2023-06-27 NOTE — TELEPHONE ENCOUNTER
She needs to get urine at [facility] and please get me results in afternoon so can treat

## 2023-06-28 RX ORDER — CIPROFLOXACIN 250 MG/1
250 TABLET, FILM COATED ORAL 2 TIMES DAILY
Qty: 6 TABLET | Refills: 0 | Status: SHIPPED | OUTPATIENT
Start: 2023-06-28 | End: 2023-07-01

## 2023-08-15 RX ORDER — DILTIAZEM HYDROCHLORIDE 120 MG/1
CAPSULE, COATED, EXTENDED RELEASE ORAL
Qty: 90 CAPSULE | Refills: 3 | Status: SHIPPED | OUTPATIENT
Start: 2023-08-15

## 2023-08-16 ENCOUNTER — OFFICE VISIT (OUTPATIENT)
Dept: INTERNAL MEDICINE | Facility: CLINIC | Age: 88
End: 2023-08-16
Payer: MEDICARE

## 2023-08-16 VITALS
HEIGHT: 63 IN | TEMPERATURE: 98.2 F | SYSTOLIC BLOOD PRESSURE: 126 MMHG | HEART RATE: 70 BPM | BODY MASS INDEX: 26.4 KG/M2 | DIASTOLIC BLOOD PRESSURE: 98 MMHG | WEIGHT: 149 LBS

## 2023-08-16 DIAGNOSIS — L03.115 CELLULITIS OF RIGHT LOWER EXTREMITY: Primary | ICD-10-CM

## 2023-08-16 PROCEDURE — 1159F MED LIST DOCD IN RCRD: CPT | Performed by: NURSE PRACTITIONER

## 2023-08-16 PROCEDURE — 1160F RVW MEDS BY RX/DR IN RCRD: CPT | Performed by: NURSE PRACTITIONER

## 2023-08-16 PROCEDURE — 99213 OFFICE O/P EST LOW 20 MIN: CPT | Performed by: NURSE PRACTITIONER

## 2023-08-16 NOTE — PROGRESS NOTES
"Chief Complaint  Cellulitis (R leg, started last Friday. Sun went to [facility] given shot and abx.)    Subjective      History of Present Illness  Luh is a 89 y.o. female who presents to the clinic today for evaluation of a possible skin infection located left lower leg. Symptoms include erythema located left leg . Patient denies severe pain, chills, and fever greater than 100. Precipitating event: none known. Treatment to date has included antibiotics started 3 days ago. Family says they can't tell any difference.    The following portions of the patient's history were reviewed and updated as appropriate: allergies, current medications, past family history, past medical history, past social history, past surgical history, and problem list.    Review of Systems  Pertinent items are noted in HPI.         Objective   Vital Signs:  /98   Pulse 70   Temp 98.2 øF (36.8 øC)   Ht 160 cm (62.99\")   Wt 67.6 kg (149 lb)   BMI 26.40 kg/mý   Estimated body mass index is 26.4 kg/mý as calculated from the following:    Height as of this encounter: 160 cm (62.99\").    Weight as of this encounter: 67.6 kg (149 lb).            Physical Exam  Vitals reviewed.   Constitutional:       General: She is not in acute distress.  HENT:      Head: Normocephalic and atraumatic.   Cardiovascular:      Rate and Rhythm: Normal rate.   Pulmonary:      Effort: Pulmonary effort is normal.   Skin:     Findings: Bruising and erythema present.          Neurological:      Mental Status: She is alert. Mental status is at baseline.   Psychiatric:         Attention and Perception: Attention normal.         Mood and Affect: Mood normal.        Result Review                    Assessment and Plan  Diagnoses and all orders for this visit:    1. Cellulitis of right lower extremity (Primary)  Assessment & Plan:  Cellulitis of the right lower leg.     She received Rocephin at urgent care and was prescribed Keflex.  Area of redness has decreased, " Continue Keflex until all doses have been taken.  Follow-up as needed.                          Follow Up  Return if symptoms worsen or fail to improve.  Patient was given instructions and counseling regarding her condition or for health maintenance advice. Please see specific information pulled into the AVS if appropriate.    Part of this note may be an electronic transcription/translation of spoken language to printed text using the Dragon Dictation System.

## 2023-08-18 PROBLEM — L03.115 CELLULITIS OF RIGHT LOWER EXTREMITY: Status: ACTIVE | Noted: 2023-08-18

## 2023-08-18 NOTE — ASSESSMENT & PLAN NOTE
Cellulitis of the right lower leg.     She received Rocephin at urgent care and was prescribed Keflex.  Area of redness has decreased, Continue Keflex until all doses have been taken.  Follow-up as needed.

## 2023-09-13 RX ORDER — OMEPRAZOLE 40 MG/1
CAPSULE, DELAYED RELEASE ORAL
Qty: 90 CAPSULE | Refills: 3 | Status: SHIPPED | OUTPATIENT
Start: 2023-09-13

## 2023-09-20 ENCOUNTER — TELEPHONE (OUTPATIENT)
Dept: INTERNAL MEDICINE | Facility: CLINIC | Age: 88
End: 2023-09-20

## 2023-09-20 DIAGNOSIS — I48.0 PAROXYSMAL ATRIAL FIBRILLATION: ICD-10-CM

## 2023-09-20 DIAGNOSIS — I10 BENIGN ESSENTIAL HYPERTENSION: Primary | ICD-10-CM

## 2023-09-20 DIAGNOSIS — E53.9 VITAMIN B DEFICIENCY: ICD-10-CM

## 2023-09-20 DIAGNOSIS — E78.5 HYPERLIPIDEMIA LDL GOAL <100: ICD-10-CM

## 2023-09-20 DIAGNOSIS — M54.50 ACUTE RIGHT-SIDED LOW BACK PAIN WITHOUT SCIATICA: ICD-10-CM

## 2023-09-20 NOTE — TELEPHONE ENCOUNTER
Called Swetha back, confirmed date of next appointment is 10/17/23    Requests Dr Garcia order her labs for her 10/17/23 [facility] appointment so she can do them the week

## 2023-09-20 NOTE — TELEPHONE ENCOUNTER
Provider: Schuyler Garcia MD     Caller: NAIN HERNÁNDEZ     Relationship to Patient: DAUGHTER    Phone Number: [phone]     Reason for Call: CALLING TO VERIFY NEXT APPOINTMENT. PATIENT'S DAUGHTER DID CONFIRM ONE OF THE APPOINTMENT DATES BUT THE  VERBAL FORM IS INCOMPLETE

## 2023-09-28 ENCOUNTER — LAB (OUTPATIENT)
Dept: LAB | Facility: HOSPITAL | Age: 88
End: 2023-09-28
Payer: MEDICARE

## 2023-09-28 DIAGNOSIS — I48.0 PAROXYSMAL ATRIAL FIBRILLATION: ICD-10-CM

## 2023-09-28 DIAGNOSIS — M54.50 ACUTE RIGHT-SIDED LOW BACK PAIN WITHOUT SCIATICA: ICD-10-CM

## 2023-09-28 DIAGNOSIS — I10 BENIGN ESSENTIAL HYPERTENSION: ICD-10-CM

## 2023-09-28 DIAGNOSIS — E78.5 HYPERLIPIDEMIA LDL GOAL <100: ICD-10-CM

## 2023-09-28 DIAGNOSIS — E53.9 VITAMIN B DEFICIENCY: ICD-10-CM

## 2023-09-28 LAB
ALBUMIN SERPL-MCNC: 4.1 G/DL (ref 3.5–5.2)
ALBUMIN UR-MCNC: <1.2 MG/DL
ALBUMIN/GLOB SERPL: 1.3 G/DL
ALP SERPL-CCNC: 78 U/L (ref 39–117)
ALT SERPL W P-5'-P-CCNC: 11 U/L (ref 1–33)
ANION GAP SERPL CALCULATED.3IONS-SCNC: 10.4 MMOL/L (ref 5–15)
AST SERPL-CCNC: 20 U/L (ref 1–32)
BASOPHILS # BLD AUTO: 0.03 10*3/MM3 (ref 0–0.2)
BASOPHILS NFR BLD AUTO: 0.5 % (ref 0–1.5)
BILIRUB SERPL-MCNC: 0.4 MG/DL (ref 0–1.2)
BUN SERPL-MCNC: 9 MG/DL (ref 8–23)
BUN/CREAT SERPL: 11.4 (ref 7–25)
CALCIUM SPEC-SCNC: 9.7 MG/DL (ref 8.2–9.6)
CHLORIDE SERPL-SCNC: 100 MMOL/L (ref 98–107)
CHOLEST SERPL-MCNC: 175 MG/DL (ref 0–200)
CO2 SERPL-SCNC: 27.6 MMOL/L (ref 22–29)
CREAT SERPL-MCNC: 0.79 MG/DL (ref 0.57–1)
CREAT UR-MCNC: 64.2 MG/DL
DEPRECATED RDW RBC AUTO: 38.2 FL (ref 37–54)
EGFRCR SERPLBLD CKD-EPI 2021: 70.3 ML/MIN/1.73
EOSINOPHIL # BLD AUTO: 0.1 10*3/MM3 (ref 0–0.4)
EOSINOPHIL NFR BLD AUTO: 1.7 % (ref 0.3–6.2)
ERYTHROCYTE [DISTWIDTH] IN BLOOD BY AUTOMATED COUNT: 11.4 % (ref 12.3–15.4)
ERYTHROCYTE [SEDIMENTATION RATE] IN BLOOD: 23 MM/HR (ref 0–30)
FOLATE SERPL-MCNC: 13.5 NG/ML (ref 4.78–24.2)
GLOBULIN UR ELPH-MCNC: 3.2 GM/DL
GLUCOSE SERPL-MCNC: 92 MG/DL (ref 65–99)
HCT VFR BLD AUTO: 41.5 % (ref 34–46.6)
HCYS SERPL-MCNC: 12.5 UMOL/L (ref 0–15)
HDLC SERPL-MCNC: 69 MG/DL (ref 40–60)
HGB BLD-MCNC: 14 G/DL (ref 12–15.9)
IMM GRANULOCYTES # BLD AUTO: 0.01 10*3/MM3 (ref 0–0.05)
IMM GRANULOCYTES NFR BLD AUTO: 0.2 % (ref 0–0.5)
LDLC SERPL CALC-MCNC: 93 MG/DL (ref 0–100)
LDLC/HDLC SERPL: 1.33 {RATIO}
LYMPHOCYTES # BLD AUTO: 1.22 10*3/MM3 (ref 0.7–3.1)
LYMPHOCYTES NFR BLD AUTO: 21.1 % (ref 19.6–45.3)
MAGNESIUM SERPL-MCNC: 2.4 MG/DL (ref 1.7–2.3)
MCH RBC QN AUTO: 31.1 PG (ref 26.6–33)
MCHC RBC AUTO-ENTMCNC: 33.7 G/DL (ref 31.5–35.7)
MCV RBC AUTO: 92.2 FL (ref 79–97)
MICROALBUMIN/CREAT UR: NORMAL MG/G{CREAT}
MONOCYTES # BLD AUTO: 0.75 10*3/MM3 (ref 0.1–0.9)
MONOCYTES NFR BLD AUTO: 13 % (ref 5–12)
NEUTROPHILS NFR BLD AUTO: 3.67 10*3/MM3 (ref 1.7–7)
NEUTROPHILS NFR BLD AUTO: 63.5 % (ref 42.7–76)
NRBC BLD AUTO-RTO: 0 /100 WBC (ref 0–0.2)
PLATELET # BLD AUTO: 283 10*3/MM3 (ref 140–450)
PMV BLD AUTO: 11.2 FL (ref 6–12)
POTASSIUM SERPL-SCNC: 4.4 MMOL/L (ref 3.5–5.2)
PROT SERPL-MCNC: 7.3 G/DL (ref 6–8.5)
RBC # BLD AUTO: 4.5 10*6/MM3 (ref 3.77–5.28)
SODIUM SERPL-SCNC: 138 MMOL/L (ref 136–145)
TRIGL SERPL-MCNC: 71 MG/DL (ref 0–150)
TSH SERPL DL<=0.05 MIU/L-ACNC: 0.74 UIU/ML (ref 0.27–4.2)
VIT B12 BLD-MCNC: 347 PG/ML (ref 211–946)
VLDLC SERPL-MCNC: 13 MG/DL (ref 5–40)
WBC NRBC COR # BLD: 5.78 10*3/MM3 (ref 3.4–10.8)

## 2023-09-28 PROCEDURE — 85025 COMPLETE CBC W/AUTO DIFF WBC: CPT

## 2023-09-28 PROCEDURE — 82043 UR ALBUMIN QUANTITATIVE: CPT

## 2023-09-28 PROCEDURE — 82570 ASSAY OF URINE CREATININE: CPT

## 2023-09-28 PROCEDURE — 80053 COMPREHEN METABOLIC PANEL: CPT

## 2023-09-28 PROCEDURE — 82746 ASSAY OF FOLIC ACID SERUM: CPT

## 2023-09-28 PROCEDURE — 85652 RBC SED RATE AUTOMATED: CPT

## 2023-09-28 PROCEDURE — 80061 LIPID PANEL: CPT

## 2023-09-28 PROCEDURE — 84443 ASSAY THYROID STIM HORMONE: CPT

## 2023-09-28 PROCEDURE — 82607 VITAMIN B-12: CPT

## 2023-09-28 PROCEDURE — 83735 ASSAY OF MAGNESIUM: CPT

## 2023-09-28 PROCEDURE — 83090 ASSAY OF HOMOCYSTEINE: CPT

## 2023-10-03 RX ORDER — ROSUVASTATIN CALCIUM 5 MG/1
TABLET, COATED ORAL
Qty: 90 TABLET | Refills: 3 | Status: SHIPPED | OUTPATIENT
Start: 2023-10-03

## 2023-10-07 ENCOUNTER — FLU SHOT (OUTPATIENT)
Dept: INTERNAL MEDICINE | Facility: CLINIC | Age: 88
End: 2023-10-07
Payer: MEDICARE

## 2023-10-07 DIAGNOSIS — Z23 NEED FOR INFLUENZA VACCINATION: Primary | ICD-10-CM

## 2023-10-07 PROCEDURE — 90662 IIV NO PRSV INCREASED AG IM: CPT | Performed by: INTERNAL MEDICINE

## 2023-10-07 PROCEDURE — G0008 ADMIN INFLUENZA VIRUS VAC: HCPCS | Performed by: INTERNAL MEDICINE

## 2023-10-13 ENCOUNTER — OFFICE VISIT (OUTPATIENT)
Dept: INTERNAL MEDICINE | Facility: CLINIC | Age: 88
End: 2023-10-13
Payer: MEDICARE

## 2023-10-13 VITALS
WEIGHT: 154 LBS | BODY MASS INDEX: 27.29 KG/M2 | HEART RATE: 74 BPM | HEIGHT: 63 IN | DIASTOLIC BLOOD PRESSURE: 82 MMHG | SYSTOLIC BLOOD PRESSURE: 126 MMHG | TEMPERATURE: 99.6 F

## 2023-10-13 DIAGNOSIS — I10 BENIGN ESSENTIAL HYPERTENSION: ICD-10-CM

## 2023-10-13 DIAGNOSIS — R50.9 FEVER, UNSPECIFIED FEVER CAUSE: Primary | ICD-10-CM

## 2023-10-13 DIAGNOSIS — E66.3 OVERWEIGHT (BMI 25.0-29.9): ICD-10-CM

## 2023-10-13 DIAGNOSIS — R11.0 NAUSEA: ICD-10-CM

## 2023-10-13 DIAGNOSIS — I48.0 PAROXYSMAL ATRIAL FIBRILLATION: ICD-10-CM

## 2023-10-13 LAB
EXPIRATION DATE: NORMAL
FLUAV AG UPPER RESP QL IA.RAPID: NOT DETECTED
FLUBV AG UPPER RESP QL IA.RAPID: NOT DETECTED
INTERNAL CONTROL: NORMAL
Lab: NORMAL
SARS-COV-2 AG UPPER RESP QL IA.RAPID: NOT DETECTED

## 2023-10-13 RX ORDER — ONDANSETRON 8 MG/1
8 TABLET, ORALLY DISINTEGRATING ORAL EVERY 8 HOURS PRN
Qty: 20 TABLET | Refills: 1 | Status: SHIPPED | OUTPATIENT
Start: 2023-10-13

## 2023-10-13 NOTE — ASSESSMENT & PLAN NOTE
Patient's (Body mass index is 27.29 kg/mý.) indicates that they are overweight with health conditions that include hypertension, dyslipidemias, and osteoarthritis . Weight is unchanged. BMI is is above average; BMI management plan is completed. We discussed portion control and increasing exercise.

## 2023-10-13 NOTE — ASSESSMENT & PLAN NOTE
Follow labs and heart rate ok. Reduce eliquis to 2.5 mg daily if takes paxlovid if covid positive tomorrow

## 2023-10-13 NOTE — PROGRESS NOTES
Chief Complaint   Patient presents with    Fever     Everything started yesterday morning    Nausea    Fatigue    Chills       History of Present Illness  89 y.o. female presents for evaluation of fever and chills and cough and some nausea     Review of Systems   Constitutional:  Positive for fatigue and fever.   Respiratory:  Positive for cough. Negative for shortness of breath and wheezing.    Cardiovascular:  Negative for chest pain.   Neurological:  Positive for weakness.     .    PMSFH:  The following portions of the patient's history were reviewed and updated as appropriate: allergies, current medications, past family history, past medical history, past social history, past surgical history and problem list.      Current Outpatient Medications:     apixaban (Eliquis) 2.5 MG tablet tablet, Take 1 tablet by mouth Every 12 (Twelve) Hours., Disp: 14 tablet, Rfl: 0    bisacodyl (DULCOLAX) 10 MG suppository, Insert 1 suppository into the rectum Daily As Needed for Constipation., Disp: , Rfl:     cholecalciferol (VITAMIN D3) 10 MCG (400 UNIT) tablet, Take 1 tablet by mouth Every Morning., Disp: , Rfl:     Coenzyme Q10 (EQL CoQ10) 200 MG capsule, Take 200 mg by mouth 2 (Two) Times a Day., Disp: , Rfl:     Cranberry 450 MG tablet, Take 1 tablet by mouth Daily., Disp: , Rfl:     denosumab (Prolia) 60 MG/ML solution prefilled syringe syringe, Inject 1 mL under the skin into the appropriate area as directed Every 6 (Six) Months., Disp: , Rfl:     dilTIAZem CD (CARDIZEM CD) 120 MG 24 hr capsule, TAKE 1 CAPSULE DAILY, Disp: 90 capsule, Rfl: 3    docusate sodium (COLACE) 100 MG capsule, Take 1 capsule by mouth Daily., Disp: 90 capsule, Rfl: 1    hydrocortisone (ANUSOL-HC) 25 MG suppository, Insert 1 suppository into the rectum 2 (Two) Times a Day As Needed for Hemorrhoids., Disp: , Rfl:     melatonin 3 MG tablet, Take 1 tablet by mouth Every Night., Disp: , Rfl:     Mirabegron ER (Myrbetriq) 50 MG tablet sustained-release 24  "hour 24 hr tablet, Take 50 mg by mouth Daily., Disp: 90 tablet, Rfl: 1    omeprazole (priLOSEC) 40 MG capsule, TAKE 1 CAPSULE DAILY, Disp: 90 capsule, Rfl: 3    polyethylene glycol (MIRALAX) 17 GM/SCOOP powder, Take 17 g by mouth. Once a day on Mon, Wed, Fri and PRN for constipation, Disp: , Rfl:     rosuvastatin (CRESTOR) 5 MG tablet, TAKE 1 TABLET DAILY, Disp: 90 tablet, Rfl: 3    acetaminophen (TYLENOL) 325 MG tablet, Take 500 mg by mouth 3 (Three) Times a Day., Disp: , Rfl:     Cholecalciferol 50 MCG (2000 UT) capsule, Take 1 tablet by mouth Daily., Disp: , Rfl:     ondansetron ODT (ZOFRAN-ODT) 8 MG disintegrating tablet, Place 1 tablet on the tongue Every 8 (Eight) Hours As Needed for Nausea or Vomiting., Disp: 20 tablet, Rfl: 1    VITALS:  /82   Pulse 74   Temp 99.6 øF (37.6 øC)   Ht 160 cm (62.99\")   Wt 69.9 kg (154 lb)   BMI 27.29 kg/mý     Physical Exam  Constitutional:       Appearance: Normal appearance.   Cardiovascular:      Rate and Rhythm: Normal rate and regular rhythm.   Pulmonary:      Effort: Pulmonary effort is normal.      Breath sounds: No rales.   Abdominal:      General: Bowel sounds are normal.      Palpations: Abdomen is soft.   Neurological:      Mental Status: She is alert.   Psychiatric:         Mood and Affect: Mood normal.         Behavior: Behavior normal.         LABS:    CMP:  Lab Results   Component Value Date    BUN 9 09/28/2023    CREATININE 0.79 09/28/2023    EGFRIFNONA 70 09/27/2021     09/28/2023    K 4.4 09/28/2023     09/28/2023    CALCIUM 9.7 (H) 09/28/2023    ALBUMIN 4.1 09/28/2023    BILITOT 0.4 09/28/2023    ALKPHOS 78 09/28/2023    AST 20 09/28/2023    ALT 11 09/28/2023     CBC:  Lab Results   Component Value Date    WBC 5.78 09/28/2023    RBC 4.50 09/28/2023    HGB 14.0 09/28/2023    HCT 41.5 09/28/2023    MCV 92.2 09/28/2023    MCH 31.1 09/28/2023    MCHC 33.7 09/28/2023    RDW 11.4 (L) 09/28/2023     09/28/2023     LIPID PANEL:  Lab " Results   Component Value Date    TRIG 71 09/28/2023    HDL 69 (H) 09/28/2023    VLDL 13 09/28/2023    LDL 93 09/28/2023    LDLHDL 1.33 09/28/2023     Procedures         ASSESSMENT/PLAN:  Diagnoses and all orders for this visit:    1. Fever, unspecified fever cause (Primary)  Comments:  COVID, flu and rsv were ok. Check covid home test tomorrow  Orders:  -     POCT SARS-CoV-2 Antigen AMADOR + Flu    2. Nausea  Comments:  Use zofran as needed    3. Benign essential hypertension  Assessment & Plan:  Hypertension is unchanged.  Continue current treatment regimen.  Regular aerobic exercise.  Blood pressure will be reassessed in 2 weeks.      4. Paroxysmal atrial fibrillation  Assessment & Plan:  Follow labs and heart rate ok. Reduce eliquis to 2.5 mg daily if takes paxlovid if covid positive tomorrow       5. Overweight (BMI 25.0-29.9)  Assessment & Plan:  Patient's (Body mass index is 27.29 kg/mý.) indicates that they are overweight with health conditions that include hypertension, dyslipidemias, and osteoarthritis . Weight is unchanged. BMI is is above average; BMI management plan is completed. We discussed portion control and increasing exercise.       Other orders  -     ondansetron ODT (ZOFRAN-ODT) 8 MG disintegrating tablet; Place 1 tablet on the tongue Every 8 (Eight) Hours As Needed for Nausea or Vomiting.  Dispense: 20 tablet; Refill: 1        FOLLOW-UP:  No follow-ups on file.      Electronically signed by:    Schuyler Garcia MD

## 2023-10-13 NOTE — ASSESSMENT & PLAN NOTE
Hypertension is unchanged.  Continue current treatment regimen.  Regular aerobic exercise.  Blood pressure will be reassessed in 2 weeks.

## 2023-10-17 ENCOUNTER — TELEPHONE (OUTPATIENT)
Dept: INTERNAL MEDICINE | Facility: CLINIC | Age: 88
End: 2023-10-17
Payer: MEDICARE

## 2023-10-17 ENCOUNTER — HOSPITAL ENCOUNTER (OUTPATIENT)
Dept: GENERAL RADIOLOGY | Facility: HOSPITAL | Age: 88
Discharge: HOME | End: 2023-10-17
Payer: MEDICARE

## 2023-10-17 ENCOUNTER — OFFICE VISIT (OUTPATIENT)
Dept: INTERNAL MEDICINE | Facility: CLINIC | Age: 88
End: 2023-10-17
Payer: MEDICARE

## 2023-10-17 ENCOUNTER — LAB (OUTPATIENT)
Dept: LAB | Facility: HOSPITAL | Age: 88
End: 2023-10-17
Payer: MEDICARE

## 2023-10-17 ENCOUNTER — HOSPITAL ENCOUNTER (INPATIENT)
Facility: HOSPITAL | Age: 88
LOS: 5 days | Discharge: NURSING, REHAB OR LONG-TERM CARE | DRG: 291 | End: 2023-10-23
Attending: EMERGENCY MEDICINE | Admitting: INTERNAL MEDICINE
Payer: MEDICARE

## 2023-10-17 VITALS
TEMPERATURE: 98 F | HEIGHT: 63 IN | DIASTOLIC BLOOD PRESSURE: 52 MMHG | SYSTOLIC BLOOD PRESSURE: 96 MMHG | OXYGEN SATURATION: 94 % | HEART RATE: 84 BPM | WEIGHT: 154 LBS | BODY MASS INDEX: 27.29 KG/M2

## 2023-10-17 DIAGNOSIS — R06.89 DYSPNEA AND RESPIRATORY ABNORMALITIES: ICD-10-CM

## 2023-10-17 DIAGNOSIS — E83.52 HYPERCALCEMIA: ICD-10-CM

## 2023-10-17 DIAGNOSIS — R13.13 PHARYNGEAL DYSPHAGIA: ICD-10-CM

## 2023-10-17 DIAGNOSIS — R53.83 OTHER FATIGUE: ICD-10-CM

## 2023-10-17 DIAGNOSIS — R06.00 DYSPNEA AND RESPIRATORY ABNORMALITIES: Primary | ICD-10-CM

## 2023-10-17 DIAGNOSIS — I10 ELEVATED BLOOD PRESSURE READING WITH DIAGNOSIS OF HYPERTENSION: ICD-10-CM

## 2023-10-17 DIAGNOSIS — F03.90 DEMENTIA WITHOUT BEHAVIORAL DISTURBANCE: ICD-10-CM

## 2023-10-17 DIAGNOSIS — I10 BENIGN ESSENTIAL HYPERTENSION: ICD-10-CM

## 2023-10-17 DIAGNOSIS — R14.0 ABDOMINAL BLOATING: ICD-10-CM

## 2023-10-17 DIAGNOSIS — R06.00 DYSPNEA AND RESPIRATORY ABNORMALITIES: ICD-10-CM

## 2023-10-17 DIAGNOSIS — R42 DIZZINESS: ICD-10-CM

## 2023-10-17 DIAGNOSIS — I50.9 NEW ONSET OF CONGESTIVE HEART FAILURE: Primary | ICD-10-CM

## 2023-10-17 DIAGNOSIS — I48.0 PAROXYSMAL ATRIAL FIBRILLATION: ICD-10-CM

## 2023-10-17 DIAGNOSIS — R06.89 DYSPNEA AND RESPIRATORY ABNORMALITIES: Primary | ICD-10-CM

## 2023-10-17 DIAGNOSIS — R06.02 SHORTNESS OF BREATH: ICD-10-CM

## 2023-10-17 LAB
ALBUMIN SERPL-MCNC: 3.6 G/DL (ref 3.5–5.2)
ALBUMIN/GLOB SERPL: 0.9 G/DL
ALP SERPL-CCNC: 85 U/L (ref 39–117)
ALT SERPL W P-5'-P-CCNC: 13 U/L (ref 1–33)
ANION GAP SERPL CALCULATED.3IONS-SCNC: 9 MMOL/L (ref 5–15)
AST SERPL-CCNC: 24 U/L (ref 1–32)
BASOPHILS # BLD AUTO: 0.04 10*3/MM3 (ref 0–0.2)
BASOPHILS NFR BLD AUTO: 0.5 % (ref 0–1.5)
BILIRUB SERPL-MCNC: 0.2 MG/DL (ref 0–1.2)
BUN SERPL-MCNC: 12 MG/DL (ref 8–23)
BUN/CREAT SERPL: 16.9 (ref 7–25)
CALCIUM SPEC-SCNC: 9.6 MG/DL (ref 8.2–9.6)
CHLORIDE SERPL-SCNC: 95 MMOL/L (ref 98–107)
CO2 SERPL-SCNC: 27 MMOL/L (ref 22–29)
CREAT SERPL-MCNC: 0.71 MG/DL (ref 0.57–1)
D-LACTATE SERPL-SCNC: 1.8 MMOL/L (ref 0.5–2)
DEPRECATED RDW RBC AUTO: 43.8 FL (ref 37–54)
EGFRCR SERPLBLD CKD-EPI 2021: 79.9 ML/MIN/1.73
EOSINOPHIL # BLD AUTO: 0.2 10*3/MM3 (ref 0–0.4)
EOSINOPHIL NFR BLD AUTO: 2.6 % (ref 0.3–6.2)
ERYTHROCYTE [DISTWIDTH] IN BLOOD BY AUTOMATED COUNT: 12.4 % (ref 12.3–15.4)
GLOBULIN UR ELPH-MCNC: 4.1 GM/DL
GLUCOSE SERPL-MCNC: 99 MG/DL (ref 65–99)
HCT VFR BLD AUTO: 43.4 % (ref 34–46.6)
HGB BLD-MCNC: 14 G/DL (ref 12–15.9)
IMM GRANULOCYTES # BLD AUTO: 0.02 10*3/MM3 (ref 0–0.05)
IMM GRANULOCYTES NFR BLD AUTO: 0.3 % (ref 0–0.5)
LYMPHOCYTES # BLD AUTO: 1.61 10*3/MM3 (ref 0.7–3.1)
LYMPHOCYTES NFR BLD AUTO: 20.8 % (ref 19.6–45.3)
MCH RBC QN AUTO: 30.9 PG (ref 26.6–33)
MCHC RBC AUTO-ENTMCNC: 32.3 G/DL (ref 31.5–35.7)
MCV RBC AUTO: 95.8 FL (ref 79–97)
MONOCYTES # BLD AUTO: 0.81 10*3/MM3 (ref 0.1–0.9)
MONOCYTES NFR BLD AUTO: 10.5 % (ref 5–12)
NEUTROPHILS NFR BLD AUTO: 5.07 10*3/MM3 (ref 1.7–7)
NEUTROPHILS NFR BLD AUTO: 65.3 % (ref 42.7–76)
NRBC BLD AUTO-RTO: 0 /100 WBC (ref 0–0.2)
NT-PROBNP SERPL-MCNC: 5278 PG/ML (ref 0–1800)
PLATELET # BLD AUTO: 307 10*3/MM3 (ref 140–450)
PMV BLD AUTO: 11 FL (ref 6–12)
POTASSIUM SERPL-SCNC: 4.5 MMOL/L (ref 3.5–5.2)
PROCALCITONIN SERPL-MCNC: 0.02 NG/ML (ref 0–0.25)
PROT SERPL-MCNC: 7.7 G/DL (ref 6–8.5)
RBC # BLD AUTO: 4.53 10*6/MM3 (ref 3.77–5.28)
SODIUM SERPL-SCNC: 131 MMOL/L (ref 136–145)
TROPONIN T SERPL HS-MCNC: 171 NG/L
WBC NRBC COR # BLD: 7.75 10*3/MM3 (ref 3.4–10.8)

## 2023-10-17 PROCEDURE — 84484 ASSAY OF TROPONIN QUANT: CPT | Performed by: EMERGENCY MEDICINE

## 2023-10-17 PROCEDURE — 85025 COMPLETE CBC W/AUTO DIFF WBC: CPT | Performed by: EMERGENCY MEDICINE

## 2023-10-17 PROCEDURE — 85025 COMPLETE CBC W/AUTO DIFF WBC: CPT

## 2023-10-17 PROCEDURE — 83880 ASSAY OF NATRIURETIC PEPTIDE: CPT | Performed by: EMERGENCY MEDICINE

## 2023-10-17 PROCEDURE — 36415 COLL VENOUS BLD VENIPUNCTURE: CPT

## 2023-10-17 PROCEDURE — 99285 EMERGENCY DEPT VISIT HI MDM: CPT

## 2023-10-17 PROCEDURE — 87040 BLOOD CULTURE FOR BACTERIA: CPT | Performed by: EMERGENCY MEDICINE

## 2023-10-17 PROCEDURE — 83605 ASSAY OF LACTIC ACID: CPT | Performed by: EMERGENCY MEDICINE

## 2023-10-17 PROCEDURE — 82330 ASSAY OF CALCIUM: CPT

## 2023-10-17 PROCEDURE — 0202U NFCT DS 22 TRGT SARS-COV-2: CPT | Performed by: EMERGENCY MEDICINE

## 2023-10-17 PROCEDURE — 84145 PROCALCITONIN (PCT): CPT | Performed by: EMERGENCY MEDICINE

## 2023-10-17 PROCEDURE — 80053 COMPREHEN METABOLIC PANEL: CPT

## 2023-10-17 PROCEDURE — 80053 COMPREHEN METABOLIC PANEL: CPT | Performed by: EMERGENCY MEDICINE

## 2023-10-17 PROCEDURE — 83735 ASSAY OF MAGNESIUM: CPT

## 2023-10-17 PROCEDURE — 93005 ELECTROCARDIOGRAM TRACING: CPT | Performed by: EMERGENCY MEDICINE

## 2023-10-17 PROCEDURE — 71046 X-RAY EXAM CHEST 2 VIEWS: CPT

## 2023-10-17 PROCEDURE — 74018 RADEX ABDOMEN 1 VIEW: CPT

## 2023-10-17 PROCEDURE — 83880 ASSAY OF NATRIURETIC PEPTIDE: CPT

## 2023-10-17 RX ORDER — SODIUM CHLORIDE 0.9 % (FLUSH) 0.9 %
10 SYRINGE (ML) INJECTION AS NEEDED
Status: DISCONTINUED | OUTPATIENT
Start: 2023-10-17 | End: 2023-10-23 | Stop reason: HOSPADM

## 2023-10-17 NOTE — Clinical Note
Level of Care: Telemetry [5]   Diagnosis: Acute exacerbation of CHF (congestive heart failure) [060809]   Admitting Physician: THOMAS VELEZ [[id number]]   Attending Physician: THOMAS VELEZ [[id number]]   Bed Request Comments: tele

## 2023-10-17 NOTE — PROGRESS NOTES
Chief Complaint   Patient presents with    Nausea    Dizziness    Fatigue    Leg Swelling     both    Bloated     Counseling was given to patient, family, and caretaker for the following topics: diagnostic results, instructions for management, risk factor reductions, prognosis, patient and family education, impressions, risks and benefits of treatment options, and importance of treatment compliance . Total time of the encounter was 44 minutes .counseling.   History of Present Illness  89 y.o. female presents for evaluation of some mild shortness of air and some bloating and lower legs edema She has been constipated. She was doing better. She has intermittent cough.     Review of Systems   Constitutional:  Positive for fatigue. Negative for chills, diaphoresis and fever.   HENT:  Positive for postnasal drip.    Respiratory:  Positive for cough and shortness of breath. Negative for wheezing.    Cardiovascular:  Positive for leg swelling. Negative for chest pain.   Gastrointestinal:  Positive for abdominal distention and constipation. Negative for abdominal pain.   Neurological:  Positive for dizziness and weakness.   Psychiatric/Behavioral:  Positive for confusion and decreased concentration.      .    PMSFH:  The following portions of the patient's history were reviewed and updated as appropriate: allergies, current medications, past family history, past medical history, past social history, past surgical history and problem list.      Current Outpatient Medications:     apixaban (Eliquis) 2.5 MG tablet tablet, Take 1 tablet by mouth Every 12 (Twelve) Hours., Disp: 14 tablet, Rfl: 0    bisacodyl (DULCOLAX) 10 MG suppository, Insert 1 suppository into the rectum Daily As Needed for Constipation., Disp: , Rfl:     cholecalciferol (VITAMIN D3) 10 MCG (400 UNIT) tablet, Take 1 tablet by mouth Every Morning., Disp: , Rfl:     Coenzyme Q10 (EQL CoQ10) 200 MG capsule, Take 200 mg by mouth 2 (Two) Times a Day., Disp: , Rfl:  "    Cranberry 450 MG tablet, Take 1 tablet by mouth Daily., Disp: , Rfl:     denosumab (Prolia) 60 MG/ML solution prefilled syringe syringe, Inject 1 mL under the skin into the appropriate area as directed Every 6 (Six) Months., Disp: , Rfl:     dilTIAZem CD (CARDIZEM CD) 120 MG 24 hr capsule, TAKE 1 CAPSULE DAILY, Disp: 90 capsule, Rfl: 3    docusate sodium (COLACE) 100 MG capsule, Take 1 capsule by mouth Daily., Disp: 90 capsule, Rfl: 1    hydrocortisone (ANUSOL-HC) 25 MG suppository, Insert 1 suppository into the rectum 2 (Two) Times a Day As Needed for Hemorrhoids., Disp: , Rfl:     melatonin 3 MG tablet, Take 1 tablet by mouth Every Night., Disp: , Rfl:     Mirabegron ER (Myrbetriq) 50 MG tablet sustained-release 24 hour 24 hr tablet, Take 50 mg by mouth Daily., Disp: 90 tablet, Rfl: 1    omeprazole (priLOSEC) 40 MG capsule, TAKE 1 CAPSULE DAILY, Disp: 90 capsule, Rfl: 3    polyethylene glycol (MIRALAX) 17 GM/SCOOP powder, Take 17 g by mouth. Once a day on Mon, Wed, Fri and PRN for constipation, Disp: , Rfl:     rosuvastatin (CRESTOR) 5 MG tablet, TAKE 1 TABLET DAILY, Disp: 90 tablet, Rfl: 3    acetaminophen (TYLENOL) 325 MG tablet, Take 500 mg by mouth 3 (Three) Times a Day., Disp: , Rfl:     ondansetron ODT (ZOFRAN-ODT) 8 MG disintegrating tablet, Place 1 tablet on the tongue Every 8 (Eight) Hours As Needed for Nausea or Vomiting., Disp: 20 tablet, Rfl: 1    VITALS:  BP 96/52   Pulse 84   Temp 98 °F (36.7 °C)   Ht 160 cm (62.99\")   Wt 69.9 kg (154 lb)   SpO2 94%   BMI 27.29 kg/m²     Physical Exam  Constitutional:       General: She is not in acute distress.     Appearance: Normal appearance.   Cardiovascular:      Rate and Rhythm: Normal rate. Rhythm irregular.      Heart sounds: Murmur (III/VI SM) heard.   Pulmonary:      Breath sounds: No wheezing or rales.   Abdominal:      General: Bowel sounds are normal. There is distension.      Palpations: Abdomen is soft. There is no mass.      Tenderness: " "There is no abdominal tenderness. There is no right CVA tenderness, left CVA tenderness, guarding or rebound.   Musculoskeletal:         General: Swelling (1 to 2 + leg swelling without erythema) present.   Neurological:      General: No focal deficit present.      Mental Status: She is alert.   Psychiatric:         Mood and Affect: Mood normal.         Behavior: Behavior normal.         LABS:    CMP:  Lab Results   Component Value Date    BUN 9 09/28/2023    CREATININE 0.79 09/28/2023    EGFRIFNONA 70 09/27/2021     09/28/2023    K 4.4 09/28/2023     09/28/2023    CALCIUM 9.7 (H) 09/28/2023    ALBUMIN 4.1 09/28/2023    BILITOT 0.4 09/28/2023    ALKPHOS 78 09/28/2023    AST 20 09/28/2023    ALT 11 09/28/2023     CBC:  Lab Results   Component Value Date    WBC 5.78 09/28/2023    RBC 4.50 09/28/2023    HGB 14.0 09/28/2023    HCT 41.5 09/28/2023    MCV 92.2 09/28/2023    MCH 31.1 09/28/2023    MCHC 33.7 09/28/2023    RDW 11.4 (L) 09/28/2023     09/28/2023     LIPID PANEL:  Lab Results   Component Value Date    TRIG 71 09/28/2023    HDL 69 (H) 09/28/2023    VLDL 13 09/28/2023    LDL 93 09/28/2023    LDLHDL 1.33 09/28/2023     HGBA1C(LAST 2):No results found for: \"HGBA1C\"  UA:    Lab Results   Component Value Date    SQUAMEPIUA 0-2 06/27/2023    SPECGRAVUR 1.017 06/27/2023    KETONESU Negative 06/27/2023    BLOODU Trace (A) 06/27/2023    LEUKOCYTESUR Moderate (2+) (A) 06/27/2023    NITRITEU Positive (A) 06/27/2023    RBCUA None Seen 06/27/2023    WBCUA 21-30 (A) 06/27/2023    BACTERIA 4+ (A) 06/27/2023     Procedures         ASSESSMENT/PLAN:  Diagnoses and all orders for this visit:    1. Dyspnea and respiratory abnormalities (Primary)  Comments:  Follow with Dr Miles this week and get labs and chest xray  Orders:  -     BNP; Future  -     CBC & Differential; Future  -     XR Chest PA & Lateral; Future    2. Abdominal bloating  Comments:  Likely constpation. Check KUB and discussed enema  Orders:  -   "   XR Abdomen KUB; Future    3. Benign essential hypertension  Assessment & Plan:  Blood pressure running low Monitor and check labs.     Orders:  -     Comprehensive Metabolic Panel; Future    4. Hypercalcemia  Comments:  Follow labs.  Orders:  -     Calcium, Ionized; Future  -     Magnesium; Future    5. Other fatigue  Assessment & Plan:  Worsened and follow labs as trying to beat viral syndrome       6. Dizziness  Assessment & Plan:  Stay hydrated and check labs.       7. Dementia without behavioral disturbance  Assessment & Plan:  Psychological condition is unchanged.  Continue current treatment regimen.  Regular aerobic exercise.  Psychological condition  will be reassessed in 3 months.          FOLLOW-UP:  No follow-ups on file.      Electronically signed by:    Schuyler Garcia MD

## 2023-10-18 ENCOUNTER — APPOINTMENT (OUTPATIENT)
Dept: GENERAL RADIOLOGY | Facility: HOSPITAL | Age: 88
DRG: 291 | End: 2023-10-18
Payer: MEDICARE

## 2023-10-18 ENCOUNTER — APPOINTMENT (OUTPATIENT)
Dept: CARDIOLOGY | Facility: HOSPITAL | Age: 88
DRG: 291 | End: 2023-10-18
Payer: MEDICARE

## 2023-10-18 PROBLEM — I50.9 ACUTE EXACERBATION OF CHF (CONGESTIVE HEART FAILURE): Status: ACTIVE | Noted: 2023-10-18

## 2023-10-18 PROBLEM — I50.9 CHF (CONGESTIVE HEART FAILURE): Status: ACTIVE | Noted: 2023-10-18

## 2023-10-18 PROBLEM — I35.0 SEVERE AORTIC STENOSIS: Status: ACTIVE | Noted: 2023-10-18

## 2023-10-18 PROBLEM — R09.02 HYPOXIA: Status: ACTIVE | Noted: 2023-10-18

## 2023-10-18 LAB
ALBUMIN SERPL-MCNC: 3.8 G/DL (ref 3.5–5.2)
ALBUMIN/GLOB SERPL: 1.1 G/DL
ALP SERPL-CCNC: 77 U/L (ref 39–117)
ALT SERPL W P-5'-P-CCNC: 11 U/L (ref 1–33)
ANION GAP SERPL CALCULATED.3IONS-SCNC: 12 MMOL/L (ref 5–15)
ANION GAP SERPL CALCULATED.3IONS-SCNC: 13.8 MMOL/L (ref 5–15)
ASCENDING AORTA: 2.5 CM
AST SERPL-CCNC: 21 U/L (ref 1–32)
B PARAPERT DNA SPEC QL NAA+PROBE: NOT DETECTED
B PERT DNA SPEC QL NAA+PROBE: NOT DETECTED
BASOPHILS # BLD AUTO: 0.04 10*3/MM3 (ref 0–0.2)
BASOPHILS # BLD AUTO: 0.04 10*3/MM3 (ref 0–0.2)
BASOPHILS NFR BLD AUTO: 0.6 % (ref 0–1.5)
BASOPHILS NFR BLD AUTO: 0.6 % (ref 0–1.5)
BH CV ECHO MEAS - AI P1/2T: 305.9 MSEC
BH CV ECHO MEAS - AO MAX PG: 78.9 MMHG
BH CV ECHO MEAS - AO MEAN PG: 65 MMHG
BH CV ECHO MEAS - AO ROOT DIAM: 3 CM
BH CV ECHO MEAS - AO V2 MAX: 440 CM/SEC
BH CV ECHO MEAS - AO V2 VTI: 83.1 CM
BH CV ECHO MEAS - AVA(I,D): 0.65 CM2
BH CV ECHO MEAS - EDV(CUBED): 61.6 ML
BH CV ECHO MEAS - EDV(MOD-SP2): 20.9 ML
BH CV ECHO MEAS - EDV(MOD-SP4): 38.6 ML
BH CV ECHO MEAS - EF(MOD-BP): 74.9 %
BH CV ECHO MEAS - EF(MOD-SP2): 84.3 %
BH CV ECHO MEAS - EF(MOD-SP4): 63 %
BH CV ECHO MEAS - ESV(CUBED): 17.6 ML
BH CV ECHO MEAS - ESV(MOD-SP2): 3.3 ML
BH CV ECHO MEAS - ESV(MOD-SP4): 14.3 ML
BH CV ECHO MEAS - FS: 34.2 %
BH CV ECHO MEAS - IVS/LVPW: 0.78 CM
BH CV ECHO MEAS - IVSD: 1.25 CM
BH CV ECHO MEAS - LA DIMENSION: 4 CM
BH CV ECHO MEAS - LAT PEAK E' VEL: 12.5 CM/SEC
BH CV ECHO MEAS - LV MASS(C)D: 210.9 GRAMS
BH CV ECHO MEAS - LV MAX PG: 1.81 MMHG
BH CV ECHO MEAS - LV MEAN PG: 1 MMHG
BH CV ECHO MEAS - LV V1 MAX: 67.1 CM/SEC
BH CV ECHO MEAS - LV V1 VTI: 11.9 CM
BH CV ECHO MEAS - LVIDD: 4 CM
BH CV ECHO MEAS - LVIDS: 2.6 CM
BH CV ECHO MEAS - LVOT AREA: 4.5 CM2
BH CV ECHO MEAS - LVOT DIAM: 2.4 CM
BH CV ECHO MEAS - LVPWD: 1.6 CM
BH CV ECHO MEAS - MED PEAK E' VEL: 3.4 CM/SEC
BH CV ECHO MEAS - MR MAX PG: 30.9 MMHG
BH CV ECHO MEAS - MR MAX VEL: 278 CM/SEC
BH CV ECHO MEAS - MR MEAN PG: 24 MMHG
BH CV ECHO MEAS - MR MEAN VEL: 239 CM/SEC
BH CV ECHO MEAS - MR VTI: 77.4 CM
BH CV ECHO MEAS - MV A MAX VEL: 60.6 CM/SEC
BH CV ECHO MEAS - MV DEC TIME: 0.12 SEC
BH CV ECHO MEAS - MV E MAX VEL: 126.3 CM/SEC
BH CV ECHO MEAS - MV E/A: 2.08
BH CV ECHO MEAS - MV MAX PG: 6.1 MMHG
BH CV ECHO MEAS - MV MEAN PG: 2 MMHG
BH CV ECHO MEAS - MV V2 VTI: 14.9 CM
BH CV ECHO MEAS - MVA(VTI): 3.6 CM2
BH CV ECHO MEAS - PA ACC TIME: 0.11 SEC
BH CV ECHO MEAS - PA V2 MAX: 209 CM/SEC
BH CV ECHO MEAS - RVSP: 32 MMHG
BH CV ECHO MEAS - SV(LVOT): 54 ML
BH CV ECHO MEAS - SV(MOD-SP2): 17.6 ML
BH CV ECHO MEAS - SV(MOD-SP4): 24.3 ML
BH CV ECHO MEAS - TAPSE (>1.6): 0.91 CM
BH CV ECHO MEAS - TR MAX PG: 24.7 MMHG
BH CV ECHO MEAS - TR MAX VEL: 247.5 CM/SEC
BH CV ECHO MEASUREMENTS AVERAGE E/E' RATIO: 15.89
BH CV XLRA - RV BASE: 3.4 CM
BH CV XLRA - RV LENGTH: 6.8 CM
BH CV XLRA - RV MID: 2.5 CM
BH CV XLRA - TDI S': 10.8 CM/SEC
BILIRUB SERPL-MCNC: 0.3 MG/DL (ref 0–1.2)
BUN SERPL-MCNC: 14 MG/DL (ref 8–23)
BUN SERPL-MCNC: 9 MG/DL (ref 8–23)
BUN/CREAT SERPL: 14.3 (ref 7–25)
BUN/CREAT SERPL: 16.1 (ref 7–25)
C PNEUM DNA NPH QL NAA+NON-PROBE: NOT DETECTED
CA-I BLD-MCNC: 5.2 MG/DL (ref 4.6–5.4)
CA-I SERPL ISE-MCNC: 1.29 MMOL/L (ref 1.15–1.35)
CALCIUM SPEC-SCNC: 9 MG/DL (ref 8.2–9.6)
CALCIUM SPEC-SCNC: 9.7 MG/DL (ref 8.2–9.6)
CHLORIDE SERPL-SCNC: 95 MMOL/L (ref 98–107)
CHLORIDE SERPL-SCNC: 95 MMOL/L (ref 98–107)
CO2 SERPL-SCNC: 25 MMOL/L (ref 22–29)
CO2 SERPL-SCNC: 26.2 MMOL/L (ref 22–29)
CREAT SERPL-MCNC: 0.63 MG/DL (ref 0.57–1)
CREAT SERPL-MCNC: 0.87 MG/DL (ref 0.57–1)
DEPRECATED RDW RBC AUTO: 37.9 FL (ref 37–54)
DEPRECATED RDW RBC AUTO: 42.4 FL (ref 37–54)
EGFRCR SERPLBLD CKD-EPI 2021: 62.6 ML/MIN/1.73
EGFRCR SERPLBLD CKD-EPI 2021: 83.3 ML/MIN/1.73
EOSINOPHIL # BLD AUTO: 0.16 10*3/MM3 (ref 0–0.4)
EOSINOPHIL # BLD AUTO: 0.21 10*3/MM3 (ref 0–0.4)
EOSINOPHIL NFR BLD AUTO: 2.3 % (ref 0.3–6.2)
EOSINOPHIL NFR BLD AUTO: 3 % (ref 0.3–6.2)
ERYTHROCYTE [DISTWIDTH] IN BLOOD BY AUTOMATED COUNT: 11.4 % (ref 12.3–15.4)
ERYTHROCYTE [DISTWIDTH] IN BLOOD BY AUTOMATED COUNT: 12.2 % (ref 12.3–15.4)
FLUAV SUBTYP SPEC NAA+PROBE: NOT DETECTED
FLUBV RNA ISLT QL NAA+PROBE: NOT DETECTED
GEN 5 2HR TROPONIN T REFLEX: 150 NG/L
GLOBULIN UR ELPH-MCNC: 3.4 GM/DL
GLUCOSE SERPL-MCNC: 109 MG/DL (ref 65–99)
GLUCOSE SERPL-MCNC: 97 MG/DL (ref 65–99)
HADV DNA SPEC NAA+PROBE: NOT DETECTED
HCOV 229E RNA SPEC QL NAA+PROBE: NOT DETECTED
HCOV HKU1 RNA SPEC QL NAA+PROBE: NOT DETECTED
HCOV NL63 RNA SPEC QL NAA+PROBE: NOT DETECTED
HCOV OC43 RNA SPEC QL NAA+PROBE: NOT DETECTED
HCT VFR BLD AUTO: 38 % (ref 34–46.6)
HCT VFR BLD AUTO: 39.4 % (ref 34–46.6)
HGB BLD-MCNC: 12.4 G/DL (ref 12–15.9)
HGB BLD-MCNC: 13.4 G/DL (ref 12–15.9)
HMPV RNA NPH QL NAA+NON-PROBE: NOT DETECTED
HPIV1 RNA ISLT QL NAA+PROBE: NOT DETECTED
HPIV2 RNA SPEC QL NAA+PROBE: NOT DETECTED
HPIV3 RNA NPH QL NAA+PROBE: NOT DETECTED
HPIV4 P GENE NPH QL NAA+PROBE: NOT DETECTED
IMM GRANULOCYTES # BLD AUTO: 0.02 10*3/MM3 (ref 0–0.05)
IMM GRANULOCYTES # BLD AUTO: 0.03 10*3/MM3 (ref 0–0.05)
IMM GRANULOCYTES NFR BLD AUTO: 0.3 % (ref 0–0.5)
IMM GRANULOCYTES NFR BLD AUTO: 0.4 % (ref 0–0.5)
IVRT: 63 MS
LEFT ATRIUM VOLUME INDEX: 39.6 ML/M2
LYMPHOCYTES # BLD AUTO: 1.29 10*3/MM3 (ref 0.7–3.1)
LYMPHOCYTES # BLD AUTO: 1.56 10*3/MM3 (ref 0.7–3.1)
LYMPHOCYTES NFR BLD AUTO: 18.8 % (ref 19.6–45.3)
LYMPHOCYTES NFR BLD AUTO: 22.1 % (ref 19.6–45.3)
M PNEUMO IGG SER IA-ACNC: NOT DETECTED
MAGNESIUM SERPL-MCNC: 2.3 MG/DL (ref 1.7–2.3)
MCH RBC QN AUTO: 30.7 PG (ref 26.6–33)
MCH RBC QN AUTO: 31.2 PG (ref 26.6–33)
MCHC RBC AUTO-ENTMCNC: 32.6 G/DL (ref 31.5–35.7)
MCHC RBC AUTO-ENTMCNC: 34 G/DL (ref 31.5–35.7)
MCV RBC AUTO: 91.6 FL (ref 79–97)
MCV RBC AUTO: 94.1 FL (ref 79–97)
MONOCYTES # BLD AUTO: 0.75 10*3/MM3 (ref 0.1–0.9)
MONOCYTES # BLD AUTO: 0.84 10*3/MM3 (ref 0.1–0.9)
MONOCYTES NFR BLD AUTO: 10.9 % (ref 5–12)
MONOCYTES NFR BLD AUTO: 11.9 % (ref 5–12)
NEUTROPHILS NFR BLD AUTO: 4.39 10*3/MM3 (ref 1.7–7)
NEUTROPHILS NFR BLD AUTO: 4.61 10*3/MM3 (ref 1.7–7)
NEUTROPHILS NFR BLD AUTO: 62 % (ref 42.7–76)
NEUTROPHILS NFR BLD AUTO: 67.1 % (ref 42.7–76)
NRBC BLD AUTO-RTO: 0 /100 WBC (ref 0–0.2)
NRBC BLD AUTO-RTO: 0 /100 WBC (ref 0–0.2)
NT-PROBNP SERPL-MCNC: 6344 PG/ML (ref 0–1800)
PLATELET # BLD AUTO: 297 10*3/MM3 (ref 140–450)
PLATELET # BLD AUTO: 329 10*3/MM3 (ref 140–450)
PMV BLD AUTO: 10.9 FL (ref 6–12)
PMV BLD AUTO: 11.7 FL (ref 6–12)
POTASSIUM SERPL-SCNC: 4.1 MMOL/L (ref 3.5–5.2)
POTASSIUM SERPL-SCNC: 4.2 MMOL/L (ref 3.5–5.2)
PROT SERPL-MCNC: 7.2 G/DL (ref 6–8.5)
QT INTERVAL: 400 MS
QTC INTERVAL: 467 MS
RBC # BLD AUTO: 4.04 10*6/MM3 (ref 3.77–5.28)
RBC # BLD AUTO: 4.3 10*6/MM3 (ref 3.77–5.28)
RHINOVIRUS RNA SPEC NAA+PROBE: NOT DETECTED
RSV RNA NPH QL NAA+NON-PROBE: NOT DETECTED
SARS-COV-2 RNA NPH QL NAA+NON-PROBE: NOT DETECTED
SODIUM SERPL-SCNC: 132 MMOL/L (ref 136–145)
SODIUM SERPL-SCNC: 135 MMOL/L (ref 136–145)
TROPONIN T DELTA: -21 NG/L
TSH SERPL DL<=0.05 MIU/L-ACNC: 0.7 UIU/ML (ref 0.27–4.2)
WBC NRBC COR # BLD: 6.87 10*3/MM3 (ref 3.4–10.8)
WBC NRBC COR # BLD: 7.07 10*3/MM3 (ref 3.4–10.8)

## 2023-10-18 PROCEDURE — 25010000002 DIGOXIN PER 500 MCG: Performed by: FAMILY MEDICINE

## 2023-10-18 PROCEDURE — 99222 1ST HOSP IP/OBS MODERATE 55: CPT | Performed by: INTERNAL MEDICINE

## 2023-10-18 PROCEDURE — 93306 TTE W/DOPPLER COMPLETE: CPT

## 2023-10-18 PROCEDURE — 93306 TTE W/DOPPLER COMPLETE: CPT | Performed by: INTERNAL MEDICINE

## 2023-10-18 PROCEDURE — 93010 ELECTROCARDIOGRAM REPORT: CPT | Performed by: INTERNAL MEDICINE

## 2023-10-18 PROCEDURE — 71045 X-RAY EXAM CHEST 1 VIEW: CPT

## 2023-10-18 PROCEDURE — 80048 BASIC METABOLIC PNL TOTAL CA: CPT | Performed by: INTERNAL MEDICINE

## 2023-10-18 PROCEDURE — 85025 COMPLETE CBC W/AUTO DIFF WBC: CPT | Performed by: INTERNAL MEDICINE

## 2023-10-18 PROCEDURE — 25010000002 AMIODARONE IN DEXTROSE 5% 360-4.14 MG/200ML-% SOLUTION: Performed by: INTERNAL MEDICINE

## 2023-10-18 PROCEDURE — 93005 ELECTROCARDIOGRAM TRACING: CPT | Performed by: INTERNAL MEDICINE

## 2023-10-18 PROCEDURE — 99233 SBSQ HOSP IP/OBS HIGH 50: CPT | Performed by: INTERNAL MEDICINE

## 2023-10-18 PROCEDURE — 84443 ASSAY THYROID STIM HORMONE: CPT | Performed by: INTERNAL MEDICINE

## 2023-10-18 PROCEDURE — 93005 ELECTROCARDIOGRAM TRACING: CPT | Performed by: FAMILY MEDICINE

## 2023-10-18 PROCEDURE — 25010000002 DIGOXIN PER 500 MCG: Performed by: INTERNAL MEDICINE

## 2023-10-18 PROCEDURE — 84484 ASSAY OF TROPONIN QUANT: CPT | Performed by: EMERGENCY MEDICINE

## 2023-10-18 RX ORDER — DILTIAZEM HYDROCHLORIDE 120 MG/1
120 CAPSULE, COATED, EXTENDED RELEASE ORAL DAILY
COMMUNITY
End: 2023-10-23 | Stop reason: HOSPADM

## 2023-10-18 RX ORDER — SODIUM CHLORIDE 9 MG/ML
40 INJECTION, SOLUTION INTRAVENOUS AS NEEDED
Status: DISCONTINUED | OUTPATIENT
Start: 2023-10-18 | End: 2023-10-23 | Stop reason: HOSPADM

## 2023-10-18 RX ORDER — OXYBUTYNIN CHLORIDE 10 MG/1
10 TABLET, EXTENDED RELEASE ORAL DAILY
Status: DISCONTINUED | OUTPATIENT
Start: 2023-10-18 | End: 2023-10-23 | Stop reason: HOSPADM

## 2023-10-18 RX ORDER — BUMETANIDE 0.25 MG/ML
1 INJECTION INTRAMUSCULAR; INTRAVENOUS ONCE
Status: COMPLETED | OUTPATIENT
Start: 2023-10-18 | End: 2023-10-18

## 2023-10-18 RX ORDER — BUMETANIDE 0.25 MG/ML
0.5 INJECTION INTRAMUSCULAR; INTRAVENOUS
Status: CANCELLED | OUTPATIENT
Start: 2023-10-18

## 2023-10-18 RX ORDER — ROSUVASTATIN CALCIUM 10 MG/1
5 TABLET, COATED ORAL NIGHTLY
Status: DISCONTINUED | OUTPATIENT
Start: 2023-10-18 | End: 2023-10-23 | Stop reason: HOSPADM

## 2023-10-18 RX ORDER — ONDANSETRON 2 MG/ML
4 INJECTION INTRAMUSCULAR; INTRAVENOUS EVERY 6 HOURS PRN
Status: DISCONTINUED | OUTPATIENT
Start: 2023-10-18 | End: 2023-10-23 | Stop reason: HOSPADM

## 2023-10-18 RX ORDER — DIGOXIN 0.25 MG/ML
250 INJECTION INTRAMUSCULAR; INTRAVENOUS ONCE
Status: DISCONTINUED | OUTPATIENT
Start: 2023-10-18 | End: 2023-10-18

## 2023-10-18 RX ORDER — BUMETANIDE 0.25 MG/ML
1 INJECTION INTRAMUSCULAR; INTRAVENOUS
Status: DISCONTINUED | OUTPATIENT
Start: 2023-10-18 | End: 2023-10-20

## 2023-10-18 RX ORDER — HYDROXYZINE HYDROCHLORIDE 25 MG/1
25 TABLET, FILM COATED ORAL 3 TIMES DAILY PRN
Status: DISCONTINUED | OUTPATIENT
Start: 2023-10-18 | End: 2023-10-23 | Stop reason: HOSPADM

## 2023-10-18 RX ORDER — SODIUM CHLORIDE 0.9 % (FLUSH) 0.9 %
10 SYRINGE (ML) INJECTION EVERY 12 HOURS SCHEDULED
Status: DISCONTINUED | OUTPATIENT
Start: 2023-10-18 | End: 2023-10-23 | Stop reason: HOSPADM

## 2023-10-18 RX ORDER — ROSUVASTATIN CALCIUM 5 MG/1
5 TABLET, COATED ORAL NIGHTLY
COMMUNITY

## 2023-10-18 RX ORDER — OMEGA-3S/DHA/EPA/FISH OIL/D3 300MG-1000
400 CAPSULE ORAL DAILY
Status: DISCONTINUED | OUTPATIENT
Start: 2023-10-18 | End: 2023-10-23 | Stop reason: HOSPADM

## 2023-10-18 RX ORDER — DOCUSATE SODIUM 100 MG/1
100 CAPSULE, LIQUID FILLED ORAL 2 TIMES DAILY
Status: DISCONTINUED | OUTPATIENT
Start: 2023-10-18 | End: 2023-10-23 | Stop reason: HOSPADM

## 2023-10-18 RX ORDER — OMEGA-3S/DHA/EPA/FISH OIL/D3 300MG-1000
400 CAPSULE ORAL EVERY MORNING
Status: DISCONTINUED | OUTPATIENT
Start: 2023-10-18 | End: 2023-10-18

## 2023-10-18 RX ORDER — LANOLIN ALCOHOL/MO/W.PET/CERES
3 CREAM (GRAM) TOPICAL NIGHTLY
Status: DISCONTINUED | OUTPATIENT
Start: 2023-10-18 | End: 2023-10-18

## 2023-10-18 RX ORDER — BISACODYL 10 MG
10 SUPPOSITORY, RECTAL RECTAL DAILY PRN
Status: DISCONTINUED | OUTPATIENT
Start: 2023-10-18 | End: 2023-10-23 | Stop reason: HOSPADM

## 2023-10-18 RX ORDER — SODIUM CHLORIDE 0.9 % (FLUSH) 0.9 %
10 SYRINGE (ML) INJECTION AS NEEDED
Status: DISCONTINUED | OUTPATIENT
Start: 2023-10-18 | End: 2023-10-23 | Stop reason: HOSPADM

## 2023-10-18 RX ORDER — DOCUSATE SODIUM 100 MG/1
100 CAPSULE, LIQUID FILLED ORAL 2 TIMES DAILY
COMMUNITY

## 2023-10-18 RX ORDER — ONDANSETRON 4 MG/1
4 TABLET, FILM COATED ORAL EVERY 6 HOURS PRN
Status: DISCONTINUED | OUTPATIENT
Start: 2023-10-18 | End: 2023-10-23 | Stop reason: HOSPADM

## 2023-10-18 RX ORDER — DIGOXIN 0.25 MG/ML
250 INJECTION INTRAMUSCULAR; INTRAVENOUS ONCE
Status: COMPLETED | OUTPATIENT
Start: 2023-10-18 | End: 2023-10-18

## 2023-10-18 RX ORDER — PANTOPRAZOLE SODIUM 40 MG/1
40 TABLET, DELAYED RELEASE ORAL
Status: DISCONTINUED | OUTPATIENT
Start: 2023-10-18 | End: 2023-10-23 | Stop reason: HOSPADM

## 2023-10-18 RX ORDER — METOPROLOL TARTRATE 5 MG/5ML
2.5 INJECTION INTRAVENOUS
Status: DISCONTINUED | OUTPATIENT
Start: 2023-10-18 | End: 2023-10-23 | Stop reason: HOSPADM

## 2023-10-18 RX ORDER — DILTIAZEM HYDROCHLORIDE 120 MG/1
120 CAPSULE, COATED, EXTENDED RELEASE ORAL DAILY
Status: DISCONTINUED | OUTPATIENT
Start: 2023-10-18 | End: 2023-10-18

## 2023-10-18 RX ORDER — MIDODRINE HYDROCHLORIDE 5 MG/1
5 TABLET ORAL
Status: DISCONTINUED | OUTPATIENT
Start: 2023-10-18 | End: 2023-10-19

## 2023-10-18 RX ORDER — CHOLECALCIFEROL (VITAMIN D3) 125 MCG
5 CAPSULE ORAL NIGHTLY
Status: DISCONTINUED | OUTPATIENT
Start: 2023-10-18 | End: 2023-10-23 | Stop reason: HOSPADM

## 2023-10-18 RX ORDER — DIGOXIN 0.25 MG/ML
250 INJECTION INTRAMUSCULAR; INTRAVENOUS EVERY 6 HOURS
Status: COMPLETED | OUTPATIENT
Start: 2023-10-18 | End: 2023-10-18

## 2023-10-18 RX ORDER — ACETAMINOPHEN 500 MG
500 TABLET ORAL EVERY 6 HOURS PRN
Status: DISCONTINUED | OUTPATIENT
Start: 2023-10-18 | End: 2023-10-23 | Stop reason: HOSPADM

## 2023-10-18 RX ORDER — POLYETHYLENE GLYCOL 3350 17 G/17G
17 POWDER, FOR SOLUTION ORAL DAILY
Status: DISCONTINUED | OUTPATIENT
Start: 2023-10-18 | End: 2023-10-23 | Stop reason: HOSPADM

## 2023-10-18 RX ORDER — OMEPRAZOLE 40 MG/1
40 CAPSULE, DELAYED RELEASE ORAL
COMMUNITY

## 2023-10-18 RX ADMIN — DOCUSATE SODIUM 100 MG: 100 CAPSULE, LIQUID FILLED ORAL at 08:07

## 2023-10-18 RX ADMIN — CHOLECALCIFEROL (VITAMIN D3) 10 MCG (400 UNIT) TABLET 400 UNITS: at 08:07

## 2023-10-18 RX ADMIN — AMIODARONE HYDROCHLORIDE 1 MG/MIN: 1.8 INJECTION, SOLUTION INTRAVENOUS at 09:16

## 2023-10-18 RX ADMIN — Medication 5 MG: at 20:07

## 2023-10-18 RX ADMIN — AMIODARONE HYDROCHLORIDE 1 MG/MIN: 1.8 INJECTION, SOLUTION INTRAVENOUS at 20:27

## 2023-10-18 RX ADMIN — BUMETANIDE 1 MG: 0.25 INJECTION, SOLUTION INTRAMUSCULAR; INTRAVENOUS at 00:10

## 2023-10-18 RX ADMIN — ROSUVASTATIN 5 MG: 10 TABLET, FILM COATED ORAL at 20:07

## 2023-10-18 RX ADMIN — DIGOXIN 250 MCG: 0.25 INJECTION INTRAMUSCULAR; INTRAVENOUS at 12:33

## 2023-10-18 RX ADMIN — MIDODRINE HYDROCHLORIDE 5 MG: 5 TABLET ORAL at 10:11

## 2023-10-18 RX ADMIN — APIXABAN 2.5 MG: 2.5 TABLET, FILM COATED ORAL at 20:07

## 2023-10-18 RX ADMIN — BUMETANIDE 1 MG: 0.25 INJECTION, SOLUTION INTRAMUSCULAR; INTRAVENOUS at 18:28

## 2023-10-18 RX ADMIN — MIDODRINE HYDROCHLORIDE 5 MG: 5 TABLET ORAL at 18:29

## 2023-10-18 RX ADMIN — POLYETHYLENE GLYCOL 3350 17 G: 17 POWDER, FOR SOLUTION ORAL at 08:07

## 2023-10-18 RX ADMIN — METOPROLOL TARTRATE 2.5 MG: 5 INJECTION INTRAVENOUS at 02:56

## 2023-10-18 RX ADMIN — PANTOPRAZOLE SODIUM 40 MG: 40 TABLET, DELAYED RELEASE ORAL at 05:56

## 2023-10-18 RX ADMIN — DIGOXIN 250 MCG: 250 INJECTION, SOLUTION INTRAMUSCULAR; INTRAVENOUS at 03:48

## 2023-10-18 RX ADMIN — Medication 10 ML: at 20:25

## 2023-10-18 RX ADMIN — HYDROXYZINE HYDROCHLORIDE 25 MG: 25 TABLET, FILM COATED ORAL at 12:33

## 2023-10-18 RX ADMIN — NITROGLYCERIN 0.5 INCH: 20 OINTMENT TOPICAL at 00:09

## 2023-10-18 RX ADMIN — DOCUSATE SODIUM 100 MG: 100 CAPSULE, LIQUID FILLED ORAL at 20:07

## 2023-10-18 RX ADMIN — APIXABAN 2.5 MG: 2.5 TABLET, FILM COATED ORAL at 08:07

## 2023-10-18 RX ADMIN — DIGOXIN 250 MCG: 0.25 INJECTION INTRAMUSCULAR; INTRAVENOUS at 08:07

## 2023-10-18 RX ADMIN — OXYBUTYNIN CHLORIDE 10 MG: 10 TABLET, EXTENDED RELEASE ORAL at 08:07

## 2023-10-18 RX ADMIN — AMIODARONE HYDROCHLORIDE 1 MG/MIN: 1.8 INJECTION, SOLUTION INTRAVENOUS at 15:37

## 2023-10-18 NOTE — TELEPHONE ENCOUNTER
Pt's daughter called me saaying someone from our office had called her and told her to come back to office.  I did not find any such message in the chart. I looked at Chest xray results (KUB and labs not back yet). I called daughter back and told her that the chest x-ray reading could be consistent with viral pneumonia.  Since the patient also had a low blood pressure today at the office I recommended that she take her to the ER for treatment and further investigation.  She agreed.

## 2023-10-18 NOTE — PROGRESS NOTES
[facility] Medicine Services  ADMISSION FOLLOW-UP NOTE          Patient admitted after midnight, H&P by my partner performed earlier on today's date reviewed.  Interim findings, labs, and charting also reviewed.        The [facility] Hospital Problem List has been managed and updated to include any new diagnoses:  Active Hospital Problems    Diagnosis  POA    **Acute exacerbation of CHF (congestive heart failure) [I50.9]  Yes    Hypoxia [R09.02]  Unknown    Moderate to severe aortic stenosis [I35.0]  Unknown    CHF (congestive heart failure) [I50.9]  Yes    Dementia without behavioral disturbance [F03.90]  Yes    Atrial fibrillation with rapid ventricular response [I48.91]  Yes    Benign essential hypertension [I10]  Yes      Resolved Hospital Problems   No resolved problems to display.     Patient is a 89-year-old admitted with acute respiratory failure with hypoxia secondary to pulmonary edema from diastolic CHF exacerbation with moderate to severe aortic stenosis and A-fib with RVR.    Patient reports feeling weak and tired but denies chest pain, nausea, abdominal pain or shortness of breath.  She was diagnosed with A-fib at least a couple of years ago and has been well controlled since then.    Acute respiratory failure with hypoxia  Pulmonary edema, seen on chest x-ray  Exacerbation of diastolic congestive heart failure  Moderate to severe aortic stenosis  A-fib with RVR  Elevated troponin  Symptomatic hypotension after diltiazem  -Cardiology consult pending  -Repeat echo pending, last echo reviewed from July 2021 with EF of 60% and noted diastolic dysfunction  -Added digoxin 250 mcg every 6 hours x2 doses pending cardiology recommendations  -Started Eliquis  -Continue O2 to maintain sats greater than 90, currently on 2 L nasal cannula with O2 sat 89% on room air; does not wear oxygen at home at baseline  -Discontinue diltiazem and Nitropaste secondary to hypotension  -Bumex 1 mg IV given  in the ED  -Continue to monitor strict I's and O's    History of hypertension  -Avoid antihypertensive meds secondary to symptomatic hypotension  -Noted she is not currently on any home medications    Dementia  Osteoarthritis  Advanced age  -Fall precautions      Expected Discharge   Expected Discharge Date: 10/21/2023; Expected Discharge Time:      Beth Payan MD  10/18/23

## 2023-10-18 NOTE — CONSULTS
[facility]  Cardiovascular Consultation Note    Reason for consultation: #1 A-fib RVR #2 heart failure with previous known normal EF #3 aortic stenosis    History of Present Illness:  89-year-old female with previous known normal atrial fibrillation, previous TIA, hypertension, hyperlipidemia who presents with shortness of breath and fatigue for about the last 1 week.  This grandson is in the room and states that up until the last week or so she been pretty healthy and able to do what ever she wanted to do.  There is no history of tightness heaviness squeezing pressure chest jaw throat arms.  The grandson also notes she never looks short of breath.  The patient denies feeling her heart race.  In her PCPs office she was also noted to be hypotensive and was sent here for evaluation.  No blood in the stool.    Cardiac risk factors: #1 age greater than 65 #2 hypertension #3 hyperlipidemia  [id number] score 6    Past medical and surgical history, social and family history reviewed in EMR.    REVIEW OF SYSTEMS:     Past Medical History:   Diagnosis Date    Ankle instability     Chronic left shoulder pain 1/26/2018    Disorder of tendon of shoulder region, left     Dysphagia     Esophagitis     Femoral neck fracture 7/15/2021    Finger fracture, right 2009    Dr Chandler casting and physical therapy    Hemorrhoids     Hypertension     Localized, primary osteoarthritis of left shoulder region     Osteoarthritis of right hip 3/20/2023    Paroxysmal atrial fibrillation 9/30/2021    Right shoulder pain     Shoulder joint replacement status     Right    Stroke     CVA     Past Surgical History:   Procedure Laterality Date    APPENDECTOMY      Daughter denies    CATARACT EXTRACTION, BILATERAL      2020    COLONOSCOPY N/A 8/23/2021    Procedure: COLONOSCOPY;  Surgeon: Aries Varela MD;  Location: [facility];  Service: Gastroenterology;  Laterality: N/A;    ENDOSCOPY  07/2009    with  biopsy/esophageal ring dilation    ENDOSCOPY N/A 8/23/2021    Procedure: ESOPHAGOGASTRODUODENOSCOPY;  Surgeon: Aries Varela MD;  Location:  [facility];  Service: Gastroenterology;  Laterality: N/A;    HEMORRHOIDECTOMY      lanced     HIP HEMIARTHROPLASTY Right 7/15/2021    Procedure: HIP HEMIARTHROPLASTY;  Surgeon: Adam Olsen MD;  Location:  [facility];  Service: Orthopedics;  Laterality: Right;    HYSTERECTOMY      partial     OOPHORECTOMY      SHOULDER SURGERY Right     replacement    SHOULDER SURGERY      Arthroscopy of shoulder:Right    TONSILLECTOMY       Social History     Socioeconomic History    Marital status:    Tobacco Use    Smoking status: Never    Smokeless tobacco: Never    Tobacco comments:      was smoker   Vaping Use    Vaping Use: Never used   Substance and Sexual Activity    Alcohol use: No    Drug use: No    Sexual activity: Not Currently     Partners: Male     Family History   Problem Relation Age of Onset    Cancer Mother     Stroke Mother     Bleeding Disorder Mother     Osteoarthritis Mother     Rheum arthritis Mother     Breast cancer Mother 60    Heart attack Father     Coronary artery disease Father     Cancer Sister         Bladder     Bleeding Disorder Brother         2    Colon cancer Brother         3    Ovarian cancer Neg Hx        H&P ROS reviewed and pertinent CV ROS as noted in HPI.    Cardiac: Patient has known history of atrial fibrillation as well as aortic stenosis.  Complains of shortness of breath but no anginal type pain  Respiratory: Complains of significant dyspnea to the point of resting dyspnea  Lower Extremities: No lesions does have some lower extremity edema which is increased significantly  GI: No nausea vomiting diarrhea bright blood per rectum or melena  Neuro: Has a history of TIA  Hematology: No history of bleeding diathesis ecchymosis or petechiae  Renal: No CKD hematuria  Musculoskeletal: Has chronic pain disorder has had previous  right shoulder surgery  Endocrine: No hypothyroidism or diabetes  Constitutional: Has an increased weakness over the last 1 week  Psych: Has known dementia but is able to function and knows her family members      Physical Exam: General pleasant elderly female sitting in bed at a 75 degree angle with resting tachypnea and dyspnea.  Heart rates in the 130s to 140s.       HEENT: Positive JVP.  No icterus.       Respiratory: Equal bilateral symmetrical expansion with crackles at the bases       Cardiovascular: Tachycardic and irregular with a systolic ejection murmur and pitting edema palpation       GI: Soft and flat       Lower Extremities: Stasis abnormalities       Neuro: Facial expressions symmetrical moves all 4 extremities       Skin: Warm and dry with stasis abnormalities       Psych: Pleasant affect    Results Review: High-sensitivity troponin is elevated at 170 GFR 79 BNP 5278           Vital Sign Min/Max for last 24 hours  Temp  Min: 97.7 °F (36.5 °C)  Max: 98.8 °F (37.1 °C)   BP  Min: 86/74  Max: 145/92   Pulse  Min: 77  Max: 141   Resp  Min: 18  Max: 22   SpO2  Min: 89 %  Max: 95 %   No data recorded      Intake/Output Summary (Last 24 hours) at 10/18/2023 0855  Last data filed at 10/18/2023 0525  Gross per 24 hour   Intake --   Output 1000 ml   Net -1000 ml             Current Facility-Administered Medications:     acetaminophen (TYLENOL) tablet 500 mg, 500 mg, Oral, Q6H PRN, Cassie Flores G, DO    amiodarone 360 mg in 200 mL D5W infusion, 1 mg/min, Intravenous, Continuous, Oscar Byrd MD    apixaban (ELIQUIS) tablet 2.5 mg, 2.5 mg, Oral, Q12H, Cassie Flores, DO, 2.5 mg at 10/18/23 0807    bisacodyl (DULCOLAX) suppository 10 mg, 10 mg, Rectal, Daily PRN, Cassie Flores G, DO    bumetanide (BUMEX) injection 1 mg, 1 mg, Intravenous, BID, Oscar Byrd MD    Calcium Replacement - Follow Nurse / BPA Driven Protocol, , Does not apply, PRN, Sara Florese G, DO    cholecalciferol (VITAMIN D3)  tablet 400 Units, 400 Units, Oral, Daily, Sandra, Cassie G, DO, 400 Units at 10/18/23 0807    digoxin (LANOXIN) injection 250 mcg, 250 mcg, Intravenous, Q6H, Beth Payan MD, 250 mcg at 10/18/23 0807    docusate sodium (COLACE) capsule 100 mg, 100 mg, Oral, BID, Sandra, Cassie G, DO, 100 mg at 10/18/23 0807    Magnesium Standard Dose Replacement - Follow Nurse / BPA Driven Protocol, , Does not apply, PRN, Sandra, Cassie G, DO    melatonin tablet 5 mg, 5 mg, Oral, Nightly, Sandra, Cassie G, DO    metoprolol tartrate (LOPRESSOR) injection 2.5 mg, 2.5 mg, Intravenous, Q5 Min PRN, Sandra, Cassie G, DO, 2.5 mg at 10/18/23 0256    ondansetron (ZOFRAN) tablet 4 mg, 4 mg, Oral, Q6H PRN **OR** ondansetron (ZOFRAN) injection 4 mg, 4 mg, Intravenous, Q6H PRN, Sandra, Cassie G, DO    oxybutynin XL (DITROPAN-XL) 24 hr tablet 10 mg, 10 mg, Oral, Daily, Sandra, Cassie G, DO, 10 mg at 10/18/23 0807    pantoprazole (PROTONIX) EC tablet 40 mg, 40 mg, Oral, Q AM, Sandra, Cassie G, DO, 40 mg at 10/18/23 0556    Phosphorus Replacement - Follow Nurse / BPA Driven Protocol, , Does not apply, PRN, Sandra, Cassie G, DO    polyethylene glycol (MIRALAX) packet 17 g, 17 g, Oral, Daily, Sandra, Cassie G, DO, 17 g at 10/18/23 0807    Potassium Replacement - Follow Nurse / BPA Driven Protocol, , Does not apply, PRN, Sandra, Cassie G, DO    rosuvastatin (CRESTOR) tablet 5 mg, 5 mg, Oral, Nightly, Sandra, Cassie G, DO    [COMPLETED] Insert Peripheral IV, , , Once **AND** sodium chloride 0.9 % flush 10 mL, 10 mL, Intravenous, PRN, Sandra, Cassie G, DO    sodium chloride 0.9 % flush 10 mL, 10 mL, Intravenous, Q12H, Sandra, Cassie G, DO    sodium chloride 0.9 % flush 10 mL, 10 mL, Intravenous, PRN, Sandra, Cassie G, DO    sodium chloride 0.9 % infusion 40 mL, 40 mL, Intravenous, PRN, Sandra, Cassie G, DO    Lab Review:   Results from last 7 days   Lab Units 10/18/23  0746 10/17/23  2310 10/17/23  1537   WBC 10*3/mm3 6.87 7.75 7.07   HEMOGLOBIN g/dL 12.4 14.0  13.4   PLATELETS 10*3/mm3 297 307 329     Results from last 7 days   Lab Units 10/18/23  0746 10/17/23  2310 10/17/23  1537   SODIUM mmol/L 132* 131* 135*   POTASSIUM mmol/L 4.2 4.5 4.1   CO2 mmol/L 25.0 27.0 26.2   BUN mg/dL 9 12 14   CREATININE mg/dL 0.63 0.71 0.87   MAGNESIUM mg/dL  --   --  2.3   GLUCOSE mg/dL 109* 99 97     Estimated Creatinine Clearance: 52.8 mL/min (by C-G formula based on SCr of 0.63 mg/dL).        .lipd  Lab Results   Component Value Date    TRIG 71 09/28/2023    HDL 69 (H) 09/28/2023    AST 24 10/17/2023    ALT 13 10/17/2023       Radiology Reports:  Imaging Results (Last 72 Hours)       Procedure Component Value Units Date/Time    XR Chest 1 View [[id number]] Collected: 10/18/23 0020     Updated: 10/18/23 0024    Narrative:      XR CHEST 1 VW    Date of Exam: 10/18/2023 12:18 AM EDT    Indication: CHF exacerbation    Comparison: 10/17/2023.    Findings:  The heart is mildly enlarged, stable as compared to the previous study. There is indistinctness of the pulmonary vasculature with small bilateral pleural effusions, similar as compared to the previous study. Patchy airspace opacities and interstitial   opacities present. No pneumothorax. No acute osseous abnormality identified.      Impression:      Impression:  Mild pulmonary edema pattern with small bilateral pleural effusions, similar as compared to the previous study.       Electronically Signed: Marie Kowalski MD    10/18/2023 12:21 AM EDT    Workstation ID: [id number]            Assessment/Plan: Patient presents with a weeklong history of fatigue significant dyspnea and lower extremity edema.  She is found to be in A-fib RVR and heart failure.  The patient is actually going in and out of sinus rhythm currently.  Her systolic blood pressure was only 105 somata start her on amiodarone drip at 1 mg/min.  If she continues to have rapid rates with significant shortness of breath may give her an amiodarone bolus if her blood pressure stable.   Recommend discontinuation of diltiazem so we can give the patient amiodarone for her A-fib and possibly ultimately a beta-blocker  2 heart failure secondary to #1 as well as valvular heart disease-we will give Bumex 1 mg IV every 12  3 aortic stenosis-repeat echo pending      Oscar Byrd MD  10/18/23  08:55 EDT  This is an addendum to the earlier note.  I received multiple calls today about the patient having hypotension.  She was given IV amiodarone 1 mg but became hypotensive with it.  She still has multiple runs of A-fib RVR and becomes more short of breath with it.  I came to evaluate the patient.  Current heart rates in the 140s.  Blood pressures 92 systolic.  Of note the patient's breathing has improved but she gets more short of breath during these A-fib RVR.  I recommend transferring the patient to the ICU for pressor support and institution of the amiodarone drip

## 2023-10-18 NOTE — TELEPHONE ENCOUNTER
Rx Refill Note  Requested Prescriptions     Pending Prescriptions Disp Refills    omeprazole (priLOSEC) 40 MG capsule [Pharmacy Med Name: OMEPRAZOLE DR CAPS 40MG] 90 capsule 3     Sig: TAKE 1 CAPSULE DAILY      Last office visit with prescribing clinician: 4/21/2023   Last telemedicine visit with prescribing clinician: Visit date not found   Next office visit with prescribing clinician: 10/17/2023                         Would you like a call back once the refill request has been completed: [] Yes [] No    If the office needs to give you a call back, can they leave a voicemail: [] Yes [] No    Yina Cedillo LPN  09/13/23, 08:10 EDT  
home
Attending Only

## 2023-10-18 NOTE — PLAN OF CARE
Goal Outcome Evaluation:              Outcome Evaluation: Pts HR increased to 167 not to long after arrival to floor. STAT EKG completed and showed afib w/ rvr. Hospitalist paged and IV lopressor was given without change in HR but did decrease BP. Digoxin was then ordered and pt is still in afib but maintaining MAP <60. Another EKG is due to be completed. Pt is asymptomatic aside from SOA from acute exacerbation of CHF. IV Bumex given in ED. Adequate output. ECHO and Cardiology consult ordered. Pt is oriented to self and is forgetful (hx of adv dementia). Incontinent at times and needs assistance. Pt currently has a caregiver that comes to her home daily. PT/OT consults ordered. Daughter at bedside was source for admissison completion. Skin and fall precautions maintained. Continue plan of care.

## 2023-10-18 NOTE — PLAN OF CARE
Problem: Adult Inpatient Plan of Care  Goal: Plan of Care Review  Outcome: Ongoing, Progressing  Flowsheets (Taken 10/18/2023 1930)  Outcome Evaluation: Pt arrived to unit this afternoon in A-fib w/ RVR.  Pt normotensive.  Amiodarone gtt infusing.  Pt converted to NSR @ ~1658.  Adequate UOP.  Family @ bedside.  Goal: Patient-Specific Goal (Individualized)  Outcome: Ongoing, Progressing  Goal: Absence of Hospital-Acquired Illness or Injury  Outcome: Ongoing, Progressing  Intervention: Identify and Manage Fall Risk  Recent Flowsheet Documentation  Taken 10/18/2023 1800 by Regla Navarro, RN  Safety Promotion/Fall Prevention:   activity supervised   clutter free environment maintained   fall prevention program maintained   nonskid shoes/slippers when out of bed   room organization consistent   safety round/check completed  Taken 10/18/2023 1600 by Regla Navarro, RN  Safety Promotion/Fall Prevention:   activity supervised   clutter free environment maintained   fall prevention program maintained   nonskid shoes/slippers when out of bed   room organization consistent   safety round/check completed  Intervention: Prevent Skin Injury  Recent Flowsheet Documentation  Taken 10/18/2023 1800 by Regla Navarro, RN  Body Position:   turned   lower extremity elevated   neutral body alignment   neutral head position   upper extremity elevated  Skin Protection:   adhesive use limited   incontinence pads utilized   tubing/devices free from skin contact   skin-to-device areas padded  Taken 10/18/2023 1600 by Regla Navarro, RN  Body Position:   turned   right   lower extremity elevated   upper extremity elevated  Skin Protection:   adhesive use limited   incontinence pads utilized   tubing/devices free from skin contact   skin-to-device areas padded  Intervention: Prevent and Manage VTE (Venous Thromboembolism) Risk  Recent Flowsheet Documentation  Taken 10/18/2023 1800 by Regla Navarro  Cate, ADIN  Activity Management: bedrest  Range of Motion: active ROM (range of motion) encouraged  Taken 10/18/2023 1600 by Regla Navarro RN  Activity Management: bedrest  Range of Motion:   active ROM (range of motion) encouraged   ROM (range of motion) performed  Intervention: Prevent Infection  Recent Flowsheet Documentation  Taken 10/18/2023 1800 by Regla Navarro, RN  Infection Prevention:   environmental surveillance performed   visitors restricted/screened   single patient room provided   rest/sleep promoted   personal protective equipment utilized  Taken 10/18/2023 1600 by Regla Navarro, RN  Infection Prevention:   environmental surveillance performed   visitors restricted/screened   single patient room provided   rest/sleep promoted   personal protective equipment utilized  Goal: Optimal Comfort and Wellbeing  Outcome: Ongoing, Progressing  Goal: Readiness for Transition of Care  Outcome: Ongoing, Progressing     Problem: Fall Injury Risk  Goal: Absence of Fall and Fall-Related Injury  Outcome: Ongoing, Progressing  Intervention: Identify and Manage Contributors  Recent Flowsheet Documentation  Taken 10/18/2023 1800 by Regla Navarro, RN  Medication Review/Management: medications reviewed  Taken 10/18/2023 1600 by Regla Navarro RN  Medication Review/Management: medications reviewed  Intervention: Promote Injury-Free Environment  Recent Flowsheet Documentation  Taken 10/18/2023 1800 by Regla Navarro, RN  Safety Promotion/Fall Prevention:   activity supervised   clutter free environment maintained   fall prevention program maintained   nonskid shoes/slippers when out of bed   room organization consistent   safety round/check completed  Taken 10/18/2023 1600 by Regla Navarro, RN  Safety Promotion/Fall Prevention:   activity supervised   clutter free environment maintained   fall prevention program maintained   nonskid  shoes/slippers when out of bed   room organization consistent   safety round/check completed     Problem: Skin Injury Risk Increased  Goal: Skin Health and Integrity  Outcome: Ongoing, Progressing  Intervention: Optimize Skin Protection  Recent Flowsheet Documentation  Taken 10/18/2023 1800 by Regla Navarro, RN  Pressure Reduction Techniques:   frequent weight shift encouraged   heels elevated off bed   pressure points protected   weight shift assistance provided  Head of Bed (HOB) Positioning: HOB at 30-45 degrees  Pressure Reduction Devices:   alternating pressure pump (ADD)   elbow protectors utilized   specialty bed utilized   pressure-redistributing mattress utilized   positioning supports utilized   heel offloading device utilized  Skin Protection:   adhesive use limited   incontinence pads utilized   tubing/devices free from skin contact   skin-to-device areas padded  Taken 10/18/2023 1600 by Regla Navarro, RN  Pressure Reduction Techniques:   frequent weight shift encouraged   heels elevated off bed   pressure points protected   weight shift assistance provided  Head of Bed (HOB) Positioning: HOB at 30-45 degrees  Pressure Reduction Devices:   alternating pressure pump (ADD)   elbow protectors utilized   specialty bed utilized   pressure-redistributing mattress utilized   positioning supports utilized   heel offloading device utilized  Skin Protection:   adhesive use limited   incontinence pads utilized   tubing/devices free from skin contact   skin-to-device areas padded     Problem: Heart Failure Comorbidity  Goal: Maintenance of Heart Failure Symptom Control  Outcome: Ongoing, Progressing  Intervention: Maintain Heart Failure-Management  Recent Flowsheet Documentation  Taken 10/18/2023 1800 by Regla Navarro, RN  Medication Review/Management: medications reviewed  Taken 10/18/2023 1600 by Regla Navarro, RN  Medication Review/Management: medications reviewed    Goal Outcome Evaluation:              Outcome Evaluation: Pt arrived to unit this afternoon in A-fib w/ RVR.  Pt normotensive.  Amiodarone gtt infusing.  Pt converted to NSR @ ~1658.  Adequate UOP.  Family @ bedside.

## 2023-10-18 NOTE — ED PROVIDER NOTES
Subjective   History of Present Illness  Patient is a 89 y.o. female presenting today to ED due to recommendation by PCP due to hypotension, abnormal imaging, and bilateral leg and abdominal edema. Patient admits to shortness of breath that is worse when laying down. Patient denies fever, cough, congestion, and sick contact exposure. Patient's daughter says that the edema was even worse this morning.     History provided by:  Patient and relative  History limited by:  Dementia      Review of Systems   Constitutional:  Negative for fatigue and fever.   Respiratory:  Positive for shortness of breath. Negative for cough.    Cardiovascular:  Positive for leg swelling. Negative for chest pain and palpitations.       Past Medical History:   Diagnosis Date    Ankle instability     Chronic left shoulder pain 1/26/2018    Disorder of tendon of shoulder region, left     Dysphagia     Esophagitis     Femoral neck fracture 7/15/2021    Finger fracture, right 2009    Dr Chandler casting and physical therapy    Hemorrhoids     Hypertension     Localized, primary osteoarthritis of left shoulder region     Osteoarthritis of right hip 3/20/2023    Paroxysmal atrial fibrillation 9/30/2021    Right shoulder pain     Shoulder joint replacement status     Right    Stroke     CVA       Allergies   Allergen Reactions    Nasal Katy Cough       Past Surgical History:   Procedure Laterality Date    APPENDECTOMY      Daughter denies    CATARACT EXTRACTION, BILATERAL      2020    COLONOSCOPY N/A 8/23/2021    Procedure: COLONOSCOPY;  Surgeon: Aries Varela MD;  Location:  [facility];  Service: Gastroenterology;  Laterality: N/A;    ENDOSCOPY  07/2009    with biopsy/esophageal ring dilation    ENDOSCOPY N/A 8/23/2021    Procedure: ESOPHAGOGASTRODUODENOSCOPY;  Surgeon: Aries Varela MD;  Location:  [facility];  Service: Gastroenterology;  Laterality: N/A;    HEMORRHOIDECTOMY      lanced     HIP HEMIARTHROPLASTY Right 7/15/2021     Procedure: HIP HEMIARTHROPLASTY;  Surgeon: Adam Olsen MD;  Location: [facility];  Service: Orthopedics;  Laterality: Right;    HYSTERECTOMY      partial     OOPHORECTOMY      SHOULDER SURGERY Right     replacement    SHOULDER SURGERY      Arthroscopy of shoulder:Right    TONSILLECTOMY         Family History   Problem Relation Age of Onset    Cancer Mother     Stroke Mother     Bleeding Disorder Mother     Osteoarthritis Mother     Rheum arthritis Mother     Breast cancer Mother 60    Heart attack Father     Coronary artery disease Father     Cancer Sister         Bladder     Bleeding Disorder Brother         2    Colon cancer Brother         3    Ovarian cancer Neg Hx        Social History     Socioeconomic History    Marital status:    Tobacco Use    Smoking status: Never    Smokeless tobacco: Never    Tobacco comments:      was smoker   Vaping Use    Vaping Use: Never used   Substance and Sexual Activity    Alcohol use: No    Drug use: No    Sexual activity: Not Currently     Partners: Male           Objective   Physical Exam  Vitals and nursing note reviewed.   Constitutional:       General: She is not in acute distress.     Appearance: Normal appearance. She is not ill-appearing, toxic-appearing or diaphoretic.   Cardiovascular:      Rate and Rhythm: Normal rate and regular rhythm.   Pulmonary:      Effort: Respiratory distress present.      Breath sounds: Wheezing present.   Chest:      Chest wall: No tenderness.   Musculoskeletal:      Right lower leg: Edema present.      Left lower leg: Edema present.   Neurological:      Mental Status: She is alert. Mental status is at baseline.   Psychiatric:         Mood and Affect: Mood normal.         Behavior: Behavior normal.         Procedures           ED Course  ED Course as of 10/18/23 0126   Tue Oct 17, 2023   2239 I reviewed the patient's primary care records patient had a visit today as well as 4 days ago.  Patient has a variety of symptoms  which could be consistent with a viral syndrome.  See that documentation for further details. [RS]   7862 I reviewed the radiology report from earlier today which does demonstrate bilateral pleural effusions with findings most consistent with congestive heart failure.  They did put in the differential of possible viral pneumonia.  However, with the bilateral findings as well as the effusion with the patient's increased abdominal and ankle swelling, I feel like that it would most likely be a CHF picture. [RS]      ED Course User Index  [RS] Kt Jj MD                                           Medical Decision Making  Problems Addressed:  Elevated blood pressure reading with diagnosis of hypertension: complicated acute illness or injury  New onset of congestive heart failure: complicated acute illness or injury  Paroxysmal atrial fibrillation: complicated acute illness or injury  Shortness of breath: complicated acute illness or injury    Amount and/or Complexity of Data Reviewed  Independent Historian: caregiver  External Data Reviewed: labs and ECG.  Labs: ordered.  Radiology: ordered.  ECG/medicine tests: ordered.    Risk  Prescription drug management.  Decision regarding hospitalization.        Final diagnoses:   New onset of congestive heart failure   Shortness of breath   Elevated blood pressure reading with diagnosis of hypertension   Paroxysmal atrial fibrillation       ED Disposition  ED Disposition       ED Disposition   Decision to Admit    Condition   --    Comment   Level of Care: Telemetry [5]   Diagnosis: Acute exacerbation of CHF (congestive heart failure) [[id number]]   Admitting Physician: THOMAS VELEZ [[id number]]   Attending Physician: THOMAS VELEZ [[id number]]   Bed Request Comments: tele                 No follow-up provider specified.       Medication List      No changes were made to your prescriptions during this visit.            Kt Jj MD  10/18/23 0126

## 2023-10-18 NOTE — PROGRESS NOTES
INTENSIVIST   PROGRESS NOTE     Hospital:  LOS: 0 days     Ms. Luh Mckeon, 89 y.o. female is followed for a Chief Complaint of: Respiratory Failure, Afib      Subjective   S     Interval History:  Remains in Afib with marginal blood pressure. Continues to have shortness of breath.        The patient's relevant past medical, surgical and social history were reviewed and updated in Epic as appropriate.      ROS:   Constitutional: Negative for fever.   Respiratory: Positive for dyspnea.   Cardiovascular: Negative for chest pain.   Gastrointestinal: Negative for  nausea, vomiting and diarrhea.     Objective   O     Vitals:  Temp  Min: 97.7 °F (36.5 °C)  Max: 98.8 °F (37.1 °C)  BP  Min: 77/60  Max: 145/92  Pulse  Min: 77  Max: 149  Resp  Min: 18  Max: 22  SpO2  Min: 89 %  Max: 95 % Flow (L/min)  Min: 2  Max: 2    Intake/Ouptut 24 hrs (7:00AM - 6:59 AM)  Intake & Output (last 3 days)         10/15 0701  10/16 0700 10/16 0701  10/17 0700 10/17 0701  10/18 0700 10/18 0701  10/19 0700    P.O.    240    Total Intake(mL/kg)    240 (3.5)    Urine (mL/kg/hr)   1000 350 (0.7)    Total Output   1000 350    Net   -1000 -110                    Medications (drips):  amiodarone, Last Rate: Stopped (10/18/23 0933)          Physical Examination  Telemetry:  Atrial Fibrillation   Constitutional:  No acute distress.  Sitting up in bed on nasal cannula.    Eyes: No scleral icterus.   PERRL, EOM intact.    Neck:  Supple, FROM   Cardiovascular: Irregularly irregular. Normal heart sounds.  No murmurs, gallop or rub.   Respiratory: No respiratory distress. Normal respiratory effort.  Diminished with crackles.    Abdominal:  Soft. No masses. Nontender. No distension. No HSM.   Extremities: No digital cyanosis. No clubbing.  No peripheral edema.   Skin: No rashes, lesions or ulcers   Neurological:   Alert and interactive.              Results from last 7 days   Lab Units 10/18/23  0746 10/17/23  2310 10/17/23  1537   WBC 10*3/mm3 6.87  7.75 7.07   HEMOGLOBIN g/dL 12.4 14.0 13.4   MCV fL 94.1 95.8 91.6   PLATELETS 10*3/mm3 297 307 329     Results from last 7 days   Lab Units 10/18/23  0746 10/17/23  2310 10/17/23  1537   SODIUM mmol/L 132* 131* 135*   POTASSIUM mmol/L 4.2 4.5 4.1   CO2 mmol/L 25.0 27.0 26.2   CREATININE mg/dL 0.63 0.71 0.87   GLUCOSE mg/dL 109* 99 97   MAGNESIUM mg/dL  --   --  2.3     Estimated Creatinine Clearance: 52.7 mL/min (by C-G formula based on SCr of 0.63 mg/dL).  Results from last 7 days   Lab Units 10/17/23  2310 10/17/23  1537   ALK PHOS U/L 85 77   BILIRUBIN mg/dL 0.2 0.3   ALT (SGPT) U/L 13 11   AST (SGOT) U/L 24 21             Images:  Imaging Results (Last 24 Hours)       Procedure Component Value Units Date/Time    XR Chest 1 View [[id number]] Collected: 10/18/23 0020     Updated: 10/18/23 0024    Narrative:      XR CHEST 1 VW    Date of Exam: 10/18/2023 12:18 AM EDT    Indication: CHF exacerbation    Comparison: 10/17/2023.    Findings:  The heart is mildly enlarged, stable as compared to the previous study. There is indistinctness of the pulmonary vasculature with small bilateral pleural effusions, similar as compared to the previous study. Patchy airspace opacities and interstitial   opacities present. No pneumothorax. No acute osseous abnormality identified.      Impression:      Impression:  Mild pulmonary edema pattern with small bilateral pleural effusions, similar as compared to the previous study.       Electronically Signed: Marie Kowalski MD    10/18/2023 12:21 AM EDT    Workstation ID: [id number]           Results for orders placed during the hospital encounter of 10/17/23    Adult Transthoracic Echo Complete w/ Color, Spectral and Contrast if necessary per protocol    Interpretation Summary    Left ventricular ejection fraction appears to be greater than 70%.    Left ventricular diastolic function was indeterminate.    Left atrial volume is mildly increased.    Severe aortic valve stenosis is present.     Aortic valve maximum pressure gradient is 79 mmHg. Aortic valve mean pressure gradient is 65 mmHg.    Estimated right ventricular systolic pressure from tricuspid regurgitation is normal (<35 mmHg). Calculated right ventricular systolic pressure from tricuspid regurgitation is 32 mmHg.    Incidental irregular heart rate noted with MV inflow suggestive of atrial fibrillation       Results: Reviewed.  I reviewed the patient's new laboratory and imaging results.  I independently reviewed the patient's new images.    Medications: Reviewed.    Assessment & Plan   A / P     Ms. Mckeon is a 89yo F with a history of moderate-severe aortic stenosis, Afib on eliquis and dementia who presented to the [facility] ED on 10/18 with fatigue and shortness of breath.     She was admitted to Hospital Medicine and given Bumex and placed on 2L nasal cannula. Cardiology was consulted for further management of CHF and Afib.     She was bolused with Amiodarone and started on Amiodarone infusion. She became hypotensive and continued to have multiple runs of Afib with RVR.     ICU transfer was recommended to restart the Amiodarone infusion with vasopressor support as needed.       Nutrition:   Diet: Cardiac Diets; Healthy Heart (2-3 Na+); Texture: Regular Texture (IDDSI 7); Fluid Consistency: Thin (IDDSI 0)  Advance Directives:   Code Status and Medical Interventions:   Ordered at: 10/18/23 0332     Level Of Support Discussed With:    Patient     Code Status (Patient has no pulse and is not breathing):    CPR (Attempt to Resuscitate)     Medical Interventions (Patient has pulse or is breathing):    Full Support       Active Hospital Problems    Diagnosis     **Acute exacerbation of CHF (congestive heart failure)     Hypoxia     Moderate to severe aortic stenosis     CHF (congestive heart failure)     Dementia without behavioral disturbance     Atrial fibrillation with rapid ventricular response     Benign essential hypertension        Assessment /  Plan:    Transfer to ICU  Restart Amiodarone with addition of Neosynephrine as needed.   Monitor respiratory status.   Repeat echocardiogram shows severe aortic stenosis. Further management per Cardiology.   Continue Eliquis.   Bumex 1mg IV BID  AM labs    High risk secondary to arrhythmia with resultant hypotension.     High level of risk due to:  severe exacerbation of chronic illness and illness with threat to life or bodily function.    Plan of care and goals reviewed during interdisciplinary rounds.  I discussed the patient's findings and my recommendations with patient and nursing staff      Griselda Velez, DO    Intensive Care Medicine and Pulmonary Medicine

## 2023-10-19 ENCOUNTER — APPOINTMENT (OUTPATIENT)
Dept: GENERAL RADIOLOGY | Facility: HOSPITAL | Age: 88
DRG: 291 | End: 2023-10-19
Payer: MEDICARE

## 2023-10-19 ENCOUNTER — TELEPHONE (OUTPATIENT)
Dept: CARDIOLOGY | Facility: CLINIC | Age: 88
End: 2023-10-19
Payer: MEDICARE

## 2023-10-19 LAB
ANION GAP SERPL CALCULATED.3IONS-SCNC: 8 MMOL/L (ref 5–15)
BUN SERPL-MCNC: 8 MG/DL (ref 8–23)
BUN/CREAT SERPL: 12.7 (ref 7–25)
CALCIUM SPEC-SCNC: 8.9 MG/DL (ref 8.2–9.6)
CHLORIDE SERPL-SCNC: 97 MMOL/L (ref 98–107)
CO2 SERPL-SCNC: 34 MMOL/L (ref 22–29)
CREAT SERPL-MCNC: 0.63 MG/DL (ref 0.57–1)
DEPRECATED RDW RBC AUTO: 43.3 FL (ref 37–54)
EGFRCR SERPLBLD CKD-EPI 2021: 83.3 ML/MIN/1.73
ERYTHROCYTE [DISTWIDTH] IN BLOOD BY AUTOMATED COUNT: 12.3 % (ref 12.3–15.4)
GLUCOSE BLDC GLUCOMTR-MCNC: 116 MG/DL (ref 70–130)
GLUCOSE SERPL-MCNC: 102 MG/DL (ref 65–99)
HCT VFR BLD AUTO: 37.6 % (ref 34–46.6)
HGB BLD-MCNC: 12.2 G/DL (ref 12–15.9)
MAGNESIUM SERPL-MCNC: 2.2 MG/DL (ref 1.7–2.3)
MCH RBC QN AUTO: 31.2 PG (ref 26.6–33)
MCHC RBC AUTO-ENTMCNC: 32.4 G/DL (ref 31.5–35.7)
MCV RBC AUTO: 96.2 FL (ref 79–97)
PHOSPHATE SERPL-MCNC: 4.5 MG/DL (ref 2.5–4.5)
PLATELET # BLD AUTO: 286 10*3/MM3 (ref 140–450)
PMV BLD AUTO: 10.7 FL (ref 6–12)
POTASSIUM SERPL-SCNC: 3.7 MMOL/L (ref 3.5–5.2)
QT INTERVAL: 344 MS
QT INTERVAL: 354 MS
QT INTERVAL: 356 MS
QTC INTERVAL: 378 MS
QTC INTERVAL: 489 MS
QTC INTERVAL: 503 MS
RBC # BLD AUTO: 3.91 10*6/MM3 (ref 3.77–5.28)
SODIUM SERPL-SCNC: 139 MMOL/L (ref 136–145)
WBC NRBC COR # BLD: 7.03 10*3/MM3 (ref 3.4–10.8)

## 2023-10-19 PROCEDURE — 99232 SBSQ HOSP IP/OBS MODERATE 35: CPT | Performed by: INTERNAL MEDICINE

## 2023-10-19 PROCEDURE — 84100 ASSAY OF PHOSPHORUS: CPT | Performed by: INTERNAL MEDICINE

## 2023-10-19 PROCEDURE — 93010 ELECTROCARDIOGRAM REPORT: CPT | Performed by: INTERNAL MEDICINE

## 2023-10-19 PROCEDURE — 94660 CPAP INITIATION&MGMT: CPT

## 2023-10-19 PROCEDURE — 94664 DEMO&/EVAL PT USE INHALER: CPT

## 2023-10-19 PROCEDURE — 94799 UNLISTED PULMONARY SVC/PX: CPT

## 2023-10-19 PROCEDURE — 82948 REAGENT STRIP/BLOOD GLUCOSE: CPT

## 2023-10-19 PROCEDURE — 80048 BASIC METABOLIC PNL TOTAL CA: CPT | Performed by: INTERNAL MEDICINE

## 2023-10-19 PROCEDURE — 25010000002 ONDANSETRON PER 1 MG: Performed by: INTERNAL MEDICINE

## 2023-10-19 PROCEDURE — 83735 ASSAY OF MAGNESIUM: CPT | Performed by: INTERNAL MEDICINE

## 2023-10-19 PROCEDURE — 71045 X-RAY EXAM CHEST 1 VIEW: CPT

## 2023-10-19 PROCEDURE — 93005 ELECTROCARDIOGRAM TRACING: CPT | Performed by: INTERNAL MEDICINE

## 2023-10-19 PROCEDURE — 25010000002 AMIODARONE IN DEXTROSE 5% 360-4.14 MG/200ML-% SOLUTION: Performed by: INTERNAL MEDICINE

## 2023-10-19 PROCEDURE — 85027 COMPLETE CBC AUTOMATED: CPT | Performed by: INTERNAL MEDICINE

## 2023-10-19 RX ORDER — IPRATROPIUM BROMIDE AND ALBUTEROL SULFATE 2.5; .5 MG/3ML; MG/3ML
3 SOLUTION RESPIRATORY (INHALATION) EVERY 4 HOURS PRN
Status: DISCONTINUED | OUTPATIENT
Start: 2023-10-19 | End: 2023-10-23 | Stop reason: HOSPADM

## 2023-10-19 RX ORDER — AMIODARONE HYDROCHLORIDE 200 MG/1
200 TABLET ORAL 3 TIMES DAILY
Status: DISCONTINUED | OUTPATIENT
Start: 2023-10-19 | End: 2023-10-23

## 2023-10-19 RX ORDER — MIDODRINE HYDROCHLORIDE 10 MG/1
10 TABLET ORAL
Status: DISCONTINUED | OUTPATIENT
Start: 2023-10-19 | End: 2023-10-19

## 2023-10-19 RX ORDER — LABETALOL HYDROCHLORIDE 5 MG/ML
20 INJECTION, SOLUTION INTRAVENOUS ONCE
Status: COMPLETED | OUTPATIENT
Start: 2023-10-20 | End: 2023-10-19

## 2023-10-19 RX ORDER — POTASSIUM CHLORIDE 1.5 G/1.58G
40 POWDER, FOR SOLUTION ORAL ONCE
Status: COMPLETED | OUTPATIENT
Start: 2023-10-19 | End: 2023-10-19

## 2023-10-19 RX ORDER — METOLAZONE 5 MG/1
10 TABLET ORAL ONCE
Status: COMPLETED | OUTPATIENT
Start: 2023-10-19 | End: 2023-10-19

## 2023-10-19 RX ORDER — MIDODRINE HYDROCHLORIDE 10 MG/1
10 TABLET ORAL
Status: DISCONTINUED | OUTPATIENT
Start: 2023-10-19 | End: 2023-10-23 | Stop reason: HOSPADM

## 2023-10-19 RX ORDER — BUMETANIDE 0.25 MG/ML
1 INJECTION INTRAMUSCULAR; INTRAVENOUS ONCE
Status: COMPLETED | OUTPATIENT
Start: 2023-10-20 | End: 2023-10-19

## 2023-10-19 RX ADMIN — AMIODARONE HYDROCHLORIDE 200 MG: 200 TABLET ORAL at 16:04

## 2023-10-19 RX ADMIN — MIDODRINE HYDROCHLORIDE 10 MG: 10 TABLET ORAL at 18:33

## 2023-10-19 RX ADMIN — ONDANSETRON 4 MG: 2 INJECTION INTRAMUSCULAR; INTRAVENOUS at 22:24

## 2023-10-19 RX ADMIN — AMIODARONE HYDROCHLORIDE 200 MG: 200 TABLET ORAL at 20:09

## 2023-10-19 RX ADMIN — AMIODARONE HYDROCHLORIDE 1 MG/MIN: 1.8 INJECTION, SOLUTION INTRAVENOUS at 02:25

## 2023-10-19 RX ADMIN — HYDROXYZINE HYDROCHLORIDE 25 MG: 25 TABLET, FILM COATED ORAL at 23:05

## 2023-10-19 RX ADMIN — MIDODRINE HYDROCHLORIDE 10 MG: 10 TABLET ORAL at 08:31

## 2023-10-19 RX ADMIN — Medication 10 MG: at 23:25

## 2023-10-19 RX ADMIN — METOLAZONE 10 MG: 5 TABLET ORAL at 23:05

## 2023-10-19 RX ADMIN — BUMETANIDE 1 MG: 0.25 INJECTION, SOLUTION INTRAMUSCULAR; INTRAVENOUS at 08:22

## 2023-10-19 RX ADMIN — ROSUVASTATIN 5 MG: 10 TABLET, FILM COATED ORAL at 20:09

## 2023-10-19 RX ADMIN — AMIODARONE HYDROCHLORIDE 1 MG/MIN: 1.8 INJECTION, SOLUTION INTRAVENOUS at 08:31

## 2023-10-19 RX ADMIN — OXYBUTYNIN CHLORIDE 10 MG: 10 TABLET, EXTENDED RELEASE ORAL at 08:22

## 2023-10-19 RX ADMIN — CHOLECALCIFEROL (VITAMIN D3) 10 MCG (400 UNIT) TABLET 400 UNITS: at 08:22

## 2023-10-19 RX ADMIN — POTASSIUM CHLORIDE FOR ORAL SOLUTION 40 MEQ: 1.5 POWDER, FOR SOLUTION ORAL at 12:02

## 2023-10-19 RX ADMIN — Medication 5 MG: at 20:09

## 2023-10-19 RX ADMIN — BUMETANIDE 1 MG: 0.25 INJECTION, SOLUTION INTRAMUSCULAR; INTRAVENOUS at 23:24

## 2023-10-19 RX ADMIN — AMIODARONE HYDROCHLORIDE 1 MG/MIN: 1.8 INJECTION, SOLUTION INTRAVENOUS at 14:37

## 2023-10-19 RX ADMIN — IPRATROPIUM BROMIDE AND ALBUTEROL SULFATE 3 ML: 2.5; .5 SOLUTION RESPIRATORY (INHALATION) at 23:02

## 2023-10-19 RX ADMIN — DOCUSATE SODIUM 100 MG: 100 CAPSULE, LIQUID FILLED ORAL at 08:22

## 2023-10-19 RX ADMIN — POLYETHYLENE GLYCOL 3350 17 G: 17 POWDER, FOR SOLUTION ORAL at 08:22

## 2023-10-19 RX ADMIN — AMIODARONE HYDROCHLORIDE 1 MG/MIN: 1.8 INJECTION, SOLUTION INTRAVENOUS at 20:09

## 2023-10-19 RX ADMIN — BUMETANIDE 1 MG: 0.25 INJECTION, SOLUTION INTRAMUSCULAR; INTRAVENOUS at 18:33

## 2023-10-19 RX ADMIN — MIDODRINE HYDROCHLORIDE 10 MG: 10 TABLET ORAL at 12:03

## 2023-10-19 RX ADMIN — APIXABAN 2.5 MG: 2.5 TABLET, FILM COATED ORAL at 08:22

## 2023-10-19 RX ADMIN — Medication 10 ML: at 08:23

## 2023-10-19 RX ADMIN — AMIODARONE HYDROCHLORIDE 200 MG: 200 TABLET ORAL at 08:22

## 2023-10-19 RX ADMIN — PANTOPRAZOLE SODIUM 40 MG: 40 TABLET, DELAYED RELEASE ORAL at 06:20

## 2023-10-19 RX ADMIN — APIXABAN 2.5 MG: 2.5 TABLET, FILM COATED ORAL at 20:09

## 2023-10-19 NOTE — PLAN OF CARE
Goal Outcome Evaluation:                          -Pt on 1-2L nasal cannula, NSR. Alert, oriented to self. Amio gtt infusing @1mg. UOP adequate, no BM. In chair for several hours and up to bedside commode with minimal assistance (x1 person).

## 2023-10-19 NOTE — TELEPHONE ENCOUNTER
Caller: NAIN HERNÁNDEZ    Relationship: Emergency Contact    Best call back number: [phone]      What is the best time to reach you: ANY     Who are you requesting to speak with (clinical staff, provider,  specific staff member): CLINICAL     What was the call regarding: PT'S DAUGHTER STATES THAT THE PT IS CURRENTLY IN THE ED AT [facility]. PT HAS AN APPT TOMORROW 10-20-23. PT'S DAUGHTER IS INQUIRING IF DR. RENDON WILL SEE PT IN THE HOSPITAL. PLEASE REACH OUT TO FURTHER ADVISE.     Is it okay if the provider responds through [facility]: NO

## 2023-10-19 NOTE — PROGRESS NOTES
[facility] at [facility]  IP Progress Note      Chief Complaint/Reason for service: #1 A-fib RVR with heart failure #2 moderate to severe arctic stenosis #3 elevated troponin    Subjective   Subjective: The granddaughter reports the patient had a good night.  She states she slept well.  The patient is improved.  According to the granddaughter the patient has significant dementia and has good days and bad days.    Past medical, surgical, social and family history reviewed in the patient's electronic medical record.    Objective     Vital Sign Min/Max for last 24 hours  Temp  Min: 97.9 °F (36.6 °C)  Max: 98.7 °F (37.1 °C)   BP  Min: 77/60  Max: 134/102   Pulse  Min: 43  Max: 149   Resp  Min: 18  Max: 22   SpO2  Min: 86 %  Max: 98 %   Flow (L/min)  Min: 2  Max: 3      Intake/Output Summary (Last 24 hours) at 10/19/2023 0745  Last data filed at 10/19/2023 0623  Gross per 24 hour   Intake 731 ml   Output 3000 ml   Net -2269 ml             Current Facility-Administered Medications:     acetaminophen (TYLENOL) tablet 500 mg, 500 mg, Oral, Q6H PRN, SandraEvelyn yisie G, DO    amiodarone 360 mg in 200 mL D5W infusion, 1 mg/min, Intravenous, Continuous, Oscar Byrd MD, Last Rate: 33.3 mL/hr at 10/19/23 0225, 1 mg/min at 10/19/23 0225    apixaban (ELIQUIS) tablet 2.5 mg, 2.5 mg, Oral, Q12H, Cassie Flores G, DO, 2.5 mg at 10/18/23 2007    bisacodyl (DULCOLAX) suppository 10 mg, 10 mg, Rectal, Daily PRN, SandraEvelyn yisie G, DO    bumetanide (BUMEX) injection 1 mg, 1 mg, Intravenous, BID, Oscar Byrd MD, 1 mg at 10/18/23 1828    Calcium Replacement - Follow Nurse / BPA Driven Protocol, , Does not apply, PRN, Sandra, Cassie G, DO    cholecalciferol (VITAMIN D3) tablet 400 Units, 400 Units, Oral, Daily, Cassie Flores G, DO, 400 Units at 10/18/23 0807    docusate sodium (COLACE) capsule 100 mg, 100 mg, Oral, BID, Cassie Flores DO, 100 mg at 10/18/23 2007    hydrOXYzine (ATARAX) tablet 25 mg, 25  mg, Oral, TID PRN, Beth Payan MD, 25 mg at 10/18/23 1233    Magnesium Standard Dose Replacement - Follow Nurse / BPA Driven Protocol, , Does not apply, PRN, Sandra, Cassie G, DO    melatonin tablet 5 mg, 5 mg, Oral, Nightly, Sandra, Cassie G, DO, 5 mg at 10/18/23 2007    metoprolol tartrate (LOPRESSOR) injection 2.5 mg, 2.5 mg, Intravenous, Q5 Min PRN, Sandra, Cassie G, DO, 2.5 mg at 10/18/23 0256    midodrine (PROAMATINE) tablet 5 mg, 5 mg, Oral, TID AC, Beth Payan MD, 5 mg at 10/18/23 1829    ondansetron (ZOFRAN) tablet 4 mg, 4 mg, Oral, Q6H PRN **OR** ondansetron (ZOFRAN) injection 4 mg, 4 mg, Intravenous, Q6H PRN, Sandra, Cassie G, DO    oxybutynin XL (DITROPAN-XL) 24 hr tablet 10 mg, 10 mg, Oral, Daily, Sandra, Cassie G, DO, 10 mg at 10/18/23 0807    pantoprazole (PROTONIX) EC tablet 40 mg, 40 mg, Oral, Q AM, Sandra, Cassie G, DO, 40 mg at 10/19/23 0620    Pharmacy Consult - [facility], , Does not apply, Daily, Chiquita Keita, PharmD    Phosphorus Replacement - Follow Nurse / BPA Driven Protocol, , Does not apply, PRN, Sandra, Cassie G, DO    polyethylene glycol (MIRALAX) packet 17 g, 17 g, Oral, Daily, Sandra, Cassie G, DO, 17 g at 10/18/23 0807    Potassium Replacement - Follow Nurse / BPA Driven Protocol, , Does not apply, PRN, Sandra, Cassie G, DO    rosuvastatin (CRESTOR) tablet 5 mg, 5 mg, Oral, Nightly, Sandra, Cassie G, DO, 5 mg at 10/18/23 2007    [COMPLETED] Insert Peripheral IV, , , Once **AND** sodium chloride 0.9 % flush 10 mL, 10 mL, Intravenous, PRN, Sandra, Cassie G, DO    sodium chloride 0.9 % flush 10 mL, 10 mL, Intravenous, Q12H, Sandra, Cassie G, DO, 10 mL at 10/18/23 2025    sodium chloride 0.9 % flush 10 mL, 10 mL, Intravenous, PRN, Sandra, Cassie G, DO    sodium chloride 0.9 % infusion 40 mL, 40 mL, Intravenous, PRN, Sandra, Cassie G, DO    Physical Exam: General pleasant elderly female lying on her right side not dyspneic tachypneic sat on oxygen 92%        HEENT: No obvious JVP.  Nasal O2  present       Respiratory: Equal bilateral symmetrical expansion crackles bilaterally       Cardiovascular: Irregular rate and rhythm with a grade 4 systolic ejection murmur no edema palpation       GI: Soft and flat       Lower Extremities: No lesions       Neuro: Facial expressions are symmetrical.  Moves all 4 extremities.  Appears weak       Skin: Warm and dry no edema palpation       Psych: Pleasant affect    Results Review: Patient is a net -2.2 L out since admission.  GFR is 83.3.  Heart rates in the 70s.  Blood pressures 81-1 16 hemoglobin 12.2    Radiology Results:  Imaging Results (Last 72 Hours)       Procedure Component Value Units Date/Time    XR Chest 1 View [[id number]] Collected: 10/18/23 0020     Updated: 10/18/23 0024    Narrative:      XR CHEST 1 VW    Date of Exam: 10/18/2023 12:18 AM EDT    Indication: CHF exacerbation    Comparison: 10/17/2023.    Findings:  The heart is mildly enlarged, stable as compared to the previous study. There is indistinctness of the pulmonary vasculature with small bilateral pleural effusions, similar as compared to the previous study. Patchy airspace opacities and interstitial   opacities present. No pneumothorax. No acute osseous abnormality identified.      Impression:      Impression:  Mild pulmonary edema pattern with small bilateral pleural effusions, similar as compared to the previous study.       Electronically Signed: Marie Kowalski MD    10/18/2023 12:21 AM EDT    Workstation ID: [id number]            EKG: A-fib    ECHO: EF greater than 70% with severe arctic stenosis    Tele: Patient is going in and out of sinus rhythm and A-fib RVR    Assessment   Assessment/Plan: #1 A-fib RVR-the patient is going in and out of sinus rhythm.  She was transferred to ICU yesterday because of hypotension with amiodarone as well as hypotension with A-fib RVR.  She never required any IV pressors.  Start p.o. amiodarone 200 mg every 12 hours and continue IV amiodarone  2  hypotension-increase midodrine to 10 mg 3 times daily  3 elevated troponins-echo reveals no wall motion abnormalities and I suspect elevated troponin is due to A-fib RVR with severe arctic stenosis and demand ischemia    Oscar Byrd MD  10/19/23  07:45 EDT

## 2023-10-19 NOTE — PROGRESS NOTES
INTENSIVIST   PROGRESS NOTE     Hospital:  LOS: 1 day     Ms. Luh Mckeon, 89 y.o. female is followed for a Chief Complaint of: Respiratory Failure, Afib      Subjective   S     Interval History:  No events overnight. Has not required any vasopressor support. Tolerating Amiodarone.        The patient's relevant past medical, surgical and social history were reviewed and updated in Epic as appropriate.      ROS:   Constitutional: Negative for fever.   Respiratory: Positive for dyspnea.   Cardiovascular: Negative for chest pain.   Gastrointestinal: Negative for  nausea, vomiting and diarrhea.     Objective   O     Vitals:  Temp  Min: 97.9 °F (36.6 °C)  Max: 98.7 °F (37.1 °C)  BP  Min: 77/60  Max: 130/78  Pulse  Min: 43  Max: 149  Resp  Min: 18  Max: 22  SpO2  Min: 86 %  Max: 98 % Flow (L/min)  Min: 2  Max: 3    Intake/Ouptut 24 hrs (7:00AM - 6:59 AM)  Intake & Output (last 3 days)         10/16 0701  10/17 0700 10/17 0701  10/18 0700 10/18 0701  10/19 0700 10/19 0701  10/20 0700    P.O.   240     I.V. (mL/kg)   491 (7.3)     Total Intake(mL/kg)   731 (10.9)     Urine (mL/kg/hr)  1000 3000 (1.9)     Total Output  1000 3000     Net  -1000 -8786                     Medications (drips):  amiodarone, Last Rate: 1 mg/min (10/19/23 0831)          Physical Examination  Telemetry:  Atrial Fibrillation   Constitutional:  No acute distress.  Resting in bed on nasal cannula.    Eyes: No scleral icterus.   PERRL, EOM intact.    Neck:  Supple, FROM   Cardiovascular: Irregularly irregular. Normal heart sounds.  No murmurs, gallop or rub.   Respiratory: No respiratory distress. Normal respiratory effort.  Diminished with crackles.    Abdominal:  Soft. No masses. Nontender. No distension. No HSM.   Extremities: No digital cyanosis. No clubbing.  No peripheral edema.   Skin: No rashes, lesions or ulcers   Neurological:   Alert and interactive.              Results from last 7 days   Lab Units 10/19/23  0402 10/18/23  5028  10/17/23  2310   WBC 10*3/mm3 7.03 6.87 7.75   HEMOGLOBIN g/dL 12.2 12.4 14.0   MCV fL 96.2 94.1 95.8   PLATELETS 10*3/mm3 286 297 307     Results from last 7 days   Lab Units 10/19/23  0402 10/18/23  0746 10/17/23  2310 10/17/23  1537   SODIUM mmol/L 139 132* 131* 135*   POTASSIUM mmol/L 3.7 4.2 4.5 4.1   CO2 mmol/L 34.0* 25.0 27.0 26.2   CREATININE mg/dL 0.63 0.63 0.71 0.87   GLUCOSE mg/dL 102* 109* 99 97   MAGNESIUM mg/dL 2.2  --   --  2.3   PHOSPHORUS mg/dL 4.5  --   --   --      Estimated Creatinine Clearance: 52.5 mL/min (by C-G formula based on SCr of 0.63 mg/dL).  Results from last 7 days   Lab Units 10/17/23  2310 10/17/23  1537   ALK PHOS U/L 85 77   BILIRUBIN mg/dL 0.2 0.3   ALT (SGPT) U/L 13 11   AST (SGOT) U/L 24 21             Images:  Imaging Results (Last 24 Hours)       ** No results found for the last 24 hours. **           Results for orders placed during the hospital encounter of 10/17/23    Adult Transthoracic Echo Complete w/ Color, Spectral and Contrast if necessary per protocol    Interpretation Summary    Left ventricular ejection fraction appears to be greater than 70%.    Left ventricular diastolic function was indeterminate.    Left atrial volume is mildly increased.    Severe aortic valve stenosis is present.    Aortic valve maximum pressure gradient is 79 mmHg. Aortic valve mean pressure gradient is 65 mmHg.    Estimated right ventricular systolic pressure from tricuspid regurgitation is normal (<35 mmHg). Calculated right ventricular systolic pressure from tricuspid regurgitation is 32 mmHg.    Incidental irregular heart rate noted with MV inflow suggestive of atrial fibrillation       Results: Reviewed.  I reviewed the patient's new laboratory and imaging results.  I independently reviewed the patient's new images.    Medications: Reviewed.    Assessment & Plan   A / P     Ms. Mckeon is a 89yo F with a history of moderate-severe aortic stenosis, Afib on eliquis and dementia who  presented to the [facility] ED on 10/18 with fatigue and shortness of breath.     She was admitted to Hospital Medicine and given Bumex and placed on 2L nasal cannula. Cardiology was consulted for further management of CHF and Afib.     She was bolused with Amiodarone and started on Amiodarone infusion. She became hypotensive and continued to have multiple runs of Afib with RVR.     ICU transfer was recommended to restart the Amiodarone infusion with vasopressor support as needed.     She has thus far tolerated Amiodarone infusion without the need for vasopressors.       Nutrition:   Diet: Cardiac Diets; Healthy Heart (2-3 Na+); Texture: Regular Texture (IDDSI 7); Fluid Consistency: Thin (IDDSI 0)  Advance Directives:   Code Status and Medical Interventions:   Ordered at: 10/18/23 0332     Level Of Support Discussed With:    Patient     Code Status (Patient has no pulse and is not breathing):    CPR (Attempt to Resuscitate)     Medical Interventions (Patient has pulse or is breathing):    Full Support       Active Hospital Problems    Diagnosis     **Acute exacerbation of CHF (congestive heart failure)     Hypoxia     Moderate to severe aortic stenosis     CHF (congestive heart failure)     Dementia without behavioral disturbance     Atrial fibrillation with rapid ventricular response     Benign essential hypertension        Assessment / Plan:    Continue Amiodarone per Cardiology.   Monitor respiratory status.   Repeat echocardiogram shows severe aortic stenosis. Further management per Cardiology.   Continue Eliquis.   Replace potassium.  PT/OT  AM labs      High level of risk due to:  severe exacerbation of chronic illness and illness with threat to life or bodily function.    Plan of care and goals reviewed during interdisciplinary rounds.  I discussed the patient's findings and my recommendations with patient and nursing staff      Griselda Velez,     Intensive Care Medicine and Pulmonary Medicine

## 2023-10-19 NOTE — PLAN OF CARE
Goal Outcome Evaluation:  Pt in and out of Afib, mostly in NSR, rate controlled. UOP 1,425 mL. Afebrile. Pt confused at baseline, oriented to self. Amio gtt continues at 1 mg/min. No BM. Granddaughter at bedside and updated.

## 2023-10-19 NOTE — TELEPHONE ENCOUNTER
Patient currently in the hospital and daughter wanted to let us know she wont make it to tomorrows appointment. I told her I would cancel the appointment and we would be happy to schedule her for a hospital follow up once discharged.   She wanted Dr. Miles to come see her in the hospital, I told her if there is a cardiology consult put in one of our doctors would be happy to come see her.

## 2023-10-20 LAB
ANION GAP SERPL CALCULATED.3IONS-SCNC: 9 MMOL/L (ref 5–15)
BUN SERPL-MCNC: 11 MG/DL (ref 8–23)
BUN/CREAT SERPL: 13.9 (ref 7–25)
CALCIUM SPEC-SCNC: 9.4 MG/DL (ref 8.2–9.6)
CHLORIDE SERPL-SCNC: 86 MMOL/L (ref 98–107)
CO2 SERPL-SCNC: 35 MMOL/L (ref 22–29)
CREAT SERPL-MCNC: 0.79 MG/DL (ref 0.57–1)
EGFRCR SERPLBLD CKD-EPI 2021: 70.3 ML/MIN/1.73
GLUCOSE SERPL-MCNC: 141 MG/DL (ref 65–99)
MAGNESIUM SERPL-MCNC: 1.9 MG/DL (ref 1.7–2.3)
PHOSPHATE SERPL-MCNC: 4.9 MG/DL (ref 2.5–4.5)
POTASSIUM SERPL-SCNC: 3.2 MMOL/L (ref 3.5–5.2)
POTASSIUM SERPL-SCNC: 4 MMOL/L (ref 3.5–5.2)
SODIUM SERPL-SCNC: 130 MMOL/L (ref 136–145)

## 2023-10-20 PROCEDURE — 97530 THERAPEUTIC ACTIVITIES: CPT

## 2023-10-20 PROCEDURE — 84100 ASSAY OF PHOSPHORUS: CPT | Performed by: INTERNAL MEDICINE

## 2023-10-20 PROCEDURE — 25010000002 ACETAZOLAMIDE PER 500 MG

## 2023-10-20 PROCEDURE — 92610 EVALUATE SWALLOWING FUNCTION: CPT | Performed by: SPEECH-LANGUAGE PATHOLOGIST

## 2023-10-20 PROCEDURE — 25010000002 AMIODARONE IN DEXTROSE 5% 360-4.14 MG/200ML-% SOLUTION: Performed by: INTERNAL MEDICINE

## 2023-10-20 PROCEDURE — 97166 OT EVAL MOD COMPLEX 45 MIN: CPT

## 2023-10-20 PROCEDURE — 84132 ASSAY OF SERUM POTASSIUM: CPT | Performed by: NURSE PRACTITIONER

## 2023-10-20 PROCEDURE — 93005 ELECTROCARDIOGRAM TRACING: CPT | Performed by: INTERNAL MEDICINE

## 2023-10-20 PROCEDURE — 97535 SELF CARE MNGMENT TRAINING: CPT

## 2023-10-20 PROCEDURE — 80048 BASIC METABOLIC PNL TOTAL CA: CPT | Performed by: INTERNAL MEDICINE

## 2023-10-20 PROCEDURE — 94799 UNLISTED PULMONARY SVC/PX: CPT

## 2023-10-20 PROCEDURE — 93010 ELECTROCARDIOGRAM REPORT: CPT | Performed by: INTERNAL MEDICINE

## 2023-10-20 PROCEDURE — 97162 PT EVAL MOD COMPLEX 30 MIN: CPT

## 2023-10-20 PROCEDURE — 99232 SBSQ HOSP IP/OBS MODERATE 35: CPT

## 2023-10-20 PROCEDURE — 99232 SBSQ HOSP IP/OBS MODERATE 35: CPT | Performed by: INTERNAL MEDICINE

## 2023-10-20 PROCEDURE — 83735 ASSAY OF MAGNESIUM: CPT | Performed by: INTERNAL MEDICINE

## 2023-10-20 RX ORDER — ACETAZOLAMIDE SODIUM 500 MG/5ML
250 INJECTION, POWDER, LYOPHILIZED, FOR SOLUTION INTRAVENOUS ONCE
Status: COMPLETED | OUTPATIENT
Start: 2023-10-20 | End: 2023-10-20

## 2023-10-20 RX ORDER — POTASSIUM CHLORIDE 1.5 G/1.58G
40 POWDER, FOR SOLUTION ORAL EVERY 4 HOURS
Status: COMPLETED | OUTPATIENT
Start: 2023-10-20 | End: 2023-10-20

## 2023-10-20 RX ADMIN — AMIODARONE HYDROCHLORIDE 200 MG: 200 TABLET ORAL at 10:00

## 2023-10-20 RX ADMIN — MIDODRINE HYDROCHLORIDE 10 MG: 10 TABLET ORAL at 11:16

## 2023-10-20 RX ADMIN — OXYBUTYNIN CHLORIDE 10 MG: 10 TABLET, EXTENDED RELEASE ORAL at 09:02

## 2023-10-20 RX ADMIN — POTASSIUM CHLORIDE FOR ORAL SOLUTION 40 MEQ: 1.5 POWDER, FOR SOLUTION ORAL at 09:02

## 2023-10-20 RX ADMIN — AMIODARONE HYDROCHLORIDE 200 MG: 200 TABLET ORAL at 16:38

## 2023-10-20 RX ADMIN — MIDODRINE HYDROCHLORIDE 10 MG: 10 TABLET ORAL at 09:02

## 2023-10-20 RX ADMIN — PANTOPRAZOLE SODIUM 40 MG: 40 TABLET, DELAYED RELEASE ORAL at 05:34

## 2023-10-20 RX ADMIN — DOCUSATE SODIUM 100 MG: 100 CAPSULE, LIQUID FILLED ORAL at 09:02

## 2023-10-20 RX ADMIN — Medication 10 ML: at 11:19

## 2023-10-20 RX ADMIN — ROSUVASTATIN 5 MG: 10 TABLET, FILM COATED ORAL at 20:07

## 2023-10-20 RX ADMIN — ACETAZOLAMIDE 250 MG: 500 INJECTION, POWDER, LYOPHILIZED, FOR SOLUTION INTRAVENOUS at 12:34

## 2023-10-20 RX ADMIN — MIDODRINE HYDROCHLORIDE 10 MG: 10 TABLET ORAL at 18:48

## 2023-10-20 RX ADMIN — APIXABAN 2.5 MG: 2.5 TABLET, FILM COATED ORAL at 09:02

## 2023-10-20 RX ADMIN — APIXABAN 2.5 MG: 2.5 TABLET, FILM COATED ORAL at 20:07

## 2023-10-20 RX ADMIN — AMIODARONE HYDROCHLORIDE 200 MG: 200 TABLET ORAL at 20:07

## 2023-10-20 RX ADMIN — Medication 5 MG: at 20:07

## 2023-10-20 RX ADMIN — POLYETHYLENE GLYCOL 3350 17 G: 17 POWDER, FOR SOLUTION ORAL at 09:02

## 2023-10-20 RX ADMIN — POTASSIUM CHLORIDE FOR ORAL SOLUTION 40 MEQ: 1.5 POWDER, FOR SOLUTION ORAL at 12:34

## 2023-10-20 RX ADMIN — CHOLECALCIFEROL (VITAMIN D3) 10 MCG (400 UNIT) TABLET 400 UNITS: at 09:02

## 2023-10-20 RX ADMIN — Medication 10 ML: at 20:07

## 2023-10-20 RX ADMIN — AMIODARONE HYDROCHLORIDE 1 MG/MIN: 1.8 INJECTION, SOLUTION INTRAVENOUS at 02:23

## 2023-10-20 NOTE — THERAPY EVALUATION
Patient Name: Luh Mckeon  : 1930    MRN: [id number]                              Today's Date: 10/20/2023       Admit Date: 10/17/2023    Visit Dx:     ICD-10-CM ICD-9-CM   1. New onset of congestive heart failure  I50.9 428.0   2. Shortness of breath  R06.02 786.05   3. Elevated blood pressure reading with diagnosis of hypertension  I10 401.9   4. Paroxysmal atrial fibrillation  I48.0 427.31     Patient Active Problem List   Diagnosis    Generalized anxiety disorder    Unsteady gait    Risk for falls    Hyperlipidemia LDL goal <100    Benign essential hypertension    Memory loss    Urinary incontinence in female    Candida onychomycosis    AR (allergic rhinitis)    Overweight (BMI 25.0-29.9)    Chest pain    Cramp in limb    Disorder of mitral and aortic valves    Disorder of skeletal muscle    Diverticular disease of colon    Dysphagia    External hemorrhoids         Hand joint pain    Insomnia    Knee pain    Menopausal and postmenopausal disorder    Osteoporosis    Shoulder pain    Vitamin B deficiency    Medicare annual wellness visit, subsequent    Dizziness    Urgency of urination    Fatigue    Atrial fibrillation with rapid ventricular response    Hematochezia    Paroxysmal atrial fibrillation    Dementia without behavioral disturbance    Fall at home    Hip pain, acute, right    Acute right-sided low back pain without sciatica    Osteoarthritis of right hip    Complex regional pain syndrome type 2 of both lower extremities    Cellulitis of right lower extremity    Acute exacerbation of CHF (congestive heart failure)    Hypoxia    Moderate to severe aortic stenosis    CHF (congestive heart failure)     Past Medical History:   Diagnosis Date    Ankle instability     Chronic left shoulder pain 2018    Disorder of tendon of shoulder region, left     Dysphagia     Esophagitis     Femoral neck fracture 7/15/2021    Finger fracture, right 2009    Dr Chandler casting and physical therapy     Hemorrhoids     Hypertension     Localized, primary osteoarthritis of left shoulder region     Osteoarthritis of right hip 3/20/2023    Paroxysmal atrial fibrillation 9/30/2021    Right shoulder pain     Shoulder joint replacement status     Right    Stroke     CVA     Past Surgical History:   Procedure Laterality Date    APPENDECTOMY      Daughter denies    CATARACT EXTRACTION, BILATERAL      2020    COLONOSCOPY N/A 8/23/2021    Procedure: COLONOSCOPY;  Surgeon: Aries Varela MD;  Location:  [facility];  Service: Gastroenterology;  Laterality: N/A;    ENDOSCOPY  07/2009    with biopsy/esophageal ring dilation    ENDOSCOPY N/A 8/23/2021    Procedure: ESOPHAGOGASTRODUODENOSCOPY;  Surgeon: Aries Varela MD;  Location:  [facility];  Service: Gastroenterology;  Laterality: N/A;    HEMORRHOIDECTOMY      lanced     HIP HEMIARTHROPLASTY Right 7/15/2021    Procedure: HIP HEMIARTHROPLASTY;  Surgeon: Adam Olsen MD;  Location:  [email or web address];  Service: Orthopedics;  Laterality: Right;    HYSTERECTOMY      partial     OOPHORECTOMY      SHOULDER SURGERY Right     replacement    SHOULDER SURGERY      Arthroscopy of shoulder:Right    TONSILLECTOMY        General Information       Row Name 10/20/23 2200          Physical Therapy Time and Intention    Document Type evaluation  -AY     Mode of Treatment physical therapy  -AY       Row Name 10/20/23 6816          General Information    Patient Profile Reviewed yes  -AY     Prior Level of Function independent:;gait;transfer;all household mobility;ADL's;using stairs  pt with caregivers present 8hr a day. amb HH distances with RWx and community distances with rollator. briefs at baseline.  -AY     Existing Precautions/Restrictions fall;cardiac  -AY     Barriers to Rehab medically complex;previous functional deficit  -AY       Row Name 10/20/23 1271          Living Environment    People in Home alone;other (see comments)  caregiver  -AY       Row Name 10/20/23 0615           Home Main Entrance    Number of Stairs, Main Entrance three  -AY     Stair Railings, Main Entrance railings on both sides of stairs  A always present on steps.  -AY       Row Name 10/20/23 1205          Stairs Within Home, Primary    Number of Stairs, Within Home, Primary none  -AY       Row Name 10/20/23 1205          Cognition    Orientation Status (Cognition) oriented x 3  -AY       Row Name 10/20/23 1205          Safety Issues, Functional Mobility    Impairments Affecting Function (Mobility) balance;endurance/activity tolerance;shortness of breath;strength  -AY               User Key  (r) = Recorded By, (t) = Taken By, (c) = Cosigned By      Initials Name Provider Type    AY Elza Chance PT Physical Therapist                   Mobility       Row Name 10/20/23 1211          Bed Mobility    Bed Mobility supine-sit  -AY     Supine-Sit [place] (Bed Mobility) minimum assist (75% patient effort);1 person assist;verbal cues  -AY     Assistive Device (Bed Mobility) bed rails;head of bed elevated  -AY       Row Name 10/20/23 1211          Bed-Chair Transfer    Bed-Chair [place] (Transfers) minimum assist (75% patient effort);1 person assist;verbal cues  -AY     Assistive Device (Bed-Chair Transfers) walker, front-wheeled  -AY       Row Name 10/20/23 1211          Sit-Stand Transfer    Sit-Stand [place] (Transfers) minimum assist (75% patient effort);1 person assist;verbal cues  -AY     Assistive Device (Sit-Stand Transfers) walker, front-wheeled  -AY       Row Name 10/20/23 1211          Gait/Stairs (Locomotion)    [place] Level (Gait) minimum assist (75% patient effort);1 person assist;verbal cues  -AY     Assistive Device (Gait) walker, front-wheeled  -AY     Distance in Feet (Gait) 20  -AY     Deviations/Abnormal Patterns (Gait) bilateral deviations;base of support, narrow;filiberto decreased;stride length decreased;gait speed decreased  -AY     Bilateral Gait Deviations forward flexed  posture;heel strike decreased  -AY     Comment, (Gait/Stairs) cueing for posture, increased stride length, and heel strike. gait distance limited by fatigue.  -AY               User Key  (r) = Recorded By, (t) = Taken By, (c) = Cosigned By      Initials Name Provider Type    Elza Peterson PT Physical Therapist                   Obj/Interventions       Row Name 10/20/23 1213          Range of Motion Comprehensive    General Range of Motion bilateral lower extremity ROM WFL  -AY       Row Name 10/20/23 1213          Strength Comprehensive (MMT)    General Manual Muscle Testing (MMT) Assessment lower extremity strength deficits identified  -AY     Comment, General Manual Muscle Testing (MMT) Assessment BLE grossly 4-/5  -AY       Row Name 10/20/23 1213          Balance    Balance Assessment sitting static balance;sitting dynamic balance;sit to stand dynamic balance;standing static balance;standing dynamic balance  -AY     Static Sitting Balance contact guard  -AY     Dynamic Sitting Balance contact guard  -AY     Position, Sitting Balance unsupported;sitting edge of bed  -AY     Sit to Stand Dynamic Balance minimal assist  -AY     Static Standing Balance minimal assist  -AY     Dynamic Standing Balance minimal assist  -AY     Position/Device Used, Standing Balance supported;walker, rolling  -AY       Row Name 10/20/23 1213          Sensory Assessment (Somatosensory)    Sensory Assessment (Somatosensory) LE sensation intact  -AY               User Key  (r) = Recorded By, (t) = Taken By, (c) = Cosigned By      Initials Name Provider Type    Elza Peterson PT Physical Therapist                   Goals/Plan       Row Name 10/20/23 1216          Bed Mobility Goal 1 (PT)    Activity/Assistive Device (Bed Mobility Goal 1, PT) sit to supine/supine to sit  -AY     [place] Level/Cues Needed (Bed Mobility Goal 1, PT) supervision required  -AY     Time Frame (Bed Mobility Goal 1, PT) long term goal (LTG);10 days  -AY      Progress/Outcomes (Bed Mobility Goal 1, PT) goal ongoing  -AY       Row Name 10/20/23 1216          Transfer Goal 1 (PT)    Activity/Assistive Device (Transfer Goal 1, PT) sit-to-stand/stand-to-sit;bed-to-chair/chair-to-bed;walker, rolling  -AY     [place] Level/Cues Needed (Transfer Goal 1, PT) modified independence  -AY     Time Frame (Transfer Goal 1, PT) long term goal (LTG);10 days  -AY     Progress/Outcome (Transfer Goal 1, PT) goal ongoing  -AY       Row Name 10/20/23 1216          Gait Training Goal 1 (PT)    Activity/Assistive Device (Gait Training Goal 1, PT) gait (walking locomotion);assistive device use;walker, rolling  -AY     [place] Level (Gait Training Goal 1, PT) supervision required  -AY     Distance (Gait Training Goal 1, PT) 150  -AY     Time Frame (Gait Training Goal 1, PT) long term goal (LTG);10 days  -AY     Progress/Outcome (Gait Training Goal 1, PT) goal ongoing  -AY       Row Name 10/20/23 1216          Stairs Goal 1 (PT)    Activity/Assistive Device (Stairs Goal 1, PT) ascending stairs;descending stairs;using handrail, right;using handrail, left  -AY     [place] Level/Cues Needed (Stairs Goal 1, PT) supervision required  -AY     Number of Stairs (Stairs Goal 1, PT) 3  -AY     Time Frame (Stairs Goal 1, PT) long term goal (LTG);10 days  -AY     Progress/Outcome (Stairs Goal 1, PT) goal ongoing  -AY       Row Name 10/20/23 1216          Therapy Assessment/Plan (PT)    Planned Therapy Interventions (PT) balance training;gait training;bed mobility training;home exercise program;postural re-education;transfer training;patient/family education;strengthening;stair training  -AY               User Key  (r) = Recorded By, (t) = Taken By, (c) = Cosigned By      Initials Name Provider Type    Elza Peterson, PT Physical Therapist                   Clinical Impression       Row Name 10/20/23 1213          Pain    Pretreatment Pain Rating 0/10 - no pain  -AY     Posttreatment Pain  Rating 0/10 - no pain  -AY     Pain Intervention(s) Ambulation/increased activity;Repositioned  -AY     Additional Documentation Pain Scale: Numbers Pre/Post-Treatment (Group)  -AY       Row Name 10/20/23 1214          Plan of Care Review    Plan of Care Reviewed With patient;grandchild(ralph)  -AY     Progress improving  -AY     Outcome Evaluation PT initial eval completed. Pt limited by decreased functional endurance, balance deficit, and generalized weakness compared to baseline. Pt ambulated 20ft with min A and RWx, limtied by mild instability. Rec continued skilled PT to increase indep with mobility. d/c rec for SNF.  -AY       Row Name 10/20/23 1214          Therapy Assessment/Plan (PT)    Rehab Potential (PT) fair, will monitor progress closely  -AY     Criteria for Skilled Interventions Met (PT) yes;meets criteria;skilled treatment is necessary  -AY     Therapy Frequency (PT) daily  -AY       Row Name 10/20/23 1214          Vital Signs    Pre Systolic BP Rehab 113  -AY     Pre Treatment Diastolic BP 64  -AY     Post Systolic BP Rehab 117  -AY     Post Treatment Diastolic BP 70  -AY     Pretreatment Heart Rate (beats/min) 68  -AY     Posttreatment Heart Rate (beats/min) 65  -AY     Pre SpO2 (%) 99  -AY     O2 Delivery Pre Treatment nasal cannula  -AY     O2 Delivery Intra Treatment nasal cannula  -AY     Post SpO2 (%) 93  -AY     O2 Delivery Post Treatment nasal cannula  -AY     Pre Patient Position Supine  -AY     Intra Patient Position Standing  -AY     Post Patient Position Sitting  -AY       Row Name 10/20/23 1210          Positioning and Restraints    Pre-Treatment Position in bed  -AY     Post Treatment Position chair  -AY     In Chair notified nsg;reclined;sitting;call light within reach;encouraged to call for assist;exit alarm on;legs elevated;LUE elevated;RUE elevated;waffle cushion;with family/caregiver  -AY               User Key  (r) = Recorded By, (t) = Taken By, (c) = Cosigned By      Initials  Name Provider Type    Elza Peterson PT Physical Therapist                   Outcome Measures       Row Name 10/20/23 1217          How much help from another person do you currently need...    Turning from your back to your side while in flat bed without using bedrails? 3  -AY     Moving from lying on back to sitting on the side of a flat bed without bedrails? 3  -AY     Moving to and from a bed to a chair (including a wheelchair)? 3  -AY     Standing up from a chair using your arms (e.g., wheelchair, bedside chair)? 3  -AY     Climbing 3-5 steps with a railing? 3  -AY     To walk in hospital room? 3  -AY     AM-PAC 6 Clicks Score (PT) 18  -AY     Highest level of mobility 6 --> Walked 10 steps or more  -AY       Row Name 10/20/23 1217          Functional Assessment    Outcome Measure Options AM-PAC 6 Clicks Basic Mobility (PT)  -AY               User Key  (r) = Recorded By, (t) = Taken By, (c) = Cosigned By      Initials Name Provider Type    AY Elza Chance PT Physical Therapist                                 Physical Therapy Education       Title: PT OT SLP Therapies (In Progress)       Topic: Physical Therapy (In Progress)       Point: Mobility training (In Progress)       Learning Progress Summary             Patient Acceptance, E,TB, NR by AY at 10/20/2023 1217                         Point: Home exercise program (Not Started)       Learner Progress:  Not documented in this visit.              Point: Body mechanics (In Progress)       Learning Progress Summary             Patient Acceptance, E,TB, NR by AY at 10/20/2023 1217                         Point: Precautions (In Progress)       Learning Progress Summary             Patient Acceptance, E,TB, NR by AY at 10/20/2023 1217                                         User Key       Initials Effective Dates Name Provider Type Discipline    AY 11/10/20 -  Elza Chance PT Physical Therapist PT                  PT Recommendation and Plan  Planned Therapy  Interventions (PT): balance training, gait training, bed mobility training, home exercise program, postural re-education, transfer training, patient/family education, strengthening, stair training  Plan of Care Reviewed With: patient, grandchild(ralph)  Progress: improving  Outcome Evaluation: PT initial eval completed. Pt limited by decreased functional endurance, balance deficit, and generalized weakness compared to baseline. Pt ambulated 20ft with min A and RWx, limtied by mild instability. Rec continued skilled PT to increase indep with mobility. d/c rec for SNF.     Time Calculation:   PT Evaluation Complexity  History, PT Evaluation Complexity: 1-2 personal factors and/or comorbidities  Examination of Body Systems (PT Eval Complexity): total of 3 or more elements  Clinical Presentation (PT Evaluation Complexity): evolving  Clinical Decision Making (PT Evaluation Complexity): moderate complexity  Overall Complexity (PT Evaluation Complexity): moderate complexity     PT Charges       Row Name 10/20/23 1218             Time Calculation    Start Time 0740  -AY      PT Received On 10/20/23  -AY      PT Goal Re-Cert Due Date 10/30/23  -AY         Timed Charges    68792 - PT Therapeutic Activity Minutes 10  -AY         Untimed Charges    PT Eval/Re-eval Minutes 48  -AY         Total Minutes    Timed Charges Total Minutes 10  -AY      Untimed Charges Total Minutes 48  -AY       Total Minutes 58  -AY                User Key  (r) = Recorded By, (t) = Taken By, (c) = Cosigned By      Initials Name Provider Type    AY Elza Chance, PT Physical Therapist                  Therapy Charges for Today       Code Description Service Date Service Provider Modifiers Qty    15682482030 HC PT THERAPEUTIC ACT EA 15 MIN 10/20/2023 Elza Chance, PT GP 1    16637927894 HC PT EVAL MOD COMPLEXITY 4 10/20/2023 Elza Chance, PT GP 1            PT G-Codes  Outcome Measure Options: AM-PAC 6 Clicks Basic Mobility (PT)  AM-PAC 6 Clicks Score  (PT): 18  PT Discharge Summary  Anticipated Discharge Disposition (PT): skilled nursing facility    Elza Chance, ELIUD  10/20/2023

## 2023-10-20 NOTE — PLAN OF CARE
Goal Outcome Evaluation:  Plan of Care Reviewed With: patient, grandchild(ralph)        Progress: improving  Outcome Evaluation: PT initial eval completed. Pt limited by decreased functional endurance, balance deficit, and generalized weakness compared to baseline. Pt ambulated 20ft with min A and RWx, limtied by mild instability. Rec continued skilled PT to increase indep with mobility. d/c rec for SNF.      Anticipated Discharge Disposition (PT): skilled nursing facility

## 2023-10-20 NOTE — PLAN OF CARE
Goal Outcome Evaluation:           Progress:  (initial eval)     SLP evaluation completed. Will continue to address dysphagia w/ plan for FEES tomorrow to further assess swallowing fnx. Okay to continue current ordered diet until FEES, meds whole/crushed in puree. Please see note for further details and recommendations.

## 2023-10-20 NOTE — PLAN OF CARE
Problem: Adult Inpatient Plan of Care  Goal: Plan of Care Review  Outcome: Ongoing, Progressing  Flowsheets (Taken 10/20/2023 1797)  Progress: improving  Plan of Care Reviewed With: patient  Outcome Evaluation:     Significant events overnight        Remains SB to NSR on monitor, amio gtt continues at 1 mg/min; no AF on monitor  Escalation of O2 requirements, significant respiratory distress and restlessness/anxiety around 2200.     CXR obtained, Bumex & metolazone given, PRN nebs ordered and labetalol 10mg given x 1 for severe HTN    Placed on bipap from 2300 - 0530, back on 2LNC     A & O x 1, atarax given x 1 for anxiety, symptoms improved    BM x 3 via BSC, UOP adequate via bedpan 1725mL/BSC 3 occurrences     Afebrile. K 3.2 on AM labs, replacement ordered    Granddaughter at bedside, updated on patient changes & plan of care

## 2023-10-20 NOTE — PROGRESS NOTES
INTENSIVIST   PROGRESS NOTE     Hospital:  LOS: 2 days     Ms. Luh Mckeon, 89 y.o. female is followed for a Chief Complaint of: Respiratory Failure, Afib      Subjective   S     Interval History:  Flash pulmonary edema overnight requiring Bipap and additional diuretics. Back on 2L nasal cannula this AM.        The patient's relevant past medical, surgical and social history were reviewed and updated in Epic as appropriate.      ROS:   Constitutional: Negative for fever.   Respiratory: Positive for dyspnea.   Cardiovascular: Negative for chest pain.   Gastrointestinal: Negative for  nausea, vomiting and diarrhea.     Objective   O     Vitals:  Temp  Min: 96.9 °F (36.1 °C)  Max: 98 °F (36.7 °C)  BP  Min: 97/72  Max: 194/124  Pulse  Min: 50  Max: 78  Resp  Min: 16  Max: 28  SpO2  Min: 87 %  Max: 97 % Flow (L/min)  Min: 2  Max: 6    Intake/Ouptut 24 hrs (7:00AM - 6:59 AM)  Intake & Output (last 3 days)         10/17 0701  10/18 0700 10/18 0701  10/19 0700 10/19 0701  10/20 0700 10/20 0701  10/21 0700    P.O.  240 600 200    I.V. (mL/kg)  491 (7.3) 709.1 (10.3)     Total Intake(mL/kg)  731 (10.9) 1309.1 (19) 200 (2.9)    Urine (mL/kg/hr) 1000 3350 (2.1) 2975 (1.8) 650 (1.5)    Stool   0     Total Output 1000 3350 2975 650    Net -1000 -2619 -1665.9 -450            Urine Unmeasured Occurrence   2 x     Stool Unmeasured Occurrence   3 x             Medications (drips):           Physical Examination  Telemetry:  Atrial Fibrillation   Constitutional:  No acute distress.  Resting in bed on nasal cannula.    Eyes: No scleral icterus.   PERRL, EOM intact.    Neck:  Supple, FROM   Cardiovascular: Irregularly irregular. Normal heart sounds.  No murmurs, gallop or rub.   Respiratory: No respiratory distress. Normal respiratory effort.  Diminished with crackles.    Abdominal:  Soft. No masses. Nontender. No distension. No HSM.   Extremities: No digital cyanosis. No clubbing.  No peripheral edema.   Skin: No rashes, lesions  or ulcers   Neurological:   Alert and interactive.              Results from last 7 days   Lab Units 10/19/23  0402 10/18/23  0746 10/17/23  2310   WBC 10*3/mm3 7.03 6.87 7.75   HEMOGLOBIN g/dL 12.2 12.4 14.0   MCV fL 96.2 94.1 95.8   PLATELETS 10*3/mm3 286 297 307     Results from last 7 days   Lab Units 10/20/23  0352 10/19/23  0402 10/18/23  0746 10/17/23  2310 10/17/23  1537   SODIUM mmol/L 130* 139 132*   < > 135*   POTASSIUM mmol/L 3.2* 3.7 4.2   < > 4.1   CO2 mmol/L 35.0* 34.0* 25.0   < > 26.2   CREATININE mg/dL 0.79 0.63 0.63   < > 0.87   GLUCOSE mg/dL 141* 102* 109*   < > 97   MAGNESIUM mg/dL 1.9 2.2  --   --  2.3   PHOSPHORUS mg/dL 4.9* 4.5  --   --   --     < > = values in this interval not displayed.     Estimated Creatinine Clearance: 42.3 mL/min (by C-G formula based on SCr of 0.79 mg/dL).  Results from last 7 days   Lab Units 10/17/23  2310 10/17/23  1537   ALK PHOS U/L 85 77   BILIRUBIN mg/dL 0.2 0.3   ALT (SGPT) U/L 13 11   AST (SGOT) U/L 24 21             Images:  Imaging Results (Last 24 Hours)       Procedure Component Value Units Date/Time    XR Chest 1 View [[id number]] Collected: 10/19/23 2312     Updated: 10/19/23 2316    Narrative:      XR CHEST 1 VW    Date of Exam: 10/19/2023 10:32 PM EDT    Indication: dyspnea    Comparison: 10/18/2023.    Findings:  The heart appears mildly enlarged. Diffuse interstitial and airspace opacities are present with slight progression of airspace within the bilateral lower lobes. Small bilateral pleural effusions are present, left greater than right, similar. No   pneumothorax. No acute osseous abnormality.      Impression:      Impression:  Slight progression of airspace disease within the bilateral lower lobes, likely related to worsening pneumonia. Stable small bilateral pleural effusions, left greater than right.        Electronically Signed: Marie Kowalski MD    10/19/2023 11:13 PM EDT    Workstation ID: [id number]           Results for orders placed during  the hospital encounter of 10/17/23    Adult Transthoracic Echo Complete w/ Color, Spectral and Contrast if necessary per protocol    Interpretation Summary    Left ventricular ejection fraction appears to be greater than 70%.    Left ventricular diastolic function was indeterminate.    Left atrial volume is mildly increased.    Severe aortic valve stenosis is present.    Aortic valve maximum pressure gradient is 79 mmHg. Aortic valve mean pressure gradient is 65 mmHg.    Estimated right ventricular systolic pressure from tricuspid regurgitation is normal (<35 mmHg). Calculated right ventricular systolic pressure from tricuspid regurgitation is 32 mmHg.    Incidental irregular heart rate noted with MV inflow suggestive of atrial fibrillation       Results: Reviewed.  I reviewed the patient's new laboratory and imaging results.  I independently reviewed the patient's new images.    Medications: Reviewed.    Assessment & Plan   A / P     Ms. Mckeon is a 89yo F with a history of moderate-severe aortic stenosis, Afib on eliquis and dementia who presented to the [facility] ED on 10/18 with fatigue and shortness of breath.     She was admitted to Hospital Medicine and given Bumex and placed on 2L nasal cannula. Cardiology was consulted for further management of CHF and Afib.     She was bolused with Amiodarone and started on Amiodarone infusion. She became hypotensive and continued to have multiple runs of Afib with RVR.     ICU transfer was recommended to restart the Amiodarone infusion with vasopressor support as needed.     She has thus far tolerated Amiodarone infusion without the need for vasopressors.       Nutrition:   Diet: Cardiac Diets; Healthy Heart (2-3 Na+); Texture: Regular Texture (IDDSI 7); Fluid Consistency: Thin (IDDSI 0)  Advance Directives:   Code Status and Medical Interventions:   Ordered at: 10/18/23 0332     Level Of Support Discussed With:    Patient     Code Status (Patient has no pulse and is not  breathing):    CPR (Attempt to Resuscitate)     Medical Interventions (Patient has pulse or is breathing):    Full Support       Active Hospital Problems    Diagnosis     **Acute exacerbation of CHF (congestive heart failure)     Hypoxia     Moderate to severe aortic stenosis     CHF (congestive heart failure)     Dementia without behavioral disturbance     Atrial fibrillation with rapid ventricular response     Benign essential hypertension        Assessment / Plan:    Stop IV Amiodarone. PO Amiodarone per Cardiology.   Monitor respiratory status.   Repeat echocardiogram shows severe aortic stenosis. Not a candidate for valvular intervention.   Continue Eliquis.   Replace potassium.  D/c IV bumex. Diamox x 1.   Continue Midodrine.   PT/OT  AM labs      High level of risk due to:  severe exacerbation of chronic illness and illness with threat to life or bodily function.    Plan of care and goals reviewed during interdisciplinary rounds.  I discussed the patient's findings and my recommendations with patient, family, and nursing staff      Griselda Velez, DO    Intensive Care Medicine and Pulmonary Medicine

## 2023-10-20 NOTE — PLAN OF CARE
Goal Outcome Evaluation:  Plan of Care Reviewed With: patient, caregiver           Outcome Evaluation: Pt presents w/ generalized weakness, decreased functional endurance, and mild balance deficits limiting her ADL independence. Pt would benefit from continued skilled IPOT services to address current functional deficits. Rec SNF at d/c.      Anticipated Discharge Disposition (OT): skilled nursing facility

## 2023-10-20 NOTE — THERAPY EVALUATION
Acute Care - Speech Language Pathology   Swallow Initial Evaluation [facility]  Clinical Swallow Evaluation       Patient Name: Luh Mckeon  : 1930  MRN: [id number]  Today's Date: 10/20/2023               Admit Date: 10/17/2023    Visit Dx:     ICD-10-CM ICD-9-CM   1. New onset of congestive heart failure  I50.9 428.0   2. Shortness of breath  R06.02 786.05   3. Elevated blood pressure reading with diagnosis of hypertension  I10 401.9   4. Paroxysmal atrial fibrillation  I48.0 427.31   5. Dysphagia, unspecified type  R13.10 787.20     Patient Active Problem List   Diagnosis    Generalized anxiety disorder    Unsteady gait    Risk for falls    Hyperlipidemia LDL goal <100    Benign essential hypertension    Memory loss    Urinary incontinence in female    Candida onychomycosis    AR (allergic rhinitis)    Overweight (BMI 25.0-29.9)    Chest pain    Cramp in limb    Disorder of mitral and aortic valves    Disorder of skeletal muscle    Diverticular disease of colon    Dysphagia    External hemorrhoids         Hand joint pain    Insomnia    Knee pain    Menopausal and postmenopausal disorder    Osteoporosis    Shoulder pain    Vitamin B deficiency    Medicare annual wellness visit, subsequent    Dizziness    Urgency of urination    Fatigue    Atrial fibrillation with rapid ventricular response    Hematochezia    Paroxysmal atrial fibrillation    Dementia without behavioral disturbance    Fall at home    Hip pain, acute, right    Acute right-sided low back pain without sciatica    Osteoarthritis of right hip    Complex regional pain syndrome type 2 of both lower extremities    Cellulitis of right lower extremity    Acute exacerbation of CHF (congestive heart failure)    Hypoxia    Moderate to severe aortic stenosis    CHF (congestive heart failure)     Past Medical History:   Diagnosis Date    Ankle instability     Chronic left shoulder pain 2018    Disorder of tendon of shoulder region, left      Dysphagia     Esophagitis     Femoral neck fracture 7/15/2021    Finger fracture, right 2009    Dr Chandler casting and physical therapy    Hemorrhoids     Hypertension     Localized, primary osteoarthritis of left shoulder region     Osteoarthritis of right hip 3/20/2023    Paroxysmal atrial fibrillation 9/30/2021    Right shoulder pain     Shoulder joint replacement status     Right    Stroke     CVA     Past Surgical History:   Procedure Laterality Date    APPENDECTOMY      Daughter denies    CATARACT EXTRACTION, BILATERAL      2020    COLONOSCOPY N/A 8/23/2021    Procedure: COLONOSCOPY;  Surgeon: Arise Varela MD;  Location:  [facility];  Service: Gastroenterology;  Laterality: N/A;    ENDOSCOPY  07/2009    with biopsy/esophageal ring dilation    ENDOSCOPY N/A 8/23/2021    Procedure: ESOPHAGOGASTRODUODENOSCOPY;  Surgeon: Aries Varela MD;  Location:  [facility];  Service: Gastroenterology;  Laterality: N/A;    HEMORRHOIDECTOMY      lanced     HIP HEMIARTHROPLASTY Right 7/15/2021    Procedure: HIP HEMIARTHROPLASTY;  Surgeon: Adam Olsen MD;  Location:  [facility];  Service: Orthopedics;  Laterality: Right;    HYSTERECTOMY      partial     OOPHORECTOMY      SHOULDER SURGERY Right     replacement    SHOULDER SURGERY      Arthroscopy of shoulder:Right    TONSILLECTOMY         SLP Recommendation and Plan  SLP Swallowing Diagnosis: suspected pharyngeal dysphagia (10/20/23 1420)  SLP Diet Recommendation: other (see comments) (continue MD ordered po diet until FEES in am) (10/20/23 1420)  Recommended Precautions and Strategies: upright posture during/after eating, small bites of food and sips of liquid, general aspiration precautions, fatigue precautions, assist with feeding (10/20/23 1420)  SLP Rec. for Method of Medication Administration: meds whole, meds crushed, with puree, as tolerated (10/20/23 1420)     Monitor for Signs of Aspiration: yes, notify SLP if any concerns (10/20/23  "1420)  Recommended Diagnostics: reassess via FEES (10/21/23) (10/20/23 1420)  Swallow Criteria for Skilled Therapeutic Interventions Met: demonstrates skilled criteria (10/20/23 1420)  Anticipated Discharge Disposition (SLP): skilled nursing facility (10/20/23 1420)  Rehab Potential/Prognosis, Swallowing: good, to achieve stated therapy goals (10/20/23 1420)     Predicted Duration Therapy Intervention (Days): until discharge (10/20/23 1420)  Oral Care Recommendations: Oral Care BID/PRN, Toothbrush (10/20/23 1420)                                      Oral Care Recommendations: Oral Care BID/PRN, Toothbrush (10/20/23 1420)    Progress:  (initial eval)      SWALLOW EVALUATION (last 72 hours)       SLP Adult Swallow Evaluation       Row Name 10/20/23 1420       Rehab Evaluation    Document Type evaluation  -CJ    Subjective Information no complaints  -CJ    Patient Observations alert;cooperative  -CJ    Patient/Family/Caregiver Comments/Observations caregiver present  -CJ    Patient Effort good  -CJ    Symptoms Noted During/After Treatment none  -CJ       General Information    Patient Profile Reviewed yes  -CJ    Pertinent History Of Current Problem Pt adm w/ exacerbation of CHF, hypoxia; sig h/o HTN, afib, dementia, HLD, memory loss, dysphagia, osteoarthritis, aortic stenosis. CXR 10/19: slight progression of airspace disease w/ in BLLL 2/2 PNA L>R.  RN consulted 2/2 concern for coughing w/ po intake  -CJ    Current Method of Nutrition regular textures;thin liquids  -CJ    Precautions/Limitations, Vision for purposes of eval  -CJ    Precautions/Limitations, Hearing WFL;for purposes of eval  -CJ    Prior Level of Function-Communication other (see comments)  dementia per chart, has caregiver to assist  -CJ    Prior Level of Function-Swallowing other (see comments)  caregiver reports \"she coughs at home all the time but I don't think she's choking\"  -CJ    Plans/Goals Discussed with patient;other (see comments)  " caregiver  -    Barriers to Rehab medically complex  -    Patient's Goals for Discharge patient did not state  -       Pain    Additional Documentation Pain Scale: FACES Pre/Post-Treatment (Group)  -       Pain Scale: FACES Pre/Post-Treatment    Pain: FACES Scale, Pretreatment 0-->no hurt  -CJ    Posttreatment Pain Rating 0-->no hurt  -CJ       Oral Motor Structure and Function    Dentition Assessment natural, present and adequate  -    Secretion Management WNL/WFL  -    Mucosal Quality moist, healthy  -       Oral Musculature and Cranial Nerve Assessment    Oral Motor General Assessment WFL  -CJ       General Eating/Swallowing Observations    Respiratory Support Currently in Use nasal cannula  -    Eating/Swallowing Skills fed by SLP  -    Positioning During Eating upright in bed  -    Utensils Used spoon;cup;straw  -    Consistencies Trialed regular textures;ice chips;thin liquids;pureed  -       Clinical Swallow Eval    Pharyngeal Phase suspected pharyngeal impairment  -    Clinical Swallow Evaluation Summary Oral phase is seemingly wfl. Adequate mastication w/ solids. Cough w/ initial teaspoon presentaiton of thin liquids. Then delayed cough at end of evaluation. Given concern for difficulty w/ po intake per RN and at home along w/ progression of airspace disease will plan to complete FEES tomorrow am. Okay to continue MD ordered po diet  -       Pharyngeal Phase Concerns    Pharyngeal Phase Concerns cough  -CJ    Cough thin  -CJ       SLP Evaluation Clinical Impression    SLP Swallowing Diagnosis suspected pharyngeal dysphagia  -    Functional Impact risk of aspiration/pneumonia  -    Rehab Potential/Prognosis, Swallowing good, to achieve stated therapy goals  -    Swallow Criteria for Skilled Therapeutic Interventions Met demonstrates skilled criteria  -       Recommendations    Predicted Duration Therapy Intervention (Days) until discharge  -    SLP Diet Recommendation  other (see comments)  continue MD ordered po diet until FEES in am  -    Recommended Diagnostics reassess via FEES  10/21/23  -    Recommended Precautions and Strategies upright posture during/after eating;small bites of food and sips of liquid;general aspiration precautions;fatigue precautions;assist with feeding  -    Oral Care Recommendations Oral Care BID/PRN;Toothbrush  -    SLP Rec. for Method of Medication Administration meds whole;meds crushed;with puree;as tolerated  -    Monitor for Signs of Aspiration yes;notify SLP if any concerns  -    Anticipated Discharge Disposition (SLP) [facility]  -              User Key  (r) = Recorded By, (t) = Taken By, (c) = Cosigned By      Initials Name Effective Dates    Ashia Thapa MS CCC-SLP 07/11/23 -                     EDUCATION  The patient has been educated in the following areas:   Dysphagia (Swallowing Impairment) Oral Care/Hydration.              Time Calculation:    Time Calculation- SLP       Row Name 10/20/23 1602             Time Calculation- SLP    SLP Start Time 1420  -      SLP Received On 10/20/23  -         Untimed Charges    [id number] Eval Oral Pharyng Swallow Minutes 40  -         Total Minutes    Untimed Charges Total Minutes 40  -CJ       Total Minutes 40  -                User Key  (r) = Recorded By, (t) = Taken By, (c) = Cosigned By      Initials Name Provider Type    Ashia Thapa MS CCC-SLP Speech and Language Pathologist                    Therapy Charges for Today       Code Description Service Date Service Provider Modifiers Qty    [id number] HC ST EVAL ORAL PHARYNG SWALLOW 3 10/20/2023 Ashia Borrego MS CCC-SLP GN 1                 Ashia Borrego MS CCC-ARTURO  10/20/2023

## 2023-10-20 NOTE — PROGRESS NOTES
"  [facility]  PROGRESS NOTE    Date of Admission: 10/17/2023  Date of Service: 10/20/23    Primary Care Physician: Schuyler Garcia MD    Chief Complaint: Afib with RVR      Subjective      HPI: Sitting back in recliner eating breakfast. Good appetite. Maintaining sinus bradycardia/rhythm with PACs for the most part. Granddaughter at bedside says patient struggled with shortness  of breath through the night. Improved after neb treatment and Bumex 1 mg IV x1. Now on 2 L/min. Patient denies any complaints.     Objective   Vitals: /59   Pulse 62   Temp 97.4 °F (36.3 °C) (Axillary)   Resp 18   Ht 160 cm (62.99\")   Wt 69 kg (152 lb 1.9 oz)   SpO2 90%   BMI 26.95 kg/m²     Physical Exam:   GENERAL: Elderly, Alert, cooperative, in no acute distress.   HEART: Regular rate and rhythm; 4/6 KAMILLE  LUNGS: slight crackles at bases, Nonlabored breathing on 2 L/min  EXTREMITIES: No obvious deformities, cyanosis, or edema noted.     Results:  Results from last 7 days   Lab Units 10/19/23  0402 10/18/23  0746 10/17/23  2310   WBC 10*3/mm3 7.03 6.87 7.75   HEMOGLOBIN g/dL 12.2 12.4 14.0   HEMATOCRIT % 37.6 38.0 43.4   PLATELETS 10*3/mm3 286 297 307     Results from last 7 days   Lab Units 10/20/23  0352 10/19/23  0402 10/18/23  0746   SODIUM mmol/L 130* 139 132*   POTASSIUM mmol/L 3.2* 3.7 4.2   CHLORIDE mmol/L 86* 97* 95*   CO2 mmol/L 35.0* 34.0* 25.0   BUN mg/dL 11 8 9   CREATININE mg/dL 0.79 0.63 0.63   GLUCOSE mg/dL 141* 102* 109*      Lab Results   Component Value Date    CHOL 175 09/28/2023    TRIG 71 09/28/2023    HDL 69 (H) 09/28/2023    LDL 93 09/28/2023    AST 24 10/17/2023    ALT 13 10/17/2023             Results from last 7 days   Lab Units 10/18/23  0101   TSH uIU/mL 0.702             Results from last 7 days   Lab Units 10/18/23  0101 10/17/23  2310   HSTROP T ng/L 150* 171*     Results from last 7 days   Lab Units 10/17/23  2310   PROBNP pg/mL 5,278.0* "         Intake/Output Summary (Last 24 hours) at 10/20/2023 0905  Last data filed at 10/20/2023 0530  Gross per 24 hour   Intake 1109.1 ml   Output 2575 ml   Net -1465.9 ml       EKG sinus daquan with PACs    Radiology Data:  Echo 10/18/23:    Left ventricular ejection fraction appears to be greater than 70%.    Left ventricular diastolic function was indeterminate.    Left atrial volume is mildly increased.    Severe aortic valve stenosis is present.    Aortic valve maximum pressure gradient is 79 mmHg. Aortic valve mean pressure gradient is 65 mmHg.    Estimated right ventricular systolic pressure from tricuspid regurgitation is normal (<35 mmHg). Calculated right ventricular systolic pressure from tricuspid regurgitation is 32 mmHg.    Incidental irregular heart rate noted with MV inflow suggestive of atrial fibrillation    CXR 10/19/23:  Findings:  The heart appears mildly enlarged. Diffuse interstitial and airspace opacities are present with slight progression of airspace within the bilateral lower lobes. Small bilateral pleural effusions are present, left greater than right, similar. No   pneumothorax. No acute osseous abnormality.     IMPRESSION:  Impression:  Slight progression of airspace disease within the bilateral lower lobes, likely related to worsening pneumonia. Stable small bilateral pleural effusions, left greater than right.      Current Medications:  amiodarone, 200 mg, Oral, TID  apixaban, 2.5 mg, Oral, Q12H  cholecalciferol, 400 Units, Oral, Daily  docusate sodium, 100 mg, Oral, BID  melatonin, 5 mg, Oral, Nightly  midodrine, 10 mg, Oral, TID AC  oxybutynin XL, 10 mg, Oral, Daily  pantoprazole, 40 mg, Oral, Q AM  pharmacy consult - MTM, , Does not apply, Daily  polyethylene glycol, 17 g, Oral, Daily  potassium chloride, 40 mEq, Oral, Q4H  rosuvastatin, 5 mg, Oral, Nightly  sodium chloride, 10 mL, Intravenous, Q12H           Assessment and Plan:   Persistent Atrial Fibrillation  On Eliquis 2.5 mg  bid  Maintaining sinus daquan/NSR with PACs on amiodarone 200 mg PO tid  Acute on Chronic HFpEF/Severe Aortic Stenosis  Echo 10/18/23: LVEF >70%, severe AS max 79 mean 65, RVSP <35  Not a candidate for valvular intervention  Will give Diamox 250 mcg IV around midday  Hypotension  Continue midodrine  Elevated Troponin  NO ACS. Type II demand ischemia from afib with RVR, defer ischemic evaluation    Electronically signed by Valente Aaron PA-C, 10/20/23, 9:08 AM EDT.

## 2023-10-20 NOTE — CASE MANAGEMENT/SOCIAL WORK
Discharge Planning Assessment  [facility]     Patient Name: Luh Mckeon  MRN: [id number]  Today's Date: 10/20/2023    Admit Date: 10/17/2023    Plan: Home with home health vs rehab   Discharge Needs Assessment       Row Name 10/20/23 1145       Living Environment    People in Home alone    Unique Family Situation Daily caregivers from 9 am to 6 pm.    Current Living Arrangements home    Potentially Unsafe Housing Conditions none    Primary Care Provided by other (see comments);self  Caregivers    Provides Primary Care For no one, unable/limited ability to care for self    Family Caregiver if Needed child(ralph), adult;grandchild(ralph), adult;other (see comments)  Caregivers    Able to Return to Prior Arrangements yes       Transition Planning    Patient/Family Anticipates Transition to home    Patient/Family Anticipated Services at Transition home health care;rehabilitation services    Transportation Anticipated family or friend will provide       Discharge Needs Assessment    Readmission Within the Last 30 Days no previous admission in last 30 days    Equipment Currently Used at Home cane, straight;rollator;walker, rolling;commode;shower chair    Current Discharge Risk chronically ill;lives alone                   Discharge Plan       Row Name 10/20/23 1141       Plan    Plan Home with home health vs rehab    Patient/Family in Agreement with Plan yes    Plan Comments I met with Ms. Mckeon, her granddaughter, Marnie, and her caregiver at the bedside. Ms. Mckeon lives alone in [facility]. She is independent with mobility and activities of daily living. She is driven by her daughter when leaving the home and is not current with any home or outpatient services. Ms. Mckeon does have daily caregivers from 0900 - 1800. She has a rolling walker, rollator, straight cane, bedside commode, and shower chair at home. Denies any difficulty affording her medications. Anticipate home with or without home health services or  rehab. Therapy had just worked with Ms. Mckeon prior to me speaking with the patient and her family and they stated that rehab was the recommendations. They would like a referral sent to [facility] as Ms. Mckeon recovered from a broken hip with them. I will make this referral. CM will continue to follow.    Final Discharge Disposition Code 30 - still a patient                  Continued Care and Services - Admitted Since 10/17/2023    Coordination has not been started for this encounter.       Expected Discharge Date and Time       Expected Discharge Date Expected Discharge Time    Oct 27, 2023            Demographic Summary       Row Name 10/20/23 1140       General Information    General Information Comments Confirmed PCP to be Schuyler Garcia and Medicare and [place] to be insurers.                   Functional Status       Row Name 10/20/23 1143       Functional Status, IADL    Medications assistive person    Meal Preparation assistive person    Housekeeping assistive equipment and person    Laundry assistive person    Shopping assistive equipment and person       Employment/    Employment Status retired                   Psychosocial    No documentation.                  Abuse/Neglect    No documentation.                  Legal    No documentation.                  Substance Abuse    No documentation.                  Patient Forms    No documentation.                     Tim Noonan RN

## 2023-10-20 NOTE — THERAPY EVALUATION
Patient Name: Luh Mckeon  : 1930    MRN: [id number]                              Today's Date: 10/20/2023       Admit Date: 10/17/2023    Visit Dx:     ICD-10-CM ICD-9-CM   1. New onset of congestive heart failure  I50.9 428.0   2. Shortness of breath  R06.02 786.05   3. Elevated blood pressure reading with diagnosis of hypertension  I10 401.9   4. Paroxysmal atrial fibrillation  I48.0 427.31     Patient Active Problem List   Diagnosis    Generalized anxiety disorder    Unsteady gait    Risk for falls    Hyperlipidemia LDL goal <100    Benign essential hypertension    Memory loss    Urinary incontinence in female    Candida onychomycosis    AR (allergic rhinitis)    Overweight (BMI 25.0-29.9)    Chest pain    Cramp in limb    Disorder of mitral and aortic valves    Disorder of skeletal muscle    Diverticular disease of colon    Dysphagia    External hemorrhoids         Hand joint pain    Insomnia    Knee pain    Menopausal and postmenopausal disorder    Osteoporosis    Shoulder pain    Vitamin B deficiency    Medicare annual wellness visit, subsequent    Dizziness    Urgency of urination    Fatigue    Atrial fibrillation with rapid ventricular response    Hematochezia    Paroxysmal atrial fibrillation    Dementia without behavioral disturbance    Fall at home    Hip pain, acute, right    Acute right-sided low back pain without sciatica    Osteoarthritis of right hip    Complex regional pain syndrome type 2 of both lower extremities    Cellulitis of right lower extremity    Acute exacerbation of CHF (congestive heart failure)    Hypoxia    Moderate to severe aortic stenosis    CHF (congestive heart failure)     Past Medical History:   Diagnosis Date    Ankle instability     Chronic left shoulder pain 2018    Disorder of tendon of shoulder region, left     Dysphagia     Esophagitis     Femoral neck fracture 7/15/2021    Finger fracture, right 2009    Dr Chandler casting and physical therapy     Hemorrhoids     Hypertension     Localized, primary osteoarthritis of left shoulder region     Osteoarthritis of right hip 3/20/2023    Paroxysmal atrial fibrillation 9/30/2021    Right shoulder pain     Shoulder joint replacement status     Right    Stroke     CVA     Past Surgical History:   Procedure Laterality Date    APPENDECTOMY      Daughter denies    CATARACT EXTRACTION, BILATERAL      2020    COLONOSCOPY N/A 8/23/2021    Procedure: COLONOSCOPY;  Surgeon: Aries Varela MD;  Location:  [facility];  Service: Gastroenterology;  Laterality: N/A;    ENDOSCOPY  07/2009    with biopsy/esophageal ring dilation    ENDOSCOPY N/A 8/23/2021    Procedure: ESOPHAGOGASTRODUODENOSCOPY;  Surgeon: Aries Varela MD;  Location:  [facility];  Service: Gastroenterology;  Laterality: N/A;    HEMORRHOIDECTOMY      lanced     HIP HEMIARTHROPLASTY Right 7/15/2021    Procedure: HIP HEMIARTHROPLASTY;  Surgeon: Adam Olsen MD;  Location:  [facility] OR;  Service: Orthopedics;  Laterality: Right;    HYSTERECTOMY      partial     OOPHORECTOMY      SHOULDER SURGERY Right     replacement    SHOULDER SURGERY      Arthroscopy of shoulder:Right    TONSILLECTOMY        General Information       Row Name 10/20/23 1442          OT Time and Intention    Document Type evaluation  -     Mode of Treatment occupational therapy  -       Row Name 10/20/23 1442          General Information    Patient Profile Reviewed yes  -MC     Prior Level of Function independent:;bed mobility;transfer;all household mobility;min assist:;ADL's  Pt has caregivers 8 hours/day who assist w/ household management tasks and bathing, pt wears briefs at baseline d/t incontinence. Pt uses a rollator for mobility  -     Existing Precautions/Restrictions fall;cardiac  -MC     Barriers to Rehab medically complex;previous functional deficit  -       Row Name 10/20/23 1442          Living Environment    People in Home alone;other (see comments)  caregivers   -[facility] Name 10/20/23 1442          Cognition    Orientation Status (Cognition) oriented x 3  -[facility] Name 10/20/23 1442          Safety Issues, Functional Mobility    Impairments Affecting Function (Mobility) balance;endurance/activity tolerance;shortness of breath;strength  -               User Key  (r) = Recorded By, (t) = Taken By, (c) = Cosigned By      Initials Name Provider Type     Leanne Pal OT Occupational Therapist                     Mobility/ADL's       Row Name 10/20/23 1448          Transfers    Transfers sit-stand transfer;stand-sit transfer;toilet transfer  -[facility] 10/20/23 1448          Sit-Stand Transfer    Sit-Stand [place] (Transfers) minimum assist (75% patient effort);1 person assist;verbal cues  -     Assistive Device (Sit-Stand Transfers) other (see comments)  -MC       Row Name 10/20/23 1448          Stand-Sit Transfer    Stand-Sit [place] (Transfers) minimum assist (75% patient effort);1 person assist;verbal cues  -     Assistive Device (Stand-Sit Transfers) other (see comments)  -MC       Row Name 10/20/23 1448          Toilet Transfer    Type (Toilet Transfer) sit-stand;stand-sit;stand pivot/stand step  -     [place] Level (Toilet Transfer) minimum assist (75% patient effort);2 person assist;verbal cues  -     Assistive Device (Toilet Transfer) commode, bedside without drop arms  -[facility] Name 10/20/23 1448          Functional Mobility    Functional Mobility- Comment Deferred to PT  -[facility] 10/20/23 1448          Activities of Daily Living    BADL Assessment/Intervention lower body dressing;toileting  -[facility] 10/20/23 1448          Lower Body Dressing Assessment/Training    [place] Level (Lower Body Dressing) don;doff;socks;standby assist  -     Position (Lower Body Dressing) supported sitting  -[facility] Name 10/20/23 1448          Toileting Assessment/Training    [place] Level  (Toileting) adjust/manage clothing;perform perineal hygiene;dependent (less than 25% patient effort);verbal cues  -     Assistive Devices (Toileting) commode, bedside without drop arms  -     Position (Toileting) supported standing  -               User Key  (r) = Recorded By, (t) = Taken By, (c) = Cosigned By      Initials Name Provider Type     Leanne Pal OT Occupational Therapist                   Obj/Interventions       Row Name 10/20/23 1451          Sensory Assessment (Somatosensory)    Sensory Assessment (Somatosensory) UE sensation intact  -       Row Name 10/20/23 1451          Vision Assessment/Intervention    Visual Impairment/Limitations WFL  -       Row Name 10/20/23 1451          Range of Motion Comprehensive    General Range of Motion bilateral upper extremity ROM WFL  -       Row Name 10/20/23 1451          Strength Comprehensive (MMT)    General Manual Muscle Testing (MMT) Assessment upper extremity strength deficits identified  -     Comment, General Manual Muscle Testing (MMT) Assessment BUE grossly 4/5  -       Row Name 10/20/23 1451          Balance    Balance Assessment sitting static balance;sitting dynamic balance;sit to stand dynamic balance;standing static balance;standing dynamic balance  -     Static Sitting Balance contact guard  -     Dynamic Sitting Balance contact guard  -     Position, Sitting Balance unsupported;sitting in chair  -     Sit to Stand Dynamic Balance minimal assist;verbal cues  -     Static Standing Balance minimal assist;verbal cues  -     Dynamic Standing Balance minimal assist;verbal cues  -     Position/Device Used, Standing Balance supported  -     Balance Interventions sitting;sit to stand;occupation based/functional task  -               User Key  (r) = Recorded By, (t) = Taken By, (c) = Cosigned By      Initials Name Provider Type     Leanne Pal OT Occupational Therapist                   Goals/Plan        Row Name 10/20/23 1507          Transfer Goal 1 (OT)    Activity/Assistive Device (Transfer Goal 1, OT) bed-to-chair/chair-to-bed;toilet;walker, rolling  -     [place] Level/Cues Needed (Transfer Goal 1, OT) standby assist  -MC     Time Frame (Transfer Goal 1, OT) long term goal (LTG);10 days  -MC     Progress/Outcome (Transfer Goal 1, OT) goal ongoing  -       Row Name 10/20/23 1502          Toileting Goal 1 (OT)    Activity/Device (Toileting Goal 1, OT) adjust/manage clothing;perform perineal hygiene;commode;grab bar/safety frame  -     [place] Level/Cues Needed (Toileting Goal 1, OT) standby assist  -MC     Time Frame (Toileting Goal 1, OT) long term goal (LTG);10 days  -MC     Progress/Outcome (Toileting Goal 1, OT) goal ongoing  -       Row Name 10/20/23 1502          Grooming Goal 1 (OT)    Activity/Device (Grooming Goal 1, OT) hair care;oral care;wash face, hands  standing sinkside  -     [place] (Grooming Goal 1, OT) standby assist  -MC     Time Frame (Grooming Goal 1, OT) long term goal (LTG);10 days  -     Progress/Outcome (Grooming Goal 1, OT) goal ongoing  -       Row Name 10/20/23 0673          Therapy Assessment/Plan (OT)    Planned Therapy Interventions (OT) activity tolerance training;adaptive equipment training;BADL retraining;functional balance retraining;IADL retraining;occupation/activity based interventions;patient/caregiver education/training;ROM/therapeutic exercise;strengthening exercise;transfer/mobility retraining  -               User Key  (r) = Recorded By, (t) = Taken By, (c) = Cosigned By      Initials Name Provider Type    Leanne Awad OT Occupational Therapist                   Clinical Impression       Row Name 10/20/23 1593          Pain Assessment    Pretreatment Pain Rating 0/10 - no pain  -     Posttreatment Pain Rating 0/10 - no pain  -       Row Name 10/20/23 5933          Plan of Care Review    Plan of Care Reviewed With  patient;caregiver  -     Outcome Evaluation Pt presents w/ generalized weakness, decreased functional endurance, and mild balance deficits limiting her ADL independence. Pt would benefit from continued skilled IPOT services to address current functional deficits. Rec SNF at d/c.  -       Row Name 10/20/23 1454          Therapy Assessment/Plan (OT)    Rehab Potential (OT) good, to achieve stated therapy goals  -     Criteria for Skilled Therapeutic Interventions Met (OT) yes;skilled treatment is necessary  -     Therapy Frequency (OT) daily  -       Row Name 10/20/23 1454          Therapy Plan Review/Discharge Plan (OT)    Anticipated Discharge Disposition (OT) skilled nursing facility  -       Row Name 10/20/23 1453          Vital Signs    Pre Systolic BP Rehab 134  -     Pre Treatment Diastolic BP 71  -     Posttreatment Heart Rate (beats/min) 55  -MC     Pre SpO2 (%) 95  -     O2 Delivery Pre Treatment nasal cannula  -     O2 Delivery Intra Treatment nasal cannula  -     O2 Delivery Post Treatment nasal cannula  -     Pre Patient Position Sitting  -     Intra Patient Position Standing  -     Post Patient Position Sitting  -       Row Name 10/20/23 1458          Positioning and Restraints    Pre-Treatment Position sitting in chair/recliner  -     Post Treatment Position chair  -MC     In Chair notified nsg;sitting;call light within reach;encouraged to call for assist;exit alarm on;with family/caregiver;waffle cushion  -               User Key  (r) = Recorded By, (t) = Taken By, (c) = Cosigned By      Initials Name Provider Type     Leanne Pal, OT Occupational Therapist                   Outcome Measures       Row Name 10/20/23 5009          How much help from another is currently needed...    Putting on and taking off regular lower body clothing? 3  -MC     Bathing (including washing, rinsing, and drying) 2  -MC     Toileting (which includes using toilet bed pan or  urinal) 2  -MC     Putting on and taking off regular upper body clothing 3  -MC     Taking care of personal grooming (such as brushing teeth) 3  -MC     Eating meals 3  -     AM-[facility] 6 Clicks Score (OT) 16  -       Row Name 10/20/23 1217          How much help from another person do you currently need...    Turning from your back to your side while in flat bed without using bedrails? 3  -AY     Moving from lying on back to sitting on the side of a flat bed without bedrails? 3  -AY     Moving to and from a bed to a chair (including a wheelchair)? 3  -AY     Standing up from a chair using your arms (e.g., wheelchair, bedside chair)? 3  -AY     Climbing 3-5 steps with a railing? 3  -AY     To walk in hospital room? 3  -     AM-[facility] 6 Clicks Score (PT) 18  -AY     Highest level of mobility 6 --> Walked 10 steps or more  -       Row Name 10/20/23 1503 10/20/23 1217       Functional Assessment    Outcome Measure Options AM-[facility] 6 Clicks Daily Activity (OT)  -[facility] 6 Clicks Basic Mobility (PT)  -              User Key  (r) = Recorded By, (t) = Taken By, (c) = Cosigned By      Initials Name Provider Type    AY Elza Chance, PT Physical Therapist     Leanne Pal OT Occupational Therapist                    Occupational Therapy Education       Title: PT OT SLP Therapies (In Progress)       Topic: Occupational Therapy (In Progress)       Point: ADL training (In Progress)       Description:   Instruct learner(s) on proper safety adaptation and remediation techniques during self care or transfers.   Instruct in proper use of assistive devices.                  Learning Progress Summary             Patient Acceptance, E, NR by  at 10/20/2023 1510                         Point: Home exercise program (Not Started)       Description:   Instruct learner(s) on appropriate technique for monitoring, assisting and/or progressing therapeutic exercises/activities.                  Learner Progress:  Not documented  in this visit.              Point: Precautions (In Progress)       Description:   Instruct learner(s) on prescribed precautions during self-care and functional transfers.                  Learning Progress Summary             Patient Acceptance, E, NR by  at 10/20/2023 1510                         Point: Body mechanics (In Progress)       Description:   Instruct learner(s) on proper positioning and spine alignment during self-care, functional mobility activities and/or exercises.                  Learning Progress Summary             Patient Acceptance, E, NR by  at 10/20/2023 1510                                         User Key       Initials Effective Dates Name Provider Type Discipline     10/14/22 -  Leanne Pal, SHAYAN Occupational Therapist OT                  OT Recommendation and Plan  Planned Therapy Interventions (OT): activity tolerance training, adaptive equipment training, BADL retraining, functional balance retraining, IADL retraining, occupation/activity based interventions, patient/caregiver education/training, ROM/therapeutic exercise, strengthening exercise, transfer/mobility retraining  Therapy Frequency (OT): daily  Plan of Care Review  Plan of Care Reviewed With: patient, caregiver  Outcome Evaluation: Pt presents w/ generalized weakness, decreased functional endurance, and mild balance deficits limiting her ADL independence. Pt would benefit from continued skilled IPOT services to address current functional deficits. Rec SNF at d/c.     Time Calculation:   Evaluation Complexity (OT)  Review Occupational Profile/Medical/Therapy History Complexity: expanded/moderate complexity  Assessment, Occupational Performance/Identification of Deficit Complexity: 3-5 performance deficits  Clinical Decision Making Complexity (OT): detailed assessment/moderate complexity  Overall Complexity of Evaluation (OT): moderate complexity     Time Calculation- OT       Row Name 10/20/23 1511             Time  Calculation- OT    OT Start Time 1308  -      OT Received On 10/20/23  -      OT Goal Re-Cert Due Date 10/30/23  -         Timed Charges    46874 - OT Therapeutic Activity Minutes 5  -MC      29948 - OT Self Care/Mgmt Minutes 8  -MC         Untimed Charges    OT Eval/Re-eval Minutes 31  -MC         Total Minutes    Timed Charges Total Minutes 13  -MC      Untimed Charges Total Minutes 31  -MC       Total Minutes 44  -MC                User Key  (r) = Recorded By, (t) = Taken By, (c) = Cosigned By      Initials Name Provider Type     Leanne Pal OT Occupational Therapist                  Therapy Charges for Today       Code Description Service Date Service Provider Modifiers Qty    39526039234 HC OT SELF CARE/MGMT/TRAIN EA 15 MIN 10/20/2023 Leanne Pal OT GO 1    72901285976 HC OT EVAL MOD COMPLEXITY 3 10/20/2023 Leanne Pal OT GO 1                 Leanne Pal OT  10/20/2023

## 2023-10-20 NOTE — PLAN OF CARE
Goal Outcome Evaluation:  Plan of Care Reviewed With: patient, caregiver, family        Progress: improving  Outcome Evaluation: OOBTC x8 hours. SLP to follow for possible dysphagia. Vazquez mcgee/cyrus. Potassium replaced.

## 2023-10-21 ENCOUNTER — ANCILLARY PROCEDURE (OUTPATIENT)
Dept: SPEECH THERAPY | Facility: HOSPITAL | Age: 88
DRG: 291 | End: 2023-10-21
Payer: MEDICARE

## 2023-10-21 LAB
ANION GAP SERPL CALCULATED.3IONS-SCNC: 9 MMOL/L (ref 5–15)
BUN SERPL-MCNC: 13 MG/DL (ref 8–23)
BUN/CREAT SERPL: 16.5 (ref 7–25)
CALCIUM SPEC-SCNC: 9.2 MG/DL (ref 8.2–9.6)
CHLORIDE SERPL-SCNC: 89 MMOL/L (ref 98–107)
CO2 SERPL-SCNC: 32 MMOL/L (ref 22–29)
CREAT SERPL-MCNC: 0.79 MG/DL (ref 0.57–1)
DEPRECATED RDW RBC AUTO: 41 FL (ref 37–54)
EGFRCR SERPLBLD CKD-EPI 2021: 70.3 ML/MIN/1.73
ERYTHROCYTE [DISTWIDTH] IN BLOOD BY AUTOMATED COUNT: 11.9 % (ref 12.3–15.4)
GLUCOSE SERPL-MCNC: 98 MG/DL (ref 65–99)
HCT VFR BLD AUTO: 40.1 % (ref 34–46.6)
HGB BLD-MCNC: 13.2 G/DL (ref 12–15.9)
MAGNESIUM SERPL-MCNC: 1.8 MG/DL (ref 1.7–2.3)
MCH RBC QN AUTO: 30.6 PG (ref 26.6–33)
MCHC RBC AUTO-ENTMCNC: 32.9 G/DL (ref 31.5–35.7)
MCV RBC AUTO: 92.8 FL (ref 79–97)
PHOSPHATE SERPL-MCNC: 3.8 MG/DL (ref 2.5–4.5)
PLATELET # BLD AUTO: 310 10*3/MM3 (ref 140–450)
PMV BLD AUTO: 11.1 FL (ref 6–12)
POTASSIUM SERPL-SCNC: 3.3 MMOL/L (ref 3.5–5.2)
POTASSIUM SERPL-SCNC: 4.2 MMOL/L (ref 3.5–5.2)
RBC # BLD AUTO: 4.32 10*6/MM3 (ref 3.77–5.28)
SODIUM SERPL-SCNC: 130 MMOL/L (ref 136–145)
WBC NRBC COR # BLD: 7.09 10*3/MM3 (ref 3.4–10.8)

## 2023-10-21 PROCEDURE — 83735 ASSAY OF MAGNESIUM: CPT | Performed by: INTERNAL MEDICINE

## 2023-10-21 PROCEDURE — 99232 SBSQ HOSP IP/OBS MODERATE 35: CPT | Performed by: INTERNAL MEDICINE

## 2023-10-21 PROCEDURE — 84100 ASSAY OF PHOSPHORUS: CPT | Performed by: INTERNAL MEDICINE

## 2023-10-21 PROCEDURE — 25010000002 ONDANSETRON PER 1 MG: Performed by: INTERNAL MEDICINE

## 2023-10-21 PROCEDURE — 80048 BASIC METABOLIC PNL TOTAL CA: CPT | Performed by: INTERNAL MEDICINE

## 2023-10-21 PROCEDURE — 84132 ASSAY OF SERUM POTASSIUM: CPT | Performed by: NURSE PRACTITIONER

## 2023-10-21 PROCEDURE — 85027 COMPLETE CBC AUTOMATED: CPT | Performed by: INTERNAL MEDICINE

## 2023-10-21 PROCEDURE — 92612 ENDOSCOPY SWALLOW (FEES) VID: CPT

## 2023-10-21 RX ORDER — BUMETANIDE 1 MG/1
1 TABLET ORAL DAILY
Status: DISCONTINUED | OUTPATIENT
Start: 2023-10-21 | End: 2023-10-23 | Stop reason: HOSPADM

## 2023-10-21 RX ORDER — POTASSIUM CHLORIDE 750 MG/1
40 CAPSULE, EXTENDED RELEASE ORAL EVERY 4 HOURS
Status: COMPLETED | OUTPATIENT
Start: 2023-10-21 | End: 2023-10-21

## 2023-10-21 RX ORDER — POTASSIUM CHLORIDE 1.5 G/1.58G
40 POWDER, FOR SOLUTION ORAL EVERY 4 HOURS
Status: DISCONTINUED | OUTPATIENT
Start: 2023-10-21 | End: 2023-10-21

## 2023-10-21 RX ADMIN — APIXABAN 2.5 MG: 2.5 TABLET, FILM COATED ORAL at 08:09

## 2023-10-21 RX ADMIN — AMIODARONE HYDROCHLORIDE 200 MG: 200 TABLET ORAL at 20:37

## 2023-10-21 RX ADMIN — MIDODRINE HYDROCHLORIDE 10 MG: 10 TABLET ORAL at 11:01

## 2023-10-21 RX ADMIN — APIXABAN 2.5 MG: 2.5 TABLET, FILM COATED ORAL at 20:36

## 2023-10-21 RX ADMIN — Medication 10 ML: at 08:17

## 2023-10-21 RX ADMIN — POTASSIUM CHLORIDE 40 MEQ: 750 CAPSULE, EXTENDED RELEASE ORAL at 11:01

## 2023-10-21 RX ADMIN — MIDODRINE HYDROCHLORIDE 10 MG: 10 TABLET ORAL at 08:10

## 2023-10-21 RX ADMIN — PANTOPRAZOLE SODIUM 40 MG: 40 TABLET, DELAYED RELEASE ORAL at 05:51

## 2023-10-21 RX ADMIN — ONDANSETRON 4 MG: 2 INJECTION INTRAMUSCULAR; INTRAVENOUS at 21:13

## 2023-10-21 RX ADMIN — CHOLECALCIFEROL (VITAMIN D3) 10 MCG (400 UNIT) TABLET 400 UNITS: at 08:09

## 2023-10-21 RX ADMIN — ROSUVASTATIN 5 MG: 10 TABLET, FILM COATED ORAL at 20:37

## 2023-10-21 RX ADMIN — MIDODRINE HYDROCHLORIDE 10 MG: 10 TABLET ORAL at 16:13

## 2023-10-21 RX ADMIN — Medication 5 MG: at 20:37

## 2023-10-21 RX ADMIN — AMIODARONE HYDROCHLORIDE 200 MG: 200 TABLET ORAL at 16:13

## 2023-10-21 RX ADMIN — POTASSIUM CHLORIDE FOR ORAL SOLUTION 40 MEQ: 1.5 POWDER, FOR SOLUTION ORAL at 08:09

## 2023-10-21 RX ADMIN — AMIODARONE HYDROCHLORIDE 200 MG: 200 TABLET ORAL at 08:09

## 2023-10-21 RX ADMIN — Medication 10 ML: at 20:38

## 2023-10-21 RX ADMIN — OXYBUTYNIN CHLORIDE 10 MG: 10 TABLET, EXTENDED RELEASE ORAL at 08:09

## 2023-10-21 RX ADMIN — BUMETANIDE 1 MG: 1 TABLET ORAL at 11:00

## 2023-10-21 RX ADMIN — DOCUSATE SODIUM 100 MG: 100 CAPSULE, LIQUID FILLED ORAL at 20:37

## 2023-10-21 RX ADMIN — POTASSIUM CHLORIDE 40 MEQ: 750 CAPSULE, EXTENDED RELEASE ORAL at 16:13

## 2023-10-21 NOTE — PLAN OF CARE
Problem: Adult Inpatient Plan of Care  Goal: Plan of Care Review  Outcome: Ongoing, Progressing  Flowsheets (Taken 10/21/2023 0626)  Progress: improving     Goal Outcome Evaluation:    No acute events overnight    Remains SB to NSR on monitor, no s/s AF, afebrile, normotensive  2L NC, A & O x 1  0 BM via BSC, 1525mL UOP     Plan for FEES with SLP today     Grandson at bedside, updated on patient condition & plan of care

## 2023-10-21 NOTE — PROGRESS NOTES
"  [facility] at [facility]  PROGRESS NOTE    Date of Admission: 10/17/2023  Date of Service: 10/21/23    Primary Care Physician: Schuyler Garcia MD    Chief Complaint: Afib with RVR      Subjective      HPI: In bed family at bedside    Objective   Vitals: /58 (BP Location: Left arm, Patient Position: Lying)   Pulse 62   Temp 98 °F (36.7 °C) (Oral)   Resp 22   Ht 160 cm (62.99\")   Wt 67.7 kg (149 lb 4 oz)   SpO2 90%   BMI 26.45 kg/m²     Physical Exam:   GENERAL: Elderly, Alert, cooperative, in no acute distress.   HEART: Regular rate and rhythm; 4/6 KAMILLE  LUNGS: slight crackles at bases, Nonlabored breathing on 2 L/min  EXTREMITIES: No obvious deformities, cyanosis, or edema noted.     Results:  Results from last 7 days   Lab Units 10/21/23  0551 10/19/23  0402 10/18/23  0746   WBC 10*3/mm3 7.09 7.03 6.87   HEMOGLOBIN g/dL 13.2 12.2 12.4   HEMATOCRIT % 40.1 37.6 38.0   PLATELETS 10*3/mm3 310 286 297     Results from last 7 days   Lab Units 10/21/23  0551 10/20/23  1638 10/20/23  0352 10/19/23  0402   SODIUM mmol/L 130*  --  130* 139   POTASSIUM mmol/L 3.3* 4.0 3.2* 3.7   CHLORIDE mmol/L 89*  --  86* 97*   CO2 mmol/L 32.0*  --  35.0* 34.0*   BUN mg/dL 13  --  11 8   CREATININE mg/dL 0.79  --  0.79 0.63   GLUCOSE mg/dL 98  --  141* 102*      Lab Results   Component Value Date    CHOL 175 09/28/2023    TRIG 71 09/28/2023    HDL 69 (H) 09/28/2023    LDL 93 09/28/2023    AST 24 10/17/2023    ALT 13 10/17/2023             Results from last 7 days   Lab Units 10/18/23  0101   TSH uIU/mL 0.702             Results from last 7 days   Lab Units 10/18/23  0101 10/17/23  2310   HSTROP T ng/L 150* 171*     Results from last 7 days   Lab Units 10/17/23  2310   PROBNP pg/mL 5,278.0*         Intake/Output Summary (Last 24 hours) at 10/21/2023 0934  Last data filed at 10/21/2023 0817  Gross per 24 hour   Intake 500 ml   Output 3800 ml   Net -3300 ml       EKG sinus daquan with PACs    Radiology " Data:  Echo 10/18/23:    Left ventricular ejection fraction appears to be greater than 70%.    Left ventricular diastolic function was indeterminate.    Left atrial volume is mildly increased.    Severe aortic valve stenosis is present.    Aortic valve maximum pressure gradient is 79 mmHg. Aortic valve mean pressure gradient is 65 mmHg.    Estimated right ventricular systolic pressure from tricuspid regurgitation is normal (<35 mmHg). Calculated right ventricular systolic pressure from tricuspid regurgitation is 32 mmHg.    Incidental irregular heart rate noted with MV inflow suggestive of atrial fibrillation    CXR 10/19/23:  Findings:  The heart appears mildly enlarged. Diffuse interstitial and airspace opacities are present with slight progression of airspace within the bilateral lower lobes. Small bilateral pleural effusions are present, left greater than right, similar. No   pneumothorax. No acute osseous abnormality.     IMPRESSION:  Impression:  Slight progression of airspace disease within the bilateral lower lobes, likely related to worsening pneumonia. Stable small bilateral pleural effusions, left greater than right.      Current Medications:  amiodarone, 200 mg, Oral, TID  apixaban, 2.5 mg, Oral, Q12H  cholecalciferol, 400 Units, Oral, Daily  docusate sodium, 100 mg, Oral, BID  melatonin, 5 mg, Oral, Nightly  midodrine, 10 mg, Oral, TID AC  oxybutynin XL, 10 mg, Oral, Daily  pantoprazole, 40 mg, Oral, Q AM  pharmacy consult - MTM, , Does not apply, Daily  polyethylene glycol, 17 g, Oral, Daily  potassium chloride, 40 mEq, Oral, Q4H  rosuvastatin, 5 mg, Oral, Nightly  sodium chloride, 10 mL, Intravenous, Q12H           Assessment and Plan:   Parox Atrial Fibrillation  On Eliquis 2.5 mg bid  Maintaining sinus daquan/NSR with PACs on amiodarone 200 mg PO tid  Acute on Chronic HFpEF/Severe Aortic Stenosis  Echo 10/18/23: LVEF >70%, severe AS max 79 mean 65, RVSP <35  Poor candidate for valvular intervention  due to age and cognitive impairment.  Discussed with family in the room today they will discuss further.  End-of-life /code decisions to be discussed as well    Hypotension  Continue midodrine  Elevated Troponin  NO ACS. Type II demand ischemia from afib with RVR, defer ischemic evaluation    To telemetry when available

## 2023-10-21 NOTE — DISCHARGE INSTR - DIET
FEES Reason for Referral 10/21/2023    Patient was referred for a FEES to assess the efficiency of his/her swallow function, rule out aspiration and make recommendations regarding safe dietary consistencies, effective compensatory strategies, and safe eating environment.         Recommendations/Treatment  SLP Swallowing Diagnosis: mild, pharyngeal dysphagia (10/21/23 1000)  Functional Impact: risk of aspiration/pneumonia (10/21/23 1000)  Rehab Potential/Prognosis, Swallowing: good, to achieve stated therapy goals (10/21/23 1000)  Swallow Criteria for Skilled Therapeutic Interventions Met: demonstrates skilled criteria (10/21/23 1000)  Therapy Frequency (Swallow): 5 days per week (10/21/23 1000)  Predicted Duration Therapy Intervention (Days): until discharge (10/21/23 1000)  SLP Diet Recommendation: soft to chew textures, whole, thin liquids (10/21/23 1000)  Recommended Precautions and Strategies: upright posture during/after eating, small bites of food and sips of liquid, alternate between small bites of food and sips of liquid, volitional throat clear, general aspiration precautions, 1:1 supervision (10/21/23 1000)  SLP Rec. for Method of Medication Administration: meds whole, with puree (10/21/23 1000)  Monitor for Signs of Aspiration: yes, notify SLP if any concerns (10/21/23 1000)  Anticipated Discharge Disposition (SLP): unknown, anticipate therapy at next level of care (10/21/23 1000)    Instrumental Set-up  Risks/Benefits Reviewed: risks/benefits explained, patient, agreed to eval (10/21/23 1000)  Nasal Entry: right: (10/21/23 1000)  Anatomic Considerations: no anatomic structural deviation (10/21/23 1000)  Utensils Used: Spoon, Cup, Straw (10/21/23 1000)  Consistencies Trialed: thin liquids, nectar-thick liquids, honey-thick liquids, pudding/puree, regular textures (10/21/23 1000)    Oral Preparation/ Oral Phase  Oral Phase: WFL (10/21/23 1000)    Pharyngeal Phase  Initiation of Pharyngeal Swallow: bolus in  valleculae (10/21/23 1000)  Pharyngeal Phase: impaired pharyngeal phase of swallowing (10/21/23 1000)  Penetration During the Swallow: nectar-thick liquids, thin liquids, secondary to reduced laryngeal elevation, secondary to reduced vestibular closure, secondary to delayed swallow initiation or mistiming (10/21/23 1000)  Aspiration After the Swallow: thin liquids, secondary to residue, in pyriform sinuses, other (see comments) (aspiration only occurred when pt was talking during 1 trial of thin liquid) (10/21/23 1000)  Rosenbek's Scale: thin:, 8-->Level 8, nectar:, 3-->Level 3, honey:, pudding/puree:, regular textures:, 1-->Level 1 (10/21/23 1000)  Residue: regular Textures, valleculae, posterior pharyngeal wall, secondary to reduced base of tongue retraction, secondary to reduced posterior pharyngeal wall stripping (10/21/23 1000)  Response to Residue: cleared residue, with liquid wash, other (see comments) (cued) (10/21/23 1000)  Attempted Compensatory Maneuvers: bolus size, bolus presentation style, multiple swallows, alternate liquids/solids, throat clear after swallow (10/21/23 1000)  Response to Attempted Compensatory Maneuvers: reduced residue (10/21/23 1000)  FEES Summary: Pt aspirated thin liquids only x1 trial after the swallow due to pt talking during/after intake. Otherwise, no aspiration. There was silent penetration of thin and nectar-thick liquids during the swallow, but this did not deepen, and was cleared w/ a cued cough + reswallow. Pt is at risk of aspiration. There was moderate residue w/ regular solids which  required multiple swallows and x2 liquid washes to clear. Recommend soft/whole solids and thin liquids with strict use of the following strategies: single sips (straws OK), volitional cough at end of PO intake, and alternate liquids/solids. (10/21/23 1000)

## 2023-10-21 NOTE — PLAN OF CARE
Problem: Adult Inpatient Plan of Care  Goal: Plan of Care Review  Outcome: Ongoing, Progressing  Goal: Patient-Specific Goal (Individualized)  Outcome: Ongoing, Progressing  Goal: Absence of Hospital-Acquired Illness or Injury  Outcome: Ongoing, Progressing  Intervention: Identify and Manage Fall Risk  Recent Flowsheet Documentation  Taken 10/21/2023 1405 by Lito Garcia RN  Safety Promotion/Fall Prevention:   activity supervised   assistive device/personal items within reach   clutter free environment maintained   lighting adjusted   room organization consistent   safety round/check completed  Taken 10/21/2023 1229 by Lito Garcia RN  Safety Promotion/Fall Prevention:   activity supervised   assistive device/personal items within reach   clutter free environment maintained   lighting adjusted   nonskid shoes/slippers when out of bed   room organization consistent   safety round/check completed  Intervention: Prevent Infection  Recent Flowsheet Documentation  Taken 10/21/2023 1405 by Lito Garcia RN  Infection Prevention:   environmental surveillance performed   hand hygiene promoted   equipment surfaces disinfected   personal protective equipment utilized   rest/sleep promoted   single patient room provided  Taken 10/21/2023 1229 by Lito Garcia RN  Infection Prevention:   environmental surveillance performed   equipment surfaces disinfected   hand hygiene promoted   rest/sleep promoted   personal protective equipment utilized   single patient room provided  Goal: Optimal Comfort and Wellbeing  Outcome: Ongoing, Progressing  Intervention: Monitor Pain and Promote Comfort  Recent Flowsheet Documentation  Taken 10/21/2023 1405 by Lito Garcia RN  Pain Management Interventions:   see MAR   quiet environment facilitated  Taken 10/21/2023 1229 by Lito Garcia RN  Pain Management Interventions:   see MAR   quiet environment facilitated  Intervention: Provide Person-Centered Care  Recent Flowsheet Documentation  Taken  10/21/2023 1405 by Lito Garcia RN  Trust Relationship/Rapport:   care explained   questions encouraged   questions answered  Taken 10/21/2023 1229 by Lito Garcia RN  Trust Relationship/Rapport:   care explained   questions answered   questions encouraged  Goal: Readiness for Transition of Care  Outcome: Ongoing, Progressing     Problem: Fall Injury Risk  Goal: Absence of Fall and Fall-Related Injury  Outcome: Ongoing, Progressing  Intervention: Identify and Manage Contributors  Recent Flowsheet Documentation  Taken 10/21/2023 1405 by Lito Garcia RN  Medication Review/Management: medications reviewed  Taken 10/21/2023 1229 by Lito Garcia RN  Medication Review/Management: medications reviewed  Intervention: Promote Injury-Free Environment  Recent Flowsheet Documentation  Taken 10/21/2023 1405 by Lito Garcia RN  Safety Promotion/Fall Prevention:   activity supervised   assistive device/personal items within reach   clutter free environment maintained   lighting adjusted   room organization consistent   safety round/check completed  Taken 10/21/2023 1229 by Lito Garcia RN  Safety Promotion/Fall Prevention:   activity supervised   assistive device/personal items within reach   clutter free environment maintained   lighting adjusted   nonskid shoes/slippers when out of bed   room organization consistent   safety round/check completed     Problem: Skin Injury Risk Increased  Goal: Skin Health and Integrity  Outcome: Ongoing, Progressing     Problem: Heart Failure Comorbidity  Goal: Maintenance of Heart Failure Symptom Control  Outcome: Ongoing, Progressing  Intervention: Maintain Heart Failure-Management  Recent Flowsheet Documentation  Taken 10/21/2023 1405 by Lito Garcia RN  Medication Review/Management: medications reviewed  Taken 10/21/2023 1229 by Lito Garcia RN  Medication Review/Management: medications reviewed   Goal Outcome Evaluation:

## 2023-10-21 NOTE — PROGRESS NOTES
INTENSIVIST   PROGRESS NOTE     Hospital:  LOS: 3 days     Ms. Luh Mckeon, 89 y.o. female is followed for a Chief Complaint of: Respiratory Failure, Afib      Subjective   S     Interval History:  No acute events overnight. Awaiting FEES by Speech therapy.        The patient's relevant past medical, surgical and social history were reviewed and updated in Epic as appropriate.      ROS:   Constitutional: Negative for fever.   Respiratory: Positive for dyspnea.   Cardiovascular: Negative for chest pain.   Gastrointestinal: Negative for  nausea, vomiting and diarrhea.     Objective   O     Vitals:  Temp  Min: 96.5 °F (35.8 °C)  Max: 98 °F (36.7 °C)  BP  Min: 91/53  Max: 135/80  Pulse  Min: 53  Max: 62  Resp  Min: 18  Max: 22  SpO2  Min: 90 %  Max: 96 % Flow (L/min)  Min: 2  Max: 4    Intake/Ouptut 24 hrs (7:00AM - 6:59 AM)  Intake & Output (last 3 days)         10/18 0701  10/19 0700 10/19 0701  10/20 0700 10/20 0701  10/21 0700 10/21 0701  10/22 0700    P.O. 240 600 500     I.V. (mL/kg) 491 (7.3) 709.1 (10.3)      Total Intake(mL/kg) 731 (10.9) 1309.1 (19) 500 (7.4)     Urine (mL/kg/hr) 3350 (2.1) 2975 (1.8) 3225 (2) 575 (2.3)    Stool  0      Total Output 3350 2975 3225 575    Net -2619 -1665.9 -2725 -575            Urine Unmeasured Occurrence  2 x      Stool Unmeasured Occurrence  3 x              Medications (drips):           Physical Examination  Telemetry:  Sinus bradycardia.    Constitutional:  No acute distress.  Resting in bed on nasal cannula.    Eyes: No scleral icterus.   PERRL, EOM intact.    Neck:  Supple, FROM   Cardiovascular: Bradycardic. Regular rhythm. Normal heart sounds.  No murmurs, gallop or rub.   Respiratory: No respiratory distress. Normal respiratory effort.  Diminished with crackles.    Abdominal:  Soft. No masses. Nontender. No distension. No HSM.   Extremities: No digital cyanosis. No clubbing.  No peripheral edema.   Skin: No rashes, lesions or ulcers   Neurological:   Alert and  interactive.              Results from last 7 days   Lab Units 10/21/23  0551 10/19/23  0402 10/18/23  0746   WBC 10*3/mm3 7.09 7.03 6.87   HEMOGLOBIN g/dL 13.2 12.2 12.4   MCV fL 92.8 96.2 94.1   PLATELETS 10*3/mm3 310 286 297     Results from last 7 days   Lab Units 10/21/23  0551 10/20/23  1638 10/20/23  0352 10/19/23  0402   SODIUM mmol/L 130*  --  130* 139   POTASSIUM mmol/L 3.3* 4.0 3.2* 3.7   CO2 mmol/L 32.0*  --  35.0* 34.0*   CREATININE mg/dL 0.79  --  0.79 0.63   GLUCOSE mg/dL 98  --  141* 102*   MAGNESIUM mg/dL 1.8  --  1.9 2.2   PHOSPHORUS mg/dL 3.8  --  4.9* 4.5     Estimated Creatinine Clearance: 42 mL/min (by C-G formula based on SCr of 0.79 mg/dL).  Results from last 7 days   Lab Units 10/17/23  2310 10/17/23  1537   ALK PHOS U/L 85 77   BILIRUBIN mg/dL 0.2 0.3   ALT (SGPT) U/L 13 11   AST (SGOT) U/L 24 21             Images:  Imaging Results (Last 24 Hours)       Procedure Component Value Units Date/Time    SLP FEES - Fiberoptic Endo Eval Swallow [[id number]] Resulted: 10/21/23 0946     Updated: 10/21/23 0946           Results for orders placed during the hospital encounter of 10/17/23    Adult Transthoracic Echo Complete w/ Color, Spectral and Contrast if necessary per protocol    Interpretation Summary    Left ventricular ejection fraction appears to be greater than 70%.    Left ventricular diastolic function was indeterminate.    Left atrial volume is mildly increased.    Severe aortic valve stenosis is present.    Aortic valve maximum pressure gradient is 79 mmHg. Aortic valve mean pressure gradient is 65 mmHg.    Estimated right ventricular systolic pressure from tricuspid regurgitation is normal (<35 mmHg). Calculated right ventricular systolic pressure from tricuspid regurgitation is 32 mmHg.    Incidental irregular heart rate noted with MV inflow suggestive of atrial fibrillation       Results: Reviewed.  I reviewed the patient's new laboratory and imaging results.  I independently reviewed  the patient's new images.    Medications: Reviewed.    Assessment & Plan   A / P     Ms. Mckeon is a 89yo F with a history of moderate-severe aortic stenosis, Afib on eliquis and dementia who presented to the [facility] ED on 10/18 with fatigue and shortness of breath.     She was admitted to Hospital Medicine and given Bumex and placed on 2L nasal cannula. Cardiology was consulted for further management of CHF and Afib.     She was bolused with Amiodarone and started on Amiodarone infusion. She became hypotensive and continued to have multiple runs of Afib with RVR.     ICU transfer was recommended to restart the Amiodarone infusion with vasopressor support as needed.     She has thus far tolerated Amiodarone infusion without the need for vasopressors.       Nutrition:   Diet: Cardiac Diets; Healthy Heart (2-3 Na+); Texture: Regular Texture (IDDSI 7); Fluid Consistency: Thin (IDDSI 0)  Advance Directives:   Code Status and Medical Interventions:   Ordered at: 10/18/23 0332     Level Of Support Discussed With:    Patient     Code Status (Patient has no pulse and is not breathing):    CPR (Attempt to Resuscitate)     Medical Interventions (Patient has pulse or is breathing):    Full Support       Active Hospital Problems    Diagnosis     **Acute exacerbation of CHF (congestive heart failure)     Hypoxia     Moderate to severe aortic stenosis     CHF (congestive heart failure)     Dementia without behavioral disturbance     Atrial fibrillation with rapid ventricular response     Benign essential hypertension        Assessment / Plan:    PO Amiodarone per Cardiology.   Monitor respiratory status.   Repeat echocardiogram shows severe aortic stenosis. Not a candidate for valvular intervention.   Continue Eliquis.   Replace potassium.  Continue Midodrine.   Start PO Bumex daily.   Speech to perform FEES today.   PT/OT  AM labs  Okay to transfer back to telemetry when a bed is available.       High level of risk due to:  severe  exacerbation of chronic illness and illness with threat to life or bodily function.    Plan of care and goals reviewed during interdisciplinary rounds.  I discussed the patient's findings and my recommendations with patient, family, nursing staff, and consulting provider      Griselda Velez DO    Intensive Care Medicine and Pulmonary Medicine

## 2023-10-21 NOTE — PLAN OF CARE
Problem: Adult Inpatient Plan of Care  Goal: Plan of Care Review  Outcome: Ongoing, Progressing  Flowsheets (Taken 10/21/2023 1206)  Plan of Care Reviewed With:   patient   caregiver   Goal Outcome Evaluation:  Plan of Care Reviewed With: patient, caregiver            SLP evaluation completed. Will address dysphagia in tx. Please see note for further details and recommendations.

## 2023-10-21 NOTE — MBS/VFSS/FEES
Acute Care - Speech Language Pathology   Swallow Initial Evaluation [facility]  Fiberoptic Endoscopic Evaluation of Swallowing (FEES)       Patient Name: Luh Mckeon  : 1930  MRN: [id number]  Today's Date: 10/21/2023               Admit Date: 10/17/2023    Visit Dx:     ICD-10-CM ICD-9-CM   1. New onset of congestive heart failure  I50.9 428.0   2. Shortness of breath  R06.02 786.05   3. Elevated blood pressure reading with diagnosis of hypertension  I10 401.9   4. Paroxysmal atrial fibrillation  I48.0 427.31   5. Pharyngeal dysphagia  R13.13 787.23     Patient Active Problem List   Diagnosis    Generalized anxiety disorder    Unsteady gait    Risk for falls    Hyperlipidemia LDL goal <100    Benign essential hypertension    Memory loss    Urinary incontinence in female    Candida onychomycosis    AR (allergic rhinitis)    Overweight (BMI 25.0-29.9)    Chest pain    Cramp in limb    Disorder of mitral and aortic valves    Disorder of skeletal muscle    Diverticular disease of colon    Dysphagia    External hemorrhoids         Hand joint pain    Insomnia    Knee pain    Menopausal and postmenopausal disorder    Osteoporosis    Shoulder pain    Vitamin B deficiency    Medicare annual wellness visit, subsequent    Dizziness    Urgency of urination    Fatigue    Atrial fibrillation with rapid ventricular response    Hematochezia    Paroxysmal atrial fibrillation    Dementia without behavioral disturbance    Fall at home    Hip pain, acute, right    Acute right-sided low back pain without sciatica    Osteoarthritis of right hip    Complex regional pain syndrome type 2 of both lower extremities    Cellulitis of right lower extremity    Acute exacerbation of CHF (congestive heart failure)    Hypoxia    Moderate to severe aortic stenosis    CHF (congestive heart failure)     Past Medical History:   Diagnosis Date    Ankle instability     Chronic left shoulder pain 2018    Disorder of tendon of  shoulder region, left     Dysphagia     Esophagitis     Femoral neck fracture 7/15/2021    Finger fracture, right 2009    Dr Chandler casting and physical therapy    Hemorrhoids     Hypertension     Localized, primary osteoarthritis of left shoulder region     Osteoarthritis of right hip 3/20/2023    Paroxysmal atrial fibrillation 9/30/2021    Right shoulder pain     Shoulder joint replacement status     Right    Stroke     CVA     Past Surgical History:   Procedure Laterality Date    APPENDECTOMY      Daughter denies    CATARACT EXTRACTION, BILATERAL      2020    COLONOSCOPY N/A 8/23/2021    Procedure: COLONOSCOPY;  Surgeon: Aries Varela MD;  Location: [facility];  Service: Gastroenterology;  Laterality: N/A;    ENDOSCOPY  07/2009    with biopsy/esophageal ring dilation    ENDOSCOPY N/A 8/23/2021    Procedure: ESOPHAGOGASTRODUODENOSCOPY;  Surgeon: Aries Varela MD;  Location: [facility];  Service: Gastroenterology;  Laterality: N/A;    HEMORRHOIDECTOMY      lanced     HIP HEMIARTHROPLASTY Right 7/15/2021    Procedure: HIP HEMIARTHROPLASTY;  Surgeon: Adam Olsen MD;  Location: [facility] OR;  Service: Orthopedics;  Laterality: Right;    HYSTERECTOMY      partial     OOPHORECTOMY      SHOULDER SURGERY Right     replacement    SHOULDER SURGERY      Arthroscopy of shoulder:Right    TONSILLECTOMY         SLP Recommendation and Plan  SLP Swallowing Diagnosis: mild, pharyngeal dysphagia (10/21/23 1000)  SLP Diet Recommendation: soft to chew textures, whole, thin liquids (10/21/23 1000)  Recommended Precautions and Strategies: upright posture during/after eating, small bites of food and sips of liquid, alternate between small bites of food and sips of liquid, volitional throat clear, general aspiration precautions, 1:1 supervision (10/21/23 1000)  SLP Rec. for Method of Medication Administration: meds whole, with puree (10/21/23 1000)     Monitor for Signs of Aspiration: yes, notify SLP if any concerns  (10/21/23 1000)     Swallow Criteria for Skilled Therapeutic Interventions Met: demonstrates skilled criteria (10/21/23 1000)  Anticipated Discharge Disposition (SLP): unknown, anticipate therapy at next level of care (10/21/23 1000)  Rehab Potential/Prognosis, Swallowing: good, to achieve stated therapy goals (10/21/23 1000)  Therapy Frequency (Swallow): 5 days per week (10/21/23 1000)  Predicted Duration Therapy Intervention (Days): until discharge (10/21/23 1000)  Oral Care Recommendations: Oral Care BID/PRN, Toothbrush (10/21/23 1000)                                      Oral Care Recommendations: Oral Care BID/PRN, Toothbrush (10/21/23 1000)    Plan of Care Reviewed With: patient, caregiver      SWALLOW EVALUATION (last 72 hours)       SLP Adult Swallow Evaluation       Row Name 10/21/23 1000 10/20/23 1620                Rehab Evaluation    Document Type evaluation  -DV evaluation  -CJ       Subjective Information no complaints  -DV no complaints  -CJ       Patient Observations alert;cooperative  -DV alert;cooperative  -CJ       Patient/Family/Caregiver Comments/Observations caregiver present  -DV caregiver present  -CJ       Patient Effort good  -DV good  -CJ       Symptoms Noted During/After Treatment none  -DV none  -CJ          General Information    Patient Profile Reviewed yes  -DV yes  -CJ       Pertinent History Of Current Problem see initial eval  -DV Pt adm w/ exacerbation of CHF, hypoxia; sig h/o HTN, afib, dementia, HLD, memory loss, dysphagia, osteoarthritis, aortic stenosis. CXR 10/19: slight progression of airspace disease w/ in BLLL 2/2 PNA L>R.  RN consulted 2/2 concern for coughing w/ po intake  -CJ       Current Method of Nutrition regular textures;thin liquids  -DV regular textures;thin liquids  -CJ       Precautions/Limitations, Vision for purposes of eval  -DV for purposes of eval  -CJ       Precautions/Limitations, Hearing WFL;for purposes of eval  -DV WFL;for purposes of eval  -CJ        "Prior Level of Function-Communication other (see comments)  dementia per chart  -DV other (see comments)  dementia per chart, has caregiver to assist  -CJ       Prior Level of Function-Swallowing no diet consistency restrictions  -DV other (see comments)  caregiver reports \"she coughs at home all the time but I don't think she's choking\"  -CJ       Plans/Goals Discussed with patient;other (see comments)  caregiver  -DV patient;other (see comments)  caregiver  -CJ       Barriers to Rehab medically complex  -DV medically complex  -CJ       Patient's Goals for Discharge patient did not state  -DV patient did not state  -CJ          Pain    Additional Documentation Pain Scale: Numbers Pre/Post-Treatment (Group)  -DV Pain Scale: FACES Pre/Post-Treatment (Group)  -CJ          Pain Scale: Numbers Pre/Post-Treatment    Pretreatment Pain Rating 0/10 - no pain  -DV --       Posttreatment Pain Rating 0/10 - no pain  -DV --          Pain Scale: FACES Pre/Post-Treatment    Pain: FACES Scale, Pretreatment -- 0-->no hurt  -CJ       Posttreatment Pain Rating -- 0-->no hurt  -CJ          Oral Motor Structure and Function    Dentition Assessment -- natural, present and adequate  -       Secretion Management -- WNL/WFL  -       Mucosal Quality -- moist, healthy  -          Oral Musculature and Cranial Nerve Assessment    Oral Motor General Assessment -- WFL  -          General Eating/Swallowing Observations    Respiratory Support Currently in Use -- nasal cannula  -       Eating/Swallowing Skills -- fed by SLP  -       Positioning During Eating -- upright in bed  -       Utensils Used -- spoon;cup;straw  -       Consistencies Trialed -- regular textures;ice chips;thin liquids;pureed  -CJ          Clinical Swallow Eval    Pharyngeal Phase -- suspected pharyngeal impairment  -       Clinical Swallow Evaluation Summary -- Oral phase is seemingly wfl. Adequate mastication w/ solids. Cough w/ initial teaspoon presentaiton " of thin liquids. Then delayed cough at end of evaluation. Given concern for difficulty w/ po intake per RN and at home along w/ progression of airspace disease will plan to complete FEES tomorrow am. Okay to continue MD ordered po diet  -CJ          Pharyngeal Phase Concerns    Pharyngeal Phase Concerns -- cough  -CJ       Cough -- thin  -CJ          Fiberoptic Endoscopic Evaluation of Swallowing (FEES)    Risks/Benefits Reviewed risks/benefits explained;patient;agreed to eval  -DV --       Nasal Entry right:  -DV --       Scope serial number/identification [id number]  -DV --          Anatomy and Physiology    Anatomic Considerations no anatomic structural deviation  -DV --       Velopharyngeal WFL  -DV --       Base of Tongue symmetrical  -DV --       Epiglottis WFL  -DV --       Laryngeal Function Breathing symmetrical  -DV --       Laryngeal Function Phonation symmetrical  -DV --       Laryngeal Function to Breath Hold TVF contact  -DV --       Secretion Rating Scale (Tesfaye et alCameron 1996) 0- normal, no visible secretions  -DV --       Ice Chips DNA  -DV --       Spontaneous Swallow frequency reduced  -DV --       Sensory sensed scope  -DV --       Utensils Used Spoon;Cup;Straw  -DV --       Consistencies Trialed thin liquids;nectar-thick liquids;honey-thick liquids;pudding/puree;regular textures  -DV --          FEES Interpretation    Oral Phase WFL  -DV --          Initiation of Pharyngeal Swallow    Initiation of Pharyngeal Swallow bolus in valleculae  -DV --       Pharyngeal Phase impaired pharyngeal phase of swallowing  -DV --       Penetration During the Swallow nectar-thick liquids;thin liquids;secondary to reduced laryngeal elevation;secondary to reduced vestibular closure;secondary to delayed swallow initiation or mistiming  -DV --       Aspiration After the Swallow thin liquids;secondary to residue;in pyriform sinuses;other (see comments)  aspiration only occurred when pt was talking during 1 trial of thin  liquid  -DV --       Depth of Penetration shallow  -DV --       Response to Penetration No  -DV --       No spontaneous response to penetration and effective laryngeal clearance with cue (see comments)  -DV --       Response to Aspiration No  -DV --       No spontaneous response to aspiration with non-effective subglottic clearance with cue (see comments)  -DV --       Rosenbek's Scale thin:;8-->Level 8;nectar:;3-->Level 3;honey:;pudding/puree:;regular textures:;1-->Level 1  -DV --       Residue regular Textures;valleculae;posterior pharyngeal wall;secondary to reduced base of tongue retraction;secondary to reduced posterior pharyngeal wall stripping  -DV --       Response to Residue cleared residue;with liquid wash;other (see comments)  cued  -DV --       Attempted Compensatory Maneuvers bolus size;bolus presentation style;multiple swallows;alternate liquids/solids;throat clear after swallow  -DV --       Response to Attempted Compensatory Maneuvers reduced residue  -DV --       Successful Compensatory Maneuver Competency other (see comments)  caregiver to assist, able to teachback  -DV --       FEES Summary Pt aspirated thin liquids only x1 trial after the swallow due to pt talking during/after intake. Otherwise, no aspiration. There was silent penetration of thin and nectar-thick liquids during the swallow, but this did not deepen, and was cleared w/ a cued cough + reswallow. Pt is at risk of aspiration. There was moderate residue w/ regular solids which  required multiple swallows and x2 liquid washes to clear. Recommend soft/whole solids and thin liquids with strict use of the following strategies: single sips (straws OK), volitional cough at end of PO intake, and alternate liquids/solids.  -DV --          SLP Evaluation Clinical Impression    SLP Swallowing Diagnosis mild;pharyngeal dysphagia  -DV suspected pharyngeal dysphagia  -CJ       Functional Impact risk of aspiration/pneumonia  -DV risk of  aspiration/pneumonia  -       Rehab Potential/Prognosis, Swallowing good, to achieve stated therapy goals  -DV good, to achieve stated therapy goals  -       Swallow Criteria for Skilled Therapeutic Interventions Met demonstrates skilled criteria  -DV demonstrates skilled criteria  -          Recommendations    Therapy Frequency (Swallow) 5 days per week  -DV --       Predicted Duration Therapy Intervention (Days) until discharge  -DV until discharge  -       SLP Diet Recommendation soft to chew textures;whole;thin liquids  -DV other (see comments)  continue MD ordered po diet until FEES in am  -       Recommended Diagnostics -- reassess via FEES  10/21/23  -       Recommended Precautions and Strategies upright posture during/after eating;small bites of food and sips of liquid;alternate between small bites of food and sips of liquid;volitional throat clear;general aspiration precautions;1:1 supervision  - upright posture during/after eating;small bites of food and sips of liquid;general aspiration precautions;fatigue precautions;assist with feeding  -       Oral Care Recommendations Oral Care BID/PRN;Toothbrush  - Oral Care BID/PRN;Toothbrush  -       SLP Rec. for Method of Medication Administration meds whole;with puree  -DV meds whole;meds crushed;with puree;as tolerated  -       Monitor for Signs of Aspiration yes;notify SLP if any concerns  -DV yes;notify SLP if any concerns  -       Anticipated Discharge Disposition (SLP) unknown;anticipate therapy at next level of care  - skilled nursing facility  -                 User Key  (r) = Recorded By, (t) = Taken By, (c) = Cosigned By      Initials Name Effective Dates     Ashia Borrego MS CCC-SLP 07/11/23 -     Sheree Daily MS CCC-SLP 06/16/21 -                     EDUCATION  The patient has been educated in the following areas:   Dysphagia (Swallowing Impairment) Modified Diet Instruction.        SLP GOALS       Row Name 10/21/23  1000             (LTG) Patient will demonstrate functional swallow for    Diet Texture (Demonstrate functional swallow) soft to chew (whole) textures  -DV      Liquid viscosity (Demonstrate functional swallow) thin liquids  -DV      [place] (Demonstrate functional swallow) with minimal cues (75-90% accuracy)  -DV      Time Frame (Demonstrate functional swallow) by discharge  -DV      Progress/Outcomes (Demonstrate functional swallow) new goal  -DV         (STG) Patient will tolerate trials of    Consistencies Trialed (Tolerate trials) soft to chew (whole) textures;thin liquids  -DV      Desired Outcome (Tolerate trials) without signs/symptoms of aspiration;with adequate oral prep/transit/clearance  -DV      [place] (Tolerate trials) with minimal cues (75-90% accuracy)  -DV      Time Frame (Tolerate trials) 1 week  -DV      Progress/Outcomes (Tolerate trials) new goal  -DV         (STG) Pharyngeal Strengthening Exercise Goal 1 (SLP)    Activity (Pharyngeal Strengthening Goal 1, SLP) increase superior movement of the hyolaryngeal complex;increase anterior movement of the hyolaryngeal complex;increase closure at entrance to airway/closure of airway at glottis;increase squeeze/positive pressure generation;increase tongue base retraction;increase timing  -DV      Increase Timing prepping - 3 second prep or suck swallow or 3-step swallow  -DV      Increase Superior Movement of the Hyolaryngeal Complex effortful pitch glide (falsetto + pharyngeal squeeze)  -DV      Increase Anterior Movement of the Hyolaryngeal Complex chin tuck against resistance (CTAR)  -DV      Increase Closure at Entrance to Airway/Closure of Airway at Glottis supraglottic swallow  -DV      Increase Squeeze/Positive Pressure Generation hard effortful swallow  -DV      Increase Tongue Base Retraction romain  -DV      [place]/Accuracy (Pharyngeal Strengthening Goal 1, SLP) with minimal cues (75-90% accuracy)  -DV      Time Frame  (Pharyngeal Strengthening Goal 1, SLP) short term goal (STG)  -DV      Progress/Outcomes (Pharyngeal Strengthening Goal 1, SLP) new goal  -DV         (STG) Swallow Compensatory Strategies Goal 1 (SLP)    Activity (Swallow Compensatory Strategies/Techniques Goal 1, SLP) compensatory strategies;small cup sips;small straw sips;alternate food/liquid intake;throat clear/extra swallow  -DV      [place]/Accuracy (Swallow Compensatory Strategies/Techniques Goal 1, SLP) with minimal cues (75-90% accuracy)  -DV      Time Frame (Swallow Compensatory Strategies/Techniques Goal 1, SLP) short term goal (STG)  -DV      Progress/Outcomes (Swallow Compensatory Strategies/Techniques Goal 1, SLP) new goal  -DV                User Key  (r) = Recorded By, (t) = Taken By, (c) = Cosigned By      Initials Name Provider Type    Sheree Daily MS CCC-SLP Speech and Language Pathologist                       Time Calculation:    Time Calculation- SLP       Row Name 10/21/23 1209             Time Calculation- SLP    SLP Start Time 1000  -DV      SLP Received On 10/21/23  -DV         Untimed Charges    SLP Eval/Re-eval  ST Fiberoptic Endo Eval Swallow - 61981  -DV      46650-GF Fiberoptic Endo Eval Swallow Minutes 120  -DV         Total Minutes    Untimed Charges Total Minutes 120  -DV       Total Minutes 120  -DV                User Key  (r) = Recorded By, (t) = Taken By, (c) = Cosigned By      Initials Name Provider Type    Sheree Daily MS CCC-SLP Speech and Language Pathologist                    Therapy Charges for Today       Code Description Service Date Service Provider Modifiers Qty    [id number]  ST FIBEROPTIC ENDO EVAL SWALL 8 10/21/2023 Sheree Graham MS CCC-SLP GN 1                 MS GERMAIN Allen  10/21/2023

## 2023-10-22 LAB
ANION GAP SERPL CALCULATED.3IONS-SCNC: 9 MMOL/L (ref 5–15)
BACTERIA SPEC AEROBE CULT: NORMAL
BUN SERPL-MCNC: 12 MG/DL (ref 8–23)
BUN/CREAT SERPL: 16.9 (ref 7–25)
CALCIUM SPEC-SCNC: 9.1 MG/DL (ref 8.2–9.6)
CHLORIDE SERPL-SCNC: 93 MMOL/L (ref 98–107)
CO2 SERPL-SCNC: 29 MMOL/L (ref 22–29)
CREAT SERPL-MCNC: 0.71 MG/DL (ref 0.57–1)
DEPRECATED RDW RBC AUTO: 39.8 FL (ref 37–54)
EGFRCR SERPLBLD CKD-EPI 2021: 79.9 ML/MIN/1.73
ERYTHROCYTE [DISTWIDTH] IN BLOOD BY AUTOMATED COUNT: 11.9 % (ref 12.3–15.4)
GLUCOSE SERPL-MCNC: 98 MG/DL (ref 65–99)
HCT VFR BLD AUTO: 38.1 % (ref 34–46.6)
HGB BLD-MCNC: 12.7 G/DL (ref 12–15.9)
MAGNESIUM SERPL-MCNC: 1.9 MG/DL (ref 1.7–2.3)
MCH RBC QN AUTO: 30.8 PG (ref 26.6–33)
MCHC RBC AUTO-ENTMCNC: 33.3 G/DL (ref 31.5–35.7)
MCV RBC AUTO: 92.3 FL (ref 79–97)
PHOSPHATE SERPL-MCNC: 3.2 MG/DL (ref 2.5–4.5)
PLATELET # BLD AUTO: 286 10*3/MM3 (ref 140–450)
PMV BLD AUTO: 11.3 FL (ref 6–12)
POTASSIUM SERPL-SCNC: 4.6 MMOL/L (ref 3.5–5.2)
RBC # BLD AUTO: 4.13 10*6/MM3 (ref 3.77–5.28)
SODIUM SERPL-SCNC: 131 MMOL/L (ref 136–145)
WBC NRBC COR # BLD: 6.99 10*3/MM3 (ref 3.4–10.8)

## 2023-10-22 PROCEDURE — 80048 BASIC METABOLIC PNL TOTAL CA: CPT | Performed by: INTERNAL MEDICINE

## 2023-10-22 PROCEDURE — 94799 UNLISTED PULMONARY SVC/PX: CPT

## 2023-10-22 PROCEDURE — 94761 N-INVAS EAR/PLS OXIMETRY MLT: CPT

## 2023-10-22 PROCEDURE — 84100 ASSAY OF PHOSPHORUS: CPT | Performed by: INTERNAL MEDICINE

## 2023-10-22 PROCEDURE — 83735 ASSAY OF MAGNESIUM: CPT | Performed by: INTERNAL MEDICINE

## 2023-10-22 PROCEDURE — 99232 SBSQ HOSP IP/OBS MODERATE 35: CPT | Performed by: INTERNAL MEDICINE

## 2023-10-22 PROCEDURE — 85027 COMPLETE CBC AUTOMATED: CPT | Performed by: INTERNAL MEDICINE

## 2023-10-22 RX ADMIN — MIDODRINE HYDROCHLORIDE 10 MG: 10 TABLET ORAL at 08:32

## 2023-10-22 RX ADMIN — DOCUSATE SODIUM 100 MG: 100 CAPSULE, LIQUID FILLED ORAL at 08:32

## 2023-10-22 RX ADMIN — BUMETANIDE 1 MG: 1 TABLET ORAL at 08:32

## 2023-10-22 RX ADMIN — MIDODRINE HYDROCHLORIDE 10 MG: 10 TABLET ORAL at 17:47

## 2023-10-22 RX ADMIN — Medication 5 MG: at 21:43

## 2023-10-22 RX ADMIN — POLYETHYLENE GLYCOL 3350 17 G: 17 POWDER, FOR SOLUTION ORAL at 08:32

## 2023-10-22 RX ADMIN — Medication 10 ML: at 08:33

## 2023-10-22 RX ADMIN — MIDODRINE HYDROCHLORIDE 10 MG: 10 TABLET ORAL at 11:32

## 2023-10-22 RX ADMIN — OXYBUTYNIN CHLORIDE 10 MG: 10 TABLET, EXTENDED RELEASE ORAL at 08:32

## 2023-10-22 RX ADMIN — CHOLECALCIFEROL (VITAMIN D3) 10 MCG (400 UNIT) TABLET 400 UNITS: at 08:32

## 2023-10-22 RX ADMIN — AMIODARONE HYDROCHLORIDE 200 MG: 200 TABLET ORAL at 21:43

## 2023-10-22 RX ADMIN — DOCUSATE SODIUM 100 MG: 100 CAPSULE, LIQUID FILLED ORAL at 21:43

## 2023-10-22 RX ADMIN — ROSUVASTATIN 5 MG: 10 TABLET, FILM COATED ORAL at 21:43

## 2023-10-22 RX ADMIN — APIXABAN 2.5 MG: 2.5 TABLET, FILM COATED ORAL at 21:43

## 2023-10-22 RX ADMIN — AMIODARONE HYDROCHLORIDE 200 MG: 200 TABLET ORAL at 08:32

## 2023-10-22 RX ADMIN — Medication 10 ML: at 21:43

## 2023-10-22 RX ADMIN — AMIODARONE HYDROCHLORIDE 200 MG: 200 TABLET ORAL at 16:07

## 2023-10-22 RX ADMIN — PANTOPRAZOLE SODIUM 40 MG: 40 TABLET, DELAYED RELEASE ORAL at 05:49

## 2023-10-22 RX ADMIN — APIXABAN 2.5 MG: 2.5 TABLET, FILM COATED ORAL at 08:32

## 2023-10-22 NOTE — PROGRESS NOTES
"  [facility]  PROGRESS NOTE    Date of Admission: 10/17/2023  Date of Service: 10/22/23    Primary Care Physician: Schuyler Garcia MD    Chief Complaint: Afib with RVR      Subjective      HPI:  no afib    Objective   Vitals: /63   Pulse 57   Temp 97.4 °F (36.3 °C) (Oral)   Resp 18   Ht 160 cm (62.99\")   Wt 67.7 kg (149 lb 4 oz)   SpO2 98%   BMI 26.45 kg/m²     Physical Exam:   GENERAL: Elderly, Alert, cooperative, in no acute distress.   HEART: Regular rate and rhythm; 4/6 KAMILLE  LUNGS: slight crackles at bases, Nonlabored breathing on 2 L/min  EXTREMITIES: No obvious deformities, cyanosis, or edema noted.     Results:  Results from last 7 days   Lab Units 10/22/23  0334 10/21/23  0551 10/19/23  0402   WBC 10*3/mm3 6.99 7.09 7.03   HEMOGLOBIN g/dL 12.7 13.2 12.2   HEMATOCRIT % 38.1 40.1 37.6   PLATELETS 10*3/mm3 286 310 286     Results from last 7 days   Lab Units 10/22/23  0334 10/21/23  1552 10/21/23  0551 10/20/23  1638 10/20/23  0352   SODIUM mmol/L 131*  --  130*  --  130*   POTASSIUM mmol/L 4.6 4.2 3.3*   < > 3.2*   CHLORIDE mmol/L 93*  --  89*  --  86*   CO2 mmol/L 29.0  --  32.0*  --  35.0*   BUN mg/dL 12  --  13  --  11   CREATININE mg/dL 0.71  --  0.79  --  0.79   GLUCOSE mg/dL 98  --  98  --  141*    < > = values in this interval not displayed.      Lab Results   Component Value Date    CHOL 175 09/28/2023    TRIG 71 09/28/2023    HDL 69 (H) 09/28/2023    LDL 93 09/28/2023    AST 24 10/17/2023    ALT 13 10/17/2023             Results from last 7 days   Lab Units 10/18/23  0101   TSH uIU/mL 0.702             Results from last 7 days   Lab Units 10/18/23  0101 10/17/23  2310   HSTROP T ng/L 150* 171*     Results from last 7 days   Lab Units 10/17/23  2310   PROBNP pg/mL 5,278.0*         Intake/Output Summary (Last 24 hours) at 10/22/2023 1141  Last data filed at 10/22/2023 0839  Gross per 24 hour   Intake 456 ml   Output 800 ml   Net -344 ml       EKG " sinus daquan with PACs    Radiology Data:  Echo 10/18/23:    Left ventricular ejection fraction appears to be greater than 70%.    Left ventricular diastolic function was indeterminate.    Left atrial volume is mildly increased.    Severe aortic valve stenosis is present.    Aortic valve maximum pressure gradient is 79 mmHg. Aortic valve mean pressure gradient is 65 mmHg.    Estimated right ventricular systolic pressure from tricuspid regurgitation is normal (<35 mmHg). Calculated right ventricular systolic pressure from tricuspid regurgitation is 32 mmHg.    Incidental irregular heart rate noted with MV inflow suggestive of atrial fibrillation    CXR 10/19/23:  Findings:  The heart appears mildly enlarged. Diffuse interstitial and airspace opacities are present with slight progression of airspace within the bilateral lower lobes. Small bilateral pleural effusions are present, left greater than right, similar. No   pneumothorax. No acute osseous abnormality.     IMPRESSION:  Impression:  Slight progression of airspace disease within the bilateral lower lobes, likely related to worsening pneumonia. Stable small bilateral pleural effusions, left greater than right.      Current Medications:  amiodarone, 200 mg, Oral, TID  apixaban, 2.5 mg, Oral, Q12H  bumetanide, 1 mg, Oral, Daily  cholecalciferol, 400 Units, Oral, Daily  docusate sodium, 100 mg, Oral, BID  melatonin, 5 mg, Oral, Nightly  midodrine, 10 mg, Oral, TID AC  oxybutynin XL, 10 mg, Oral, Daily  pantoprazole, 40 mg, Oral, Q AM  pharmacy consult - MTM, , Does not apply, Daily  polyethylene glycol, 17 g, Oral, Daily  rosuvastatin, 5 mg, Oral, Nightly  sodium chloride, 10 mL, Intravenous, Q12H           Assessment and Plan:   Parox Atrial Fibrillation  On Eliquis 2.5 mg bid  Maintaining sinus daquan/NSR with PACs on amiodarone 200 mg PO tid  Acute on Chronic HFpEF/Severe Aortic Stenosis  Echo 10/18/23: LVEF >70%, severe AS max 79 mean 65, RVSP <35  Poor candidate  for valvular intervention due to age and cognitive impairment.    Hypotension  Continue midodrine  Elevated Troponin  NO ACS. Type II demand ischemia from afib with RVR, defer ischemic evaluation    Upon dc amio 200  tid X3 days  BID X 1 week  Daily thereafter    CW last felt home w HH was planned

## 2023-10-22 NOTE — PROGRESS NOTES
[facility] Medicine Services  PROGRESS NOTE    Patient Name: Luh Mckeon  : 1930  MRN: [id number]    Date of Admission: 10/17/2023  Primary Care Physician: Schuyler Garcia MD    Subjective   Subjective     CC:  SOA, edema    HPI:  Doing okay this am, caregiver at bedside. No new issues overnight.       Objective   Objective     Vital Signs:   Temp:  [97.8 °F (36.6 °C)-99.1 °F (37.3 °C)] 99 °F (37.2 °C)  Heart Rate:  [55-62] 57  Resp:  [19-22] 19  BP: ()/(51-63) 109/57  Flow (L/min):  [2-4] 2     Physical Exam:  Constitutional: No acute distress, awake, alert  HENT: NCAT, mucous membranes moist  Respiratory: Clear to auscultation bilaterally, respiratory effort normal   Cardiovascular: RRR, no murmurs, rubs, or gallops  Gastrointestinal: Positive bowel sounds, soft, nontender, nondistended  Musculoskeletal: No bilateral ankle edema  Psychiatric: Appropriate affect, cooperative  Neurologic: pleasantly confused, strength symmetric in all extremities, Cranial Nerves grossly intact to confrontation, speech clear  Skin: No rashes      Results Reviewed:  LAB RESULTS:      Lab 10/21/23  0551 10/19/23  0402 10/18/23  0746 10/17/23  2310 10/17/23  1537   WBC 7.09 7.03 6.87 7.75 7.07   HEMOGLOBIN 13.2 12.2 12.4 14.0 13.4   HEMATOCRIT 40.1 37.6 38.0 43.4 39.4   PLATELETS 310 286 297 307 329   NEUTROS ABS  --   --  4.61 5.07 4.39   IMMATURE GRANS (ABS)  --   --  0.02 0.02 0.03   LYMPHS ABS  --   --  1.29 1.61 1.56   MONOS ABS  --   --  0.75 0.81 0.84   EOS ABS  --   --  0.16 0.20 0.21   MCV 92.8 96.2 94.1 95.8 91.6   PROCALCITONIN  --   --   --  0.02  --    LACTATE  --   --   --  1.8  --          Lab 10/21/23  1552 10/21/23  0551 10/20/23  1638 10/20/23  0352 10/19/23  0402 10/18/23  0746 10/18/23  0101 10/17/23  2310 10/17/23  1537   SODIUM  --  130*  --  130* 139 132*  --  131* 135*   POTASSIUM 4.2 3.3* 4.0 3.2* 3.7 4.2  --  4.5 4.1   CHLORIDE  --  89*  --  86* 97* 95*  --   95* 95*   CO2  --  32.0*  --  35.0* 34.0* 25.0  --  27.0 26.2   ANION GAP  --  9.0  --  9.0 8.0 12.0  --  9.0 13.8   BUN  --  13  --  11 8 9  --  12 14   CREATININE  --  0.79  --  0.79 0.63 0.63  --  0.71 0.87   EGFR  --  70.3  --  70.3 83.3 83.3  --  79.9 62.6   GLUCOSE  --  98  --  141* 102* 109*  --  99 97   CALCIUM  --  9.2  --  9.4 8.9 9.0  --  9.6 9.7*   IONIZED CALCIUM  --   --   --   --   --   --   --   --  1.29  5.2   MAGNESIUM  --  1.8  --  1.9 2.2  --   --   --  2.3   PHOSPHORUS  --  3.8  --  4.9* 4.5  --   --   --   --    TSH  --   --   --   --   --   --  0.702  --   --          Lab 10/17/23  2310 10/17/23  1537   TOTAL PROTEIN 7.7 7.2   ALBUMIN 3.6 3.8   GLOBULIN 4.1 3.4   ALT (SGPT) 13 11   AST (SGOT) 24 21   BILIRUBIN 0.2 0.3   ALK PHOS 85 77         Lab 10/18/23  0101 10/17/23  2310 10/17/23  1537   PROBNP  --  5,278.0* 6,344.0*   HSTROP T 150* 171*  --                  Brief Urine Lab Results  (Last result in the past 365 days)        Color   Clarity   Blood   Leuk Est   Nitrite   Protein   CREAT   Urine HCG        09/28/23 1040             64.2                 Microbiology Results Abnormal       Procedure Component Value - Date/Time    Blood Culture - Blood, Arm, Right [[id number]]  (Normal) Collected: 10/17/23 2335    Lab Status: Preliminary result Specimen: Blood from Arm, Right Updated: 10/22/23 0001     Blood Culture No growth at 4 days    Blood Culture - Blood, Arm, Left [[id number]]  (Normal) Collected: 10/17/23 2305    Lab Status: Preliminary result Specimen: Blood from Arm, Left Updated: 10/21/23 2331     Blood Culture No growth at 4 days    COVID PRE-OP / PRE-PROCEDURE SCREENING ORDER (NO ISOLATION) - Swab, Nasopharynx [[id number]]  (Normal) Collected: 10/17/23 2316    Lab Status: Final result Specimen: Swab from Nasopharynx Updated: 10/18/23 0042    Narrative:      The following orders were created for panel order COVID PRE-OP / PRE-PROCEDURE SCREENING ORDER (NO ISOLATION) - Swab,  Nasopharynx.  Procedure                               Abnormality         Status                     ---------                               -----------         ------                     Respiratory Panel PCR w/...[[id number]]  Normal              Final result                 Please view results for these tests on the individual orders.    Respiratory Panel PCR w/COVID-19(SARS-CoV-2) ENRIQUE/NELSON/CARRIE/PAD/COR/MAD/LAURA In-House, NP Swab in UTM/VTM, 3-4 HR TAT - Swab, Nasopharynx [[id number]]  (Normal) Collected: 10/17/23 8556    Lab Status: Final result Specimen: Swab from Nasopharynx Updated: 10/18/23 0042     ADENOVIRUS, PCR Not Detected     Coronavirus 229E Not Detected     Coronavirus HKU1 Not Detected     Coronavirus NL63 Not Detected     Coronavirus OC43 Not Detected     COVID19 Not Detected     Human Metapneumovirus Not Detected     Human Rhinovirus/Enterovirus Not Detected     Influenza A PCR Not Detected     Influenza B PCR Not Detected     Parainfluenza Virus 1 Not Detected     Parainfluenza Virus 2 Not Detected     Parainfluenza Virus 3 Not Detected     Parainfluenza Virus 4 Not Detected     RSV, PCR Not Detected     Bordetella pertussis pcr Not Detected     Bordetella parapertussis PCR Not Detected     Chlamydophila pneumoniae PCR Not Detected     Mycoplasma pneumo by PCR Not Detected    Narrative:      In the setting of a positive respiratory panel with a viral infection PLUS a negative procalcitonin without other underlying concern for bacterial infection, consider observing off antibiotics or discontinuation of antibiotics and continue supportive care. If the respiratory panel is positive for atypical bacterial infection (Bordetella pertussis, Chlamydophila pneumoniae, or Mycoplasma pneumoniae), consider antibiotic de-escalation to target atypical bacterial infection.            No radiology results from the last 24 hrs    Results for orders placed during the hospital encounter of 10/17/23    Adult  Transthoracic Echo Complete w/ Color, Spectral and Contrast if necessary per protocol    Interpretation Summary    Left ventricular ejection fraction appears to be greater than 70%.    Left ventricular diastolic function was indeterminate.    Left atrial volume is mildly increased.    Severe aortic valve stenosis is present.    Aortic valve maximum pressure gradient is 79 mmHg. Aortic valve mean pressure gradient is 65 mmHg.    Estimated right ventricular systolic pressure from tricuspid regurgitation is normal (<35 mmHg). Calculated right ventricular systolic pressure from tricuspid regurgitation is 32 mmHg.    Incidental irregular heart rate noted with MV inflow suggestive of atrial fibrillation      Current medications:  Scheduled Meds:amiodarone, 200 mg, Oral, TID  apixaban, 2.5 mg, Oral, Q12H  bumetanide, 1 mg, Oral, Daily  cholecalciferol, 400 Units, Oral, Daily  docusate sodium, 100 mg, Oral, BID  melatonin, 5 mg, Oral, Nightly  midodrine, 10 mg, Oral, TID AC  oxybutynin XL, 10 mg, Oral, Daily  pantoprazole, 40 mg, Oral, Q AM  pharmacy consult - [place], , Does not apply, Daily  polyethylene glycol, 17 g, Oral, Daily  rosuvastatin, 5 mg, Oral, Nightly  sodium chloride, 10 mL, Intravenous, Q12H      Continuous Infusions:   PRN Meds:.  acetaminophen    bisacodyl    Calcium Replacement - Follow Nurse / BPA Driven Protocol    hydrOXYzine    ipratropium-albuterol    Magnesium Standard Dose Replacement - Follow Nurse / BPA Driven Protocol    metoprolol tartrate    ondansetron **OR** ondansetron    Phosphorus Replacement - Follow Nurse / BPA Driven Protocol    Potassium Replacement - Follow Nurse / BPA Driven Protocol    [COMPLETED] Insert Peripheral IV **AND** sodium chloride    sodium chloride    sodium chloride    Assessment & Plan   Assessment & Plan     Active Hospital Problems    Diagnosis  POA    **Acute exacerbation of CHF (congestive heart failure) [I50.9]  Yes    Hypoxia [R09.02]  Unknown    Moderate to severe  aortic stenosis [I35.0]  Unknown    CHF (congestive heart failure) [I50.9]  Yes    Dementia without behavioral disturbance [F03.90]  Yes    Atrial fibrillation with rapid ventricular response [I48.91]  Yes    Benign essential hypertension [I10]  Yes      Resolved Hospital Problems   No resolved problems to display.        Brief Hospital Course to date:  Luh Mckeon is a 89 y.o. female  with a PMH significant for moderate-severe aortic stenosis, atrial fibrillation on Eliquis, dementia who presented with fatigue, SOA and edema. She was initially admitted to our service for CHF exacerbation as well as Afib with RVR and Cardiology was consulted.  She was transferred to the ICU on same day of admission due to need for Amiodarone gtt and concern that she may need vasopressor support while on amiodarone.  Pt has tolerated Amiodarone initiation well and did not ultimately require any vasopressors while in the ICU. She was transferred back to our service on 10/22.    Plan:    Acute on chronic HFpEF/Severe aortic stenosis  - Not on home oxygen.  Satting 89% on room air.  Currently maintaining adequate O2 sats on 2 L nasal cannula  --BNP elevated, CXR consistent with CHF  --continue PO bumex  --stricts Is and Os, daily weights  --echo with LVEF of 70% and severe AS      Atrial Fibrillation with RVR  --started on Amiodarone this admission   --echo shows severe AS, LVEF of >70%  --cardiology consulted, follows with Dr. Miles  --anticoagulation with Eliquis    Hypokalemia  -- replace per protocol      Hypotension  -- started on Midodrine in the ICU, continue     Dementia  - No active home meds    Dysphagia  -- SLP has seen, diet as per their recs        Total time spent: Time Spent: Time Spent: 25 minutes  Time spent includes time reviewing chart, face-to-face time, counseling patient/family/caregiver, ordering medications/tests/procedures, communicating with other health care professionals, documenting clinical  information in the electronic health record, and coordination of care.       Expected Discharge Location and Transportation: SNF ([facility])  Expected Discharge   Expected Discharge Date: 10/27/2023; Expected Discharge Time:      DVT prophylaxis:  Medical and mechanical DVT prophylaxis orders are present.     AM-PAC 6 Clicks Score (PT): 18 (10/21/23 2375)    CODE STATUS:   Code Status and Medical Interventions:   Ordered at: 10/18/23 0332     Level Of Support Discussed With:    Patient     Code Status (Patient has no pulse and is not breathing):    CPR (Attempt to Resuscitate)     Medical Interventions (Patient has pulse or is breathing):    Full Support       Fannie Martinez MD  10/22/23

## 2023-10-22 NOTE — PLAN OF CARE
Problem: Adult Inpatient Plan of Care  Goal: Plan of Care Review  Outcome: Ongoing, Progressing  Goal: Patient-Specific Goal (Individualized)  Outcome: Ongoing, Progressing  Goal: Absence of Hospital-Acquired Illness or Injury  Outcome: Ongoing, Progressing  Intervention: Identify and Manage Fall Risk  Recent Flowsheet Documentation  Taken 10/22/2023 1607 by Stephanie Jackson RN  Safety Promotion/Fall Prevention:   room organization consistent   safety round/check completed   activity supervised   assistive device/personal items within reach   clutter free environment maintained  Taken 10/22/2023 1132 by Stephanie Jackson RN  Safety Promotion/Fall Prevention:   safety round/check completed   room organization consistent   activity supervised   assistive device/personal items within reach   clutter free environment maintained  Taken 10/22/2023 0821 by Stephanie Jackson RN  Safety Promotion/Fall Prevention:   safety round/check completed   room organization consistent   nonskid shoes/slippers when out of bed   activity supervised   assistive device/personal items within reach  Intervention: Prevent Skin Injury  Recent Flowsheet Documentation  Taken 10/22/2023 1607 by Stephanie Jackson RN  Body Position:   right   turned  Skin Protection:   adhesive use limited   incontinence pads utilized   transparent dressing maintained   tubing/devices free from skin contact  Taken 10/22/2023 1132 by Stephanie Jackson RN  Body Position:   position changed independently   supine, legs elevated  Skin Protection:   adhesive use limited   incontinence pads utilized   transparent dressing maintained   tubing/devices free from skin contact  Taken 10/22/2023 0821 by Stephanie Jackson RN  Body Position:   left   turned  Skin Protection:   adhesive use limited   incontinence pads utilized   skin-to-device areas padded   skin-to-skin areas padded  Intervention: Prevent and Manage VTE (Venous Thromboembolism) Risk  Recent Flowsheet  Documentation  Taken 10/22/2023 1607 by Stephanie Jackson RN  Activity Management: activity encouraged  Taken 10/22/2023 1132 by Stephanie Jackson RN  Activity Management: activity encouraged  Taken 10/22/2023 0821 by Stephanie Jackson RN  Activity Management: activity encouraged  Range of Motion: active ROM (range of motion) encouraged  Intervention: Prevent Infection  Recent Flowsheet Documentation  Taken 10/22/2023 1607 by Stephanie Jackson RN  Infection Prevention: environmental surveillance performed  Taken 10/22/2023 1132 by Stephanie Jackson RN  Infection Prevention: environmental surveillance performed  Taken 10/22/2023 0821 by Stephanie Jackson RN  Infection Prevention: environmental surveillance performed  Goal: Optimal Comfort and Wellbeing  Outcome: Ongoing, Progressing  Intervention: Provide Person-Centered Care  Recent Flowsheet Documentation  Taken 10/22/2023 0821 by Stephanie Jackson RN  Trust Relationship/Rapport:   care explained   choices provided   empathic listening provided   thoughts/feelings acknowledged   reassurance provided   questions answered   questions encouraged  Goal: Readiness for Transition of Care  Outcome: Ongoing, Progressing   Goal Outcome Evaluation:

## 2023-10-23 VITALS
HEIGHT: 63 IN | WEIGHT: 152.6 LBS | HEART RATE: 56 BPM | TEMPERATURE: 98 F | RESPIRATION RATE: 18 BRPM | OXYGEN SATURATION: 95 % | BODY MASS INDEX: 27.04 KG/M2 | SYSTOLIC BLOOD PRESSURE: 125 MMHG | DIASTOLIC BLOOD PRESSURE: 58 MMHG

## 2023-10-23 PROBLEM — I50.33 ACUTE ON CHRONIC HEART FAILURE WITH PRESERVED EJECTION FRACTION (HFPEF): Status: ACTIVE | Noted: 2023-10-23

## 2023-10-23 LAB
ANION GAP SERPL CALCULATED.3IONS-SCNC: 8 MMOL/L (ref 5–15)
BACTERIA SPEC AEROBE CULT: NORMAL
BASOPHILS # BLD AUTO: 0.06 10*3/MM3 (ref 0–0.2)
BASOPHILS NFR BLD AUTO: 0.8 % (ref 0–1.5)
BUN SERPL-MCNC: 14 MG/DL (ref 8–23)
BUN/CREAT SERPL: 19.7 (ref 7–25)
CALCIUM SPEC-SCNC: 9.1 MG/DL (ref 8.2–9.6)
CHLORIDE SERPL-SCNC: 94 MMOL/L (ref 98–107)
CO2 SERPL-SCNC: 31 MMOL/L (ref 22–29)
CREAT SERPL-MCNC: 0.71 MG/DL (ref 0.57–1)
DEPRECATED RDW RBC AUTO: 41.7 FL (ref 37–54)
EGFRCR SERPLBLD CKD-EPI 2021: 79.9 ML/MIN/1.73
EOSINOPHIL # BLD AUTO: 0.3 10*3/MM3 (ref 0–0.4)
EOSINOPHIL NFR BLD AUTO: 3.8 % (ref 0.3–6.2)
ERYTHROCYTE [DISTWIDTH] IN BLOOD BY AUTOMATED COUNT: 12 % (ref 12.3–15.4)
GLUCOSE SERPL-MCNC: 91 MG/DL (ref 65–99)
HCT VFR BLD AUTO: 38.6 % (ref 34–46.6)
HGB BLD-MCNC: 12.6 G/DL (ref 12–15.9)
IMM GRANULOCYTES # BLD AUTO: 0.03 10*3/MM3 (ref 0–0.05)
IMM GRANULOCYTES NFR BLD AUTO: 0.4 % (ref 0–0.5)
LYMPHOCYTES # BLD AUTO: 1.55 10*3/MM3 (ref 0.7–3.1)
LYMPHOCYTES NFR BLD AUTO: 19.7 % (ref 19.6–45.3)
MCH RBC QN AUTO: 30.7 PG (ref 26.6–33)
MCHC RBC AUTO-ENTMCNC: 32.6 G/DL (ref 31.5–35.7)
MCV RBC AUTO: 94.1 FL (ref 79–97)
MONOCYTES # BLD AUTO: 0.9 10*3/MM3 (ref 0.1–0.9)
MONOCYTES NFR BLD AUTO: 11.5 % (ref 5–12)
NEUTROPHILS NFR BLD AUTO: 5.01 10*3/MM3 (ref 1.7–7)
NEUTROPHILS NFR BLD AUTO: 63.8 % (ref 42.7–76)
NRBC BLD AUTO-RTO: 0 /100 WBC (ref 0–0.2)
PLATELET # BLD AUTO: 307 10*3/MM3 (ref 140–450)
PMV BLD AUTO: 11.3 FL (ref 6–12)
POTASSIUM SERPL-SCNC: 3.6 MMOL/L (ref 3.5–5.2)
QT INTERVAL: 412 MS
QT INTERVAL: 560 MS
QTC INTERVAL: 460 MS
QTC INTERVAL: 545 MS
RBC # BLD AUTO: 4.1 10*6/MM3 (ref 3.77–5.28)
SODIUM SERPL-SCNC: 133 MMOL/L (ref 136–145)
WBC NRBC COR # BLD: 7.85 10*3/MM3 (ref 3.4–10.8)

## 2023-10-23 PROCEDURE — 85025 COMPLETE CBC W/AUTO DIFF WBC: CPT | Performed by: INTERNAL MEDICINE

## 2023-10-23 PROCEDURE — 97110 THERAPEUTIC EXERCISES: CPT

## 2023-10-23 PROCEDURE — 97116 GAIT TRAINING THERAPY: CPT

## 2023-10-23 PROCEDURE — 99232 SBSQ HOSP IP/OBS MODERATE 35: CPT

## 2023-10-23 PROCEDURE — 92526 ORAL FUNCTION THERAPY: CPT

## 2023-10-23 PROCEDURE — 80048 BASIC METABOLIC PNL TOTAL CA: CPT | Performed by: INTERNAL MEDICINE

## 2023-10-23 RX ORDER — OXYBUTYNIN CHLORIDE 10 MG/1
10 TABLET, EXTENDED RELEASE ORAL DAILY
Start: 2023-10-24

## 2023-10-23 RX ORDER — BUMETANIDE 1 MG/1
1 TABLET ORAL DAILY
Start: 2023-10-24

## 2023-10-23 RX ORDER — AMIODARONE HYDROCHLORIDE 200 MG/1
TABLET ORAL
Start: 2023-10-23 | End: 2023-12-02

## 2023-10-23 RX ORDER — POTASSIUM CHLORIDE 20 MEQ/1
40 TABLET, EXTENDED RELEASE ORAL EVERY 4 HOURS
Status: COMPLETED | OUTPATIENT
Start: 2023-10-23 | End: 2023-10-23

## 2023-10-23 RX ORDER — AMIODARONE HYDROCHLORIDE 200 MG/1
200 TABLET ORAL EVERY 12 HOURS SCHEDULED
Status: DISCONTINUED | OUTPATIENT
Start: 2023-10-26 | End: 2023-10-23 | Stop reason: HOSPADM

## 2023-10-23 RX ORDER — MIDODRINE HYDROCHLORIDE 10 MG/1
10 TABLET ORAL
Start: 2023-10-23

## 2023-10-23 RX ORDER — AMIODARONE HYDROCHLORIDE 200 MG/1
200 TABLET ORAL
Status: DISCONTINUED | OUTPATIENT
Start: 2023-11-02 | End: 2023-10-23 | Stop reason: HOSPADM

## 2023-10-23 RX ORDER — AMIODARONE HYDROCHLORIDE 200 MG/1
200 TABLET ORAL 3 TIMES DAILY
Status: DISCONTINUED | OUTPATIENT
Start: 2023-10-23 | End: 2023-10-23 | Stop reason: HOSPADM

## 2023-10-23 RX ADMIN — BUMETANIDE 1 MG: 1 TABLET ORAL at 09:36

## 2023-10-23 RX ADMIN — APIXABAN 2.5 MG: 2.5 TABLET, FILM COATED ORAL at 09:36

## 2023-10-23 RX ADMIN — PANTOPRAZOLE SODIUM 40 MG: 40 TABLET, DELAYED RELEASE ORAL at 05:23

## 2023-10-23 RX ADMIN — POLYETHYLENE GLYCOL 3350 17 G: 17 POWDER, FOR SOLUTION ORAL at 09:35

## 2023-10-23 RX ADMIN — DOCUSATE SODIUM 100 MG: 100 CAPSULE, LIQUID FILLED ORAL at 09:36

## 2023-10-23 RX ADMIN — OXYBUTYNIN CHLORIDE 10 MG: 10 TABLET, EXTENDED RELEASE ORAL at 09:36

## 2023-10-23 RX ADMIN — MIDODRINE HYDROCHLORIDE 10 MG: 10 TABLET ORAL at 09:36

## 2023-10-23 RX ADMIN — POTASSIUM CHLORIDE 40 MEQ: 1500 TABLET, EXTENDED RELEASE ORAL at 09:36

## 2023-10-23 RX ADMIN — CHOLECALCIFEROL (VITAMIN D3) 10 MCG (400 UNIT) TABLET 400 UNITS: at 09:36

## 2023-10-23 RX ADMIN — POTASSIUM CHLORIDE 40 MEQ: 1500 TABLET, EXTENDED RELEASE ORAL at 11:40

## 2023-10-23 RX ADMIN — Medication 10 ML: at 09:37

## 2023-10-23 RX ADMIN — AMIODARONE HYDROCHLORIDE 200 MG: 200 TABLET ORAL at 09:36

## 2023-10-23 RX ADMIN — MIDODRINE HYDROCHLORIDE 10 MG: 10 TABLET ORAL at 11:40

## 2023-10-23 NOTE — THERAPY EVALUATION
Acute Care - Speech Language Pathology   Swallow Treatment Note [facility]       Patient Name: Luh Mckeon  : 1930  MRN: [id number]  Today's Date: 10/23/2023               Admit Date: 10/17/2023    Visit Dx:     ICD-10-CM ICD-9-CM   1. New onset of congestive heart failure  I50.9 428.0   2. Shortness of breath  R06.02 786.05   3. Elevated blood pressure reading with diagnosis of hypertension  I10 401.9   4. Paroxysmal atrial fibrillation  I48.0 427.31   5. Pharyngeal dysphagia  R13.13 787.23     Patient Active Problem List   Diagnosis    Generalized anxiety disorder    Unsteady gait    Risk for falls    Hyperlipidemia LDL goal <100    Benign essential hypertension    Memory loss    Urinary incontinence in female    Candida onychomycosis    AR (allergic rhinitis)    Overweight (BMI 25.0-29.9)    Chest pain    Cramp in limb    Disorder of mitral and aortic valves    Disorder of skeletal muscle    Diverticular disease of colon    Dysphagia    External hemorrhoids         Hand joint pain    Insomnia    Knee pain    Menopausal and postmenopausal disorder    Osteoporosis    Shoulder pain    Vitamin B deficiency    Medicare annual wellness visit, subsequent    Dizziness    Urgency of urination    Fatigue    Atrial fibrillation with rapid ventricular response    Hematochezia    Paroxysmal atrial fibrillation    Dementia without behavioral disturbance    Fall at home    Hip pain, acute, right    Acute right-sided low back pain without sciatica    Osteoarthritis of right hip    Complex regional pain syndrome type 2 of both lower extremities    Cellulitis of right lower extremity    Acute exacerbation of CHF (congestive heart failure)    Hypoxia    Moderate to severe aortic stenosis    CHF (congestive heart failure)    Acute on chronic heart failure with preserved ejection fraction (HFpEF)     Past Medical History:   Diagnosis Date    Ankle instability     Chronic left shoulder pain 2018    Disorder of  tendon of shoulder region, left     Dysphagia     Esophagitis     Femoral neck fracture 7/15/2021    Finger fracture, right 2009    Dr Chandler casting and physical therapy    Hemorrhoids     Hypertension     Localized, primary osteoarthritis of left shoulder region     Osteoarthritis of right hip 3/20/2023    Paroxysmal atrial fibrillation 9/30/2021    Right shoulder pain     Shoulder joint replacement status     Right    Stroke     CVA     Past Surgical History:   Procedure Laterality Date    APPENDECTOMY      Daughter denies    CATARACT EXTRACTION, BILATERAL      2020    COLONOSCOPY N/A 8/23/2021    Procedure: COLONOSCOPY;  Surgeon: Aries Varela MD;  Location:  [facility];  Service: Gastroenterology;  Laterality: N/A;    ENDOSCOPY  07/2009    with biopsy/esophageal ring dilation    ENDOSCOPY N/A 8/23/2021    Procedure: ESOPHAGOGASTRODUODENOSCOPY;  Surgeon: Aries Varela MD;  Location:  [facility];  Service: Gastroenterology;  Laterality: N/A;    HEMORRHOIDECTOMY      lanced     HIP HEMIARTHROPLASTY Right 7/15/2021    Procedure: HIP HEMIARTHROPLASTY;  Surgeon: Adam Olsen MD;  Location:  [facility];  Service: Orthopedics;  Laterality: Right;    HYSTERECTOMY      partial     OOPHORECTOMY      SHOULDER SURGERY Right     replacement    SHOULDER SURGERY      Arthroscopy of shoulder:Right    TONSILLECTOMY         SLP Recommendation and Plan  SLP Swallowing Diagnosis: mild, pharyngeal dysphagia (10/23/23 1130)  SLP Diet Recommendation: soft to chew textures, whole, thin liquids (10/23/23 1130)  Recommended Precautions and Strategies: upright posture during/after eating, small bites of food and sips of liquid, alternate between small bites of food and sips of liquid, volitional throat clear, general aspiration precautions, 1:1 supervision (10/23/23 1130)  SLP Rec. for Method of Medication Administration: meds whole, with puree (10/23/23 1130)     Monitor for Signs of Aspiration: yes, notify SLP if  any concerns (10/23/23 1130)     Swallow Criteria for Skilled Therapeutic Interventions Met: demonstrates skilled criteria (10/23/23 1130)  Anticipated Discharge Disposition (SLP): unknown, anticipate therapy at next level of care (10/23/23 1130)  Rehab Potential/Prognosis, Swallowing: good, to achieve stated therapy goals (10/23/23 1130)  Therapy Frequency (Swallow): 5 days per week (10/23/23 1130)  Predicted Duration Therapy Intervention (Days): until discharge (10/23/23 1130)  Oral Care Recommendations: Oral Care BID/PRN, Toothbrush (10/23/23 1130)        Daily Summary of Progress (SLP): progress toward functional goals is good (10/23/23 1130)               Treatment Assessment (SLP): continued, suspected, mild, pharyngeal dysphagia, toleration of diet (10/23/23 1130)  Treatment Assessment Comments (SLP): No overt clinical s/s of aspiration with regular or thin liquids. Swallow strategies reviewed w patient and family who demonstrated understanding. (10/23/23 1130)  Plan for Continued Treatment (SLP): continue treatment per plan of care (10/23/23 1130)       Oral Care Recommendations: Oral Care BID/PRN, Toothbrush (10/23/23 1130)           SWALLOW EVALUATION (last 72 hours)       SLP Adult Swallow Evaluation       Row Name 10/23/23 1130 10/21/23 1000 10/20/23 1420             Rehab Evaluation    Document Type therapy note (daily note)  -CH evaluation  -DV evaluation  -CJ      Subjective Information no complaints  -CH no complaints  -DV no complaints  -CJ      Patient Observations alert;cooperative;agree to therapy  -CH alert;cooperative  -DV alert;cooperative  -CJ      Patient/Family/Caregiver Comments/Observations family present  -CH caregiver present  -DV caregiver present  -CJ      Patient Effort good  -CH good  -DV good  -CJ      Symptoms Noted During/After Treatment none  -CH none  -DV none  -CJ         General Information    Patient Profile Reviewed yes  -CH yes  -DV yes  -CJ      Pertinent History Of  "Current Problem -- see initial eval  -DV Pt adm w/ exacerbation of CHF, hypoxia; sig h/o HTN, afib, dementia, HLD, memory loss, dysphagia, osteoarthritis, aortic stenosis. CXR 10/19: slight progression of airspace disease w/ in BLLL 2/2 PNA L>R.  RN consulted 2/2 concern for coughing w/ po intake  -CJ      Current Method of Nutrition -- regular textures;thin liquids  -DV regular textures;thin liquids  -CJ      Precautions/Limitations, Vision -- for purposes of eval  -DV for purposes of eval  -CJ      Precautions/Limitations, Hearing -- WFL;for purposes of eval  -DV WFL;for purposes of eval  -CJ      Prior Level of Function-Communication -- other (see comments)  dementia per chart  -DV other (see comments)  dementia per chart, has caregiver to assist  -CJ      Prior Level of Function-Swallowing -- no diet consistency restrictions  -DV other (see comments)  caregiver reports \"she coughs at home all the time but I don't think she's choking\"  -CJ      Plans/Goals Discussed with -- patient;other (see comments)  caregiver  -DV patient;other (see comments)  caregiver  -CJ      Barriers to Rehab -- medically complex  -DV medically complex  -CJ      Patient's Goals for Discharge -- patient did not state  -DV patient did not state  -         Pain    Additional Documentation Pain Scale: FACES Pre/Post-Treatment (Group)  - Pain Scale: Numbers Pre/Post-Treatment (Group)  -DV Pain Scale: FACES Pre/Post-Treatment (Group)  -CJ         Pain Scale: Numbers Pre/Post-Treatment    Pretreatment Pain Rating -- 0/10 - no pain  -DV --      Posttreatment Pain Rating -- 0/10 - no pain  -DV --         Pain Scale: FACES Pre/Post-Treatment    Pain: FACES Scale, Pretreatment 0-->no hurt  -CH -- 0-->no hurt  -CJ      Posttreatment Pain Rating 0-->no hurt  -CH -- 0-->no hurt  -CJ         Oral Motor Structure and Function    Dentition Assessment -- -- natural, present and adequate  -CJ      Secretion Management -- -- WNL/WFL  -CJ      Mucosal " Quality -- -- moist, healthy  -         Oral Musculature and Cranial Nerve Assessment    Oral Motor General Assessment -- -- WFL  -         General Eating/Swallowing Observations    Respiratory Support Currently in Use -- -- nasal cannula  -      Eating/Swallowing Skills -- -- fed by SLP  -      Positioning During Eating -- -- upright in bed  -      Utensils Used -- -- spoon;cup;straw  -      Consistencies Trialed -- -- regular textures;ice chips;thin liquids;pureed  -         Clinical Swallow Eval    Pharyngeal Phase -- -- suspected pharyngeal impairment  -      Clinical Swallow Evaluation Summary -- -- Oral phase is seemingly wfl. Adequate mastication w/ solids. Cough w/ initial teaspoon presentaiton of thin liquids. Then delayed cough at end of evaluation. Given concern for difficulty w/ po intake per RN and at home along w/ progression of airspace disease will plan to complete FEES tomorrow am. Okay to continue MD ordered po diet  -         Pharyngeal Phase Concerns    Pharyngeal Phase Concerns -- -- cough  -      Cough -- -- thin  -CJ         Fiberoptic Endoscopic Evaluation of Swallowing (FEES)    Risks/Benefits Reviewed -- risks/benefits explained;patient;agreed to eval  -DV --      Nasal Entry -- right:  -DV --      Scope serial number/identification -- [id number]  -DV --         Anatomy and Physiology    Anatomic Considerations -- no anatomic structural deviation  -DV --      Velopharyngeal -- WFL  -DV --      Base of Tongue -- symmetrical  -DV --      Epiglottis -- WFL  -DV --      Laryngeal Function Breathing -- symmetrical  -DV --      Laryngeal Function Phonation -- symmetrical  -DV --      Laryngeal Function to Breath Hold -- TVF contact  -DV --      Secretion Rating Scale (Tesfaye et al. 1996) -- 0- normal, no visible secretions  -DV --      Ice Chips -- DNA  -DV --      Spontaneous Swallow -- frequency reduced  -DV --      Sensory -- sensed scope  -DV --      Utensils Used --  Spoon;Cup;Straw  -DV --      Consistencies Trialed -- thin liquids;nectar-thick liquids;honey-thick liquids;pudding/puree;regular textures  -DV --         FEES Interpretation    Oral Phase -- WFL  -DV --         Initiation of Pharyngeal Swallow    Initiation of Pharyngeal Swallow -- bolus in valleculae  -DV --      Pharyngeal Phase -- impaired pharyngeal phase of swallowing  -DV --      Penetration During the Swallow -- nectar-thick liquids;thin liquids;secondary to reduced laryngeal elevation;secondary to reduced vestibular closure;secondary to delayed swallow initiation or mistiming  -DV --      Aspiration After the Swallow -- thin liquids;secondary to residue;in pyriform sinuses;other (see comments)  aspiration only occurred when pt was talking during 1 trial of thin liquid  -DV --      Depth of Penetration -- shallow  -DV --      Response to Penetration -- No  -DV --      No spontaneous response to penetration and -- effective laryngeal clearance with cue (see comments)  -DV --      Response to Aspiration -- No  -DV --      No spontaneous response to aspiration with -- non-effective subglottic clearance with cue (see comments)  -DV --      Rosenbek's Scale -- thin:;8-->Level 8;nectar:;3-->Level 3;honey:;pudding/puree:;regular textures:;1-->Level 1  -DV --      Residue -- regular Textures;valleculae;posterior pharyngeal wall;secondary to reduced base of tongue retraction;secondary to reduced posterior pharyngeal wall stripping  -DV --      Response to Residue -- cleared residue;with liquid wash;other (see comments)  cued  -DV --      Attempted Compensatory Maneuvers -- bolus size;bolus presentation style;multiple swallows;alternate liquids/solids;throat clear after swallow  -DV --      Response to Attempted Compensatory Maneuvers -- reduced residue  -DV --      Successful Compensatory Maneuver Competency -- other (see comments)  caregiver to assist, able to teachback  -DV --      FEES Summary -- Pt aspirated thin  liquids only x1 trial after the swallow due to pt talking during/after intake. Otherwise, no aspiration. There was silent penetration of thin and nectar-thick liquids during the swallow, but this did not deepen, and was cleared w/ a cued cough + reswallow. Pt is at risk of aspiration. There was moderate residue w/ regular solids which  required multiple swallows and x2 liquid washes to clear. Recommend soft/whole solids and thin liquids with strict use of the following strategies: single sips (straws OK), volitional cough at end of PO intake, and alternate liquids/solids.  -DV --         SLP Evaluation Clinical Impression    SLP Swallowing Diagnosis mild;pharyngeal dysphagia  -CH mild;pharyngeal dysphagia  -DV suspected pharyngeal dysphagia  -      Functional Impact risk of aspiration/pneumonia  - risk of aspiration/pneumonia  - risk of aspiration/pneumonia  -      Rehab Potential/Prognosis, Swallowing good, to achieve stated therapy goals  -CH good, to achieve stated therapy goals  -DV good, to achieve stated therapy goals  -      Swallow Criteria for Skilled Therapeutic Interventions Met demonstrates skilled criteria  - demonstrates skilled criteria  -DV demonstrates skilled criteria  -         SLP Treatment Clinical Impressions    Treatment Assessment (SLP) continued;suspected;mild;pharyngeal dysphagia;toleration of diet  -CH -- --      Treatment Assessment Comments (SLP) No overt clinical s/s of aspiration with regular or thin liquids. Swallow strategies reviewed w patient and family who demonstrated understanding.  -CH -- --      Daily Summary of Progress (SLP) progress toward functional goals is good  -CH -- --      Plan for Continued Treatment (SLP) continue treatment per plan of care  -CH -- --      Care Plan Review evaluation/treatment results reviewed;care plan/treatment goals reviewed;risks/benefits reviewed  -CH -- --      Care Plan Review, Other Participant(s) family  -CH -- --          Recommendations    Therapy Frequency (Swallow) 5 days per week  - 5 days per week  -DV --      Predicted Duration Therapy Intervention (Days) until discharge  - until discharge  -DV until discharge  -      SLP Diet Recommendation soft to chew textures;whole;thin liquids  - soft to chew textures;whole;thin liquids  - other (see comments)  continue MD ordered po diet until FEES in am  -      Recommended Diagnostics -- -- reassess via FEES  10/21/23  -      Recommended Precautions and Strategies upright posture during/after eating;small bites of food and sips of liquid;alternate between small bites of food and sips of liquid;volitional throat clear;general aspiration precautions;1:1 supervision  - upright posture during/after eating;small bites of food and sips of liquid;alternate between small bites of food and sips of liquid;volitional throat clear;general aspiration precautions;1:1 supervision  - upright posture during/after eating;small bites of food and sips of liquid;general aspiration precautions;fatigue precautions;assist with feeding  -      Oral Care Recommendations Oral Care BID/PRN;Toothbrush  - Oral Care BID/PRN;Toothbrush  - Oral Care BID/PRN;Toothbrush  -      SLP Rec. for Method of Medication Administration meds whole;with puree  - meds whole;with puree  - meds whole;meds crushed;with puree;as tolerated  -      Monitor for Signs of Aspiration yes;notify SLP if any concerns  - yes;notify SLP if any concerns  - yes;notify SLP if any concerns  -      Anticipated Discharge Disposition (SLP) unknown;anticipate therapy at next level of care  - unknown;anticipate therapy at next level of care  - skilled nursing facility  -                User Key  (r) = Recorded By, (t) = Taken By, (c) = Cosigned By      Initials Name Effective Dates     Ashia Borrego MS CCC-SLP 07/11/23 -      Carmen Ward, MS CCC-SLP 06/16/21 -      Sheree Graham MS CCC-SLP 06/16/21 -                      EDUCATION  The patient has been educated in the following areas:   Home Exercise Program (HEP) Dysphagia (Swallowing Impairment) Oral Care/Hydration Modified Diet Instruction.        SLP GOALS       Row Name 10/23/23 1130 10/21/23 1000          (LTG) Patient will demonstrate functional swallow for    Diet Texture (Demonstrate functional swallow) soft to chew (whole) textures  -CH soft to chew (whole) textures  -DV     Liquid viscosity (Demonstrate functional swallow) thin liquids  -CH thin liquids  -DV     [place] (Demonstrate functional swallow) with minimal cues (75-90% accuracy)  -CH with minimal cues (75-90% accuracy)  -DV     Time Frame (Demonstrate functional swallow) by discharge  -CH by discharge  -DV     Progress/Outcomes (Demonstrate functional swallow) good progress toward goal  -CH new goal  -DV     Comment (Demonstrate functional swallow) No s/s of aspiration with soft solid or thin liquid trials  -CH --        (STG) Patient will tolerate trials of    Consistencies Trialed (Tolerate trials) soft to chew (whole) textures;thin liquids  -CH soft to chew (whole) textures;thin liquids  -DV     Desired Outcome (Tolerate trials) without signs/symptoms of aspiration;with adequate oral prep/transit/clearance  -CH without signs/symptoms of aspiration;with adequate oral prep/transit/clearance  -DV     [place] (Tolerate trials) with minimal cues (75-90% accuracy)  -CH with minimal cues (75-90% accuracy)  -DV     Time Frame (Tolerate trials) 1 week  -CH 1 week  -DV     Progress/Outcomes (Tolerate trials) good progress toward goal  -CH new goal  -DV     Comment (Tolerate trials) No s/s of aspiration with soft solid or thin liquid trials  -CH --        (STG) Pharyngeal Strengthening Exercise Goal 1 (SLP)    Activity (Pharyngeal Strengthening Goal 1, SLP) increase superior movement of the hyolaryngeal complex;increase anterior movement of the hyolaryngeal complex;increase closure at  entrance to airway/closure of airway at glottis;increase squeeze/positive pressure generation;increase tongue base retraction;increase timing  -CH increase superior movement of the hyolaryngeal complex;increase anterior movement of the hyolaryngeal complex;increase closure at entrance to airway/closure of airway at glottis;increase squeeze/positive pressure generation;increase tongue base retraction;increase timing  -DV     Increase Timing prepping - 3 second prep or suck swallow or 3-step swallow  -CH prepping - 3 second prep or suck swallow or 3-step swallow  -DV     Increase Superior Movement of the Hyolaryngeal Complex effortful pitch glide (falsetto + pharyngeal squeeze)  -CH effortful pitch glide (falsetto + pharyngeal squeeze)  -DV     Increase Anterior Movement of the Hyolaryngeal Complex chin tuck against resistance (CTAR)  -CH chin tuck against resistance (CTAR)  -DV     Increase Closure at Entrance to Airway/Closure of Airway at Glottis supraglottic swallow  -CH supraglottic swallow  -DV     Increase Squeeze/Positive Pressure Generation hard effortful swallow  -CH hard effortful swallow  -DV     Increase Tongue Base Retraction romain  -CH romain  -DV     [place]/Accuracy (Pharyngeal Strengthening Goal 1, SLP) with minimal cues (75-90% accuracy)  - with minimal cues (75-90% accuracy)  -DV     Time Frame (Pharyngeal Strengthening Goal 1, SLP) short term goal (STG)  -CH short term goal (STG)  -DV     Progress/Outcomes (Pharyngeal Strengthening Goal 1, SLP) goal ongoing  -CH new goal  -DV     Comment (Pharyngeal Strengthening Goal 1, SLP) Did not address as patient eating noon meal  - --        (STG) Swallow Compensatory Strategies Goal 1 (SLP)    Activity (Swallow Compensatory Strategies/Techniques Goal 1, SLP) compensatory strategies;small cup sips;small straw sips;alternate food/liquid intake;throat clear/extra swallow  - compensatory strategies;small cup sips;small straw sips;alternate  food/liquid intake;throat clear/extra swallow  -DV     [place]/Accuracy (Swallow Compensatory Strategies/Techniques Goal 1, SLP) with minimal cues (75-90% accuracy)  -CH with minimal cues (75-90% accuracy)  -DV     Time Frame (Swallow Compensatory Strategies/Techniques Goal 1, SLP) short term goal (STG)  -CH short term goal (STG)  -DV     Progress/Outcomes (Swallow Compensatory Strategies/Techniques Goal 1, SLP) continuing progress toward goal  -CH new goal  -DV     Comment (Swallow Compensatory Strategies/Techniques Goal 1, SLP) reviewed all compensations with patient and family who demonstrated understanding  - --               User Key  (r) = Recorded By, (t) = Taken By, (c) = Cosigned By      Initials Name Provider Type    Carmen Bell MS CCC-SLP Speech and Language Pathologist    Sheree Daily MS CCC-SLP Speech and Language Pathologist                       Time Calculation:    Time Calculation- SLP       Row Name 10/23/23 1307             Time Calculation- SLP    SLP Start Time 1130  -CH      SLP Received On 10/23/23  -CH         Untimed Charges    54440-[facility] Treatment Swallow Minutes 38  -CH         Total Minutes    Untimed Charges Total Minutes 38  -CH       Total Minutes 38  -CH                User Key  (r) = Recorded By, (t) = Taken By, (c) = Cosigned By      Initials Name Provider Type    Carmen Bell MS CCC-SLP Speech and Language Pathologist                    Therapy Charges for Today       Code Description Service Date Service Provider Modifiers Qty    [id number]  ST TREATMENT SWALLOW 3 10/23/2023 Carmen Ward MS CCC-SLP GN 1                 Carmen Ward MS CCC-ARTURO  10/23/2023

## 2023-10-23 NOTE — PROGRESS NOTES
[facility] Medicine Services  PROGRESS NOTE    Patient Name: Luh Mckeon  : 1930  MRN: [id number]    Date of Admission: 10/17/2023  Primary Care Physician: Schuyler Garcia MD    Subjective   Subjective     CC:  SOA, edema    HPI:  No new issues overnight. Granddaughter at bedside this am.       Objective   Objective     Vital Signs:   Temp:  [97.3 °F (36.3 °C)-98.6 °F (37 °C)] 98 °F (36.7 °C)  Heart Rate:  [55-62] 56  Resp:  [18] 18  BP: (103-123)/(51-69) 115/61  Flow (L/min):  [2] 2     Physical Exam:  Constitutional: No acute distress, awake, alert  HENT: NCAT, mucous membranes moist  Respiratory: Clear to auscultation bilaterally, respiratory effort normal   Cardiovascular: RRR, no murmurs, rubs, or gallops  Gastrointestinal: Positive bowel sounds, soft, nontender, nondistended  Musculoskeletal: No bilateral ankle edema  Psychiatric: Appropriate affect, cooperative  Neurologic: pleasantly confused, strength symmetric in all extremities, Cranial Nerves grossly intact to confrontation, speech clear  Skin: No rashes      Results Reviewed:  LAB RESULTS:      Lab 10/23/23  0353 10/22/23  0334 10/21/23  0551 10/19/23  0402 10/18/23  0746 10/17/23  2310 10/17/23  1537   WBC 7.85 6.99 7.09 7.03 6.87 7.75 7.07   HEMOGLOBIN 12.6 12.7 13.2 12.2 12.4 14.0 13.4   HEMATOCRIT 38.6 38.1 40.1 37.6 38.0 43.4 39.4   PLATELETS 307 286 310 286 297 307 329   NEUTROS ABS 5.01  --   --   --  4.61 5.07 4.39   IMMATURE GRANS (ABS) 0.03  --   --   --  0.02 0.02 0.03   LYMPHS ABS 1.55  --   --   --  1.29 1.61 1.56   MONOS ABS 0.90  --   --   --  0.75 0.81 0.84   EOS ABS 0.30  --   --   --  0.16 0.20 0.21   MCV 94.1 92.3 92.8 96.2 94.1 95.8 91.6   PROCALCITONIN  --   --   --   --   --  0.02  --    LACTATE  --   --   --   --   --  1.8  --          Lab 10/23/23  0353 10/22/23  0334 10/21/23  1552 10/21/23  0551 10/20/23  1638 10/20/23  0352 10/19/23  0402 10/18/23  0746 10/18/23  0101  10/17/23  2310 10/17/23  1537   SODIUM 133* 131*  --  130*  --  130* 139   < >  --    < > 135*   POTASSIUM 3.6 4.6 4.2 3.3* 4.0 3.2* 3.7   < >  --    < > 4.1   CHLORIDE 94* 93*  --  89*  --  86* 97*   < >  --    < > 95*   CO2 31.0* 29.0  --  32.0*  --  35.0* 34.0*   < >  --    < > 26.2   ANION GAP 8.0 9.0  --  9.0  --  9.0 8.0   < >  --    < > 13.8   BUN 14 12  --  13  --  11 8   < >  --    < > 14   CREATININE 0.71 0.71  --  0.79  --  0.79 0.63   < >  --    < > 0.87   EGFR 79.9 79.9  --  70.3  --  70.3 83.3   < >  --    < > 62.6   GLUCOSE 91 98  --  98  --  141* 102*   < >  --    < > 97   CALCIUM 9.1 9.1  --  9.2  --  9.4 8.9   < >  --    < > 9.7*   IONIZED CALCIUM  --   --   --   --   --   --   --   --   --   --  1.29  5.2   MAGNESIUM  --  1.9  --  1.8  --  1.9 2.2  --   --   --  2.3   PHOSPHORUS  --  3.2  --  3.8  --  4.9* 4.5  --   --   --   --    TSH  --   --   --   --   --   --   --   --  0.702  --   --     < > = values in this interval not displayed.         Lab 10/17/23  2310 10/17/23  1537   TOTAL PROTEIN 7.7 7.2   ALBUMIN 3.6 3.8   GLOBULIN 4.1 3.4   ALT (SGPT) 13 11   AST (SGOT) 24 21   BILIRUBIN 0.2 0.3   ALK PHOS 85 77         Lab 10/18/23  0101 10/17/23  2310 10/17/23  1537   PROBNP  --  5,278.0* 6,344.0*   HSTROP T 150* 171*  --                  Brief Urine Lab Results  (Last result in the past 365 days)        Color   Clarity   Blood   Leuk Est   Nitrite   Protein   CREAT   Urine HCG        09/28/23 1040             64.2                 Microbiology Results Abnormal       Procedure Component Value - Date/Time    Blood Culture - Blood, Arm, Right [[id number]]  (Normal) Collected: 10/17/23 2335    Lab Status: Final result Specimen: Blood from Arm, Right Updated: 10/23/23 0000     Blood Culture No growth at 5 days    Blood Culture - Blood, Arm, Left [[id number]]  (Normal) Collected: 10/17/23 2305    Lab Status: Final result Specimen: Blood from Arm, Left Updated: 10/22/23 2331     Blood Culture No growth  at 5 days    COVID PRE-OP / PRE-PROCEDURE SCREENING ORDER (NO ISOLATION) - Swab, Nasopharynx [[id number]]  (Normal) Collected: 10/17/23 2315    Lab Status: Final result Specimen: Swab from Nasopharynx Updated: 10/18/23 0042    Narrative:      The following orders were created for panel order COVID PRE-OP / PRE-PROCEDURE SCREENING ORDER (NO ISOLATION) - Swab, Nasopharynx.  Procedure                               Abnormality         Status                     ---------                               -----------         ------                     Respiratory Panel PCR w/...[[id number]]  Normal              Final result                 Please view results for these tests on the individual orders.    Respiratory Panel PCR w/COVID-19(SARS-CoV-2) ENRIQUE/NELSON/CARRIE/PAD/COR/MAD/LAURA In-House, NP Swab in UTM/VTM, 3-4 HR TAT - Swab, Nasopharynx [[id number]]  (Normal) Collected: 10/17/23 2315    Lab Status: Final result Specimen: Swab from Nasopharynx Updated: 10/18/23 0042     ADENOVIRUS, PCR Not Detected     Coronavirus 229E Not Detected     Coronavirus HKU1 Not Detected     Coronavirus NL63 Not Detected     Coronavirus OC43 Not Detected     COVID19 Not Detected     Human Metapneumovirus Not Detected     Human Rhinovirus/Enterovirus Not Detected     Influenza A PCR Not Detected     Influenza B PCR Not Detected     Parainfluenza Virus 1 Not Detected     Parainfluenza Virus 2 Not Detected     Parainfluenza Virus 3 Not Detected     Parainfluenza Virus 4 Not Detected     RSV, PCR Not Detected     Bordetella pertussis pcr Not Detected     Bordetella parapertussis PCR Not Detected     Chlamydophila pneumoniae PCR Not Detected     Mycoplasma pneumo by PCR Not Detected    Narrative:      In the setting of a positive respiratory panel with a viral infection PLUS a negative procalcitonin without other underlying concern for bacterial infection, consider observing off antibiotics or discontinuation of antibiotics and continue supportive care. If  the respiratory panel is positive for atypical bacterial infection (Bordetella pertussis, Chlamydophila pneumoniae, or Mycoplasma pneumoniae), consider antibiotic de-escalation to target atypical bacterial infection.            No radiology results from the last 24 hrs    Results for orders placed during the hospital encounter of 10/17/23    Adult Transthoracic Echo Complete w/ Color, Spectral and Contrast if necessary per protocol    Interpretation Summary    Left ventricular ejection fraction appears to be greater than 70%.    Left ventricular diastolic function was indeterminate.    Left atrial volume is mildly increased.    Severe aortic valve stenosis is present.    Aortic valve maximum pressure gradient is 79 mmHg. Aortic valve mean pressure gradient is 65 mmHg.    Estimated right ventricular systolic pressure from tricuspid regurgitation is normal (<35 mmHg). Calculated right ventricular systolic pressure from tricuspid regurgitation is 32 mmHg.    Incidental irregular heart rate noted with MV inflow suggestive of atrial fibrillation      Current medications:  Scheduled Meds:amiodarone, 200 mg, Oral, TID   Followed by  [START ON 10/26/2023] amiodarone, 200 mg, Oral, Q12H   Followed by  [START ON 11/2/2023] amiodarone, 200 mg, Oral, Q24H  apixaban, 2.5 mg, Oral, Q12H  bumetanide, 1 mg, Oral, Daily  cholecalciferol, 400 Units, Oral, Daily  docusate sodium, 100 mg, Oral, BID  melatonin, 5 mg, Oral, Nightly  midodrine, 10 mg, Oral, TID AC  oxybutynin XL, 10 mg, Oral, Daily  pantoprazole, 40 mg, Oral, Q AM  pharmacy consult - MTM, , Does not apply, Daily  polyethylene glycol, 17 g, Oral, Daily  rosuvastatin, 5 mg, Oral, Nightly  sodium chloride, 10 mL, Intravenous, Q12H      Continuous Infusions:   PRN Meds:.  acetaminophen    bisacodyl    Calcium Replacement - Follow Nurse / BPA Driven Protocol    hydrOXYzine    ipratropium-albuterol    Magnesium Standard Dose Replacement - Follow Nurse / BPA Driven Protocol     metoprolol tartrate    ondansetron **OR** ondansetron    Phosphorus Replacement - Follow Nurse / BPA Driven Protocol    Potassium Replacement - Follow Nurse / BPA Driven Protocol    [COMPLETED] Insert Peripheral IV **AND** sodium chloride    sodium chloride    sodium chloride    Assessment & Plan   Assessment & Plan     Active Hospital Problems    Diagnosis  POA    **Acute exacerbation of CHF (congestive heart failure) [I50.9]  Yes    Hypoxia [R09.02]  Unknown    Moderate to severe aortic stenosis [I35.0]  Unknown    CHF (congestive heart failure) [I50.9]  Yes    Dementia without behavioral disturbance [F03.90]  Yes    Atrial fibrillation with rapid ventricular response [I48.91]  Yes    Benign essential hypertension [I10]  Yes      Resolved Hospital Problems   No resolved problems to display.        Brief Hospital Course to date:  Luh Mckeon is a 89 y.o. female  with a PMH significant for moderate-severe aortic stenosis, atrial fibrillation on Eliquis, dementia who presented with fatigue, SOA and edema. She was initially admitted to our service for CHF exacerbation as well as Afib with RVR and Cardiology was consulted.  She was transferred to the ICU on same day of admission due to need for Amiodarone gtt and concern that she may need vasopressor support while on amiodarone.  Pt has tolerated Amiodarone initiation well and did not ultimately require any vasopressors while in the ICU. She was transferred back to our service on 10/22.    Plan:    Acute on chronic HFpEF/Severe aortic stenosis  - Not on home oxygen.  Satting 89% on room air.  Currently maintaining adequate O2 sats on 2 L nasal cannula  --BNP elevated, CXR consistent with CHF  --continue PO bumex  --stricts Is and Os, daily weights  --echo with LVEF of 70% and severe AS      Atrial Fibrillation with RVR  --started on Amiodarone this admission   --echo shows severe AS, LVEF of >70%  --cardiology consulted, follows with   [facility]  --anticoagulation with Eliquis    Hypokalemia  -- replace per protocol      Hypotension  -- started on Midodrine in the ICU, continue     Dementia  - No active home meds    Dysphagia  -- SLP has seen, diet as per their recs        Total time spent: Time Spent: Time Spent: 25 minutes  Time spent includes time reviewing chart, face-to-face time, counseling patient/family/caregiver, ordering medications/tests/procedures, communicating with other health care professionals, documenting clinical information in the electronic health record, and coordination of care.       Expected Discharge Location and Transportation: SNF ([facility])  Expected Discharge   Expected Discharge Date: 10/24/2023; Expected Discharge Time:      DVT prophylaxis:  Medical and mechanical DVT prophylaxis orders are present.     AM-PAC 6 Clicks Score (PT): 18 (10/23/23 1103)    CODE STATUS:   Code Status and Medical Interventions:   Ordered at: 10/18/23 0335     Level Of Support Discussed With:    Patient     Code Status (Patient has no pulse and is not breathing):    CPR (Attempt to Resuscitate)     Medical Interventions (Patient has pulse or is breathing):    Full Support       Fannie Martinez MD  10/23/23

## 2023-10-23 NOTE — CASE MANAGEMENT/SOCIAL WORK
Case Management Discharge Note      Final Note: Patient was offered a bed at [facility] and has accepted. Patient's granddaughter at the bedside and in agreement with the plan. Please call report to [phone]. Facility to get the discharge summary out of [facility]. Patient's granddaughter wishes to transport the patient to [facility] today at 1530. [facility] is the pharmacy used by [place]. This is reflected in Epic.         Selected Continued Care - Admitted Since 10/17/2023       Destination Coordination complete.      Service Provider Selected Services Address Phone Fax Patient Preferred    [facility] [place] [address] [phone] --              Durable Medical Equipment    No services have been selected for the patient.                Dialysis/Infusion    No services have been selected for the patient.                Home Medical Care    No services have been selected for the patient.                Therapy    No services have been selected for the patient.                Community Resources    No services have been selected for the patient.                Community & DME    No services have been selected for the patient.                    Transportation Services  Private: Car    Final Discharge Disposition Code: 03 - skilled nursing facility (SNF)

## 2023-10-23 NOTE — PLAN OF CARE
Goal Outcome Evaluation:    SLP treatment completed. Will continue to address dysphagia. Please see note for further details and recommendations.

## 2023-10-23 NOTE — PLAN OF CARE
Goal Outcome Evaluation:  Plan of Care Reviewed With: patient        Progress: improving  Outcome Evaluation: patient ambulated 40' + 40' with min assist x1 and rolling walker for support. Patient demonstrated slow pace with unsteadiness, verbal and tactile cues to correct posterior lean at times, specifically with directional changes. Assist with walker at times. high fall risk. Further distance limited by weakness, fatigue and balance deficits. Recommend SNF at D/C for best functional outcome.

## 2023-10-23 NOTE — THERAPY TREATMENT NOTE
Patient Name: Luh Mckeon  : 1930    MRN: [id number]                              Today's Date: 10/23/2023       Admit Date: 10/17/2023    Visit Dx:     ICD-10-CM ICD-9-CM   1. New onset of congestive heart failure  I50.9 428.0   2. Shortness of breath  R06.02 786.05   3. Elevated blood pressure reading with diagnosis of hypertension  I10 401.9   4. Paroxysmal atrial fibrillation  I48.0 427.31   5. Pharyngeal dysphagia  R13.13 787.23     Patient Active Problem List   Diagnosis    Generalized anxiety disorder    Unsteady gait    Risk for falls    Hyperlipidemia LDL goal <100    Benign essential hypertension    Memory loss    Urinary incontinence in female    Candida onychomycosis    AR (allergic rhinitis)    Overweight (BMI 25.0-29.9)    Chest pain    Cramp in limb    Disorder of mitral and aortic valves    Disorder of skeletal muscle    Diverticular disease of colon    Dysphagia    External hemorrhoids         Hand joint pain    Insomnia    Knee pain    Menopausal and postmenopausal disorder    Osteoporosis    Shoulder pain    Vitamin B deficiency    Medicare annual wellness visit, subsequent    Dizziness    Urgency of urination    Fatigue    Atrial fibrillation with rapid ventricular response    Hematochezia    Paroxysmal atrial fibrillation    Dementia without behavioral disturbance    Fall at home    Hip pain, acute, right    Acute right-sided low back pain without sciatica    Osteoarthritis of right hip    Complex regional pain syndrome type 2 of both lower extremities    Cellulitis of right lower extremity    Acute exacerbation of CHF (congestive heart failure)    Hypoxia    Moderate to severe aortic stenosis    CHF (congestive heart failure)     Past Medical History:   Diagnosis Date    Ankle instability     Chronic left shoulder pain 2018    Disorder of tendon of shoulder region, left     Dysphagia     Esophagitis     Femoral neck fracture 7/15/2021    Finger fracture, right 2009      [facility]h casting and physical therapy    Hemorrhoids     Hypertension     Localized, primary osteoarthritis of left shoulder region     Osteoarthritis of right hip 3/20/2023    Paroxysmal atrial fibrillation 9/30/2021    Right shoulder pain     Shoulder joint replacement status     Right    Stroke     CVA     Past Surgical History:   Procedure Laterality Date    APPENDECTOMY      Daughter denies    CATARACT EXTRACTION, BILATERAL      2020    COLONOSCOPY N/A 8/23/2021    Procedure: COLONOSCOPY;  Surgeon: Aries Varela MD;  Location:  [facility];  Service: Gastroenterology;  Laterality: N/A;    ENDOSCOPY  07/2009    with biopsy/esophageal ring dilation    ENDOSCOPY N/A 8/23/2021    Procedure: ESOPHAGOGASTRODUODENOSCOPY;  Surgeon: Aries Varela MD;  Location:  [facility];  Service: Gastroenterology;  Laterality: N/A;    HEMORRHOIDECTOMY      lanced     HIP HEMIARTHROPLASTY Right 7/15/2021    Procedure: HIP HEMIARTHROPLASTY;  Surgeon: Adam Olsen MD;  Location:  [facility];  Service: Orthopedics;  Laterality: Right;    HYSTERECTOMY      partial     OOPHORECTOMY      SHOULDER SURGERY Right     replacement    SHOULDER SURGERY      Arthroscopy of shoulder:Right    TONSILLECTOMY        General Information       Row Name 10/23/23 1056          Physical Therapy Time and Intention    Document Type therapy note (daily note)  -AS     Mode of Treatment physical therapy  -AS       Row Name 10/23/23 1056          General Information    Patient Profile Reviewed yes  -AS     Existing Precautions/Restrictions fall;cardiac  -AS     Barriers to Rehab medically complex;previous functional deficit  -AS       Row Name 10/23/23 1056          Cognition    Orientation Status (Cognition) oriented x 3  -AS       Row Name 10/23/23 1056          Safety Issues, Functional Mobility    Safety Issues Affecting Function (Mobility) awareness of need for assistance;insight into deficits/self-awareness;positioning of assistive  device;safety precaution awareness;sequencing abilities  -AS     Impairments Affecting Function (Mobility) balance;endurance/activity tolerance;shortness of breath;strength  -AS     Comment, Safety Issues/Impairments (Mobility) alert, needs cues for safe use of walker, delayed processing  -AS               User Key  (r) = Recorded By, (t) = Taken By, (c) = Cosigned By      Initials Name Provider Type    AS Elza Bernard PTA Physical Therapist Assistant                   Mobility       Row Name 10/23/23 1057          Bed Mobility    Supine-Sit Lafe (Bed Mobility) contact guard;1 person assist  -AS     Assistive Device (Bed Mobility) head of bed elevated;bed rails  -AS     Comment, (Bed Mobility) increased time and effort with cues for sequencing , use of HOB elevated and bedrails to complete transfer  -AS       Row Name 10/23/23 1057          Transfers    Comment, (Transfers) cues for hand placement from EOB, commode and recliner  -AS       Row Name 10/23/23 1057          Bed-Chair Transfer    Bed-Chair Lafe (Transfers) verbal cues;minimum assist (75% patient effort);1 person assist  -AS     Assistive Device (Bed-Chair Transfers) walker, front-wheeled  -AS       Row Name 10/23/23 1057          Sit-Stand Transfer    Sit-Stand Lafe (Transfers) verbal cues;minimum assist (75% patient effort);1 person assist  -AS     Assistive Device (Sit-Stand Transfers) walker, front-wheeled  -AS     Comment, (Sit-Stand Transfer) increased assist needed from low commode  -AS       Row Name 10/23/23 1057          Gait/Stairs (Locomotion)    Lafe Level (Gait) verbal cues;minimum assist (75% patient effort);1 person assist  -AS     Assistive Device (Gait) walker, front-wheeled  -AS     Distance in Feet (Gait) 40 + 40  -AS     Deviations/Abnormal Patterns (Gait) bilateral deviations;base of support, narrow;filiberto decreased;stride length decreased;gait speed decreased  -AS     Bilateral Gait  Deviations forward flexed posture;heel strike decreased  -AS     Comment, (Gait/Stairs) patient ambulated 40' + 40' with min assist x1 and rolling walker for support. Patient demonstrated slow pace with unsteadiness, verbal and tactile cues to correct posterior lean at times, specifically with directional changes. Further distance limited by weakness, fatigue and balance deficits.  -AS               User Key  (r) = Recorded By, (t) = Taken By, (c) = Cosigned By      Initials Name Provider Type    AS Elaz Bernard PTA Physical Therapist Assistant                   Obj/Interventions       Row Name 10/23/23 [facility]          Motor Skills    Therapeutic Exercise knee;ankle  -AS       Row Name 10/23/23 [facility]          Knee (Therapeutic Exercise)    Knee (Therapeutic Exercise) strengthening exercise  -AS     Knee Strengthening (Therapeutic Exercise) bilateral;marching while seated;LAQ (long arc quad);sitting;10 repetitions  -AS       [facility] Name 10/23/23 [facility]          Ankle (Therapeutic Exercise)    Ankle (Therapeutic Exercise) AROM (active range of motion)  -AS     Ankle AROM (Therapeutic Exercise) bilateral;dorsiflexion;plantarflexion;sitting;10 repetitions  -AS       Row Name 10/23/23 [facility]          Balance    Dynamic Standing Balance verbal cues;minimal assist;1-person assist  -AS     Position/Device Used, Standing Balance supported;walker, front-wheeled  -AS     Comment, Balance unsteadiness and posterior lean with directional chnages  -AS               User Key  (r) = Recorded By, (t) = Taken By, (c) = Cosigned By      Initials Name Provider Type    AS Elza Bernard PTA Physical Therapist Assistant                   Goals/Plan    No documentation.                  Clinical Impression       Row Name 10/23/23 [facility]          Pain    Pretreatment Pain Rating 0/10 - no pain  -AS     Posttreatment Pain Rating 0/10 - no pain  -AS       Row Name 10/23/23 [facility]          Plan of Care Review    Plan of Care Reviewed With  patient  -AS     Progress improving  -AS     Outcome Evaluation patient ambulated 40' + 40' with min assist x1 and rolling walker for support. Patient demonstrated slow pace with unsteadiness, verbal and tactile cues to correct posterior lean at times, specifically with directional changes. Assist with walker at times. high fall risk. Further distance limited by weakness, fatigue and balance deficits. Recommend SNF at D/C for best functional outcome.  -AS       Row Name 10/23/23 1101          Positioning and Restraints    Pre-Treatment Position in bed  -AS     Post Treatment Position chair  -AS     In Chair reclined;call light within reach;encouraged to call for assist;exit alarm on;waffle cushion;with family/caregiver;legs elevated  -AS               User Key  (r) = Recorded By, (t) = Taken By, (c) = Cosigned By      Initials Name Provider Type    AS Elza Bernard PTA Physical Therapist Assistant                   Outcome Measures       Row Name 10/23/23 1103          How much help from another person do you currently need...    Turning from your back to your side while in flat bed without using bedrails? 4  -AS     Moving from lying on back to sitting on the side of a flat bed without bedrails? 3  -AS     Moving to and from a bed to a chair (including a wheelchair)? 3  -AS     Standing up from a chair using your arms (e.g., wheelchair, bedside chair)? 3  -AS     Climbing 3-5 steps with a railing? 2  -AS     To walk in hospital room? 3  -AS     AM-PAC 6 Clicks Score (PT) 18  -AS     Highest level of mobility 6 --> Walked 10 steps or more  -AS       Row Name 10/23/23 1103          Functional Assessment    Outcome Measure Options AM-PAC 6 Clicks Basic Mobility (PT)  -AS               User Key  (r) = Recorded By, (t) = Taken By, (c) = Cosigned By      Initials Name Provider Type    AS Elza Bernard PTA Physical Therapist Assistant                                 Physical Therapy Education       Title: PT  OT SLP Therapies (In Progress)       Topic: Physical Therapy (In Progress)       Point: Mobility training (In Progress)       Learning Progress Summary             Patient Acceptance, E, NR by AS at 10/23/2023 1103    Acceptance, E,TB, NR by AY at 10/20/2023 1217                         Point: Home exercise program (In Progress)       Learning Progress Summary             Patient Acceptance, E, NR by AS at 10/23/2023 1103                         Point: Body mechanics (In Progress)       Learning Progress Summary             Patient Acceptance, E, NR by AS at 10/23/2023 1103    Acceptance, E,TB, NR by AY at 10/20/2023 1217                         Point: Precautions (In Progress)       Learning Progress Summary             Patient Acceptance, E, NR by AS at 10/23/2023 1103    Acceptance, E,TB, NR by AY at 10/20/2023 1217                                         User Key       Initials Effective Dates Name Provider Type Discipline    AS 04/28/23 -  Elza Bernard PTA Physical Therapist Assistant PT    AY 11/10/20 -  Elza Chance PT Physical Therapist PT                  PT Recommendation and Plan     Plan of Care Reviewed With: patient  Progress: improving  Outcome Evaluation: patient ambulated 40' + 40' with min assist x1 and rolling walker for support. Patient demonstrated slow pace with unsteadiness, verbal and tactile cues to correct posterior lean at times, specifically with directional changes. Assist with walker at times. high fall risk. Further distance limited by weakness, fatigue and balance deficits. Recommend SNF at D/C for best functional outcome.     Time Calculation:         PT Charges       Row Name 10/23/23 1103             Time Calculation    Start Time 1035  -AS      PT Received On 10/23/23  -AS      PT Goal Re-Cert Due Date 10/30/23  -AS         Timed Charges    25886 - PT Therapeutic Exercise Minutes 8  -AS      25739 - Gait Training Minutes  15  -AS         Total Minutes    Timed Charges  Total Minutes 23  -AS       Total Minutes 23  -AS                User Key  (r) = Recorded By, (t) = Taken By, (c) = Cosigned By      Initials Name Provider Type    AS Elza Bernard PTA Physical Therapist Assistant                  Therapy Charges for Today       Code Description Service Date Service Provider Modifiers Qty    86370963814 HC PT THER PROC EA 15 MIN 10/23/2023 Elza Bernard PTA GP 1    70887892031 HC GAIT TRAINING EA 15 MIN 10/23/2023 Elza Bernard PTA GP 1            PT G-Codes  Outcome Measure Options: AM-PAC 6 Clicks Basic Mobility (PT)  AM-PAC 6 Clicks Score (PT): 18  AM-PAC 6 Clicks Score (OT): 16       Elza Bernard PTA  10/23/2023

## 2023-10-23 NOTE — PROGRESS NOTES
"  [facility]  PROGRESS NOTE    Date of Admission: 10/17/2023  Date of Service: 10/23/23    Primary Care Physician: Schuyler Garcia MD    Chief Complaint: Afib with RVR      Subjective      HPI: Only complaint is dry mouth. Denies chest pain, dyspnea or palpitations. Maintaining sinus rhythm on amio PO taper      Objective   Vitals: /69 (BP Location: Right arm, Patient Position: Lying)   Pulse 58   Temp 98 °F (36.7 °C) (Oral)   Resp 18   Ht 160 cm (62.99\")   Wt 69.2 kg (152 lb 9.6 oz)   SpO2 91%   BMI 27.04 kg/m²     Physical Exam:   GENERAL: Alert, cooperative, in no acute distress.   HEART: Regular rate and rhythm; 3/6 KAMILLE at RUSB  LUNGS: Clear to auscultation bilaterally. No wheezing, rales or rhonchi. Nonlabored breathing on room air  NEUROLOGIC: No gross focal abnormalities  EXTREMITIES: No obvious deformities, cyanosis, or edema noted.  2+ radial and DP pulses  PSYCH: normal mood, behavior    Results:  Results from last 7 days   Lab Units 10/23/23  0353 10/22/23  0334 10/21/23  0551   WBC 10*3/mm3 7.85 6.99 7.09   HEMOGLOBIN g/dL 12.6 12.7 13.2   HEMATOCRIT % 38.6 38.1 40.1   PLATELETS 10*3/mm3 307 286 310     Results from last 7 days   Lab Units 10/23/23  0353 10/22/23  0334 10/21/23  1552 10/21/23  0551   SODIUM mmol/L 133* 131*  --  130*   POTASSIUM mmol/L 3.6 4.6 4.2 3.3*   CHLORIDE mmol/L 94* 93*  --  89*   CO2 mmol/L 31.0* 29.0  --  32.0*   BUN mg/dL 14 12  --  13   CREATININE mg/dL 0.71 0.71  --  0.79   GLUCOSE mg/dL 91 98  --  98      Lab Results   Component Value Date    CHOL 175 09/28/2023    TRIG 71 09/28/2023    HDL 69 (H) 09/28/2023    LDL 93 09/28/2023    AST 24 10/17/2023    ALT 13 10/17/2023             Results from last 7 days   Lab Units 10/18/23  0101   TSH uIU/mL 0.702             Results from last 7 days   Lab Units 10/18/23  0101 10/17/23  2310   HSTROP T ng/L 150* 171*     Results from last 7 days   Lab Units 10/17/23  2310   PROBNP " pg/mL 5,278.0*       No intake or output data in the 24 hours ending 10/23/23 0839    I personally reviewed the patient's EKG/Telemetry data    Radiology Data: no new data    Current Medications:  amiodarone, 200 mg, Oral, TID  apixaban, 2.5 mg, Oral, Q12H  bumetanide, 1 mg, Oral, Daily  cholecalciferol, 400 Units, Oral, Daily  docusate sodium, 100 mg, Oral, BID  melatonin, 5 mg, Oral, Nightly  midodrine, 10 mg, Oral, TID AC  oxybutynin XL, 10 mg, Oral, Daily  pantoprazole, 40 mg, Oral, Q AM  pharmacy consult - MTM, , Does not apply, Daily  polyethylene glycol, 17 g, Oral, Daily  potassium chloride ER, 40 mEq, Oral, Q4H  rosuvastatin, 5 mg, Oral, Nightly  sodium chloride, 10 mL, Intravenous, Q12H           Assessment and Plan:   Paroxysmal Atrial Fibrillation  On Eliquis 2.5 mg bid  Maintaining sinus daquan/NSR with PACs on amiodarone 200 mg PO tid  Upon discharge, amio 200 tid x7 days total, then bid x 1 week then daily thereafter  Avoid all other QT prolonging medications  Acute on Chronic HFpEF/Severe Aortic Stenosis  Echo 10/18/23: LVEF >70%, severe AS max 79 mean 65, RVSP <35  Poor candidate for valvular intervention due to age and cognitive impairment.    Hypotension  Continue midodrine  Elevated Troponin  No ACS. Type II demand ischemia from afib with RVR, defer ischemic evaluation     Issue when room available. Patient is stable from cardiovascular standpoint.  Cardiology will follow-up on an as-needed basis.  Patient is to follow-up in 8 weeks with Dr. Miles's office      Electronically signed by Valente Aaron PA-C, 10/23/23, 10:36 AM EDT.

## 2023-10-23 NOTE — DISCHARGE SUMMARY
[facility] Medicine Services  DISCHARGE SUMMARY    Patient Name: Luh Mckeon  : 1930  MRN: [id number]    Date of Admission: 10/17/2023 10:30 PM  Date of Discharge:  10/23/2023  Primary Care Physician: Schuyler Garcia MD    Consults       Date and Time Order Name Status Description    10/18/2023  3:32 AM Inpatient Cardiology Consult Completed             Hospital Course     Presenting Problem: Afib with RVR  Active Hospital Problems    Diagnosis  POA    **Acute exacerbation of CHF (congestive heart failure) [I50.9]  Yes    Hypoxia [R09.02]  Unknown    Moderate to severe aortic stenosis [I35.0]  Unknown    CHF (congestive heart failure) [I50.9]  Yes    Dementia without behavioral disturbance [F03.90]  Yes    Atrial fibrillation with rapid ventricular response [I48.91]  Yes    Benign essential hypertension [I10]  Yes      Resolved Hospital Problems   No resolved problems to display.          Hospital Course:  Luh Mckeon is a 89 y.o. female  with a PMH significant for moderate-severe aortic stenosis, atrial fibrillation on Eliquis, dementia who presented with fatigue, SOA and edema. She was initially admitted to our service for CHF exacerbation as well as Afib with RVR and Cardiology was consulted.  She was transferred to the ICU on same day of admission due to need for Amiodarone gtt and concern that she may need vasopressor support while on amiodarone.  Pt has tolerated Amiodarone initiation well and did not ultimately require any vasopressors while in the ICU. She was transferred back to our service on 10/22.     Plan:     Acute on chronic HFpEF/Severe aortic stenosis  - Not on home oxygen.  Satting 89% on room air.  Currently maintaining adequate O2 sats on 2 L nasal cannula  --BNP elevated, CXR consistent with CHF  --continue PO bumex  --stricts Is and Os, daily weights  --echo with LVEF of 70% and severe AS      Atrial Fibrillation with RVR  --started on  Amiodarone this admission. Plan for 200 mg TID x 7 days, then 200 mg BID x 7 days, then 200 mg daily   --echo shows severe AS, LVEF of >70%  --cardiology consulted, follows with Dr. Miles  --anticoagulation with Eliquis     Hypokalemia  -- replace per protocol      Hypotension  -- started on Midodrine in the ICU, continue     Dementia  - No active home meds     Dysphagia  -- SLP has seen, diet as per their recs     Patient can hold Prolia injection until after she is discharged from the [place]         Discharge Follow Up Recommendations for outpatient labs/diagnostics:   Follow up with PCP within one week  Follow up with Dr. Miles in 8 weeks    Day of Discharge     HPI:   No new issues overnight. Granddaughter at bedside this am.      Review of Systems  Gen- No fevers, chills  CV- No chest pain, palpitations  Resp- No cough, dyspnea  GI- No N/V/D, abd pain    Vital Signs:   Temp:  [97.3 °F (36.3 °C)-98.6 °F (37 °C)] 98 °F (36.7 °C)  Heart Rate:  [55-62] 56  Resp:  [18] 18  BP: (103-123)/(51-69) 115/61  Flow (L/min):  [2] 2      Physical Exam:  Constitutional: No acute distress, awake, alert  HENT: NCAT, mucous membranes moist  Respiratory: Clear to auscultation bilaterally, respiratory effort normal   Cardiovascular: RRR, no murmurs, rubs, or gallops  Gastrointestinal: Positive bowel sounds, soft, nontender, nondistended  Musculoskeletal: No bilateral ankle edema  Psychiatric: Appropriate affect, cooperative  Neurologic: pleasantly confused, strength symmetric in all extremities, Cranial Nerves grossly intact to confrontation, speech clear  Skin: No rashes    Pertinent  and/or Most Recent Results     LAB RESULTS:      Lab 10/23/23  0353 10/22/23  0334 10/21/23  0551 10/19/23  0402 10/18/23  0746 10/17/23  2310 10/17/23  1537   WBC 7.85 6.99 7.09 7.03 6.87 7.75 7.07   HEMOGLOBIN 12.6 12.7 13.2 12.2 12.4 14.0 13.4   HEMATOCRIT 38.6 38.1 40.1 37.6 38.0 43.4 39.4   PLATELETS 307 286 310 286 297 307 329   NEUTROS ABS  5.01  --   --   --  4.61 5.07 4.39   IMMATURE GRANS (ABS) 0.03  --   --   --  0.02 0.02 0.03   LYMPHS ABS 1.55  --   --   --  1.29 1.61 1.56   MONOS ABS 0.90  --   --   --  0.75 0.81 0.84   EOS ABS 0.30  --   --   --  0.16 0.20 0.21   MCV 94.1 92.3 92.8 96.2 94.1 95.8 91.6   PROCALCITONIN  --   --   --   --   --  0.02  --    LACTATE  --   --   --   --   --  1.8  --          Lab 10/23/23  0353 10/22/23  0334 10/21/23  1552 10/21/23  0551 10/20/23  1638 10/20/23  0352 10/19/23  0402 10/18/23  0746 10/18/23  0101 10/17/23  2310 10/17/23  1537   SODIUM 133* 131*  --  130*  --  130* 139   < >  --    < > 135*   POTASSIUM 3.6 4.6 4.2 3.3* 4.0 3.2* 3.7   < >  --    < > 4.1   CHLORIDE 94* 93*  --  89*  --  86* 97*   < >  --    < > 95*   CO2 31.0* 29.0  --  32.0*  --  35.0* 34.0*   < >  --    < > 26.2   ANION GAP 8.0 9.0  --  9.0  --  9.0 8.0   < >  --    < > 13.8   BUN 14 12  --  13  --  11 8   < >  --    < > 14   CREATININE 0.71 0.71  --  0.79  --  0.79 0.63   < >  --    < > 0.87   EGFR 79.9 79.9  --  70.3  --  70.3 83.3   < >  --    < > 62.6   GLUCOSE 91 98  --  98  --  141* 102*   < >  --    < > 97   CALCIUM 9.1 9.1  --  9.2  --  9.4 8.9   < >  --    < > 9.7*   IONIZED CALCIUM  --   --   --   --   --   --   --   --   --   --  1.29  5.2   MAGNESIUM  --  1.9  --  1.8  --  1.9 2.2  --   --   --  2.3   PHOSPHORUS  --  3.2  --  3.8  --  4.9* 4.5  --   --   --   --    TSH  --   --   --   --   --   --   --   --  0.702  --   --     < > = values in this interval not displayed.         Lab 10/17/23  2310 10/17/23  1537   TOTAL PROTEIN 7.7 7.2   ALBUMIN 3.6 3.8   GLOBULIN 4.1 3.4   ALT (SGPT) 13 11   AST (SGOT) 24 21   BILIRUBIN 0.2 0.3   ALK PHOS 85 77         Lab 10/18/23  0101 10/17/23  2310 10/17/23  1537   PROBNP  --  5,278.0* 6,344.0*   HSTROP T 150* 171*  --                  Brief Urine Lab Results  (Last result in the past 365 days)        Color   Clarity   Blood   Leuk Est   Nitrite   Protein   CREAT   Urine HCG         09/28/23 1040             64.2               Microbiology Results (last 10 days)       Procedure Component Value - Date/Time    Blood Culture - Blood, Arm, Right [[id number]]  (Normal) Collected: 10/17/23 2335    Lab Status: Final result Specimen: Blood from Arm, Right Updated: 10/23/23 0000     Blood Culture No growth at 5 days    COVID PRE-OP / PRE-PROCEDURE SCREENING ORDER (NO ISOLATION) - Swab, Nasopharynx [[id number]]  (Normal) Collected: 10/17/23 2315    Lab Status: Final result Specimen: Swab from Nasopharynx Updated: 10/18/23 0042    Narrative:      The following orders were created for panel order COVID PRE-OP / PRE-PROCEDURE SCREENING ORDER (NO ISOLATION) - Swab, Nasopharynx.  Procedure                               Abnormality         Status                     ---------                               -----------         ------                     Respiratory Panel PCR w/...[[id number]]  Normal              Final result                 Please view results for these tests on the individual orders.    Respiratory Panel PCR w/COVID-19(SARS-CoV-2) [facility]A In-House, NP Swab in UTM/VTM, 3-4 HR TAT - Swab, Nasopharynx [[id number]]  (Normal) Collected: 10/17/23 2315    Lab Status: Final result Specimen: Swab from Nasopharynx Updated: 10/18/23 0042     ADENOVIRUS, PCR Not Detected     Coronavirus 229E Not Detected     Coronavirus HKU1 Not Detected     Coronavirus NL63 Not Detected     Coronavirus OC43 Not Detected     COVID19 Not Detected     Human Metapneumovirus Not Detected     Human Rhinovirus/Enterovirus Not Detected     Influenza A PCR Not Detected     Influenza B PCR Not Detected     Parainfluenza Virus 1 Not Detected     Parainfluenza Virus 2 Not Detected     Parainfluenza Virus 3 Not Detected     Parainfluenza Virus 4 Not Detected     RSV, PCR Not Detected     Bordetella pertussis pcr Not Detected     Bordetella parapertussis PCR Not Detected     Chlamydophila pneumoniae PCR Not Detected      Mycoplasma pneumo by PCR Not Detected    Narrative:      In the setting of a positive respiratory panel with a viral infection PLUS a negative procalcitonin without other underlying concern for bacterial infection, consider observing off antibiotics or discontinuation of antibiotics and continue supportive care. If the respiratory panel is positive for atypical bacterial infection (Bordetella pertussis, Chlamydophila pneumoniae, or Mycoplasma pneumoniae), consider antibiotic de-escalation to target atypical bacterial infection.    Blood Culture - Blood, Arm, Left [[id number]]  (Normal) Collected: 10/17/23 2305    Lab Status: Final result Specimen: Blood from Arm, Left Updated: 10/22/23 2331     Blood Culture No growth at 5 days            XR Chest 1 View    Result Date: 10/19/2023  XR CHEST 1 VW Date of Exam: 10/19/2023 10:32 PM EDT Indication: dyspnea Comparison: 10/18/2023. Findings: The heart appears mildly enlarged. Diffuse interstitial and airspace opacities are present with slight progression of airspace within the bilateral lower lobes. Small bilateral pleural effusions are present, left greater than right, similar. No pneumothorax. No acute osseous abnormality.     Impression: Slight progression of airspace disease within the bilateral lower lobes, likely related to worsening pneumonia. Stable small bilateral pleural effusions, left greater than right. Electronically Signed: Marie Kowalski MD  10/19/2023 11:13 PM EDT  Workstation ID: [id number]    Adult Transthoracic Echo Complete w/ Color, Spectral and Contrast if necessary per protocol    Result Date: 10/18/2023    Left ventricular ejection fraction appears to be greater than 70%.   Left ventricular diastolic function was indeterminate.   Left atrial volume is mildly increased.   Severe aortic valve stenosis is present.   Aortic valve maximum pressure gradient is 79 mmHg. Aortic valve mean pressure gradient is 65 mmHg.   Estimated right ventricular systolic  pressure from tricuspid regurgitation is normal (<35 mmHg). Calculated right ventricular systolic pressure from tricuspid regurgitation is 32 mmHg.   Incidental irregular heart rate noted with MV inflow suggestive of atrial fibrillation     XR Abdomen KUB    Result Date: 10/18/2023  XR ABDOMEN KUB Date of Exam: 10/17/2023 4:04 PM EDT Indication: Shortness of breath, cough, abdominal bloating Comparison: Chest x-ray performed on 10/17/2023. Findings: The bowel gas pattern is within range of normal. There are no dilated loops of bowel to indicate obstructive process. There is no pneumatosis or free air. The patient has had a previous right hip hemiarthroplasty. There is S-shaped scoliotic curvature of  the thoracolumbar spine with underlying degenerative changes. There are mild degenerative changes of the sacroiliac joints and the left hip joint. There are abnormal densities in the lung bases discussed in the separately dictated chest x-ray report.     Impression: 1. Unremarkable bowel gas pattern. 2. Incidental findings as noted above. Electronically Signed: Jorge Myers MD  10/18/2023 11:23 AM EDT  Workstation ID: [id number]    XR Chest 1 View    Result Date: 10/18/2023  XR CHEST 1 VW Date of Exam: 10/18/2023 12:18 AM EDT Indication: CHF exacerbation Comparison: 10/17/2023. Findings: The heart is mildly enlarged, stable as compared to the previous study. There is indistinctness of the pulmonary vasculature with small bilateral pleural effusions, similar as compared to the previous study. Patchy airspace opacities and interstitial opacities present. No pneumothorax. No acute osseous abnormality identified.     Impression: Mild pulmonary edema pattern with small bilateral pleural effusions, similar as compared to the previous study. Electronically Signed: Marie Kowalski MD  10/18/2023 12:21 AM EDT  Workstation ID: [id number]    XR Chest PA & Lateral    Result Date: 10/17/2023  XR CHEST PA AND LATERAL Date of Exam: 10/17/2023  4:04 PM EDT Indication: shortness of breath and edema Comparison: Chest x-ray 8/22/2021 Findings: Stable cardiomediastinal silhouette with atherosclerosis of the aortic arch and top normal size of the heart. There are new small bilateral pleural effusions. There are diffuse interstitial opacities. No pneumothorax. No acute osseous findings. A right shoulder arthroplasty is noted.     Impression: There are new small bilateral pleural effusions and new diffuse interstitial opacities, favor interstitial edema although atypical/viral infection could have a similar appearance in the correct clinical context. Electronically Signed: Kt Smiley MD  10/17/2023 4:37 PM EDT  Workstation ID: [id number]             Results for orders placed during the hospital encounter of 10/17/23    Adult Transthoracic Echo Complete w/ Color, Spectral and Contrast if necessary per protocol    Interpretation Summary    Left ventricular ejection fraction appears to be greater than 70%.    Left ventricular diastolic function was indeterminate.    Left atrial volume is mildly increased.    Severe aortic valve stenosis is present.    Aortic valve maximum pressure gradient is 79 mmHg. Aortic valve mean pressure gradient is 65 mmHg.    Estimated right ventricular systolic pressure from tricuspid regurgitation is normal (<35 mmHg). Calculated right ventricular systolic pressure from tricuspid regurgitation is 32 mmHg.    Incidental irregular heart rate noted with MV inflow suggestive of atrial fibrillation      Plan for Follow-up of Pending Labs/Results:     Discharge Details        Discharge Medications        New Medications        Instructions Start Date   amiodarone 200 MG tablet  Commonly known as: PACERONE   Take 1 tablet by mouth 3 (Three) Times a Day for 3 days, THEN 1 tablet Every 12 (Twelve) Hours for 7 days, THEN 1 tablet Daily for 30 days.   Start Date: October 23, 2023     bumetanide 1 MG tablet  Commonly known as: BUMEX   1 mg, Oral,  Daily   Start Date: October 24, 2023     midodrine 10 MG tablet  Commonly known as: PROAMATINE   10 mg, Oral, 3 Times Daily Before Meals      oxybutynin XL 10 MG 24 hr tablet  Commonly known as: DITROPAN-XL  Replaces: Mirabegron ER 50 MG tablet sustained-release 24 hour 24 hr tablet   10 mg, Oral, Daily   Start Date: October 24, 2023            Changes to Medications        Instructions Start Date   Myrbetriq 50 MG tablet sustained-release 24 hour 24 hr tablet  Generic drug: Mirabegron ER  What changed: Another medication with the same name was removed. Continue taking this medication, and follow the directions you see here.   50 mg, Oral, Every Night at Bedtime             Continue These Medications        Instructions Start Date   acetaminophen 325 MG tablet  Commonly known as: TYLENOL   325 mg, Oral, Every 4 Hours PRN      apixaban 2.5 MG tablet tablet  Commonly known as: Eliquis   2.5 mg, Oral, Every 12 Hours Scheduled      bisacodyl 10 MG suppository  Commonly known as: DULCOLAX   10 mg, Rectal, Daily PRN      cholecalciferol 10 MCG (400 UNIT) tablet  Commonly known as: VITAMIN D3   1 tablet, Oral, Every Morning      Cranberry 450 MG tablet   1 tablet, Oral, Daily      docusate sodium 100 MG capsule  Commonly known as: COLACE   100 mg, Oral, 2 Times Daily      EQL CoQ10 200 MG capsule  Generic drug: Coenzyme Q10   200 mg, Oral, 2 Times Daily      melatonin 3 MG tablet   3 mg, Oral, Nightly      omeprazole 40 MG capsule  Commonly known as: priLOSEC   40 mg, Oral, Every Night at Bedtime      ondansetron ODT 8 MG disintegrating tablet  Commonly known as: ZOFRAN-ODT   8 mg, Translingual, Every 8 Hours PRN      polyethylene glycol 17 GM/SCOOP powder  Commonly known as: MIRALAX   Takes once daily on Monday, Wednesday, and Friday and PRN for constipation      Prolia 60 MG/ML solution prefilled syringe syringe  Generic drug: denosumab   1 mL, Subcutaneous, Every 6 Months      rosuvastatin 5 MG tablet  Commonly known  as: CRESTOR   5 mg, Oral, Nightly             Stop These Medications      dilTIAZem  MG 24 hr capsule  Commonly known as: CARDIZEM CD     Mirabegron ER 50 MG tablet sustained-release 24 hour 24 hr tablet  Commonly known as: Myrbetriq  Replaced by: oxybutynin XL 10 MG 24 hr tablet  You also have another medication with the same name that you need to continue taking as instructed.              No Known Allergies      Discharge Disposition:  Rehab Facility or Unit (DC - External)    Diet:  Hospital:  Diet Order   Procedures    Diet: Cardiac Diets; Healthy Heart (2-3 Na+); Texture: Soft to Chew (NDD 3); Soft to Chew: Whole Meat; Fluid Consistency: Thin (IDDSI 0)       Diet Instructions    FEES Reason for Referral 10/21/2023    Patient was referred for a FEES to assess the efficiency of his/her swallow function, rule out aspiration and make recommendations regarding safe dietary consistencies, effective compensatory strategies, and safe eating environment.         Recommendations/Treatment  SLP Swallowing Diagnosis: mild, pharyngeal dysphagia (10/21/23 1000)  Functional Impact: risk of aspiration/pneumonia (10/21/23 1000)  Rehab Potential/Prognosis, Swallowing: good, to achieve stated therapy goals (10/21/23 1000)  Swallow Criteria for Skilled Therapeutic Interventions Met: demonstrates skilled criteria (10/21/23 1000)  Therapy Frequency (Swallow): 5 days per week (10/21/23 1000)  Predicted Duration Therapy Intervention (Days): until discharge (10/21/23 1000)  SLP Diet Recommendation: soft to chew textures, whole, thin liquids (10/21/23 1000)  Recommended Precautions and Strategies: upright posture during/after eating, small bites of food and sips of liquid, alternate between small bites of food and sips of liquid, volitional throat clear, general aspiration precautions, 1:1 supervision (10/21/23 1000)  SLP Rec. for Method of Medication Administration: meds whole, with puree (10/21/23 1000)  Monitor for Signs of  Aspiration: yes, notify SLP if any concerns (10/21/23 1000)  Anticipated Discharge Disposition (SLP): unknown, anticipate therapy at next level of care (10/21/23 1000)    Instrumental Set-up  Risks/Benefits Reviewed: risks/benefits explained, patient, agreed to eval (10/21/23 1000)  Nasal Entry: right: (10/21/23 1000)  Anatomic Considerations: no anatomic structural deviation (10/21/23 1000)  Utensils Used: Spoon, Cup, Straw (10/21/23 1000)  Consistencies Trialed: thin liquids, nectar-thick liquids, honey-thick liquids, pudding/puree, regular textures (10/21/23 1000)    Oral Preparation/ Oral Phase  Oral Phase: WFL (10/21/23 1000)    Pharyngeal Phase  Initiation of Pharyngeal Swallow: bolus in valleculae (10/21/23 1000)  Pharyngeal Phase: impaired pharyngeal phase of swallowing (10/21/23 1000)  Penetration During the Swallow: nectar-thick liquids, thin liquids, secondary to reduced laryngeal elevation, secondary to reduced vestibular closure, secondary to delayed swallow initiation or mistiming (10/21/23 1000)  Aspiration After the Swallow: thin liquids, secondary to residue, in pyriform sinuses, other (see comments) (aspiration only occurred when pt was talking during 1 trial of thin liquid) (10/21/23 1000)  Rosenbek's Scale: thin:, 8-->Level 8, nectar:, 3-->Level 3, honey:, pudding/puree:, regular textures:, 1-->Level 1 (10/21/23 1000)  Residue: regular Textures, valleculae, posterior pharyngeal wall, secondary to reduced base of tongue retraction, secondary to reduced posterior pharyngeal wall stripping (10/21/23 1000)  Response to Residue: cleared residue, with liquid wash, other (see comments) (cued) (10/21/23 1000)  Attempted Compensatory Maneuvers: bolus size, bolus presentation style, multiple swallows, alternate liquids/solids, throat clear after swallow (10/21/23 1000)  Response to Attempted Compensatory Maneuvers: reduced residue (10/21/23 1000)  FEES Summary: Pt aspirated thin liquids only x1 trial after  the swallow due to pt talking during/after intake. Otherwise, no aspiration. There was silent penetration of thin and nectar-thick liquids during the swallow, but this did not deepen, and was cleared w/ a cued cough + reswallow. Pt is at risk of aspiration. There was moderate residue w/ regular solids which  required multiple swallows and x2 liquid washes to clear. Recommend soft/whole solids and thin liquids with strict use of the following strategies: single sips (straws OK), volitional cough at end of PO intake, and alternate liquids/solids. (10/21/23 1000)                   Activity:      Restrictions or Other Recommendations:         CODE STATUS:    Code Status and Medical Interventions:   Ordered at: 10/18/23 0332     Level Of Support Discussed With:    Patient     Code Status (Patient has no pulse and is not breathing):    CPR (Attempt to Resuscitate)     Medical Interventions (Patient has pulse or is breathing):    Full Support       Future Appointments   Date Time Provider Department Center   11/6/2023  3:00 PM Schuyler Garcia MD [facility]   11/10/2023  2:30 PM Schuyler Garcia MD [facility]   11/30/2023  2:40 PM [facility]       Additional Instructions for the Follow-ups that You Need to Schedule       Discharge Follow-up with PCP   As directed       Currently Documented PCP:    Schuyler Garcia MD    PCP Phone Number:    [phone]     Follow Up Details: in one week with PCP                      Fannie Martinez MD  10/23/23      Time Spent on Discharge:  I spent  25  minutes on this discharge activity which included: face-to-face encounter with the patient, reviewing the data in the system, coordination of the care with the nursing staff as well as consultants, documentation, and entering orders.

## 2023-11-06 ENCOUNTER — TELEPHONE (OUTPATIENT)
Dept: INTERNAL MEDICINE | Facility: CLINIC | Age: 88
End: 2023-11-06

## 2023-11-06 NOTE — TELEPHONE ENCOUNTER
PT'S DAUGHTER NAIN HERNÁNDEZ CALLED AND STATED THAT THE PT IS IN [facility] AND WILL BE THERE ANOTHER COUPLE OF WEEKS.      PT HAS A HOSP F/U THIS AFTERNOON 11/06/23 AND THEN ONE THIS FRIDAY 11/10/23.    PT'S DAUGHTER DOESN'T WANT TO CANCEL THESE APPTS UNTIL DR KIRK SAYS SO.      I EXPLAINED TO THE DAUGHTER THAT SHE WILL NEED TO CALL US BACK TO SCHEDULE A HOSPITAL F/U WHEN SHE IS DISCHARGED FROM THE [facility].

## 2023-11-08 ENCOUNTER — HOME HEALTH ADMISSION (OUTPATIENT)
Dept: HOME HEALTH SERVICES | Facility: HOME HEALTHCARE | Age: 88
End: 2023-11-08
Payer: MEDICARE

## 2023-11-08 ENCOUNTER — TELEPHONE (OUTPATIENT)
Dept: INTERNAL MEDICINE | Facility: CLINIC | Age: 88
End: 2023-11-08
Payer: MEDICARE

## 2023-11-08 ENCOUNTER — TRANSCRIBE ORDERS (OUTPATIENT)
Dept: HOME HEALTH SERVICES | Facility: HOME HEALTHCARE | Age: 88
End: 2023-11-08
Payer: MEDICARE

## 2023-11-08 ENCOUNTER — READMISSION MANAGEMENT (OUTPATIENT)
Dept: CALL CENTER | Facility: HOSPITAL | Age: 88
End: 2023-11-08
Payer: MEDICARE

## 2023-11-08 DIAGNOSIS — I50.9 HEART FAILURE, UNSPECIFIED HF CHRONICITY, UNSPECIFIED HEART FAILURE TYPE: Primary | ICD-10-CM

## 2023-11-08 NOTE — OUTREACH NOTE
Prep Survey      Flowsheet Row Responses   [facility] facility patient discharged from? Non-BH   Is LACE score < 7 ? Non-BH Discharge   Eligibility [facility]   Date of Discharge 11/08/23   Discharge Disposition Home or Self Care   Discharge diagnosis Hypertensive heart disease with heart failure   Does the patient have one of the following disease processes/diagnoses(primary or secondary)? CHF   Prep survey completed? Yes            An DICKEY - Registered Nurse

## 2023-11-08 NOTE — TELEPHONE ENCOUNTER
[facility] will be doing PT & OT on this pt.  The  would like to add nursing care as well, just needs Dr Garcia's ok.

## 2023-11-09 ENCOUNTER — TRANSITIONAL CARE MANAGEMENT TELEPHONE ENCOUNTER (OUTPATIENT)
Dept: CALL CENTER | Facility: HOSPITAL | Age: 88
End: 2023-11-09
Payer: MEDICARE

## 2023-11-09 ENCOUNTER — HOME CARE VISIT (OUTPATIENT)
Dept: HOME HEALTH SERVICES | Facility: HOME HEALTHCARE | Age: 88
End: 2023-11-09
Payer: MEDICARE

## 2023-11-09 VITALS
DIASTOLIC BLOOD PRESSURE: 65 MMHG | OXYGEN SATURATION: 93 % | HEART RATE: 73 BPM | TEMPERATURE: 97.1 F | SYSTOLIC BLOOD PRESSURE: 115 MMHG

## 2023-11-09 PROCEDURE — G0299 HHS/HOSPICE OF RN EA 15 MIN: HCPCS

## 2023-11-09 NOTE — Clinical Note
"SOC Note:Luh Mckeon is a 89 y.o. female with a PMH significant for moderate-severe aortic stenosis, atrial fibrillation on Eliquis, dementia who presented with fatigue, SOA and edema. She was initially admitted to our service for CHF exacerbation as well as Afib with RVR and Cardiology was consulted. She was transferred to the ICU on same day of admission due to need for Amiodarone gtt and concern that she may need vasopressor support while on amiodarone. Pt has tolerated Amiodarone initiation well and did not ultimately require any vasopressors while in the ICU. She was transferred back to our service on 10/22.      Home Health ordered for: disciplines SN PT OT     Reason for Hosp/Primary Dx/Co-morbidities Afib, CHF, aortic stenosis     bFocus of Care helping family to manage medications and understand side effects     Patient's goal(s)\"continue to stay at home:    Current Functional status/mobility/DME walkes with walker and assistance of one. shower bench noted and full time caregiver     HB status/Living Arrangements one story home with adeqaute assess with assistance     Skin Integrity/wound status no  skin issues noted     Code Status NO CPR     Fall Risk/Safety concerns pt usses walked and many rugs in the home     Medication issues/Concerns multiple medications     Additional Problems/Concerns pts family had a lot of medication questions.     SDOH Barriers multiple medications and lack of knowledge       "

## 2023-11-09 NOTE — OUTREACH NOTE
Call Center TCM Note      Flowsheet Row Responses   [facility], [place], operated by [facility] patient discharged from? Non-   Does the patient have one of the following disease processes/diagnoses(primary or secondary)? Other   TCM attempt successful? Yes  [All patient contacts listed on verbal release]   Call start time 1106   Call end time 1113   Discharge diagnosis Hypertensive heart disease with heart failure   Is patient permission given to speak with other caregiver? Yes   List who call center can speak with Debi-caregiver   Person spoke with today (if not patient) and relationship Debi-caregiver   Meds reviewed with patient/caregiver? Yes   Is the patient having any side effects they believe may be caused by any medication additions or changes? No   Does the patient have all medications ordered at discharge? Yes   Is the patient taking all medications as directed (includes completed medication regime)? Yes   Comments Hospital d/c follow up appt 11/10/23@0930.   Does the patient have an appointment with their PCP within 7-14 days of discharge? Yes   Has home health visited the patient within 72 hours of discharge? Call prior to 72 hours   Home health comments  visit scheduled for 11/9/23   Psychosocial issues? No   Did the patient receive a copy of their discharge instructions? Yes   Nursing interventions Reviewed instructions with patient   What is the patient's perception of their health status since discharge? Same  [Patient's caregiver reports legs and ankles extremely swollen, denies any shortness of air.  nurse to visit today, PCP follow up tomorrow]   Is the patient/caregiver able to teach back signs and symptoms related to disease process for when to call PCP? Yes   Is the patient/caregiver able to teach back signs and symptoms related to disease process for when to call 911? Yes   Is the patient/caregiver able to teach back the hierarchy of who to call/visit for symptoms/problems? PCP, Specialist, Home health  nurse, Urgent Care, ED, 911 Yes   If the patient is a current smoker, are they able to teach back resources for cessation? Not a smoker   TCM call completed? Yes   Wrap up additional comments Caregiver reports patient went for a walk outside today, lower extremity swelling.   Call end time 1113   Would this patient benefit from a Referral to [facility] Social Work? No   Is the patient interested in additional calls from an ambulatory ? No            Amelia Knapp RN    11/9/2023, 11:16 EST

## 2023-11-09 NOTE — HOME HEALTH
"SOC Note:Luh Mckeon is a 89 y.o. female with a PMH significant for moderate–severe aortic stenosis, atrial fibrillation on Eliquis, dementia who presented with fatigue, SOA and edema. She was initially admitted to our service for CHF exacerbation as well as Afib with RVR and Cardiology was consulted. She was transferred to the ICU on same day of admission due to need for Amiodarone gtt and concern that she may need vasopressor support while on amiodarone. Pt has tolerated Amiodarone initiation well and did not ultimately require any vasopressors while in the ICU. She was transferred back to our service on 10/22.      Home Health ordered for: disciplines SN PT OT     Reason for Hosp/Primary Dx/Co-morbidities Afib, CHF, aortic stenosis     bFocus of Care helping family to manage medications and understand side effects     Patient's goal(s)\"continue to stay at home:    Current Functional status/mobility/DME walkes with walker and assistance of one. shower bench noted and full time caregiver     HB status/Living Arrangements one story home with adeaute assess with assistance     Skin Integrity/wound status no  skin issues noted     Code Status NO CPR     Fall Risk/Safety concerns pt usses walked and many rugs in the home     Medication issues/Concerns multiple medications     Additional Problems/Concerns pts family had a lot of medication questions.     SDOH Barriers multiple medications and lack of knowledge     Plan for next visit med managment, weight check lower extrimity edema regulation"

## 2023-11-10 ENCOUNTER — HOSPITAL ENCOUNTER (OUTPATIENT)
Dept: GENERAL RADIOLOGY | Facility: HOSPITAL | Age: 88
Discharge: HOME | End: 2023-11-10
Admitting: INTERNAL MEDICINE
Payer: MEDICARE

## 2023-11-10 ENCOUNTER — OFFICE VISIT (OUTPATIENT)
Dept: INTERNAL MEDICINE | Facility: CLINIC | Age: 88
End: 2023-11-10
Payer: MEDICARE

## 2023-11-10 ENCOUNTER — TELEPHONE (OUTPATIENT)
Dept: INTERNAL MEDICINE | Facility: CLINIC | Age: 88
End: 2023-11-10

## 2023-11-10 ENCOUNTER — LAB (OUTPATIENT)
Dept: LAB | Facility: HOSPITAL | Age: 88
End: 2023-11-10
Payer: MEDICARE

## 2023-11-10 VITALS
DIASTOLIC BLOOD PRESSURE: 88 MMHG | TEMPERATURE: 97.5 F | BODY MASS INDEX: 28.53 KG/M2 | SYSTOLIC BLOOD PRESSURE: 120 MMHG | HEIGHT: 63 IN | WEIGHT: 161 LBS | HEART RATE: 66 BPM

## 2023-11-10 DIAGNOSIS — I50.23 ACUTE ON CHRONIC SYSTOLIC CONGESTIVE HEART FAILURE: ICD-10-CM

## 2023-11-10 DIAGNOSIS — E44.1 MILD PROTEIN-CALORIE MALNUTRITION: ICD-10-CM

## 2023-11-10 DIAGNOSIS — R05.2 SUBACUTE COUGH: ICD-10-CM

## 2023-11-10 DIAGNOSIS — R26.81 UNSTEADY GAIT: ICD-10-CM

## 2023-11-10 DIAGNOSIS — R06.02 SHORTNESS OF BREATH: ICD-10-CM

## 2023-11-10 DIAGNOSIS — J18.9 PNEUMONIA OF BOTH LOWER LOBES DUE TO INFECTIOUS ORGANISM: ICD-10-CM

## 2023-11-10 DIAGNOSIS — Z91.81 RISK FOR FALLS: ICD-10-CM

## 2023-11-10 DIAGNOSIS — R60.0 BILATERAL LEG EDEMA: ICD-10-CM

## 2023-11-10 DIAGNOSIS — I35.0 SEVERE AORTIC STENOSIS: ICD-10-CM

## 2023-11-10 DIAGNOSIS — I95.89 OTHER SPECIFIED HYPOTENSION: ICD-10-CM

## 2023-11-10 DIAGNOSIS — I50.23 ACUTE ON CHRONIC SYSTOLIC CONGESTIVE HEART FAILURE: Primary | ICD-10-CM

## 2023-11-10 DIAGNOSIS — I48.91 ATRIAL FIBRILLATION WITH RAPID VENTRICULAR RESPONSE: ICD-10-CM

## 2023-11-10 PROBLEM — I95.9 ARTERIAL HYPOTENSION: Status: ACTIVE | Noted: 2023-11-10

## 2023-11-10 PROCEDURE — 83880 ASSAY OF NATRIURETIC PEPTIDE: CPT

## 2023-11-10 PROCEDURE — 71046 X-RAY EXAM CHEST 2 VIEWS: CPT

## 2023-11-10 PROCEDURE — 80053 COMPREHEN METABOLIC PANEL: CPT

## 2023-11-10 PROCEDURE — 36415 COLL VENOUS BLD VENIPUNCTURE: CPT

## 2023-11-10 PROCEDURE — 83735 ASSAY OF MAGNESIUM: CPT

## 2023-11-10 PROCEDURE — 84134 ASSAY OF PREALBUMIN: CPT

## 2023-11-10 RX ORDER — MIDODRINE HYDROCHLORIDE 10 MG/1
10 TABLET ORAL
Status: ON HOLD
Start: 2023-11-10

## 2023-11-10 RX ORDER — CEFDINIR 300 MG/1
300 CAPSULE ORAL 2 TIMES DAILY
Qty: 20 CAPSULE | Refills: 0 | Status: ON HOLD | OUTPATIENT
Start: 2023-11-10 | End: 2023-11-20

## 2023-11-10 RX ORDER — AMIODARONE HYDROCHLORIDE 200 MG/1
TABLET ORAL
Status: ON HOLD
Start: 2023-11-10 | End: 2023-12-19

## 2023-11-10 NOTE — ASSESSMENT & PLAN NOTE
Congestive heart failure due to hypertension.  Heart failure is worsening.  NYHA Class III.  Continue current treatment regimen.  Encouraged daily monitoring of the patient's weight.  Regular aerobic exercise.  Medication changes per orders.  Heart failure will be reassessed  1 1/2 weeks  .89 y.o. female presents for follow up of hospitalization from [facility] from 10/17/2023 to 10/23/2023 and then went to [facility] 10/23/2023 and stayed until 11/8/2023 for her CHF and low blood pressure. And CHF and hypoxia. She has care givers that have been with her at [facility] to further help but very attentive family. She is getting Home Health with [facility] for nursing and OT and PT Her fast heart rate has been helped with amiodarone. She has been taking midodrine for her low blood pressure. She has fatigue and weakness I have gone over medications, labs, and hospitalization and [facility] and her follow up visits with she, her daughter and care giver and they voiced understanding. She is highly complicated and high risk for ER visit and or hospitalization and have added respiratory therapist and add parameters for midodrine and gotten chest xray and labs to try and determine bumex doses. Addend elevated BNP and will do bumex 1 mg in the am and 1/2 tab in afternoon and add potassium and magnesium.

## 2023-11-10 NOTE — TELEPHONE ENCOUNTER
Please call daughter and tell her chest xray showed possible underlying pneumonia but some improvement in heart so I sent in antibiotics cefdinir to help treat possible pneumonia and also reach out to [facility] and add respiratory therapist to see her

## 2023-11-10 NOTE — ASSESSMENT & PLAN NOTE
She has responded to amiodarone and will put parameters on using the midodrine prescribed by Cardiology and hold for systolic > 130

## 2023-11-10 NOTE — ASSESSMENT & PLAN NOTE
She has responded to amiodarone and uses eliquis Her heart rate has not been really fast or slow.

## 2023-11-10 NOTE — PROGRESS NOTES
Chief Complaint   Patient presents with    Hospital Follow Up Visit    Bloated    Leg Swelling     Counseling was given to patient, family, and caretaker for the following topics: diagnostic results, instructions for management, risk factor reductions, prognosis, patient and family education, impressions, risks and benefits of treatment options, and importance of treatment compliance . Total time of the encounter was 75 minutes   History of Present Illness  89 y.o. female presents for follow up of hospitalization from [facility] from 10/17/2023 to 10/23/2023 and then went to [facility] 10/23/2023 and stayed until 11/8/2023 for her CHF and low blood pressure. And CHF and hypoxia. She has care givers that have been with her at [facility] to further help but very attentive family. She is getting Home Health with [facility] for nursing and OT and PT Her fast heart rate has been helped with amiodarone. She has been taking midodrine for her low blood pressure. She has fatigue and weakness     Review of Systems   Constitutional:  Positive for fatigue. Negative for diaphoresis and fever.   Respiratory:  Positive for cough and shortness of breath.    Cardiovascular:  Positive for leg swelling.   Gastrointestinal:  Positive for constipation. Negative for abdominal pain, blood in stool and diarrhea.   Musculoskeletal:  Positive for gait problem.   Neurological:  Positive for weakness.   Psychiatric/Behavioral:  Positive for decreased concentration.      .    PMSFH:  The following portions of the patient's history were reviewed and updated as appropriate: allergies, current medications, past family history, past medical history, past social history, past surgical history and problem list.      Current Outpatient Medications:     acetaminophen (TYLENOL) 325 MG tablet, Take 1 tablet by mouth Every 4 (Four) Hours As Needed for Mild Pain., Disp: , Rfl:     amiodarone (PACERONE) 200 MG tablet, Take 1 tablet by mouth  Daily for 3 days, THEN 1 tablet Every 12 (Twelve) Hours for 7 days, THEN 1 tablet Daily for 30 days., Disp: , Rfl:     apixaban (Eliquis) 2.5 MG tablet tablet, Take 1 tablet by mouth Every 12 (Twelve) Hours., Disp: 14 tablet, Rfl: 0    bisacodyl (DULCOLAX) 10 MG suppository, Insert 1 suppository into the rectum Daily As Needed for Constipation., Disp: , Rfl:     bumetanide (BUMEX) 1 MG tablet, 1 tab in the am and 1/2 tab in afternoon, Disp: 45 tablet, Rfl: 1    cholecalciferol (VITAMIN D3) 10 MCG (400 UNIT) tablet, Take 1 tablet by mouth Every Morning., Disp: , Rfl:     Coenzyme Q10 (EQL CoQ10) 200 MG capsule, Take 200 mg by mouth 2 (Two) Times a Day., Disp: , Rfl:     Cranberry 450 MG tablet, Take 1 tablet by mouth Daily., Disp: , Rfl:     denosumab (Prolia) 60 MG/ML solution prefilled syringe syringe, Inject 1 mL under the skin into the appropriate area as directed Every 6 (Six) Months., Disp: , Rfl:     docusate sodium (COLACE) 100 MG capsule, Take 1 capsule by mouth 2 (Two) Times a Day., Disp: , Rfl:     melatonin 3 MG tablet, Take 1 tablet by mouth Every Night., Disp: , Rfl:     midodrine (PROAMATINE) 10 MG tablet, Take 1 tablet by mouth 3 (Three) Times a Day Before Meals. Hold for systolic > 130, Disp: , Rfl:     Mirabegron ER (Myrbetriq) 50 MG tablet sustained-release 24 hour 24 hr tablet, Take 50 mg by mouth every night at bedtime., Disp: , Rfl:     omeprazole (priLOSEC) 40 MG capsule, Take 1 capsule by mouth every night at bedtime., Disp: , Rfl:     ondansetron ODT (ZOFRAN-ODT) 8 MG disintegrating tablet, Place 1 tablet on the tongue Every 8 (Eight) Hours As Needed for Nausea or Vomiting., Disp: 20 tablet, Rfl: 1    polyethylene glycol (MIRALAX) 17 GM/SCOOP powder, Takes once daily on Monday, Wednesday, and Friday and PRN for constipation, Disp: , Rfl:     rosuvastatin (CRESTOR) 5 MG tablet, Take 1 tablet by mouth Every Night., Disp: , Rfl:     cefdinir (OMNICEF) 300 MG capsule, Take 1 capsule by mouth 2  "(Two) Times a Day for 10 days., Disp: 20 capsule, Rfl: 0    magnesium oxide (MAG-OX) 400 MG tablet, Take 1 tablet by mouth Daily., Disp: 90 tablet, Rfl: 1    potassium chloride 10 MEQ CR tablet, Take 1 tablet by mouth 2 (Two) Times a Day., Disp: 60 tablet, Rfl: 1    VITALS:  /88   Pulse 66   Temp 97.5 °F (36.4 °C)   Ht 160 cm (62.99\")   Wt 73 kg (161 lb)   BMI 28.53 kg/m²     Physical Exam  Constitutional:       Appearance: She is ill-appearing (chronically ill appearing).   Eyes:      Conjunctiva/sclera: Conjunctivae normal.   Cardiovascular:      Rate and Rhythm: Normal rate. Rhythm irregular.      Heart sounds: Murmur (III/VI SM) heard.   Pulmonary:      Breath sounds: Rales (at bases) present. No wheezing.   Abdominal:      General: Bowel sounds are normal.      Palpations: Abdomen is soft.      Tenderness: There is no abdominal tenderness.   Musculoskeletal:         General: Swelling (2 + edema of legs to knees) present.   Neurological:      Comments: Awake and alert but did fall asleep when discussing with care giver and daughter stuff and slow get up and go    Psychiatric:         Mood and Affect: Mood normal.         LABS:    CMP:  Lab Results   Component Value Date    BUN 9 11/10/2023    CREATININE 0.76 11/10/2023    EGFRIFNONA 70 09/27/2021     (L) 11/10/2023    K 3.6 11/10/2023    CL 87 (L) 11/10/2023    CALCIUM 9.1 11/10/2023    ALBUMIN 4.1 11/10/2023    BILITOT 0.5 11/10/2023    ALKPHOS 84 11/10/2023    AST 18 11/10/2023    ALT 16 11/10/2023     CBC:  Lab Results   Component Value Date    WBC 7.85 10/23/2023    RBC 4.10 10/23/2023    HGB 12.6 10/23/2023    HCT 38.6 10/23/2023    MCV 94.1 10/23/2023    MCH 30.7 10/23/2023    MCHC 32.6 10/23/2023    RDW 12.0 (L) 10/23/2023     10/23/2023     Procedures       Current outpatient and discharge medications have been reconciled for the patient.  Reviewed by: Schuyler Garcia MD   ASSESSMENT/PLAN:  Diagnoses and all orders for this " visit:    1. Acute on chronic systolic congestive heart failure (Primary)  Assessment & Plan:  Congestive heart failure due to hypertension.  Heart failure is worsening.  NYHA Class III.  Continue current treatment regimen.  Encouraged daily monitoring of the patient's weight.  Regular aerobic exercise.  Medication changes per orders.  Heart failure will be reassessed  1 1/2 weeks  .89 y.o. female presents for follow up of hospitalization from [facility] from 10/17/2023 to 10/23/2023 and then went to [facility] 10/23/2023 and stayed until 11/8/2023 for her CHF and low blood pressure. And CHF and hypoxia. She has care givers that have been with her at [facility] to further help but very attentive family. She is getting Home Health with [facility] for nursing and OT and PT Her fast heart rate has been helped with amiodarone. She has been taking midodrine for her low blood pressure. She has fatigue and weakness I have gone over medications, labs, and hospitalization and [facility] and her follow up visits with she, her daughter and care giver and they voiced understanding. She is highly complicated and high risk for ER visit and or hospitalization and have added respiratory therapist and add parameters for midodrine and gotten chest xray and labs to try and determine bumex doses. Addend elevated BNP and will do bumex 1 mg in the am and 1/2 tab in afternoon and add potassium and magnesium.     Orders:  -     amiodarone (PACERONE) 200 MG tablet; Take 1 tablet by mouth Daily for 3 days, THEN 1 tablet Every 12 (Twelve) Hours for 7 days, THEN 1 tablet Daily for 30 days.  -     XR Chest PA & Lateral; Future  -     BNP; Future    2. Atrial fibrillation with rapid ventricular response  Assessment & Plan:  She has responded to amiodarone and uses eliquis Her heart rate has not been really fast or slow.     Orders:  -     Magnesium; Future    3. Moderate to severe aortic stenosis  Assessment & Plan:  Concern with her  worsening heart failure Follow with Cardiology       4. Other specified hypotension  Assessment & Plan:  She has responded to amiodarone and will put parameters on using the midodrine prescribed by Cardiology and hold for systolic > 130     Orders:  -     midodrine (PROAMATINE) 10 MG tablet; Take 1 tablet by mouth 3 (Three) Times a Day Before Meals. Hold for systolic > 130    5. Shortness of breath  Comments:  Get pulse ox and use bumex and treat possible pneumonia and get respiratory therapy  Orders:  -     XR Chest PA & Lateral; Future    6. Subacute cough  -     XR Chest PA & Lateral; Future    7. Mild protein-calorie malnutrition  Comments:  Follow labs. Poor nutrition and would add pren vic protein shake if not eating well as poor nutrtion adds to swelling  Orders:  -     Comprehensive Metabolic Panel; Future  -     Prealbumin; Future    8. Bilateral leg edema  Comments:  Treat CHF with bumex and add stockings  Orders:  -     Compression Stockings    9. Pneumonia of both lower lobes due to infectious organism  Comments:  Concern with chest xray today possible pneumonia and sent in cefdinir.  Orders:  -     cefdinir (OMNICEF) 300 MG capsule; Take 1 capsule by mouth 2 (Two) Times a Day for 10 days.  Dispense: 20 capsule; Refill: 0    10. Risk for falls  Assessment & Plan:  Worsened by CHF and will have PT help       11. Unsteady gait  Assessment & Plan:  Worsened by CHF and will have PT help       Other orders  -     bumetanide (BUMEX) 1 MG tablet; 1 tab in the am and 1/2 tab in afternoon  Dispense: 45 tablet; Refill: 1  -     potassium chloride 10 MEQ CR tablet; Take 1 tablet by mouth 2 (Two) Times a Day.  Dispense: 60 tablet; Refill: 1  -     magnesium oxide (MAG-OX) 400 MG tablet; Take 1 tablet by mouth Daily.  Dispense: 90 tablet; Refill: 1        FOLLOW-UP:  Return in about 10 days (around 11/20/2023).      Electronically signed by:    Schuyler Garcia MD

## 2023-11-11 LAB
ALBUMIN SERPL-MCNC: 4.1 G/DL (ref 3.5–5.2)
ALBUMIN/GLOB SERPL: 1.3 G/DL
ALP SERPL-CCNC: 84 U/L (ref 39–117)
ALT SERPL W P-5'-P-CCNC: 16 U/L (ref 1–33)
ANION GAP SERPL CALCULATED.3IONS-SCNC: 12.3 MMOL/L (ref 5–15)
AST SERPL-CCNC: 18 U/L (ref 1–32)
BILIRUB SERPL-MCNC: 0.5 MG/DL (ref 0–1.2)
BUN SERPL-MCNC: 9 MG/DL (ref 8–23)
BUN/CREAT SERPL: 11.8 (ref 7–25)
CALCIUM SPEC-SCNC: 9.1 MG/DL (ref 8.2–9.6)
CHLORIDE SERPL-SCNC: 87 MMOL/L (ref 98–107)
CO2 SERPL-SCNC: 30.7 MMOL/L (ref 22–29)
CREAT SERPL-MCNC: 0.76 MG/DL (ref 0.57–1)
EGFRCR SERPLBLD CKD-EPI 2021: 73.6 ML/MIN/1.73
GLOBULIN UR ELPH-MCNC: 3.1 GM/DL
GLUCOSE SERPL-MCNC: 84 MG/DL (ref 65–99)
MAGNESIUM SERPL-MCNC: 2.3 MG/DL (ref 1.7–2.3)
NT-PROBNP SERPL-MCNC: 5545 PG/ML (ref 0–1800)
POTASSIUM SERPL-SCNC: 3.6 MMOL/L (ref 3.5–5.2)
PREALB SERPL-MCNC: 13.9 MG/DL (ref 20–40)
PROT SERPL-MCNC: 7.2 G/DL (ref 6–8.5)
SODIUM SERPL-SCNC: 130 MMOL/L (ref 136–145)

## 2023-11-11 RX ORDER — BUMETANIDE 1 MG/1
TABLET ORAL
Qty: 45 TABLET | Refills: 1 | Status: ON HOLD | OUTPATIENT
Start: 2023-11-11

## 2023-11-11 RX ORDER — MAGNESIUM OXIDE 400 MG/1
400 TABLET ORAL DAILY
Qty: 90 TABLET | Refills: 1 | Status: ON HOLD | OUTPATIENT
Start: 2023-11-11

## 2023-11-11 RX ORDER — POTASSIUM CHLORIDE 750 MG/1
10 TABLET, FILM COATED, EXTENDED RELEASE ORAL 2 TIMES DAILY
Qty: 60 TABLET | Refills: 1 | Status: ON HOLD | OUTPATIENT
Start: 2023-11-11

## 2023-11-13 ENCOUNTER — HOME CARE VISIT (OUTPATIENT)
Dept: HOME HEALTH SERVICES | Facility: HOME HEALTHCARE | Age: 88
End: 2023-11-13
Payer: MEDICARE

## 2023-11-13 ENCOUNTER — HOSPITAL ENCOUNTER (INPATIENT)
Facility: HOSPITAL | Age: 88
LOS: 3 days | Discharge: HOSPICE | DRG: 306 | End: 2023-11-16
Attending: EMERGENCY MEDICINE | Admitting: INTERNAL MEDICINE
Payer: MEDICARE

## 2023-11-13 ENCOUNTER — TELEPHONE (OUTPATIENT)
Dept: CARDIOLOGY | Facility: CLINIC | Age: 88
End: 2023-11-13
Payer: MEDICARE

## 2023-11-13 ENCOUNTER — APPOINTMENT (OUTPATIENT)
Dept: GENERAL RADIOLOGY | Facility: HOSPITAL | Age: 88
DRG: 306 | End: 2023-11-13
Payer: MEDICARE

## 2023-11-13 ENCOUNTER — TELEPHONE (OUTPATIENT)
Dept: INTERNAL MEDICINE | Facility: CLINIC | Age: 88
End: 2023-11-13

## 2023-11-13 VITALS
SYSTOLIC BLOOD PRESSURE: 134 MMHG | DIASTOLIC BLOOD PRESSURE: 68 MMHG | HEART RATE: 68 BPM | RESPIRATION RATE: 16 BRPM | TEMPERATURE: 97.8 F | OXYGEN SATURATION: 96 %

## 2023-11-13 DIAGNOSIS — I50.9 ACUTE ON CHRONIC CONGESTIVE HEART FAILURE, UNSPECIFIED HEART FAILURE TYPE: Primary | ICD-10-CM

## 2023-11-13 DIAGNOSIS — E87.1 ACUTE HYPONATREMIA: ICD-10-CM

## 2023-11-13 DIAGNOSIS — I50.23 ACUTE ON CHRONIC SYSTOLIC CONGESTIVE HEART FAILURE: ICD-10-CM

## 2023-11-13 PROBLEM — I35.0 SEVERE AORTIC STENOSIS: Status: ACTIVE | Noted: 2023-11-13

## 2023-11-13 LAB
ALBUMIN SERPL-MCNC: 3.8 G/DL (ref 3.5–5.2)
ALBUMIN/GLOB SERPL: 1.3 G/DL
ALP SERPL-CCNC: 95 U/L (ref 39–117)
ALT SERPL W P-5'-P-CCNC: 20 U/L (ref 1–33)
ANION GAP SERPL CALCULATED.3IONS-SCNC: 8 MMOL/L (ref 5–15)
AST SERPL-CCNC: 23 U/L (ref 1–32)
BASOPHILS # BLD AUTO: 0.04 10*3/MM3 (ref 0–0.2)
BASOPHILS NFR BLD AUTO: 0.4 % (ref 0–1.5)
BILIRUB SERPL-MCNC: 0.5 MG/DL (ref 0–1.2)
BUN SERPL-MCNC: 9 MG/DL (ref 8–23)
BUN/CREAT SERPL: 13 (ref 7–25)
CALCIUM SPEC-SCNC: 8.9 MG/DL (ref 8.2–9.6)
CHLORIDE SERPL-SCNC: 83 MMOL/L (ref 98–107)
CO2 SERPL-SCNC: 31 MMOL/L (ref 22–29)
CREAT SERPL-MCNC: 0.69 MG/DL (ref 0.57–1)
D-LACTATE SERPL-SCNC: 1.3 MMOL/L (ref 0.5–2)
DEPRECATED RDW RBC AUTO: 41 FL (ref 37–54)
EGFRCR SERPLBLD CKD-EPI 2021: 81.5 ML/MIN/1.73
EOSINOPHIL # BLD AUTO: 0.08 10*3/MM3 (ref 0–0.4)
EOSINOPHIL NFR BLD AUTO: 0.8 % (ref 0.3–6.2)
ERYTHROCYTE [DISTWIDTH] IN BLOOD BY AUTOMATED COUNT: 12.4 % (ref 12.3–15.4)
GLOBULIN UR ELPH-MCNC: 3 GM/DL
GLUCOSE SERPL-MCNC: 117 MG/DL (ref 65–99)
HCT VFR BLD AUTO: 35.8 % (ref 34–46.6)
HGB BLD-MCNC: 11.9 G/DL (ref 12–15.9)
HOLD SPECIMEN: NORMAL
IMM GRANULOCYTES # BLD AUTO: 0.04 10*3/MM3 (ref 0–0.05)
IMM GRANULOCYTES NFR BLD AUTO: 0.4 % (ref 0–0.5)
LYMPHOCYTES # BLD AUTO: 0.94 10*3/MM3 (ref 0.7–3.1)
LYMPHOCYTES NFR BLD AUTO: 9.5 % (ref 19.6–45.3)
MCH RBC QN AUTO: 29.9 PG (ref 26.6–33)
MCHC RBC AUTO-ENTMCNC: 33.2 G/DL (ref 31.5–35.7)
MCV RBC AUTO: 89.9 FL (ref 79–97)
MONOCYTES # BLD AUTO: 1.27 10*3/MM3 (ref 0.1–0.9)
MONOCYTES NFR BLD AUTO: 12.9 % (ref 5–12)
NEUTROPHILS NFR BLD AUTO: 7.49 10*3/MM3 (ref 1.7–7)
NEUTROPHILS NFR BLD AUTO: 76 % (ref 42.7–76)
NRBC BLD AUTO-RTO: 0 /100 WBC (ref 0–0.2)
NT-PROBNP SERPL-MCNC: 8053 PG/ML (ref 0–1800)
PLATELET # BLD AUTO: 339 10*3/MM3 (ref 140–450)
PMV BLD AUTO: 10.2 FL (ref 6–12)
POTASSIUM SERPL-SCNC: 3.8 MMOL/L (ref 3.5–5.2)
PROCALCITONIN SERPL-MCNC: 0.04 NG/ML (ref 0–0.25)
PROT SERPL-MCNC: 6.8 G/DL (ref 6–8.5)
RBC # BLD AUTO: 3.98 10*6/MM3 (ref 3.77–5.28)
SODIUM SERPL-SCNC: 122 MMOL/L (ref 136–145)
TROPONIN T SERPL HS-MCNC: 43 NG/L
WBC NRBC COR # BLD: 9.86 10*3/MM3 (ref 3.4–10.8)
WHOLE BLOOD HOLD COAG: NORMAL
WHOLE BLOOD HOLD SPECIMEN: NORMAL

## 2023-11-13 PROCEDURE — 25010000002 VANCOMYCIN 10 G RECONSTITUTED SOLUTION: Performed by: EMERGENCY MEDICINE

## 2023-11-13 PROCEDURE — 85025 COMPLETE CBC W/AUTO DIFF WBC: CPT | Performed by: EMERGENCY MEDICINE

## 2023-11-13 PROCEDURE — 84484 ASSAY OF TROPONIN QUANT: CPT | Performed by: EMERGENCY MEDICINE

## 2023-11-13 PROCEDURE — G0152 HHCP-SERV OF OT,EA 15 MIN: HCPCS

## 2023-11-13 PROCEDURE — 84145 PROCALCITONIN (PCT): CPT | Performed by: NURSE PRACTITIONER

## 2023-11-13 PROCEDURE — 36415 COLL VENOUS BLD VENIPUNCTURE: CPT

## 2023-11-13 PROCEDURE — 99285 EMERGENCY DEPT VISIT HI MDM: CPT

## 2023-11-13 PROCEDURE — 83605 ASSAY OF LACTIC ACID: CPT | Performed by: EMERGENCY MEDICINE

## 2023-11-13 PROCEDURE — 99222 1ST HOSP IP/OBS MODERATE 55: CPT | Performed by: STUDENT IN AN ORGANIZED HEALTH CARE EDUCATION/TRAINING PROGRAM

## 2023-11-13 PROCEDURE — 93005 ELECTROCARDIOGRAM TRACING: CPT | Performed by: EMERGENCY MEDICINE

## 2023-11-13 PROCEDURE — 25810000003 SODIUM CHLORIDE 0.9 % SOLUTION: Performed by: EMERGENCY MEDICINE

## 2023-11-13 PROCEDURE — G0299 HHS/HOSPICE OF RN EA 15 MIN: HCPCS

## 2023-11-13 PROCEDURE — 80053 COMPREHEN METABOLIC PANEL: CPT | Performed by: EMERGENCY MEDICINE

## 2023-11-13 PROCEDURE — 87040 BLOOD CULTURE FOR BACTERIA: CPT | Performed by: EMERGENCY MEDICINE

## 2023-11-13 PROCEDURE — 83880 ASSAY OF NATRIURETIC PEPTIDE: CPT | Performed by: EMERGENCY MEDICINE

## 2023-11-13 PROCEDURE — 25010000002 PIPERACILLIN SOD-TAZOBACTAM PER 1 G: Performed by: EMERGENCY MEDICINE

## 2023-11-13 PROCEDURE — 71045 X-RAY EXAM CHEST 1 VIEW: CPT

## 2023-11-13 RX ORDER — AMIODARONE HYDROCHLORIDE 200 MG/1
200 TABLET ORAL
Status: DISCONTINUED | OUTPATIENT
Start: 2023-11-20 | End: 2023-11-16 | Stop reason: HOSPADM

## 2023-11-13 RX ORDER — SODIUM CHLORIDE 0.9 % (FLUSH) 0.9 %
10 SYRINGE (ML) INJECTION AS NEEDED
Status: DISCONTINUED | OUTPATIENT
Start: 2023-11-13 | End: 2023-11-16 | Stop reason: HOSPADM

## 2023-11-13 RX ORDER — BISACODYL 10 MG
10 SUPPOSITORY, RECTAL RECTAL DAILY PRN
Status: DISCONTINUED | OUTPATIENT
Start: 2023-11-13 | End: 2023-11-13 | Stop reason: SDUPTHER

## 2023-11-13 RX ORDER — OXYBUTYNIN CHLORIDE 10 MG/1
10 TABLET, EXTENDED RELEASE ORAL DAILY
Status: DISCONTINUED | OUTPATIENT
Start: 2023-11-14 | End: 2023-11-16 | Stop reason: HOSPADM

## 2023-11-13 RX ORDER — SODIUM CHLORIDE 0.9 % (FLUSH) 0.9 %
10 SYRINGE (ML) INJECTION EVERY 12 HOURS SCHEDULED
Status: DISCONTINUED | OUTPATIENT
Start: 2023-11-14 | End: 2023-11-16 | Stop reason: HOSPADM

## 2023-11-13 RX ORDER — OMEGA-3S/DHA/EPA/FISH OIL/D3 300MG-1000
400 CAPSULE ORAL DAILY
Status: DISCONTINUED | OUTPATIENT
Start: 2023-11-14 | End: 2023-11-16 | Stop reason: HOSPADM

## 2023-11-13 RX ORDER — SODIUM CHLORIDE 9 MG/ML
40 INJECTION, SOLUTION INTRAVENOUS AS NEEDED
Status: DISCONTINUED | OUTPATIENT
Start: 2023-11-13 | End: 2023-11-16 | Stop reason: HOSPADM

## 2023-11-13 RX ORDER — BISACODYL 5 MG/1
5 TABLET, DELAYED RELEASE ORAL DAILY PRN
Status: DISCONTINUED | OUTPATIENT
Start: 2023-11-13 | End: 2023-11-16 | Stop reason: HOSPADM

## 2023-11-13 RX ORDER — VANCOMYCIN/0.9 % SOD CHLORIDE 1.5G/250ML
20 PLASTIC BAG, INJECTION (ML) INTRAVENOUS ONCE
Qty: 500 ML | Refills: 0 | Status: COMPLETED | OUTPATIENT
Start: 2023-11-13 | End: 2023-11-14

## 2023-11-13 RX ORDER — PANTOPRAZOLE SODIUM 40 MG/1
40 TABLET, DELAYED RELEASE ORAL
Status: DISCONTINUED | OUTPATIENT
Start: 2023-11-14 | End: 2023-11-16 | Stop reason: HOSPADM

## 2023-11-13 RX ORDER — POLYETHYLENE GLYCOL 3350 17 G/17G
17 POWDER, FOR SOLUTION ORAL DAILY PRN
Status: DISCONTINUED | OUTPATIENT
Start: 2023-11-13 | End: 2023-11-16 | Stop reason: HOSPADM

## 2023-11-13 RX ORDER — CHOLECALCIFEROL (VITAMIN D3) 125 MCG
2.5 CAPSULE ORAL NIGHTLY
Status: DISCONTINUED | OUTPATIENT
Start: 2023-11-14 | End: 2023-11-16 | Stop reason: HOSPADM

## 2023-11-13 RX ORDER — FUROSEMIDE 10 MG/ML
80 INJECTION INTRAMUSCULAR; INTRAVENOUS ONCE
Status: COMPLETED | OUTPATIENT
Start: 2023-11-13 | End: 2023-11-14

## 2023-11-13 RX ORDER — AMIODARONE HYDROCHLORIDE 200 MG/1
200 TABLET ORAL EVERY 12 HOURS SCHEDULED
Status: DISCONTINUED | OUTPATIENT
Start: 2023-11-14 | End: 2023-11-16 | Stop reason: HOSPADM

## 2023-11-13 RX ORDER — BUMETANIDE 1 MG/1
1 TABLET ORAL DAILY
Status: DISCONTINUED | OUTPATIENT
Start: 2023-11-15 | End: 2023-11-14

## 2023-11-13 RX ORDER — AMOXICILLIN 250 MG
2 CAPSULE ORAL 2 TIMES DAILY
Status: DISCONTINUED | OUTPATIENT
Start: 2023-11-14 | End: 2023-11-16 | Stop reason: HOSPADM

## 2023-11-13 RX ORDER — DOCUSATE SODIUM 100 MG/1
100 CAPSULE, LIQUID FILLED ORAL 2 TIMES DAILY
Status: DISCONTINUED | OUTPATIENT
Start: 2023-11-14 | End: 2023-11-16 | Stop reason: HOSPADM

## 2023-11-13 RX ORDER — BISACODYL 10 MG
10 SUPPOSITORY, RECTAL RECTAL DAILY PRN
Status: DISCONTINUED | OUTPATIENT
Start: 2023-11-13 | End: 2023-11-16 | Stop reason: HOSPADM

## 2023-11-13 RX ORDER — POLYETHYLENE GLYCOL 3350 17 G/17G
17 POWDER, FOR SOLUTION ORAL DAILY PRN
Status: DISCONTINUED | OUTPATIENT
Start: 2023-11-13 | End: 2023-11-13 | Stop reason: SDUPTHER

## 2023-11-13 RX ORDER — ROSUVASTATIN CALCIUM 10 MG/1
5 TABLET, COATED ORAL NIGHTLY
Status: DISCONTINUED | OUTPATIENT
Start: 2023-11-14 | End: 2023-11-16 | Stop reason: HOSPADM

## 2023-11-13 RX ORDER — NITROGLYCERIN 0.4 MG/1
0.4 TABLET SUBLINGUAL
Status: DISCONTINUED | OUTPATIENT
Start: 2023-11-13 | End: 2023-11-16 | Stop reason: HOSPADM

## 2023-11-13 RX ORDER — BUMETANIDE 0.25 MG/ML
1 INJECTION INTRAMUSCULAR; INTRAVENOUS ONCE
Status: DISCONTINUED | OUTPATIENT
Start: 2023-11-14 | End: 2023-11-14

## 2023-11-13 RX ADMIN — VANCOMYCIN HYDROCHLORIDE 1500 MG: 10 INJECTION, POWDER, LYOPHILIZED, FOR SOLUTION INTRAVENOUS at 22:04

## 2023-11-13 RX ADMIN — PIPERACILLIN SODIUM AND TAZOBACTAM SODIUM 3.38 G: 3; .375 INJECTION, SOLUTION INTRAVENOUS at 21:15

## 2023-11-13 NOTE — TELEPHONE ENCOUNTER
Caller: NAIN HERNÁNDEZ    Relationship: Emergency Contact    Best call back number: [phone]     What is the best time to reach you: ANYTIME    Who are you requesting to speak with (clinical staff, provider,  specific staff member): DR KIRK'S NURSE    What was the call regarding: PATIENT'S DAUGHTER WOULD LIKE TO DISCUSS THE NEW MEDICATIONS :POTASSIUM CHLORIDE MEQ TABLETS, MAGNESIUM OXIDE TABLETS, AND ALSO CHANGES TO BUMEX DOSAGE.

## 2023-11-13 NOTE — TELEPHONE ENCOUNTER
Received call from home health nurse stating that patient has had significant weight gain and is swollen all over including her face.     Patient's blood pressure around 140's/60's and heart rate running from 60's-90's.    Currently taking Bumex 1.5 mg daily.     Home health nurse was wanting to prevent another hospitalization since she was recently in there on 10/17/2023.    Please advice. Thank yoU!

## 2023-11-13 NOTE — ED PROVIDER NOTES
Subjective   History of Present Illness  89-year-old female who presents for evaluation of shortness of breath and increasing lower extremity edema.  The patient was just admitted to our hospital in October due to CHF exacerbation secondary to underlying severe aortic stenosis.  She was diuresed and ultimately improved at that given time.  She was sent to the [facility] and per the family reports she was taking 1 mg of Bumex twice daily while at the [facility] and the lower extremity edema actually improved.  She has been out of the [facility] now for roughly 1 week and has been only taking 1 mg of Bumex daily rather than 2 mg of Bumex in a given day.  She reports that the edema has increased and the shortness of breath has also increased.  She was evaluated by her primary care physician, Dr. Garcia, 3 days ago.  Chest x-ray was unchanged relative to a chest x-ray that was performed on 10/19/2023.  It did report bilateral pneumonia but the x-ray from 10/19/2023 likewise reported bilateral pneumonia but clinically while she was in the hospital on 10/19/2023 she was not felt to have pneumonia rather CHF exacerbation and fluid overload.  She ultimately did improve in the hospital with diuresis rather than treatment for pneumonia.  After this recent outpatient visit by Dr. Atkinson the patient was given azithromycin which she is taking was also, per record review, was advised to take 1 mg of Bumex in the morning and 1/2 mg of Bumex in the evening.  However the family reports that they were not told to take an additional 1/2 mg of Bumex in the evening therefore they have not been doing this.  She has now presented for increase shortness of breath.  No reported fever.  The patient's mentation is consistent with baseline.  No definitive sick contacts.  No complaint of chest pain or abdominal pain.  No reported change in bowel or urinary function.  No other acute complaints.      Review of Systems   Constitutional:  Positive for  fatigue. Negative for chills and fever.   HENT:  Negative for congestion, ear pain, postnasal drip, sinus pressure and sore throat.    Eyes:  Negative for pain, redness and visual disturbance.   Respiratory:  Positive for shortness of breath. Negative for cough and chest tightness.    Cardiovascular:  Positive for leg swelling. Negative for chest pain and palpitations.   Gastrointestinal:  Negative for abdominal pain, anal bleeding, blood in stool, diarrhea, nausea and vomiting.   Endocrine: Negative for polydipsia and polyuria.   Genitourinary:  Negative for difficulty urinating, dysuria, frequency and urgency.   Musculoskeletal:  Negative for arthralgias, back pain and neck pain.   Skin:  Negative for pallor and rash.   Allergic/Immunologic: Negative for environmental allergies and immunocompromised state.   Neurological:  Negative for dizziness, weakness and headaches.   Hematological:  Negative for adenopathy.   Psychiatric/Behavioral:  Negative for confusion, self-injury and suicidal ideas. The patient is not nervous/anxious.    All other systems reviewed and are negative.      Past Medical History:   Diagnosis Date    Ankle instability     Chronic left shoulder pain 1/26/2018    Disorder of tendon of shoulder region, left     Dysphagia     Esophagitis     Femoral neck fracture 7/15/2021    Finger fracture, right 2009    Dr Chandler casting and physical therapy    Hemorrhoids     Hypertension     Localized, primary osteoarthritis of left shoulder region     Osteoarthritis of right hip 3/20/2023    Paroxysmal atrial fibrillation 9/30/2021    Right shoulder pain     Shoulder joint replacement status     Right    Stroke     CVA       No Known Allergies    Past Surgical History:   Procedure Laterality Date    APPENDECTOMY      Daughter denies    CATARACT EXTRACTION, BILATERAL      2020    COLONOSCOPY N/A 8/23/2021    Procedure: COLONOSCOPY;  Surgeon: Aries Varela MD;  Location: [facility] ENDOSCOPY;  Service:  Gastroenterology;  Laterality: N/A;    ENDOSCOPY  07/2009    with biopsy/esophageal ring dilation    ENDOSCOPY N/A 8/23/2021    Procedure: ESOPHAGOGASTRODUODENOSCOPY;  Surgeon: Aries Varela MD;  Location: [facility] ENDOSCOPY;  Service: Gastroenterology;  Laterality: N/A;    HEMORRHOIDECTOMY      lanced     HIP HEMIARTHROPLASTY Right 7/15/2021    Procedure: HIP HEMIARTHROPLASTY;  Surgeon: Adam Olsen MD;  Location: [facility] OR;  Service: Orthopedics;  Laterality: Right;    HYSTERECTOMY      partial     OOPHORECTOMY      SHOULDER SURGERY Right     replacement    SHOULDER SURGERY      Arthroscopy of shoulder:Right    TONSILLECTOMY         Family History   Problem Relation Age of Onset    Cancer Mother     Stroke Mother     Bleeding Disorder Mother     Osteoarthritis Mother     Rheum arthritis Mother     Breast cancer Mother 60    Heart attack Father     Coronary artery disease Father     Cancer Sister         Bladder     Bleeding Disorder Brother         2    Colon cancer Brother         3    Ovarian cancer Neg Hx        Social History     Socioeconomic History    Marital status:    Tobacco Use    Smoking status: Never    Smokeless tobacco: Never    Tobacco comments:      was smoker   Vaping Use    Vaping Use: Never used   Substance and Sexual Activity    Alcohol use: No    Drug use: No    Sexual activity: Not Currently     Partners: Male           Objective   Physical Exam  Vitals and nursing note reviewed.   Constitutional:       General: She is not in acute distress.     Appearance: Normal appearance. She is well-developed. She is not toxic-appearing or diaphoretic.   HENT:      Head: Normocephalic and atraumatic.      Right Ear: External ear normal.      Left Ear: External ear normal.      Nose: Nose normal.   Eyes:      General: Lids are normal.      Pupils: Pupils are equal, round, and reactive to light.   Neck:      Trachea: No tracheal deviation.   Cardiovascular:      Rate and Rhythm:  Normal rate and regular rhythm.      Pulses: No decreased pulses.      Heart sounds: Normal heart sounds. No murmur heard.     No friction rub. No gallop.   Pulmonary:      Effort: Pulmonary effort is normal. Tachypnea present. No respiratory distress.      Breath sounds: Normal breath sounds. Examination of the right-lower field reveals rales. Examination of the left-lower field reveals rales. No decreased breath sounds, wheezing, rhonchi or rales.   Abdominal:      General: Bowel sounds are normal.      Palpations: Abdomen is soft.      Tenderness: There is no abdominal tenderness. There is no guarding or rebound.   Musculoskeletal:         General: No deformity. Normal range of motion.      Cervical back: Normal range of motion and neck supple.      Right lower leg: 3+ Pitting Edema present.      Left lower leg: 3+ Pitting Edema present.   Lymphadenopathy:      Cervical: No cervical adenopathy.   Skin:     General: Skin is warm and dry.      Findings: No rash.   Neurological:      Mental Status: She is alert and oriented to person, place, and time.      Cranial Nerves: No cranial nerve deficit.      Sensory: No sensory deficit.   Psychiatric:         Speech: Speech normal.         Behavior: Behavior normal.         Thought Content: Thought content normal.         Judgment: Judgment normal.         Procedures           ED Course                                           Medical Decision Making  Differential diagnosis includes pneumonia, CHF exacerbation, viral illness, renal sufficiency, electrolyte abnormality.    Pressure elevated BNP consistent with CHF exacerbation.  Labs also show hyponatremia.  Normal white count, stable H&H, no other significant electrolyte abnormalities or kidney dysfunction.    Chest x-ray shows worsening multifocal airspace disease and effusions concerning for combination of pneumonia/aspiration and bibasilar atelectasis.    I have ordered blood cultures and initial broad-spectrum  antibiotics.    I discussed the patient with the hospitalist, who will consult on the patient to determine status of admission.    Problems Addressed:  Acute hyponatremia: complicated acute illness or injury with systemic symptoms  Acute on chronic congestive heart failure, unspecified heart failure type: complicated acute illness or injury with systemic symptoms    Amount and/or Complexity of Data Reviewed  External Data Reviewed: labs, radiology, ECG and notes.  Labs: ordered. Decision-making details documented in ED Course.  Radiology: ordered and independent interpretation performed. Decision-making details documented in ED Course.  ECG/medicine tests: ordered and independent interpretation performed. Decision-making details documented in ED Course.    Risk  Prescription drug management.  Decision regarding hospitalization.        Final diagnoses:   Acute on chronic congestive heart failure, unspecified heart failure type   Acute hyponatremia       ED Disposition  ED Disposition       ED Disposition   Decision to Admit    Condition   --    Comment   Level of Care: Telemetry [5]   Diagnosis: CHF (congestive heart failure) [327212]   Admitting Physician: LISA LA [[id number]]   Attending Physician: LISA LA [[id number]]   Certification: I Certify That Inpatient Hospital Services Are Medically Necessary For Greater Than 2 Midnights                 Schuyler Garcia MD  [address] [address]  [phone]      Follow up with PCP as needed    [facility]  [address] [address]  [phone]             Medication List        New Prescriptions      cefuroxime 500 MG tablet  Commonly known as: CEFTIN  Take 1 tablet by mouth 2 (Two) Times a Day.     ipratropium-albuterol 0.5-2.5 mg/3 ml nebulizer  Commonly known as: DUO-NEB  Take 3 mL by nebulization Every 4 (Four) Hours As Needed for Wheezing.            Changed      amiodarone 200 MG  tablet  Commonly known as: PACERONE  Take 1 tablet by mouth Daily for 3 days, THEN 1 tablet Every 12 (Twelve) Hours for 7 days, THEN 1 tablet Daily for 30 days.  Start taking on: November 16, 2023  What changed:   See the new instructions.  Another medication with the same name was removed. Continue taking this medication, and follow the directions you see here.            Stop      acetaminophen 325 MG tablet  Commonly known as: TYLENOL     bisacodyl 10 MG suppository  Commonly known as: DULCOLAX     cefdinir 300 MG capsule  Commonly known as: OMNICEF     Cranberry 450 MG tablet     magnesium oxide 400 MG tablet  Commonly known as: MAG-OX     ondansetron ODT 8 MG disintegrating tablet  Commonly known as: ZOFRAN-ODT               Where to Get Your Medications        These medications were sent to [facility] [place] [address]      Hours: Monday to Friday 7 AM to 5:30 PM, Saturday & Sunday 8 AM to 4:30 PM Phone: [phone]   cefuroxime 500 MG tablet  ipratropium-albuterol 0.5-2.5 mg/3 ml nebulizer       Information about where to get these medications is not yet available    Ask your nurse or doctor about these medications  amiodarone 200 MG tablet            Nirmal Hebert MD  11/16/23 1198

## 2023-11-13 NOTE — Clinical Note
Level of Care: Telemetry [5]   Diagnosis: CHF (congestive heart failure) [197293]   Admitting Physician: LISA LA [[id number]]   Attending Physician: LISA LA [[id number]]

## 2023-11-14 ENCOUNTER — DOCUMENTATION (OUTPATIENT)
Dept: HOME HEALTH SERVICES | Facility: HOME HEALTHCARE | Age: 88
End: 2023-11-14
Payer: MEDICARE

## 2023-11-14 ENCOUNTER — HOME CARE VISIT (OUTPATIENT)
Dept: HOME HEALTH SERVICES | Facility: HOME HEALTHCARE | Age: 88
End: 2023-11-14
Payer: MEDICARE

## 2023-11-14 VITALS
RESPIRATION RATE: 22 BRPM | HEART RATE: 76 BPM | SYSTOLIC BLOOD PRESSURE: 145 MMHG | DIASTOLIC BLOOD PRESSURE: 60 MMHG | TEMPERATURE: 97.2 F | OXYGEN SATURATION: 87 %

## 2023-11-14 LAB
ANION GAP SERPL CALCULATED.3IONS-SCNC: 11 MMOL/L (ref 5–15)
BASOPHILS # BLD AUTO: 0.02 10*3/MM3 (ref 0–0.2)
BASOPHILS NFR BLD AUTO: 0.2 % (ref 0–1.5)
BUN SERPL-MCNC: 8 MG/DL (ref 8–23)
BUN/CREAT SERPL: 10.5 (ref 7–25)
CALCIUM SPEC-SCNC: 8.9 MG/DL (ref 8.2–9.6)
CHLORIDE SERPL-SCNC: 85 MMOL/L (ref 98–107)
CO2 SERPL-SCNC: 33 MMOL/L (ref 22–29)
CREAT SERPL-MCNC: 0.76 MG/DL (ref 0.57–1)
DEPRECATED RDW RBC AUTO: 39.7 FL (ref 37–54)
EGFRCR SERPLBLD CKD-EPI 2021: 73.2 ML/MIN/1.73
EOSINOPHIL # BLD AUTO: 0.04 10*3/MM3 (ref 0–0.4)
EOSINOPHIL NFR BLD AUTO: 0.4 % (ref 0.3–6.2)
ERYTHROCYTE [DISTWIDTH] IN BLOOD BY AUTOMATED COUNT: 12.2 % (ref 12.3–15.4)
GLUCOSE SERPL-MCNC: 113 MG/DL (ref 65–99)
HCT VFR BLD AUTO: 35.1 % (ref 34–46.6)
HGB BLD-MCNC: 12 G/DL (ref 12–15.9)
IMM GRANULOCYTES # BLD AUTO: 0.04 10*3/MM3 (ref 0–0.05)
IMM GRANULOCYTES NFR BLD AUTO: 0.4 % (ref 0–0.5)
LYMPHOCYTES # BLD AUTO: 0.77 10*3/MM3 (ref 0.7–3.1)
LYMPHOCYTES NFR BLD AUTO: 7.6 % (ref 19.6–45.3)
MAGNESIUM SERPL-MCNC: 2.1 MG/DL (ref 1.7–2.3)
MCH RBC QN AUTO: 30.2 PG (ref 26.6–33)
MCHC RBC AUTO-ENTMCNC: 34.2 G/DL (ref 31.5–35.7)
MCV RBC AUTO: 88.2 FL (ref 79–97)
MONOCYTES # BLD AUTO: 1.09 10*3/MM3 (ref 0.1–0.9)
MONOCYTES NFR BLD AUTO: 10.8 % (ref 5–12)
NEUTROPHILS NFR BLD AUTO: 8.13 10*3/MM3 (ref 1.7–7)
NEUTROPHILS NFR BLD AUTO: 80.6 % (ref 42.7–76)
NRBC BLD AUTO-RTO: 0 /100 WBC (ref 0–0.2)
OSMOLALITY SERPL: 261 MOSM/KG (ref 275–295)
OSMOLALITY UR: 198 MOSM/KG (ref 300–1100)
PLATELET # BLD AUTO: 325 10*3/MM3 (ref 140–450)
PMV BLD AUTO: 10.2 FL (ref 6–12)
POTASSIUM SERPL-SCNC: 3 MMOL/L (ref 3.5–5.2)
POTASSIUM SERPL-SCNC: 4.1 MMOL/L (ref 3.5–5.2)
QT INTERVAL: 458 MS
QTC INTERVAL: 501 MS
RBC # BLD AUTO: 3.98 10*6/MM3 (ref 3.77–5.28)
SODIUM SERPL-SCNC: 125 MMOL/L (ref 136–145)
SODIUM SERPL-SCNC: 127 MMOL/L (ref 136–145)
SODIUM SERPL-SCNC: 128 MMOL/L (ref 136–145)
SODIUM SERPL-SCNC: 129 MMOL/L (ref 136–145)
SODIUM SERPL-SCNC: 129 MMOL/L (ref 136–145)
TSH SERPL DL<=0.05 MIU/L-ACNC: 1.03 UIU/ML (ref 0.27–4.2)
WBC NRBC COR # BLD: 10.09 10*3/MM3 (ref 3.4–10.8)

## 2023-11-14 PROCEDURE — 83935 ASSAY OF URINE OSMOLALITY: CPT | Performed by: NURSE PRACTITIONER

## 2023-11-14 PROCEDURE — 25010000002 FUROSEMIDE PER 20 MG: Performed by: EMERGENCY MEDICINE

## 2023-11-14 PROCEDURE — 85025 COMPLETE CBC W/AUTO DIFF WBC: CPT | Performed by: NURSE PRACTITIONER

## 2023-11-14 PROCEDURE — 84443 ASSAY THYROID STIM HORMONE: CPT | Performed by: NURSE PRACTITIONER

## 2023-11-14 PROCEDURE — 84295 ASSAY OF SERUM SODIUM: CPT | Performed by: NURSE PRACTITIONER

## 2023-11-14 PROCEDURE — 83735 ASSAY OF MAGNESIUM: CPT

## 2023-11-14 PROCEDURE — 99232 SBSQ HOSP IP/OBS MODERATE 35: CPT | Performed by: INTERNAL MEDICINE

## 2023-11-14 PROCEDURE — 84132 ASSAY OF SERUM POTASSIUM: CPT | Performed by: INTERNAL MEDICINE

## 2023-11-14 PROCEDURE — 99233 SBSQ HOSP IP/OBS HIGH 50: CPT | Performed by: INTERNAL MEDICINE

## 2023-11-14 PROCEDURE — 92610 EVALUATE SWALLOWING FUNCTION: CPT

## 2023-11-14 PROCEDURE — 25010000002 CEFTRIAXONE PER 250 MG: Performed by: INTERNAL MEDICINE

## 2023-11-14 PROCEDURE — 80048 BASIC METABOLIC PNL TOTAL CA: CPT | Performed by: NURSE PRACTITIONER

## 2023-11-14 PROCEDURE — 83930 ASSAY OF BLOOD OSMOLALITY: CPT | Performed by: NURSE PRACTITIONER

## 2023-11-14 RX ORDER — MIDODRINE HYDROCHLORIDE 10 MG/1
10 TABLET ORAL
Status: DISCONTINUED | OUTPATIENT
Start: 2023-11-14 | End: 2023-11-16 | Stop reason: HOSPADM

## 2023-11-14 RX ORDER — POTASSIUM CHLORIDE 750 MG/1
40 CAPSULE, EXTENDED RELEASE ORAL ONCE
Status: COMPLETED | OUTPATIENT
Start: 2023-11-14 | End: 2023-11-14

## 2023-11-14 RX ORDER — BUMETANIDE 0.25 MG/ML
2 INJECTION INTRAMUSCULAR; INTRAVENOUS EVERY 12 HOURS
Status: DISCONTINUED | OUTPATIENT
Start: 2023-11-14 | End: 2023-11-15

## 2023-11-14 RX ORDER — MIDODRINE HYDROCHLORIDE 10 MG/1
10 TABLET ORAL
Status: DISCONTINUED | OUTPATIENT
Start: 2023-11-14 | End: 2023-11-14

## 2023-11-14 RX ORDER — POTASSIUM CHLORIDE 20 MEQ/1
40 TABLET, EXTENDED RELEASE ORAL EVERY 4 HOURS
Status: DISPENSED | OUTPATIENT
Start: 2023-11-14 | End: 2023-11-14

## 2023-11-14 RX ADMIN — APIXABAN 2.5 MG: 2.5 TABLET, FILM COATED ORAL at 20:43

## 2023-11-14 RX ADMIN — AMIODARONE HYDROCHLORIDE 200 MG: 200 TABLET ORAL at 09:02

## 2023-11-14 RX ADMIN — CEFTRIAXONE SODIUM 2000 MG: 2 INJECTION, POWDER, FOR SOLUTION INTRAMUSCULAR; INTRAVENOUS at 13:19

## 2023-11-14 RX ADMIN — POTASSIUM CHLORIDE 40 MEQ: 1500 TABLET, EXTENDED RELEASE ORAL at 13:20

## 2023-11-14 RX ADMIN — SENNOSIDES AND DOCUSATE SODIUM 2 TABLET: 8.6; 5 TABLET ORAL at 00:34

## 2023-11-14 RX ADMIN — AMIODARONE HYDROCHLORIDE 200 MG: 200 TABLET ORAL at 20:43

## 2023-11-14 RX ADMIN — SENNOSIDES AND DOCUSATE SODIUM 2 TABLET: 8.6; 5 TABLET ORAL at 20:44

## 2023-11-14 RX ADMIN — ROSUVASTATIN 5 MG: 10 TABLET, FILM COATED ORAL at 21:52

## 2023-11-14 RX ADMIN — Medication 2.5 MG: at 21:52

## 2023-11-14 RX ADMIN — Medication 2.5 MG: at 00:34

## 2023-11-14 RX ADMIN — DOCUSATE SODIUM 100 MG: 100 CAPSULE, LIQUID FILLED ORAL at 00:35

## 2023-11-14 RX ADMIN — FUROSEMIDE 80 MG: 10 INJECTION, SOLUTION INTRAMUSCULAR; INTRAVENOUS at 00:35

## 2023-11-14 RX ADMIN — BUMETANIDE 2 MG: 0.25 INJECTION, SOLUTION INTRAMUSCULAR; INTRAVENOUS at 18:09

## 2023-11-14 RX ADMIN — OXYBUTYNIN CHLORIDE 10 MG: 10 TABLET, EXTENDED RELEASE ORAL at 09:02

## 2023-11-14 RX ADMIN — PANTOPRAZOLE SODIUM 40 MG: 40 TABLET, DELAYED RELEASE ORAL at 04:43

## 2023-11-14 RX ADMIN — Medication 10 ML: at 22:16

## 2023-11-14 RX ADMIN — APIXABAN 2.5 MG: 2.5 TABLET, FILM COATED ORAL at 09:02

## 2023-11-14 RX ADMIN — Medication 10 ML: at 09:04

## 2023-11-14 RX ADMIN — APIXABAN 2.5 MG: 2.5 TABLET, FILM COATED ORAL at 00:34

## 2023-11-14 RX ADMIN — POTASSIUM CHLORIDE 40 MEQ: 1500 TABLET, EXTENDED RELEASE ORAL at 09:02

## 2023-11-14 RX ADMIN — MIDODRINE HYDROCHLORIDE 10 MG: 10 TABLET ORAL at 11:11

## 2023-11-14 RX ADMIN — DOCUSATE SODIUM 100 MG: 100 CAPSULE, LIQUID FILLED ORAL at 09:02

## 2023-11-14 RX ADMIN — AMIODARONE HYDROCHLORIDE 200 MG: 200 TABLET ORAL at 00:34

## 2023-11-14 RX ADMIN — MIDODRINE HYDROCHLORIDE 10 MG: 10 TABLET ORAL at 18:07

## 2023-11-14 RX ADMIN — Medication 10 ML: at 00:35

## 2023-11-14 RX ADMIN — POTASSIUM CHLORIDE 40 MEQ: 750 CAPSULE, EXTENDED RELEASE ORAL at 18:07

## 2023-11-14 RX ADMIN — BUMETANIDE 2 MG: 0.25 INJECTION, SOLUTION INTRAMUSCULAR; INTRAVENOUS at 20:43

## 2023-11-14 RX ADMIN — ROSUVASTATIN 5 MG: 10 TABLET, FILM COATED ORAL at 00:34

## 2023-11-14 NOTE — PLAN OF CARE
Goal Outcome Evaluation:   SLP evaluation completed. Will continue to address dysphagia. Please see note for further details and recommendations.                    Anticipated Discharge Disposition (SLP): skilled nursing facility

## 2023-11-14 NOTE — PROGRESS NOTES
"    [facility] Medicine Services  PROGRESS NOTE    Patient Name: Luh Mckeon  : 1930  MRN: [id number]    Date of Admission: 2023  Primary Care Physician: Schuyler Garcia MD    Subjective   Subjective     CC:  SOA    HPI:  States that feels \"fine.\" Daughter states that breathing is better than was yesterday.        Objective   Objective     Vital Signs:   Temp:  [97.1 °F (36.2 °C)-97.8 °F (36.6 °C)] 97.1 °F (36.2 °C)  Heart Rate:  [] 69  Resp:  [16-22] 16  BP: ()/(57-96) 95/57  Flow (L/min):  [2] 2     Physical Exam:  Constitutional: No acute distress, awake, alert  HENT: NCAT, mucous membranes moist  Respiratory: decreased BS in the bases, mild accessory muscle use  Cardiovascular: RRR, no murmurs, rubs, or gallops  Gastrointestinal: Positive bowel sounds, soft, nontender, nondistended  Musculoskeletal: + edema  Psychiatric: Appropriate affect, cooperative  Neurologic: Oriented x 3, strength symmetric in all extremities, Cranial Nerves grossly intact to confrontation, speech clear  Skin: No rashes      Results Reviewed:  LAB RESULTS:      Lab 23  0503 23  1734   WBC 10.09 9.86   HEMOGLOBIN 12.0 11.9*   HEMATOCRIT 35.1 35.8   PLATELETS 325 339   NEUTROS ABS 8.13* 7.49*   IMMATURE GRANS (ABS) 0.04 0.04   LYMPHS ABS 0.77 0.94   MONOS ABS 1.09* 1.27*   EOS ABS 0.04 0.08   MCV 88.2 89.9   PROCALCITONIN  --  0.04   LACTATE  --  1.3         Lab 23  1138 23  0503 23  0026 23  1734 11/10/23  1107   SODIUM 128* 129*  129* 125* 122* 130*   POTASSIUM  --  3.0*  --  3.8 3.6   CHLORIDE  --  85*  --  83* 87*   CO2  --  33.0*  --  31.0* 30.7*   ANION GAP  --  11.0  --  8.0 12.3   BUN  --  8  --  9 9   CREATININE  --  0.76  --  0.69 0.76   EGFR  --  73.2  --  81.5 73.6   GLUCOSE  --  113*  --  117* 84   CALCIUM  --  8.9  --  8.9 9.1   MAGNESIUM  --  2.1  --   --  2.3   TSH  --  1.030  --   --   --          Lab 23  1739 " 11/10/23  1107   TOTAL PROTEIN 6.8 7.2   ALBUMIN 3.8 4.1   GLOBULIN 3.0 3.1   ALT (SGPT) 20 16   AST (SGOT) 23 18   BILIRUBIN 0.5 0.5   ALK PHOS 95 84         Lab 11/13/23  1734 11/10/23  1107   PROBNP 8,053.0* 5,545.0*   HSTROP T 43*  --                  Brief Urine Lab Results  (Last result in the past 365 days)        Color   Clarity   Blood   Leuk Est   Nitrite   Protein   CREAT   Urine HCG        09/28/23 1040             64.2                 Microbiology Results Abnormal       None            XR Chest 1 View    Result Date: 11/13/2023  XR CHEST 1 VW Date of Exam: 11/13/2023 6:28 PM EST Indication: SOA triage protocol Comparison: Chest radiograph 11/10/2023. Findings: Cardiomediastinal silhouette is enlarged similar to previous exam. Patchy multifocal airspace opacities have increased including in the bilateral lower lobes and right greater than left upper lobes. There are increasing small bilateral pleural effusions.  Probable underlying component of atelectasis in the lung bases. No significant worsening edema. No visualized pneumothorax. Aortic atherosclerotic disease noted. Partially imaged right shoulder prosthesis. Osseous structures otherwise grossly unremarkable.     Impression: Impression: Worsening multifocal airspace disease and effusions, concerning for combination of pneumonia/aspiration and bibasilar atelectasis. Electronically Signed: Jeromy Maynard MD  11/13/2023 7:42 PM EST  Workstation ID: [id number]     Results for orders placed during the hospital encounter of 10/17/23    Adult Transthoracic Echo Complete w/ Color, Spectral and Contrast if necessary per protocol    Interpretation Summary    Left ventricular ejection fraction appears to be greater than 70%.    Left ventricular diastolic function was indeterminate.    Left atrial volume is mildly increased.    Severe aortic valve stenosis is present.    Aortic valve maximum pressure gradient is 79 mmHg. Aortic valve mean pressure gradient is 65  mmHg.    Estimated right ventricular systolic pressure from tricuspid regurgitation is normal (<35 mmHg). Calculated right ventricular systolic pressure from tricuspid regurgitation is 32 mmHg.    Incidental irregular heart rate noted with MV inflow suggestive of atrial fibrillation      Current medications:  Scheduled Meds:amiodarone, 200 mg, Oral, Q12H   Followed by  [START ON 11/20/2023] amiodarone, 200 mg, Oral, Q24H  apixaban, 2.5 mg, Oral, Q12H  bumetanide, 2 mg, Intravenous, Q12H  cholecalciferol, 400 Units, Oral, Daily  docusate sodium, 100 mg, Oral, BID  melatonin, 2.5 mg, Oral, Nightly  midodrine, 10 mg, Oral, BID AC  oxybutynin XL, 10 mg, Oral, Daily  pantoprazole, 40 mg, Oral, Q AM  pharmacy consult - MTM, , Does not apply, Daily  potassium chloride ER, 40 mEq, Oral, Q4H  rosuvastatin, 5 mg, Oral, Nightly  senna-docusate sodium, 2 tablet, Oral, BID  sodium chloride, 10 mL, Intravenous, Q12H      Continuous Infusions:   PRN Meds:.  senna-docusate sodium **AND** polyethylene glycol **AND** bisacodyl **AND** bisacodyl    Calcium Replacement - Follow Nurse / BPA Driven Protocol    Magnesium Standard Dose Replacement - Follow Nurse / BPA Driven Protocol    nitroglycerin    Phosphorus Replacement - Follow Nurse / BPA Driven Protocol    Potassium Replacement - Follow Nurse / BPA Driven Protocol    sodium chloride    [COMPLETED] Insert Peripheral IV **AND** sodium chloride    sodium chloride    sodium chloride    Assessment & Plan   Assessment & Plan     Active Hospital Problems    Diagnosis  POA    Severe aortic stenosis [I35.0]  Unknown    Hyponatremia [E87.1]  Yes    Acute on chronic heart failure with preserved ejection fraction (HFpEF) [I50.33]  Yes    Dementia without behavioral disturbance [F03.90]  Yes    Paroxysmal atrial fibrillation [I48.0]  Yes    Benign essential hypertension [I10]  Yes      Resolved Hospital Problems   No resolved problems to display.        Brief Hospital Course to date:  Luh  Juan Mckeon is a 89 y.o. female  worsening shortness of air and bilateral lower extremity edema over the past 3-4 days.      1) Acute on chronic HFpEF       Severe aortic valve stenosis   -last echo noted above   -proBNP 8053.0  -CXR mentioned above   -hold on abx for now  -s/p lasix 80 mg in the ED   -bumex 2 mg this AM  -cards/Dr. Miles following, recs continue IV bumex  -strict I &O   -daily weight      2) Hyponatremia  -improved  -TSH wnl     3) ? Aspiration pneumonia  -start on rocephin  -continue mechanical soft, thickened liquid diet  -consult SLP  -procal 0.04, lactic acid 1.3, WBC 9.86  -blood cultures pending   -received vancomycin and zosyn in the ED     4) Paroxysmal atrial fibrillation   -on amiodarone and eliquis   -currently NSR      5) GERD   -PPI      6) Hyperlipidemia  -continue statin      7) recent hypotension   -BP low this AM after home midodrine held overnight, restart     8) Dementia   -no active meds    D/w daughter at bedside on 11/14/2023    Expected Discharge Location and Transportation:   Expected Discharge   Expected Discharge Date: 11/17/2023; Expected Discharge Time:      DVT prophylaxis:  Medical DVT prophylaxis orders are present.     AM-PAC 6 Clicks Score (PT): 19 (11/13/23 3502)    CODE STATUS:   Code Status and Medical Interventions:   Ordered at: 11/13/23 7975     Medical Intervention Limits:    NO intubation (DNI)     Level Of Support Discussed With:    Patient     Code Status (Patient has no pulse and is not breathing):    No CPR (Do Not Attempt to Resuscitate)     Medical Interventions (Patient has pulse or is breathing):    Limited Support       Aashish Raymond MD  11/14/23

## 2023-11-14 NOTE — CASE MANAGEMENT/SOCIAL WORK
Discharge Planning Assessment  [facility]     Patient Name: Luh Mckeon  MRN: [id number]  Today's Date: 11/14/2023    Admit Date: 11/13/2023    Plan: ONGOING   Discharge Needs Assessment    No documentation.                  Discharge Plan       Row Name 11/14/23 1529       Plan    Plan ONGOING    Patient/Family in Agreement with Plan yes    Plan Comments Met with pt, family, and caregiver at bedside.  Pt resides alone in [facility].  She has private caregivers 12h/d x 7d/w.  She is mobile with a RW or rollator.  Also has a WC and shower chair.  She is current with [facility] (SN/PT).  Confirmed she has Medicare and [employer] insurance with Rx coverage.  Palliative Care consulted today.  Of note, pt was hospitalized at [facility] from 10/17-10/23/23 and subsequently DCd to [facility] for short-term rehab before returning home on 11/8/23 with  services and readmitted to [facility] on 11/13/23.  CM will cont to follow hospital course and will assist with any DC needs as indicated.    Final Discharge Disposition Code 30 - still a patient                  Continued Care and Services - Admitted Since 11/13/2023    Coordination has not been started for this encounter.       Selected Continued Care - Prior Encounters Includes continued care and service providers with selected services from prior encounters from 8/15/2023 to 11/14/2023      Discharged on 10/23/2023 Admission date: 10/17/2023 - Discharge disposition: Skilled Nursing Facility (DC - External)      Destination       Service Provider Selected Services Address Phone Fax Patient Preferred    [facility] [address] [place] [address] [phone] --                          Expected Discharge Date and Time       Expected Discharge Date Expected Discharge Time    Nov 17, 2023            Demographic Summary    No documentation.                  Functional Status    No documentation.                   Psychosocial    No documentation.                  Abuse/Neglect    No documentation.                  Legal    No documentation.                  Substance Abuse    No documentation.                  Patient Forms    No documentation.                     Yina Berry RN

## 2023-11-14 NOTE — PLAN OF CARE
Goal Outcome Evaluation:                 Admitted overnight. Aox4. Forgetful @ times. Intermittently in Afib over night. 2L NC. Bumex x1 given. Voiding without difficulty. Pt had multiple loose BMs overnight. X1 to bedside commode. No complaints of pain. Cardiology consult.

## 2023-11-14 NOTE — THERAPY EVALUATION
Acute Care - Speech Language Pathology   Swallow Initial Evaluation [facility]  Clinical Swallow Evaluation       Patient Name: Luh Mckeon  : 1930  MRN: [id number]  Today's Date: 2023               Admit Date: 2023    Visit Dx:     ICD-10-CM ICD-9-CM   1. Acute on chronic congestive heart failure, unspecified heart failure type  I50.9 428.0   2. Acute hyponatremia  E87.1 276.1   3. Dysphagia, unspecified type  R13.10 787.20     Patient Active Problem List   Diagnosis    Generalized anxiety disorder    Unsteady gait    Risk for falls    Hyperlipidemia LDL goal <100    Benign essential hypertension    Memory loss    Urinary incontinence in female    Candida onychomycosis    AR (allergic rhinitis)    Overweight (BMI 25.0-29.9)    Chest pain    Cramp in limb    Disorder of mitral and aortic valves    Disorder of skeletal muscle    Diverticular disease of colon    Dysphagia    External hemorrhoids         Hand joint pain    Insomnia    Knee pain    Menopausal and postmenopausal disorder    Osteoporosis    Shoulder pain    Vitamin B deficiency    Medicare annual wellness visit, subsequent    Dizziness    Urgency of urination    Fatigue    Atrial fibrillation with rapid ventricular response    Hematochezia    Paroxysmal atrial fibrillation    Dementia without behavioral disturbance    Fall at home    Hip pain, acute, right    Acute right-sided low back pain without sciatica    Osteoarthritis of right hip    Complex regional pain syndrome type 2 of both lower extremities    Cellulitis of right lower extremity    Acute exacerbation of CHF (congestive heart failure)    Hypoxia    Moderate to severe aortic stenosis    Acute on chronic heart failure with preserved ejection fraction (HFpEF)    Arterial hypotension    Severe aortic stenosis    Hyponatremia     Past Medical History:   Diagnosis Date    Ankle instability     Chronic left shoulder pain 2018    Disorder of tendon of shoulder  region, left     Dysphagia     Esophagitis     Femoral neck fracture 7/15/2021    Finger fracture, right 2009    Dr Chandler casting and physical therapy    Hemorrhoids     Hypertension     Localized, primary osteoarthritis of left shoulder region     Osteoarthritis of right hip 3/20/2023    Paroxysmal atrial fibrillation 9/30/2021    Right shoulder pain     Shoulder joint replacement status     Right    Stroke     CVA     Past Surgical History:   Procedure Laterality Date    APPENDECTOMY      Daughter denies    CATARACT EXTRACTION, BILATERAL      2020    COLONOSCOPY N/A 8/23/2021    Procedure: COLONOSCOPY;  Surgeon: Aries Varela MD;  Location: [facility] END[facility];  Service: Gastroenterology;  Laterality: N/A;    ENDOSCOPY  07/2009    with biopsy/esophageal ring dilation    ENDOSCOPY N/A 8/23/2021    Procedure: ESOPHAGOGASTRODUODENOSCOPY;  Surgeon: Aries Varela MD;  Location: [facility] ([place] ENDOSCOPY;  Service: Gastroenterology;  Laterality: N/A;    HEMORRHOIDECTOMY      lanced     HIP HEMIARTHROPLASTY Right 7/15/2021    Procedure: HIP HEMIARTHROPLASTY;  Surgeon: Adam Olsen MD;  Location: [facility] ([place] OR;  Service: Orthopedics;  Laterality: Right;    HYSTERECTOMY      partial     OOPHORECTOMY      SHOULDER SURGERY Right     replacement    SHOULDER SURGERY      Arthroscopy of shoulder:Right    TONSILLECTOMY         SLP Recommendation and Plan  SLP Swallowing Diagnosis: R/O pharyngeal dysphagia (11/14/23 1010)  SLP Diet Recommendation: soft to chew textures, whole, thin liquids (11/14/23 1010)  Recommended Precautions and Strategies: general aspiration precautions, upright posture during/after eating, small bites of food and sips of liquid (11/14/23 1010)  SLP Rec. for Method of Medication Administration: meds whole, with thin liquids, meds crushed, with puree, as tolerated (11/14/23 1010)     Monitor for Signs of Aspiration: yes, notify SLP if any concerns (11/14/23 1010)  Recommended Diagnostics: other (see comments)  (DT) (11/14/23 1010)  Swallow Criteria for Skilled Therapeutic Interventions Met: demonstrates skilled criteria (11/14/23 1010)  Anticipated Discharge Disposition (SLP): skilled nursing facility (11/14/23 1010)  Rehab Potential/Prognosis, Swallowing: good, to achieve stated therapy goals (11/14/23 1010)  Therapy Frequency (Swallow): PRN (11/14/23 1010)  Predicted Duration Therapy Intervention (Days): until discharge (11/14/23 1010)  Oral Care Recommendations: Oral Care BID/PRN, Toothbrush (11/14/23 1010)                                      Oral Care Recommendations: Oral Care BID/PRN, Toothbrush (11/14/23 1010)           SWALLOW EVALUATION (last 72 hours)       SLP Adult Swallow Evaluation       Row Name 11/14/23 1010                   Rehab Evaluation    Document Type evaluation  -        Subjective Information no complaints  -        Patient Observations alert;cooperative  -        Patient/Family/Caregiver Comments/Observations Family present  -        Patient Effort good  -        Symptoms Noted During/After Treatment none  -           General Information    Patient Profile Reviewed yes  -        Pertinent History Of Current Problem Adm w/ increased shortness of breath & bilateral lower extremity edema. Hx: aortic stenosis, a-fib, dementia, CHF, HTN, HLD, osteoarthritis. CXR: worsening multifocal airspace disease/effusions concerning for PNA  -        Current Method of Nutrition soft to chew textures;whole;thin liquids  -        Precautions/Limitations, Vision WFL;for purposes of eval  -        Precautions/Limitations, Hearing WFL;for purposes of eval  -        Prior Level of Function-Communication other (see comments)  Dementia per chart  -        Prior Level of Function-Swallowing soft to chew;whole;thin liquids  FEES completed 10/21/23 w/ recs for soft/whole solid diet w/ thin liquids, single sips, volitional cough at end of PO intake, alternate bites/sips  -        Plans/Goals  Discussed with patient;family;agreed upon  [facility]        Barriers to Rehab none identified  -        Patient's Goals for Discharge patient did not state  -        Family Goals for Discharge family did not state  -           Pain    Additional Documentation Pain Scale: FACES Pre/Post-Treatment (Group)  -           Pain Scale: FACES Pre/Post-Treatment    Pain: FACES Scale, Pretreatment 0-->no hurt  -        Posttreatment Pain Rating 0-->no hurt  -           Oral Motor Structure and Function    Secretion Management WNL/WFL  -        Mucosal Quality moist, healthy  -           Oral Musculature and Cranial Nerve Assessment    Oral Motor General Assessment WF  -           General Eating/Swallowing Observations    Respiratory Support Currently in Use nasal cannula  -        Eating/Swallowing Skills self-fed;fed by SLP  -        Positioning During Eating upright in bed  -        Utensils Used spoon;cup;straw  -        Consistencies Trialed ice chips;thin liquids;pureed;regular textures  -           Clinical Swallow Eval    Oral Prep Phase impaired  -        Oral Residue impaired  -        Clinical Swallow Evaluation Summary Pt noted w/ intermittent throat clear throughout eval & prior to PO trials. Delayed throat clear x1 w/ thin liquid trial via sequential sips. No overt s/s of aspiration w/ thins via cup/straw when cued for small/single sips. No overt s/s of aspiration w/ pureed or regular solid trials. Prolonged mastication & oral residue w/ regular solid trial, able to clear w/ liquid wash. Recommend soft/whole solid diet w/ thin liquids, small/single sips. SLP will f/u for diet tolerance/adjustment  -           Oral Prep Concerns    Oral Prep Concerns prolonged mastication  -        Prolonged Mastication regular consistencies  [facility]           Oral Residue Concerns    Oral Residue Concerns diffuse residue throughout oral cavity  -        Diffuse Residue Throughout Oral Cavity regular  consistencies  -           SLP Evaluation Clinical Impression    SLP Swallowing Diagnosis R/O pharyngeal dysphagia  -        Functional Impact risk of aspiration/pneumonia  -        Rehab Potential/Prognosis, Swallowing good, to achieve stated therapy goals  -        Swallow Criteria for Skilled Therapeutic Interventions Met demonstrates skilled criteria  -           Recommendations    Therapy Frequency (Swallow) PRN  -        Predicted Duration Therapy Intervention (Days) until discharge  -        SLP Diet Recommendation soft to chew textures;whole;thin liquids  -        Recommended Diagnostics other (see comments)  DT  -        Recommended Precautions and Strategies general aspiration precautions;upright posture during/after eating;small bites of food and sips of liquid  -        Oral Care Recommendations Oral Care BID/PRN;Toothbrush  -        SLP Rec. for Method of Medication Administration meds whole;with thin liquids;meds crushed;with puree;as tolerated  -        Monitor for Signs of Aspiration yes;notify SLP if any concerns  -        Anticipated Discharge Disposition (SLP) skilled nursing facility  -                  User Key  (r) = Recorded By, (t) = Taken By, (c) = Cosigned By      Initials Name Effective Dates     Cheli Martinez, MS [facility]SLP 05/12/23 -                     EDUCATION  The patient has been educated in the following areas:   Dysphagia (Swallowing Impairment).        SLP GOALS       Row Name 11/14/23 1010             (LTG) Patient will demonstrate functional swallow for    Diet Texture (Demonstrate functional swallow) regular textures  -      Liquid viscosity (Demonstrate functional swallow) thin liquids  -      [place] (Demonstrate functional swallow) independently (over 90% accuracy)  -      Time Frame (Demonstrate functional swallow) by discharge  -      Progress/Outcomes (Demonstrate functional swallow) new goal  -         (STG) Patient will  tolerate trials of    Consistencies Trialed (Tolerate trials) soft to chew (whole) textures;thin liquids  -      Desired Outcome (Tolerate trials) without signs/symptoms of aspiration;with adequate oral prep/transit/clearance  -      [place] (Tolerate trials) independently (over 90% accuracy)  -      Time Frame (Tolerate trials) 1 week  -      Progress/Outcomes (Tolerate trials) new goal  -                User Key  (r) = Recorded By, (t) = Taken By, (c) = Cosigned By      Initials Name Provider Type     Cheli Martinez MS CCC-SLP Speech and Language Pathologist                       Time Calculation:    Time Calculation- SLP       Row Name 11/14/23 1046             Time Calculation- [facility]    SLP Start Time 1010  -      SLP Received On 11/14/23  -         Untimed Charges    07968-EQ Eval Oral Pharyng Swallow Minutes 40  -         Total Minutes    Untimed Charges Total Minutes 40  -       Total Minutes 40  -                User Key  (r) = Recorded By, (t) = Taken By, (c) = Cosigned By      Initials Name Provider Type     Cheli Martinez MS CCC-SLP Speech and Language Pathologist                    Therapy Charges for Today       Code Description Service Date Service Provider Modifiers Qty    [id number]  ST EVAL ORAL PHARYNG SWALLOW 3 11/14/2023 Cheli Martinez MS CCC-SLP GN 1                 MS GERMAIN Jeong  11/14/2023

## 2023-11-14 NOTE — HOME HEALTH
Routine Visit Note:  si  Skill/education provided called MD office in [facility] to PT having increased fluid volume overload. Noted signigant decline in status. No return phone call from office. Family agreeded to taking her to ER      Plan for next visit waiting on ER notes     Other pertinent info:

## 2023-11-14 NOTE — HOME HEALTH
Patient is a 89 year old female with a PMH significant for moderate–severe aortic stenosis, atrial fibrillation on Eliquis, dementia who presented with fatigue, SOA and edema. She was initially admitted to our service for CHF exacerbation as well as Afib with RVR and Cardiology was consulted. She was transferred to the ICU on same day of admission due to need for Amiodarone gtt and concern that she may need vasopressor support while on amiodarone. Pt has tolerated Amiodarone initiation well and did not ultimately require any vasopressors while in the ICU. She was transferred back to our service on 10/22. Patient with acute on chronic HFpEF/Severe aortic stenosis. She discharged from the hospital to the [place] and discharged back home on 11/8/23. Patient seen today for an OT evaluation and presents with decreased strength, balance, and endurance affecting her independence with ADL's, and functional mobility. Pt with shortness of breath with minimal exertion today, and OT educated on PLB techniques. Caregiver reports she still has a touch of pneumonia that PCP was calling in more abx for. OT will see pt an additional 1w4 in order to improve independence and safety in the home environment. PLOF- mod I- supervision with ADL's, functional mobility/transfers. Had caregivers during the day. Currently, has 24/7 caregivers.

## 2023-11-14 NOTE — PROGRESS NOTES
"[facility]  Hospital Progress Note     LOS: 1 day   Patient Care Team:  Schuyler Garcia MD as PCP - General  Schuyler Garcia MD as PCP - Family Medicine  Juan Manuel Miles MD as Consulting Physician (Cardiology)  PCP:  Schuyler Garcia MD    Chief Complaint:  CHF, valvular heart disease    SUBJECTIVE: Pt in bed resting. She arouses easily and feels breathing has improved. Family is at bedside.       OBJECTIVE:    Vital Sign Min/Max for last 24 hours  Temp  Min: 97.2 °F (36.2 °C)  Max: 97.8 °F (36.6 °C)   BP  Min: 93/59  Max: 171/96   Pulse  Min: 68  Max: 136   Resp  Min: 16  Max: 22   SpO2  Min: 87 %  Max: 97 %   No data recorded   Weight  Min: 72.5 kg (159 lb 12.8 oz)  Max: 73 kg (161 lb)     Flowsheet Rows      Flowsheet Row First Filed Value   Admission Height 160 cm (63\") Documented at 11/13/2023 1645   Admission Weight 73 kg (161 lb) Documented at 11/13/2023 1645            Telemetry: Afib, rate 120      Intake/Output Summary (Last 24 hours) at 11/14/2023 1025  Last data filed at 11/14/2023 0600  Gross per 24 hour   Intake 170 ml   Output 2400 ml   Net -2230 ml     Intake & Output (last 3 days)         11/11 0701  11/12 0700 11/12 0701  11/13 0700 11/13 0701  11/14 0700 11/14 0701  11/15 0700    P.O.   120     IV Piggyback   50     Total Intake(mL/kg)   170 (2.3)     Urine (mL/kg/hr)   2400     Emesis/NG output   0     Stool   0     Total Output   2400     Net   -2230             Stool Unmeasured Occurrence   3 x     Emesis Unmeasured Occurrence   1 x              Physical Exam:  Constitutional:       Appearance: Frail.   Pulmonary:      Effort: Increased respiratory effort. Tachypnea present.      Breath sounds: Examination of the right-upper field reveals decreased breath sounds. Decreased breath sounds present. Rales present.   Cardiovascular:      Tachycardia present. Irregular rhythm.      Murmurs: There is a systolic murmur.   Pulses:     Intact distal pulses. "   Edema:     Peripheral edema present.  Abdominal:      Palpations: Abdomen is soft.      Tenderness: There is no abdominal tenderness.   Skin:     General: Skin is warm and dry.   Neurological:      Mental Status: Easily aroused. Mental status is at baseline.   Psychiatric:         Behavior: Behavior is cooperative.          LABS/DIAGNOSTIC DATA:  Results from last 7 days   Lab Units 11/14/23  0503 11/13/23  1734   WBC 10*3/mm3 10.09 9.86   HEMOGLOBIN g/dL 12.0 11.9*   HEMATOCRIT % 35.1 35.8   PLATELETS 10*3/mm3 325 339     Lab Results   Lab Value Date/Time    TROPONINT 43 (H) 11/13/2023 1734    TROPONINT 150 (C) 10/18/2023 0101    TROPONINT 171 (C) 10/17/2023 2310         Results from last 7 days   Lab Units 11/14/23  0503 11/14/23  0026 11/13/23  1734 11/10/23  1107   SODIUM mmol/L 129*  129* 125* 122* 130*   POTASSIUM mmol/L 3.0*  --  3.8 3.6   CHLORIDE mmol/L 85*  --  83* 87*   CO2 mmol/L 33.0*  --  31.0* 30.7*   BUN mg/dL 8  --  9 9   CREATININE mg/dL 0.76  --  0.69 0.76   CALCIUM mg/dL 8.9  --  8.9 9.1   BILIRUBIN mg/dL  --   --  0.5 0.5   ALK PHOS U/L  --   --  95 84   ALT (SGPT) U/L  --   --  20 16   AST (SGOT) U/L  --   --  23 18   GLUCOSE mg/dL 113*  --  117* 84     proBNP on admission    8,053            Results from last 7 days   Lab Units 11/14/23  0503   TSH uIU/mL 1.030     Chest x-ray 11/13/2023  IMPRESSION:  Impression:  Worsening multifocal airspace disease and effusions, concerning for combination of pneumonia/aspiration and bibasilar atelectasis.           Medication Review:   amiodarone, 200 mg, Oral, Q12H   Followed by  [START ON 11/20/2023] amiodarone, 200 mg, Oral, Q24H  apixaban, 2.5 mg, Oral, Q12H  bumetanide, 2 mg, Intravenous, Q12H  cholecalciferol, 400 Units, Oral, Daily  docusate sodium, 100 mg, Oral, BID  melatonin, 2.5 mg, Oral, Nightly  midodrine, 10 mg, Oral, BID AC  oxybutynin XL, 10 mg, Oral, Daily  pantoprazole, 40 mg, Oral, Q AM  pharmacy consult - MTM, , Does not apply,  Daily  potassium chloride ER, 40 mEq, Oral, Q4H  rosuvastatin, 5 mg, Oral, Nightly  senna-docusate sodium, 2 tablet, Oral, BID  sodium chloride, 10 mL, Intravenous, Q12H               Benign essential hypertension    Paroxysmal atrial fibrillation    Dementia without behavioral disturbance    Acute on chronic heart failure with preserved ejection fraction (HFpEF)    Severe aortic stenosis    Hyponatremia      ASSESSMENT/PLAN:  Acute on chronic heart failure with preserved ejection fraction  Continue IV Bumex as blood pressure allows  Long-term prognosis poor.  Limited resuscitative support.  No CPR status currently.  Consider palliative consult pending response to current treatment.  Hypotension  Agree with reinstitution of midodrine  Possible aspiration pneumonia  Management per attending service, speech consult pending  Paroxysmal atrial fibrillation -currently in A-fib with RVR  Continue amiodarone and Eliquis  If A-fib persists consider discontinuation of amiodarone  Hypotension inhibits use of AV dariana blocking agents  Likely exacerbated by current respiratory issues  Severe aortic stenosis  Poor candidate for valvular intervention due to age and cognitive impairment  Hypokalemia  Continue replacement  Hyponatremia   Diurese as blood pressure allows    8.  Palliative care to be consulted after discussion with family.     Juliana Olivares, APRN   11/14/23  10:25 EST

## 2023-11-14 NOTE — CONSULTS
Palliative Care Initial Consult   Attending Physician: Aashish Raymond MD  Referring Provider: Juan Manuel Miles    Reason for Referral:  assistance with clarification of goals of care    Code Status:   Code Status and Medical Interventions:   Ordered at: 11/13/23 2144     Medical Intervention Limits:    NO intubation (DNI)     Level Of Support Discussed With:    Patient     Code Status (Patient has no pulse and is not breathing):    No CPR (Do Not Attempt to Resuscitate)     Medical Interventions (Patient has pulse or is breathing):    Limited Support      Advanced Directives: Advance Directive Status: Patient has advance directive, copy in chart   Family/Support: dtr and ganddtr   Goals of Care: TBD.    HPI:   89 yo female with hx OFpEF and severe AS presented to ED for worsening SOA.  She was [facility] October for cardiac reasons as well and then rehab at [facility].  She was seen by PCP on 11/10 with some increasing symptoms and prescribed cefdinir for airspace disease.  Bumex was also increased.  Unfortunately resp status continued to worsen with LE edema and weight gain.  CXR in ED demonstrated worsening infiltrated and effusions with subsequent admission.  Today she is feeling better and seen by cardiology.  Palliative subsequently to further explore goals of care.      ROS:   Limited but denies pain.  Up in chair without difficulties or SOA.  Denied N/V and was eating cake for birthday on arrival.      Past Medical History:   Diagnosis Date    Ankle instability     Chronic left shoulder pain 1/26/2018    Disorder of tendon of shoulder region, left     Dysphagia     Esophagitis     Femoral neck fracture 7/15/2021    Finger fracture, right 2009    Dr Chandler casting and physical therapy    Hemorrhoids     Hypertension     Localized, primary osteoarthritis of left shoulder region     Osteoarthritis of right hip 3/20/2023    Paroxysmal atrial fibrillation 9/30/2021    Right shoulder pain     Shoulder joint replacement  status     Right    Stroke     CVA     Past Surgical History:   Procedure Laterality Date    APPENDECTOMY      Daughter denies    CATARACT EXTRACTION, BILATERAL      2020    COLONOSCOPY N/A 8/23/2021    Procedure: COLONOSCOPY;  Surgeon: Aries Varela MD;  Location:  [facility];  Service: Gastroenterology;  Laterality: N/A;    ENDOSCOPY  07/2009    with biopsy/esophageal ring dilation    ENDOSCOPY N/A 8/23/2021    Procedure: ESOPHAGOGASTRODUODENOSCOPY;  Surgeon: Aries Varela MD;  Location:  [facility];  Service: Gastroenterology;  Laterality: N/A;    HEMORRHOIDECTOMY      lanced     HIP HEMIARTHROPLASTY Right 7/15/2021    Procedure: HIP HEMIARTHROPLASTY;  Surgeon: Adam Olsen MD;  Location:  [facility];  Service: Orthopedics;  Laterality: Right;    HYSTERECTOMY      partial     OOPHORECTOMY      SHOULDER SURGERY Right     replacement    SHOULDER SURGERY      Arthroscopy of shoulder:Right    TONSILLECTOMY       Social History     Socioeconomic History    Marital status:    Tobacco Use    Smoking status: Never    Smokeless tobacco: Never    Tobacco comments:      was smoker   Vaping Use    Vaping Use: Never used   Substance and Sexual Activity    Alcohol use: No    Drug use: No    Sexual activity: Not Currently     Partners: Male     Family History   Problem Relation Age of Onset    Cancer Mother     Stroke Mother     Bleeding Disorder Mother     Osteoarthritis Mother     Rheum arthritis Mother     Breast cancer Mother 60    Heart attack Father     Coronary artery disease Father     Cancer Sister         Bladder     Bleeding Disorder Brother         2    Colon cancer Brother         3    Ovarian cancer Neg Hx        No Known Allergies    Current medication reviewed for route, type, dose and frequency and are current per MAR at time of dictation.    Palliative Performance Scale Score:      BP 95/74 (BP Location: Right arm, Patient Position: Lying)   Pulse 67   Temp 97.1 °F (36.2  "°C) (Oral)   Resp 16   Ht 160 cm (63\")   Wt 72.5 kg (159 lb 12.8 oz)   SpO2 96%   BMI 28.31 kg/m²     Intake/Output Summary (Last 24 hours) at 11/14/2023 1409  Last data filed at 11/14/2023 0600  Gross per 24 hour   Intake 170 ml   Output 2400 ml   Net -2230 ml       Physical Exam:    General Appearance:    Alert, cooperative, NAD   HEENT:    NC/AT, EOMI, anicteric, MMM, face relaxed   Neck:   supple, trachea midline, no JVD   Lungs:     CTA bilat, diminished in bases; respirations regular, even and unlabored    Heart:    RRR, normal S1 and S2, no M/R/G   Abdomen:     Normal bowel sounds, soft, nontender, nondistended   G/U:   Deferred   MSK/Extremities:   Wasting, + edema   Pulses:   Pulses palpable and equal bilaterally   Skin:   Warm, dry   Neurologic:   A/Ox3, cooperative, WYATT   Psych:   Calm, appropriate         Labs:   Results from last 7 days   Lab Units 11/14/23  0503   WBC 10*3/mm3 10.09   HEMOGLOBIN g/dL 12.0   HEMATOCRIT % 35.1   PLATELETS 10*3/mm3 325     Results from last 7 days   Lab Units 11/14/23  1138 11/14/23  0503   SODIUM mmol/L 128* 129*  129*   POTASSIUM mmol/L  --  3.0*   CHLORIDE mmol/L  --  85*   CO2 mmol/L  --  33.0*   BUN mg/dL  --  8   CREATININE mg/dL  --  0.76   GLUCOSE mg/dL  --  113*   CALCIUM mg/dL  --  8.9     Results from last 7 days   Lab Units 11/14/23  1138 11/14/23  0503 11/14/23  0026 11/13/23  1734   SODIUM mmol/L 128* 129*  129*   < > 122*   POTASSIUM mmol/L  --  3.0*  --  3.8   CHLORIDE mmol/L  --  85*  --  83*   CO2 mmol/L  --  33.0*  --  31.0*   BUN mg/dL  --  8  --  9   CREATININE mg/dL  --  0.76  --  0.69   CALCIUM mg/dL  --  8.9  --  8.9   BILIRUBIN mg/dL  --   --   --  0.5   ALK PHOS U/L  --   --   --  95   ALT (SGPT) U/L  --   --   --  20   AST (SGOT) U/L  --   --   --  23   GLUCOSE mg/dL  --  113*  --  117*    < > = values in this interval not displayed.     Imaging Results (Last 72 Hours)       Procedure Component Value Units Date/Time    XR Chest 1 View " [[id number]] Collected: 11/13/23 1940     Updated: 11/13/23 1945    Narrative:      XR CHEST 1 VW    Date of Exam: 11/13/2023 6:28 PM EST    Indication: SOA triage protocol    Comparison: Chest radiograph 11/10/2023.    Findings:  Cardiomediastinal silhouette is enlarged similar to previous exam. Patchy multifocal airspace opacities have increased including in the bilateral lower lobes and right greater than left upper lobes. There are increasing small bilateral pleural effusions.   Probable underlying component of atelectasis in the lung bases. No significant worsening edema. No visualized pneumothorax. Aortic atherosclerotic disease noted. Partially imaged right shoulder prosthesis. Osseous structures otherwise grossly   unremarkable.      Impression:      Impression:  Worsening multifocal airspace disease and effusions, concerning for combination of pneumonia/aspiration and bibasilar atelectasis.      Electronically Signed: Jeromy Maynard MD    11/13/2023 7:42 PM EST    Workstation ID: [id number]                Diagnostics: Reviewed    A:     Benign essential hypertension    Paroxysmal atrial fibrillation    Dementia without behavioral disturbance    Acute on chronic heart failure with preserved ejection fraction (HFpEF)    Severe aortic stenosis    Hyponatremia         Impression:.  HFpEF  Afib  Hypotension  Severe AS  Hyponatremia improved  Dementia    Symptoms:  Dyspnea - improved  Debility       P:    Met with nia bedside and introduced palliative care and support.  Discussed potential for palliative or even hospice support in the home.  Plan to discuss with dtr later this afternoon.  Nia wished to speak with her first.  Thank you for this consult and allowing us to participate in patient's plan of care. Palliative Care Team will continue to follow patient.     Time spent:38 minutes spent reviewing medical and medication records, assessing and examining patient, discussing with family, answering  questions, providing some guidance about a plan and documentation of care, and coordinating care with other healthcare members, with > 50% time spent face to face.     Isabella Mai MD  11/14/2023    Addendum:  Not successful reaching dtr at this time.  Will reach out again in AM.

## 2023-11-14 NOTE — H&P
[facility] Medicine Services  HISTORY AND PHYSICAL    Patient Name: Luh Mckeon  : 1930  MRN: [id number]  Primary Care Physician: Schuyler Garcia MD  Date of admission: 2023    Subjective   Subjective     Chief Complaint:  Shortness of air, lower extremity edema     HPI:  Luh Mckeon is a 89 y.o. female with a past medical history significant for severe aortic stenosis, atrial fibrillation on eliquis and dementia presents to the ED accompanied by daughters due to increased shortness of air and bilateral lower extremity edema.  Patient was recently admitted to [facility] from 10/17/2023 to 10/23/2023 secondary to atrial fibrillation with RVR and acute on chronic HFpEF with severe aortic stenosis.  Cardiology was following and patient was started on amiodarone and bumex.  Patient was discharged to rehab facility ([facility]).  Family notes while she was there she done well and was discharged home.  Over the past 3-4 days the patient has had increased swelling along with increased shortness of air.  She was evaluated by her PCP on 11/10/2023 and underwent CXR PA/LAT that revealed mild airspace disease.  She was started on cefdinir.  Additionally her bumex was to be given 1 tablet during the day and half a tablet at night.  However daughters report they were only told to take 1 tablet daily.  Additionally they report while the patient was at [facility] she had a swallow study that recommended mechanical soft diet with thickened liquids, however patient was not on that particular diet while at the [facility].  Patient denies any known fever, chills, chest pain, abdominal pain, dysuria, diarrhea or melena.  She does report decreased appetite, weight gain over the past 3-4 days and non-productive cough.  Workup in the ED tonight included CXR that showed worsening multifocal airspace disease and effusions, concerning for combination of pneumonia/aspiration and bibasilar  atelectasis.  ProBNP is 8053.0, previously 5545.0 on 11/10/2023.  Patient will be admitted to [facility] under the care of the Hospitalist for further evaluation and treatment.         Review of Systems   Constitutional:  Positive for activity change, appetite change and unexpected weight change. Negative for chills, diaphoresis, fatigue and fever.   HENT: Negative.     Eyes:  Negative for photophobia and visual disturbance.   Respiratory:  Positive for cough and shortness of breath.    Cardiovascular:  Positive for leg swelling. Negative for chest pain and palpitations.   Gastrointestinal:  Positive for nausea and vomiting. Negative for abdominal distention, abdominal pain, blood in stool, constipation and diarrhea.   Genitourinary:  Negative for difficulty urinating, dysuria, flank pain, frequency and hematuria.   Musculoskeletal:  Negative for back pain, neck pain and neck stiffness.   Skin: Negative.    Neurological:  Negative for facial asymmetry, speech difficulty, weakness, light-headedness, numbness and headaches.   Psychiatric/Behavioral: Negative.                  Personal History     Past Medical History:   Diagnosis Date    Ankle instability     Chronic left shoulder pain 1/26/2018    Disorder of tendon of shoulder region, left     Dysphagia     Esophagitis     Femoral neck fracture 7/15/2021    Finger fracture, right 2009    Dr Chandler casting and physical therapy    Hemorrhoids     Hypertension     Localized, primary osteoarthritis of left shoulder region     Osteoarthritis of right hip 3/20/2023    Paroxysmal atrial fibrillation 9/30/2021    Right shoulder pain     Shoulder joint replacement status     Right    Stroke     CVA             Past Surgical History:   Procedure Laterality Date    APPENDECTOMY      Daughter denies    CATARACT EXTRACTION, BILATERAL      2020    COLONOSCOPY N/A 8/23/2021    Procedure: COLONOSCOPY;  Surgeon: Aries Varela MD;  [facility];  Service: Gastroenterology;   Laterality: N/A;    ENDOSCOPY  07/2009    with biopsy/esophageal ring dilation    ENDOSCOPY N/A 8/23/2021    Procedure: ESOPHAGOGASTRODUODENOSCOPY;  Surgeon: Aries Varela MD;  Location: [facility] END[facility]COPY;  Service: Gastroenterology;  Laterality: N/A;    HEMORRHOIDECTOMY      lanced     HIP HEMIARTHROPLASTY Right 7/15/2021    Procedure: HIP HEMIARTHROPLASTY;  Surgeon: Adam Olsen MD;  Location: [facility] OR;  Service: Orthopedics;  Laterality: Right;    HYSTERECTOMY      partial     OOPHORECTOMY      SHOULDER SURGERY Right     replacement    SHOULDER SURGERY      Arthroscopy of shoulder:Right    TONSILLECTOMY         Family History:  family history includes Bleeding Disorder in her brother and mother; Breast cancer (age of onset: 60) in her mother; Cancer in her mother and sister; Colon cancer in her brother; Coronary artery disease in her father; Heart attack in her father; Osteoarthritis in her mother; Rheum arthritis in her mother; Stroke in her mother.     Social History:  reports that she has never smoked. She has never used smokeless tobacco. She reports that she does not drink alcohol and does not use drugs.  Social History     Social History Narrative    Not on file       Medications:  Coenzyme Q10, Cranberry, Mirabegron ER, acetaminophen, amiodarone, apixaban, bisacodyl, bumetanide, cefdinir, cholecalciferol, denosumab, docusate sodium, magnesium oxide, melatonin, midodrine, omeprazole, ondansetron ODT, polyethylene glycol, potassium chloride, and rosuvastatin    No Known Allergies    Objective   Objective     Vital Signs:   Temp:  [97.8 °F (36.6 °C)] 97.8 °F (36.6 °C)  Heart Rate:  [68-76] 71  Resp:  [16-18] 18  BP: (134-141)/(68-88) 141/88    Physical Exam  Vitals and nursing note reviewed.   Constitutional:       General: She is not in acute distress.     Appearance: Normal appearance. She is not ill-appearing, toxic-appearing or diaphoretic.   HENT:      Head: Normocephalic and atraumatic.       Nose: Nose normal.      Mouth/Throat:      Mouth: Mucous membranes are dry.      Pharynx: Oropharynx is clear.   Eyes:      Extraocular Movements: Extraocular movements intact.      Conjunctiva/sclera: Conjunctivae normal.      Pupils: Pupils are equal, round, and reactive to light.   Cardiovascular:      Rate and Rhythm: Normal rate and regular rhythm.      Pulses: Normal pulses.      Heart sounds: Murmur heard.   Pulmonary:      Effort: Pulmonary effort is normal.      Comments: Decreased to bilateral lower lobes   Abdominal:      General: Bowel sounds are normal. There is no distension.      Palpations: Abdomen is soft. There is no mass.      Tenderness: There is no abdominal tenderness. There is no right CVA tenderness, guarding or rebound.      Hernia: No hernia is present.   Musculoskeletal:         General: No swelling, tenderness, deformity or signs of injury. Normal range of motion.      Cervical back: Normal range of motion and neck supple.      Right lower leg: Edema present.      Left lower leg: Edema present.   Skin:     General: Skin is warm and dry.   Neurological:      General: No focal deficit present.      Mental Status: She is alert. Mental status is at baseline.   Psychiatric:         Mood and Affect: Mood normal.         Behavior: Behavior normal.         Thought Content: Thought content normal.            Result Review:  I have personally reviewed the results from the time of this admission to 11/13/2023 21:14 EST and agree with these findings:  [x]  Laboratory list / accordion  [x]  Microbiology  [x]  Radiology  [x]  EKG/Telemetry   []  Cardiology/Vascular   []  Pathology  []  Old records  []  Other:  Most notable findings include:     LAB RESULTS:      Lab 11/13/23  1734   WBC 9.86   HEMOGLOBIN 11.9*   HEMATOCRIT 35.8   PLATELETS 339   NEUTROS ABS 7.49*   IMMATURE GRANS (ABS) 0.04   LYMPHS ABS 0.94   MONOS ABS 1.27*   EOS ABS 0.08   MCV 89.9   PROCALCITONIN 0.04   LACTATE 1.3         Lab  11/13/23  1734 11/10/23  1107   SODIUM 122* 130*   POTASSIUM 3.8 3.6   CHLORIDE 83* 87*   CO2 31.0* 30.7*   ANION GAP 8.0 12.3   BUN 9 9   CREATININE 0.69 0.76   EGFR 81.5 73.6   GLUCOSE 117* 84   CALCIUM 8.9 9.1   MAGNESIUM  --  2.3         Lab 11/13/23  1734 11/10/23  1107   TOTAL PROTEIN 6.8 7.2   ALBUMIN 3.8 4.1   GLOBULIN 3.0 3.1   ALT (SGPT) 20 16   AST (SGOT) 23 18   BILIRUBIN 0.5 0.5   ALK PHOS 95 84         Lab 11/13/23  1734 11/10/23  1107   PROBNP 8,053.0* 5,545.0*   HSTROP T 43*  --                  Brief Urine Lab Results  (Last result in the past 365 days)        Color   Clarity   Blood   Leuk Est   Nitrite   Protein   CREAT   Urine HCG        09/28/23 1040             64.2               Microbiology Results (last 10 days)       ** No results found for the last 240 hours. **            XR Chest 1 View    Result Date: 11/13/2023  XR CHEST 1 VW Date of Exam: 11/13/2023 6:28 PM EST Indication: SOA triage protocol Comparison: Chest radiograph 11/10/2023. Findings: Cardiomediastinal silhouette is enlarged similar to previous exam. Patchy multifocal airspace opacities have increased including in the bilateral lower lobes and right greater than left upper lobes. There are increasing small bilateral pleural effusions.  Probable underlying component of atelectasis in the lung bases. No significant worsening edema. No visualized pneumothorax. Aortic atherosclerotic disease noted. Partially imaged right shoulder prosthesis. Osseous structures otherwise grossly unremarkable.     Impression: Impression: Worsening multifocal airspace disease and effusions, concerning for combination of pneumonia/aspiration and bibasilar atelectasis. Electronically Signed: Jeromy Maynard MD  11/13/2023 7:42 PM EST  Workstation ID: [id number]     Results for orders placed during the hospital encounter of 10/17/23    Adult Transthoracic Echo Complete w/ Color, Spectral and Contrast if necessary per protocol    Interpretation Summary     Left ventricular ejection fraction appears to be greater than 70%.    Left ventricular diastolic function was indeterminate.    Left atrial volume is mildly increased.    Severe aortic valve stenosis is present.    Aortic valve maximum pressure gradient is 79 mmHg. Aortic valve mean pressure gradient is 65 mmHg.    Estimated right ventricular systolic pressure from tricuspid regurgitation is normal (<35 mmHg). Calculated right ventricular systolic pressure from tricuspid regurgitation is 32 mmHg.    Incidental irregular heart rate noted with MV inflow suggestive of atrial fibrillation      Assessment & Plan   Assessment & Plan       Benign essential hypertension    Paroxysmal atrial fibrillation    Dementia without behavioral disturbance    Acute on chronic heart failure with preserved ejection fraction (HFpEF)    Severe aortic stenosis    Hyponatremia      89 year old female presents to the ED due to worsening shortness of air and bilateral lower extremity edema over the past 3-4 days.     1) Acute on chronic HFpEF       Severe aortic valve stenosis   -last echo noted above   -proBNP 8053.0  -CXR mentioned above   -hold on abx for now  -s/p lasix 80 mg in the ED   -will give bumex 2 mg in am   -consult cardiology in am, follows with Dr. Miles   -strict I &O   -daily weight     2) Hyponatremia  -sodium 122  -trend over night   -check TSH, Urine and serum osmolality     3) ? Aspiration pneumonia  -hold on abx for now  -continue mechanical soft, thickened liquid diet  -consult SLP in am   -procal 0.04, lactic acid 1.3, WBC 9.86  -blood cultures pending   -received vancomycin and zosyn in the ED    4) Paroxysmal atrial fibrillation   -on amiodarone and eliquis   -currently NSR     5) GERD   -PPI     6) Hyperlipidemia  -continue statin     7) recent hypotension   -on midodrine: Hold, currently /88    8) Dementia   -no active meds       DVT prophylaxis:  eliquis     CODE STATUS:  DNR/DNI       Expected  Discharge  TBD     This note has been completed as part of a split-shared workflow.     Signature: Electronically signed by CHANTELLE Doan, 11/13/23, 9:33 PM EST.          Attending   Admission Attestation       I have performed an independent face-to-face diagnostic evaluation including performing an independent physical examination.  The documented plan of care above was reviewed and developed with the advanced practice clinician (APC).  I have updated the HPI as appropriate.    Brief HPI    89-year-old female with history of aortic stenosis, A-fib on Eliquis, dementia presents with shortness of breath, fluid overload.    Attending Physical Exam:  Temp:  [97.5 °F (36.4 °C)-97.8 °F (36.6 °C)] 97.5 °F (36.4 °C)  Heart Rate:  [] 136  Resp:  [16-18] 18  BP: (134-171)/(68-96) 171/96  Flow (L/min):  [2] 2    She is awake alert and oriented x3  Resting comfortably in bed  Speaking in full sentences  Mucous membranes are moist  Heart irregular rate and rhythm  Lungs diminished at bases, soft rales  2+ lower extremity pitting edema bilaterally    Assessment and Plan:    Acute on chronic heart failure with preserved ejection fraction-new 2 L O2 requirement.  Status post 80 mg Lasix IV in the ED.  Suspect secondary to inadequate dosing of outpatient Bumex, she has just been taking 1 mg in the morning, supposed to be taking 1 in the morning and 0.5 in the afternoon.  Continue IV diuretic 2 mg bumex BID.  Cardiology consult in the morning    Hyponatremia-suspect related to fluid overload based on clinical exam and as it is improving with diuretic    Multifocal lung disease-suspect aspiration pneumonitis/chronic aspiration.  No convincing evidence of active infectious process.  Continue to monitor.  Speech evaluation    See assessment and plan documented by APC above and updated/edited by me as appropriate.    Meliton Medrano MD  11/14/23

## 2023-11-14 NOTE — PROGRESS NOTES
Patient is on Apixaban.  Education provided on 11/14/2023 verbally and in writing to patient's caregiver.  Information provided includes effects of medication, drug-drug and drug-food interactions, and signs/symptoms of bleeding and clotting.  Patient's caregiver verbalized understanding through teach back.  All pertinent questions were answered.       Jenna Grace, PharmD  11/14/2023  13:46 EST

## 2023-11-14 NOTE — PROGRESS NOTES
Clinical Nutrition   Nutrition Support Assessment  Reason for Visit: Identified at risk by screening criteria, Difficulty chewing/swallowing      Patient Name: Luh Mckeon  YOB: 1930  MRN: [id number]  Date of Encounter: 11/14/23 14:53 EST  Admission date: 11/13/2023    Comments:    Nutrition Assessment   Admission Diagnosis:  CHF (congestive heart failure) [I50.9]      Problem List:    Benign essential hypertension    Paroxysmal atrial fibrillation    Dementia without behavioral disturbance    Acute on chronic heart failure with preserved ejection fraction (HFpEF)    Severe aortic stenosis    Hyponatremia        PMH:   She  has a past medical history of Ankle instability, Chronic left shoulder pain (1/26/2018), Disorder of tendon of shoulder region, left, Dysphagia, Esophagitis, Femoral neck fracture (7/15/2021), Finger fracture, right (2009), Hemorrhoids, Hypertension, Localized, primary osteoarthritis of left shoulder region, Osteoarthritis of right hip (3/20/2023), Paroxysmal atrial fibrillation (9/30/2021), Right shoulder pain, Shoulder joint replacement status, and Stroke.    PSH:  She  has a past surgical history that includes Shoulder surgery (Right); Hysterectomy; Tonsillectomy; Appendectomy; Shoulder surgery; Oophorectomy; Esophagogastroduodenoscopy (07/2009); Hemorrhoid surgery; Cataract extraction, bilateral; Hip hemiArthroplasty (Right, 7/15/2021); Colonoscopy (N/A, 8/23/2021); and Esophagogastroduodenoscopy (N/A, 8/23/2021).    Applicable Nutrition Concerns:   Skin:  Oral:  GI:    Applicable Interval History:   11/13-SLP Diet Recommendation: soft to chew textures, whole, thin liquids       Reported/Observed/Food/Nutrition Related History:     Pt sleeping during RD visit, able to obtain hx from caregiver at bedside. Caregiver notes pt eating well PTA, 1 day before admit noticed decline in intake 2/2 fluid accumulation. Caregiver states pt's dry wt ~145lbs and had  "not noticed any wt loss. Pt able to feed self PTA, can still feed self but requires some assistance 2/2 fatigue w/eating. Caregiver declines need for ONS at this time. NKFA.     Anthropometrics     Flowsheet Rows      Flowsheet Row First Filed Value   Admission Height 160 cm (63\") Documented at 11/13/2023 1645   Admission Weight 73 kg (161 lb) Documented at 11/13/2023 1645              Height: Height: 160 cm (63\")  Last Filed Weight: Weight: 72.5 kg (159 lb 12.8 oz) (11/14/23 0500)  Method: Weight Method: Bed scale  BMI: BMI (Calculated): 28.3  BMI classification: Overweight: 25.0-29.9kg/m2   IBW:  52kg    UBW:  145lbs (dry wt per caregiver)   Weight change:      Weight       Weight (kg) Weight (lbs) Weight Method Visit Report   3/1/2023 61.236 kg  135 lb      3/17/2023 66.769 kg  147 lb 3.2 oz   --    3/31/2023 67.132 kg  148 lb   --    4/14/2023 67.132 kg  148 lb   --    4/21/2023 67.132 kg  148 lb   --    8/13/2023 63.504 kg  140 lb  Stated     8/16/2023 67.586 kg  149 lb   --    10/13/2023 69.854 kg  154 lb   --    10/17/2023 69.4 kg  153 lb  Stated  --     69.854 kg  154 lb   --    10/18/2023 68.04 kg  150 lb       68.1 kg  150 lb 2.1 oz      10/19/2023 67.3 kg  148 lb 5.9 oz      10/20/2023 69 kg  152 lb 1.9 oz  Bed scale     10/21/2023 67.7 kg  149 lb 4 oz      10/23/2023 69.219 kg  152 lb 9.6 oz  Bed scale     11/10/2023 73.029 kg  161 lb   --    11/13/2023 73.029 kg  161 lb  Stated     11/14/2023 72.485 kg  159 lb 12.8 oz  Bed scale       Nutrition Focused Physical Exam     Date:     11/14    Unable to perform exam due to: Pt unable to participate at time of visit, Defer pending indication    Current Nutrition Prescription   PO: Diet: Cardiac Diets; Healthy Heart (2-3 Na+); Texture: Soft to Chew (NDD 3); Soft to Chew: Whole Meat; Fluid Consistency: Thin (IDDSI 0)  Oral Nutrition Supplement:   Intake: RD observed 50% of lunch tray    Nutrition Diagnosis   Date:  11/14            Updated:    Problem No " nutrition diagnosis at this time   Etiology    Signs/Symptoms    Status:     Goal:   General: Maintain nutrition  PO: Maintain intake  EN/PN: N/A    Nutrition Intervention      Follow treatment progress, Care plan reviewed, Encourage intake, Supplement offered/refused        Monitoring/Evaluation:   Per protocol, PO intake      Regla Álvarez, MS,RD,LD  Time Spent: 20

## 2023-11-14 NOTE — PROGRESS NOTES
She is current with [facility] for SN/PT and OT. Since she is inpatient she will require an ambulatory referral for home health at discharge. Thanks. Ting OAKLEY, [facility] [place]-Liaison

## 2023-11-15 LAB
ANION GAP SERPL CALCULATED.3IONS-SCNC: 8 MMOL/L (ref 5–15)
BUN SERPL-MCNC: 11 MG/DL (ref 8–23)
BUN/CREAT SERPL: 14.7 (ref 7–25)
CALCIUM SPEC-SCNC: 9.2 MG/DL (ref 8.2–9.6)
CHLORIDE SERPL-SCNC: 90 MMOL/L (ref 98–107)
CO2 SERPL-SCNC: 32 MMOL/L (ref 22–29)
CREAT SERPL-MCNC: 0.75 MG/DL (ref 0.57–1)
EGFRCR SERPLBLD CKD-EPI 2021: 74.3 ML/MIN/1.73
GLUCOSE SERPL-MCNC: 114 MG/DL (ref 65–99)
POTASSIUM SERPL-SCNC: 3.9 MMOL/L (ref 3.5–5.2)
SODIUM SERPL-SCNC: 130 MMOL/L (ref 136–145)
SODIUM SERPL-SCNC: 132 MMOL/L (ref 136–145)
SODIUM SERPL-SCNC: 133 MMOL/L (ref 136–145)

## 2023-11-15 PROCEDURE — 99232 SBSQ HOSP IP/OBS MODERATE 35: CPT | Performed by: INTERNAL MEDICINE

## 2023-11-15 PROCEDURE — 99232 SBSQ HOSP IP/OBS MODERATE 35: CPT

## 2023-11-15 PROCEDURE — 80048 BASIC METABOLIC PNL TOTAL CA: CPT | Performed by: INTERNAL MEDICINE

## 2023-11-15 PROCEDURE — 84295 ASSAY OF SERUM SODIUM: CPT | Performed by: NURSE PRACTITIONER

## 2023-11-15 PROCEDURE — 25010000002 CEFTRIAXONE PER 250 MG: Performed by: INTERNAL MEDICINE

## 2023-11-15 RX ORDER — BUMETANIDE 1 MG/1
2 TABLET ORAL
Status: DISCONTINUED | OUTPATIENT
Start: 2023-11-15 | End: 2023-11-16 | Stop reason: HOSPADM

## 2023-11-15 RX ORDER — AMIODARONE HYDROCHLORIDE 200 MG/1
200 TABLET ORAL DAILY
COMMUNITY
End: 2023-11-16 | Stop reason: HOSPADM

## 2023-11-15 RX ORDER — MIDODRINE HYDROCHLORIDE 10 MG/1
10 TABLET ORAL
COMMUNITY

## 2023-11-15 RX ORDER — BUMETANIDE 1 MG/1
TABLET ORAL
COMMUNITY

## 2023-11-15 RX ADMIN — OXYBUTYNIN CHLORIDE 10 MG: 10 TABLET, EXTENDED RELEASE ORAL at 09:12

## 2023-11-15 RX ADMIN — Medication 10 ML: at 09:14

## 2023-11-15 RX ADMIN — AMIODARONE HYDROCHLORIDE 200 MG: 200 TABLET ORAL at 09:13

## 2023-11-15 RX ADMIN — Medication 10 ML: at 20:48

## 2023-11-15 RX ADMIN — MIDODRINE HYDROCHLORIDE 10 MG: 10 TABLET ORAL at 16:58

## 2023-11-15 RX ADMIN — CEFTRIAXONE SODIUM 2000 MG: 2 INJECTION, POWDER, FOR SOLUTION INTRAMUSCULAR; INTRAVENOUS at 09:13

## 2023-11-15 RX ADMIN — PANTOPRAZOLE SODIUM 40 MG: 40 TABLET, DELAYED RELEASE ORAL at 05:18

## 2023-11-15 RX ADMIN — SENNOSIDES AND DOCUSATE SODIUM 2 TABLET: 8.6; 5 TABLET ORAL at 20:47

## 2023-11-15 RX ADMIN — CHOLECALCIFEROL (VITAMIN D3) 10 MCG (400 UNIT) TABLET 400 UNITS: at 09:17

## 2023-11-15 RX ADMIN — DOCUSATE SODIUM 100 MG: 100 CAPSULE, LIQUID FILLED ORAL at 20:47

## 2023-11-15 RX ADMIN — BUMETANIDE 2 MG: 1 TABLET ORAL at 20:47

## 2023-11-15 RX ADMIN — ROSUVASTATIN 5 MG: 10 TABLET, FILM COATED ORAL at 20:47

## 2023-11-15 RX ADMIN — Medication 2.5 MG: at 20:47

## 2023-11-15 RX ADMIN — APIXABAN 2.5 MG: 2.5 TABLET, FILM COATED ORAL at 09:13

## 2023-11-15 RX ADMIN — AMIODARONE HYDROCHLORIDE 200 MG: 200 TABLET ORAL at 20:47

## 2023-11-15 RX ADMIN — MIDODRINE HYDROCHLORIDE 10 MG: 10 TABLET ORAL at 09:13

## 2023-11-15 RX ADMIN — APIXABAN 2.5 MG: 2.5 TABLET, FILM COATED ORAL at 20:47

## 2023-11-15 RX ADMIN — BUMETANIDE 2 MG: 0.25 INJECTION, SOLUTION INTRAMUSCULAR; INTRAVENOUS at 09:14

## 2023-11-15 NOTE — PROGRESS NOTES
"Palliative Care Daily Progress Note     Referring: Juan Manuel Miles    C/C: none    S: Medical record reviewed.  Events noted.  Continues to improve and feel better. Granddtr bedside.      ROS:   Denies SOA, pain, N/V/D/C.    O: Code Status:   Code Status and Medical Interventions:   Ordered at: 11/13/23 5722     Medical Intervention Limits:    NO intubation (DNI)     Level Of Support Discussed With:    Patient     Code Status (Patient has no pulse and is not breathing):    No CPR (Do Not Attempt to Resuscitate)     Medical Interventions (Patient has pulse or is breathing):    Limited Support      Advanced Directives: Advance Directive Status: Patient has advance directive, copy in chart   Goals of Care: Ongoing.   Palliative Performance Scale Score:       /57   Pulse 65   Temp 97.8 °F (36.6 °C) (Oral)   Resp 16   Ht 160 cm (63\")   Wt 72.5 kg (159 lb 12.8 oz)   SpO2 94%   BMI 28.31 kg/m²     Intake/Output Summary (Last 24 hours) at 11/15/2023 1053  Last data filed at 11/15/2023 0600  Gross per 24 hour   Intake --   Output 1450 ml   Net -1450 ml       PE:  General Appearance:    Alert, cooperative, NAD   HEENT:    EOMI, anicteric, MMM, face relaxed   Neck:   supple, trachea midline, no JVD   Lungs:     CTA bilaterally, diminished in bases; respirations regular, even and unlabored    Heart:    RRR, normal S1 and S2, + M, - G/R   Abdomen:     Normal bowel sounds, soft, nontender, nondistended   G/U:   Deferred   MSK/Extremities:   Wasting, + edema   Pulses:   Pulses palpable and equal bilaterally   Skin:   Warm, dry   Neurologic:   A/Ox1, cooperative, WYATT   Psych:   Calm, appropriate     Meds: Reviewed and changes not noted    Labs:   Results from last 7 days   Lab Units 11/14/23  0503   WBC 10*3/mm3 10.09   HEMOGLOBIN g/dL 12.0   HEMATOCRIT % 35.1   PLATELETS 10*3/mm3 325     Results from last 7 days   Lab Units 11/15/23  0723   SODIUM mmol/L 130*   POTASSIUM mmol/L 3.9   CHLORIDE mmol/L 90*   CO2 mmol/L " 32.0*   BUN mg/dL 11   CREATININE mg/dL 0.75   GLUCOSE mg/dL 114*   CALCIUM mg/dL 9.2     Results from last 7 days   Lab Units 11/15/23  0723 11/14/23  0026 11/13/23  1734   SODIUM mmol/L 130*   < > 122*   POTASSIUM mmol/L 3.9   < > 3.8   CHLORIDE mmol/L 90*   < > 83*   CO2 mmol/L 32.0*   < > 31.0*   BUN mg/dL 11   < > 9   CREATININE mg/dL 0.75   < > 0.69   CALCIUM mg/dL 9.2   < > 8.9   BILIRUBIN mg/dL  --   --  0.5   ALK PHOS U/L  --   --  95   ALT (SGPT) U/L  --   --  20   AST (SGOT) U/L  --   --  23   GLUCOSE mg/dL 114*   < > 117*    < > = values in this interval not displayed.     Imaging Results (Last 72 Hours)       Procedure Component Value Units Date/Time    XR Chest 1 View [[id number]] Collected: 11/13/23 1940     Updated: 11/13/23 1945    Narrative:      XR CHEST 1 VW    Date of Exam: 11/13/2023 6:28 PM EST    Indication: SOA triage protocol    Comparison: Chest radiograph 11/10/2023.    Findings:  Cardiomediastinal silhouette is enlarged similar to previous exam. Patchy multifocal airspace opacities have increased including in the bilateral lower lobes and right greater than left upper lobes. There are increasing small bilateral pleural effusions.   Probable underlying component of atelectasis in the lung bases. No significant worsening edema. No visualized pneumothorax. Aortic atherosclerotic disease noted. Partially imaged right shoulder prosthesis. Osseous structures otherwise grossly   unremarkable.      Impression:      Impression:  Worsening multifocal airspace disease and effusions, concerning for combination of pneumonia/aspiration and bibasilar atelectasis.      Electronically Signed: Jeromy Maynard MD    11/13/2023 7:42 PM EST    Workstation ID: [id number]                  Diagnostics: Reviewed    A:     Benign essential hypertension    Paroxysmal atrial fibrillation    Dementia without behavioral disturbance    Acute on chronic heart failure with preserved ejection fraction (HFpEF)    Severe  aortic stenosis    Hyponatremia       Impression:.  HFpEF  Afib  Hypotension  Severe AS  Hyponatremia improved  Dementia     Symptoms:  Dyspnea - improved  Debility    P:   Spoke with Dtr Swetha via phone.  Confirmed resuscitation status.  Discussed long term prognosis.  Wishes to try to take pt home with more caregivers and home palliative services.  She is physically unable to provide this care herself as had episode of vision loss and transported to ED via EMS after trying to do so for just a few days.  Palliative Care Team will continue to monitor patient and see prn.       Isabella Mai MD  11/15/2023  Time spent:27 min

## 2023-11-15 NOTE — CASE MANAGEMENT/SOCIAL WORK
Discharge Planning Assessment  [facility]     Patient Name: Luh Mckeon  MRN: [id number]  Today's Date: 11/15/2023    Admit Date: 11/13/2023    Plan: ONGOING   Discharge Needs Assessment    No documentation.                  Discharge Plan       Row Name 11/15/23 1223       Plan    Plan Comments Spoke with Mavis in Palliative Care.  They will make a referral to [facility] for home Palliative care servcies upon DC.                  Continued Care and Services - Admitted Since 11/13/2023    Coordination has not been started for this encounter.       Selected Continued Care - Prior Encounters Includes continued care and service providers with selected services from prior encounters from 8/15/2023 to 11/15/2023      Discharged on 10/23/2023 Admission date: 10/17/2023 - Discharge disposition: Skilled Nursing Facility (DC - External)      Destination       Service Provider Selected Services Address Phone Fax Patient Preferred    [facility] [address] [place] [address] [phone] --                          Expected Discharge Date and Time       Expected Discharge Date Expected Discharge Time    Nov 17, 2023            Demographic Summary    No documentation.                  Functional Status    No documentation.                  Psychosocial    No documentation.                  Abuse/Neglect    No documentation.                  Legal    No documentation.                  Substance Abuse    No documentation.                  Patient Forms    No documentation.                     Yina Berry RN

## 2023-11-15 NOTE — PLAN OF CARE
Goal Outcome Evaluation:  Plan of Care Reviewed With: patient, family        Progress: no change  Outcome Evaluation: New palliative consult for assistance with hospice discussion per Dr. Miles.  ACP on file names Sesar Mckeon as HCS with daughter as secondary HCS- Swetha Avery.  Dr. TOMASZ Mai saw pt on 11/14 and spoke with granddaughter at .  She attempted to call daughter but was unable to reach her.  Dr. TOMASZ Mai and palliative RN saw pt today.   Palliative brochure provided to granddaughter at  along with meal discount card.  Dr. Mai to attempt to reach HCS again today.  Plan to send home palliative referral on pt. at discharge.  Pt. pleasantly confused.  Yesterday was her 89rd birthday.  She denied pain and nausea.  Myid dyspnea noted with conversation at rest on 2LNC.  Per granddaughter's report, pt is eating 100% of her meal trays.  Last documented BM on 11/13.  Palliative care to continue to follow for support, POC and ongoing GOC conversations.        Problem: Palliative Care  Goal: Enhanced Quality of Life  Intervention: Promote Advance Care Planning  Flowsheets (Taken 11/15/2023 1406)  Life Transition/Adjustment:   palliative care initiated   palliative care discussed

## 2023-11-15 NOTE — PROGRESS NOTES
"  [facility]  PROGRESS NOTE    Date of Admission: 11/13/2023  Date of Service: 11/15/23    Primary Care Physician: Schuyler Garcia MD    Chief Complaint: CHF, valvular heart disease     Problem List:   Benign essential hypertension    Paroxysmal atrial fibrillation    Dementia without behavioral disturbance    Acute on chronic heart failure with preserved ejection fraction (HFpEF)    Severe aortic stenosis    Hyponatremia      Subjective      Patient awake and sitting up in bed.  Granddaughter at bedside.  No acute events overnight.  She reports her breathing is better today.  He denies dizziness when getting up to use the restroom.  He is in sinus rhythm today heart rates 60s.      Objective   Vitals: /69 (BP Location: Left arm, Patient Position: Lying)   Pulse 71   Temp 97.8 °F (36.6 °C) (Oral)   Resp 16   Ht 160 cm (63\")   Wt 72.5 kg (159 lb 12.8 oz)   SpO2 94%   BMI 28.31 kg/m²      Physical Exam:  Constitutional:       General: Awake.      Appearance: Not in distress. Frail.   Neck:      Vascular: JVD normal.   Pulmonary:      Effort: Pulmonary effort is normal.      Breath sounds: Examination of the right-upper field reveals decreased breath sounds. Rales present.   Cardiovascular:      Normal rate. Regular rhythm.      Murmurs: There is a systolic murmur.   Edema:     Peripheral edema present.  Psychiatric:         Behavior: Behavior is cooperative.               Results:  Results from last 7 days   Lab Units 11/14/23  0503 11/13/23  1734   WBC 10*3/mm3 10.09 9.86   HEMOGLOBIN g/dL 12.0 11.9*   HEMATOCRIT % 35.1 35.8   PLATELETS 10*3/mm3 325 339     Results from last 7 days   Lab Units 11/15/23  0723 11/15/23  0022 11/14/23  1838 11/14/23  1138 11/14/23  0503 11/14/23  0026 11/13/23  1734   SODIUM mmol/L 130* 133* 127*   < > 129*  129*   < > 122*   POTASSIUM mmol/L 3.9  --  4.1  --  3.0*  --  3.8   CHLORIDE mmol/L 90*  --   --   --  85*  --  83*   CO2 " mmol/L 32.0*  --   --   --  33.0*  --  31.0*   BUN mg/dL 11  --   --   --  8  --  9   CREATININE mg/dL 0.75  --   --   --  0.76  --  0.69   GLUCOSE mg/dL 114*  --   --   --  113*  --  117*    < > = values in this interval not displayed.      Lab Results   Component Value Date    CHOL 175 09/28/2023    TRIG 71 09/28/2023    HDL 69 (H) 09/28/2023    LDL 93 09/28/2023    AST 23 11/13/2023    ALT 20 11/13/2023             Results from last 7 days   Lab Units 11/14/23  0503   TSH uIU/mL 1.030             Results from last 7 days   Lab Units 11/13/23  1734   HSTROP T ng/L 43*     Results from last 7 days   Lab Units 11/13/23  1734   PROBNP pg/mL 8,053.0*         Intake/Output Summary (Last 24 hours) at 11/15/2023 0846  Last data filed at 11/15/2023 0600  Gross per 24 hour   Intake --   Output 1450 ml   Net -1450 ml       I personally reviewed the patient's EKG/Telemetry data    Radiology Data:   Results for orders placed during the hospital encounter of 10/17/23    Adult Transthoracic Echo Complete w/ Color, Spectral and Contrast if necessary per protocol    Interpretation Summary    Left ventricular ejection fraction appears to be greater than 70%.    Left ventricular diastolic function was indeterminate.    Left atrial volume is mildly increased.    Severe aortic valve stenosis is present.    Aortic valve maximum pressure gradient is 79 mmHg. Aortic valve mean pressure gradient is 65 mmHg.    Estimated right ventricular systolic pressure from tricuspid regurgitation is normal (<35 mmHg). Calculated right ventricular systolic pressure from tricuspid regurgitation is 32 mmHg.    Incidental irregular heart rate noted with MV inflow suggestive of atrial fibrillation        Current Medications:  amiodarone, 200 mg, Oral, Q12H   Followed by  [START ON 11/20/2023] amiodarone, 200 mg, Oral, Q24H  apixaban, 2.5 mg, Oral, Q12H  bumetanide, 2 mg, Intravenous, Q12H  cefTRIAXone, 2,000 mg, Intravenous, Q24H  cholecalciferol, 400  Units, Oral, Daily  docusate sodium, 100 mg, Oral, BID  melatonin, 2.5 mg, Oral, Nightly  midodrine, 10 mg, Oral, BID AC  oxybutynin XL, 10 mg, Oral, Daily  pantoprazole, 40 mg, Oral, Q AM  pharmacy consult - MTM, , Does not apply, Daily  rosuvastatin, 5 mg, Oral, Nightly  senna-docusate sodium, 2 tablet, Oral, BID  sodium chloride, 10 mL, Intravenous, Q12H           Assessment and Plan:   Acute on chronic heart failure with preserved ejection fraction  BP improved on midodrine.  Alter Bumex to 1 mg p.o. nightly followed by 2 mg p.o. in the a.m.  Long-term prognosis poor.  Limited resuscitative support.  No CPR/no intubation status currently.    Palliative care has been consulted family are discussing home options.  Hypotension  Improved on midodrine.  Possible aspiration pneumonia  Management per attending service  Paroxysmal atrial fibrillation -currently in A-fib with RVR  Maintaining sinus rhythm  Continue amiodarone and Eliquis  Hypotension inhibits use of AV dariana blocking agents  Severe aortic stenosis  Poor candidate for valvular intervention due to age and cognitive impairment  Hypokalemia  Continue electrolyte protocol.   Hyponatremia   Likely chronic, sodium up to 130.  Diurese as blood pressure allows    Likely discharge tomorrow from cardiology standpoint on p.o. Bumex.  Family aware heart [facility] can be utilized for outpatient IV diuresis.  Family still discussing home options and moving forward with palliative care.    Juliana Olivares, APRN

## 2023-11-15 NOTE — PROGRESS NOTES
"    [facility] Medicine Services  PROGRESS NOTE    Patient Name: Luh Mckeon  : 1930  MRN: [id number]    Date of Admission: 2023  Primary Care Physician: Schuyler Garcia MD    Subjective   Subjective     CC:  SOA    HPI:  \"Pretty good.\"  Daughter at bedside states that she is doing much better, breathing looks much better.        Objective   Objective     Vital Signs:   Temp:  [97.6 °F (36.4 °C)-98.1 °F (36.7 °C)] 97.6 °F (36.4 °C)  Heart Rate:  [65-89] 81  Resp:  [16-18] 18  BP: ()/(57-77) 136/72  Flow (L/min):  [2] 2     Physical Exam:  Constitutional: No acute distress, awake, alert, sitting up in bed, daughter at bedside  HENT: NCAT, mucous membranes moist  Respiratory: decreased BS in the bases, nonlabored  Cardiovascular: RRR, no murmurs, rubs, or gallops  Gastrointestinal: Positive bowel sounds, soft, nontender, nondistended  Musculoskeletal: + edema  Psychiatric: Appropriate affect, cooperative  Neurologic: Oriented x 3, strength symmetric in all extremities, Cranial Nerves grossly intact to confrontation, speech clear  Skin: No rashes      Results Reviewed:  LAB RESULTS:      Lab 23  0503 23  1734   WBC 10.09 9.86   HEMOGLOBIN 12.0 11.9*   HEMATOCRIT 35.1 35.8   PLATELETS 325 339   NEUTROS ABS 8.13* 7.49*   IMMATURE GRANS (ABS) 0.04 0.04   LYMPHS ABS 0.77 0.94   MONOS ABS 1.09* 1.27*   EOS ABS 0.04 0.08   MCV 88.2 89.9   PROCALCITONIN  --  0.04   LACTATE  --  1.3         Lab 11/15/23  0723 11/15/23  0022 23  1838 23  1138 23  0503 23  0026 23  1734 11/10/23  1107   SODIUM 130* 133* 127* 128* 129*  129*   < > 122* 130*   POTASSIUM 3.9  --  4.1  --  3.0*  --  3.8 3.6   CHLORIDE 90*  --   --   --  85*  --  83* 87*   CO2 32.0*  --   --   --  33.0*  --  31.0* 30.7*   ANION GAP 8.0  --   --   --  11.0  --  8.0 12.3   BUN 11  --   --   --  8  --  9 9   CREATININE 0.75  --   --   --  0.76  --  0.69 0.76   EGFR 74.3  --  "  --   --  73.2  --  81.5 73.6   GLUCOSE 114*  --   --   --  113*  --  117* 84   CALCIUM 9.2  --   --   --  8.9  --  8.9 9.1   MAGNESIUM  --   --   --   --  2.1  --   --  2.3   TSH  --   --   --   --  1.030  --   --   --     < > = values in this interval not displayed.         Lab 11/13/23  1734 11/10/23  1107   TOTAL PROTEIN 6.8 7.2   ALBUMIN 3.8 4.1   GLOBULIN 3.0 3.1   ALT (SGPT) 20 16   AST (SGOT) 23 18   BILIRUBIN 0.5 0.5   ALK PHOS 95 84         Lab 11/13/23  1734 11/10/23  1107   PROBNP 8,053.0* 5,545.0*   HSTROP T 43*  --                  Brief Urine Lab Results  (Last result in the past 365 days)        Color   Clarity   Blood   Leuk Est   Nitrite   Protein   CREAT   Urine HCG        09/28/23 1040             64.2                 Microbiology Results Abnormal       Procedure Component Value - Date/Time    Blood Culture - Blood, Hand, Right [[id number]]  (Normal) Collected: 11/13/23 2105    Lab Status: Preliminary result Specimen: Blood from Hand, Right Updated: 11/14/23 2132     Blood Culture No growth at 24 hours    Blood Culture - Blood, Arm, Right [[id number]]  (Normal) Collected: 11/13/23 2120    Lab Status: Preliminary result Specimen: Blood from Arm, Right Updated: 11/14/23 2132     Blood Culture No growth at 24 hours            XR Chest 1 View    Result Date: 11/13/2023  XR CHEST 1 VW Date of Exam: 11/13/2023 6:28 PM EST Indication: SOA triage protocol Comparison: Chest radiograph 11/10/2023. Findings: Cardiomediastinal silhouette is enlarged similar to previous exam. Patchy multifocal airspace opacities have increased including in the bilateral lower lobes and right greater than left upper lobes. There are increasing small bilateral pleural effusions.  Probable underlying component of atelectasis in the lung bases. No significant worsening edema. No visualized pneumothorax. Aortic atherosclerotic disease noted. Partially imaged right shoulder prosthesis. Osseous structures otherwise grossly  unremarkable.     Impression: Impression: Worsening multifocal airspace disease and effusions, concerning for combination of pneumonia/aspiration and bibasilar atelectasis. Electronically Signed: Jeromy Maynard MD  11/13/2023 7:42 PM EST  Workstation ID: [id number]     Results for orders placed during the hospital encounter of 10/17/23    Adult Transthoracic Echo Complete w/ Color, Spectral and Contrast if necessary per protocol    Interpretation Summary    Left ventricular ejection fraction appears to be greater than 70%.    Left ventricular diastolic function was indeterminate.    Left atrial volume is mildly increased.    Severe aortic valve stenosis is present.    Aortic valve maximum pressure gradient is 79 mmHg. Aortic valve mean pressure gradient is 65 mmHg.    Estimated right ventricular systolic pressure from tricuspid regurgitation is normal (<35 mmHg). Calculated right ventricular systolic pressure from tricuspid regurgitation is 32 mmHg.    Incidental irregular heart rate noted with MV inflow suggestive of atrial fibrillation      Current medications:  Scheduled Meds:amiodarone, 200 mg, Oral, Q12H   Followed by  [START ON 11/20/2023] amiodarone, 200 mg, Oral, Q24H  apixaban, 2.5 mg, Oral, Q12H  bumetanide, 2 mg, Oral, BID  cefTRIAXone, 2,000 mg, Intravenous, Q24H  cholecalciferol, 400 Units, Oral, Daily  docusate sodium, 100 mg, Oral, BID  melatonin, 2.5 mg, Oral, Nightly  midodrine, 10 mg, Oral, BID AC  oxybutynin XL, 10 mg, Oral, Daily  pantoprazole, 40 mg, Oral, Q AM  pharmacy consult - MTM, , Does not apply, Daily  rosuvastatin, 5 mg, Oral, Nightly  senna-docusate sodium, 2 tablet, Oral, BID  sodium chloride, 10 mL, Intravenous, Q12H      Continuous Infusions:   PRN Meds:.  senna-docusate sodium **AND** polyethylene glycol **AND** bisacodyl **AND** bisacodyl    Calcium Replacement - Follow Nurse / BPA Driven Protocol    Magnesium Standard Dose Replacement - Follow Nurse / BPA Driven Protocol     nitroglycerin    Phosphorus Replacement - Follow Nurse / BPA Driven Protocol    Potassium Replacement - Follow Nurse / BPA Driven Protocol    sodium chloride    [COMPLETED] Insert Peripheral IV **AND** sodium chloride    sodium chloride    sodium chloride    Assessment & Plan   Assessment & Plan     Active Hospital Problems    Diagnosis  POA    Severe aortic stenosis [I35.0]  Unknown    Hyponatremia [E87.1]  Yes    Acute on chronic heart failure with preserved ejection fraction (HFpEF) [I50.33]  Yes    Dementia without behavioral disturbance [F03.90]  Yes    Paroxysmal atrial fibrillation [I48.0]  Yes    Benign essential hypertension [I10]  Yes      Resolved Hospital Problems   No resolved problems to display.        Brief Hospital Course to date:  Luh Mckeon is a 89 y.o. female  worsening shortness of air and bilateral lower extremity edema over the past 3-4 days.      1) Acute on chronic HFpEF       Severe aortic valve stenosis   -last echo noted above   --cards/Dr. Miles following, s/p IV diuresis, transitioning to bumex 2mg PO qam and 1mg PO qhs        2) Hyponatremia  -improved  -TSH wnl     3) ? Aspiration pneumonia  -continue rocephin  -SLP following recs soft to chew with thin liquids  -procal 0.04, lactic acid 1.3, WBC 9.86  -blood cultures pending   -received vancomycin and zosyn in the ED     4) Paroxysmal atrial fibrillation   -on amiodarone and eliquis      5) GERD   -PPI      6) Hyperlipidemia  -continue statin      7) recent hypotension   -continue home midodrine     8) Dementia   -no active meds    D/w daughter at bedside on 11/15/2023    Palliative Care following    Expected Discharge Location and Transportation:   Expected Discharge   Expected Discharge Date: 11/17/2023; Expected Discharge Time:      DVT prophylaxis:  Medical DVT prophylaxis orders are present.     AM-PAC 6 Clicks Score (PT): 19 (11/14/23 2000)    CODE STATUS:   Code Status and Medical Interventions:   Ordered at: 11/13/23  2144     Medical Intervention Limits:    NO intubation (DNI)     Level Of Support Discussed With:    Patient     Code Status (Patient has no pulse and is not breathing):    No CPR (Do Not Attempt to Resuscitate)     Medical Interventions (Patient has pulse or is breathing):    Limited Support       Aashish Raymond MD  11/15/23

## 2023-11-15 NOTE — PLAN OF CARE
Goal Outcome Evaluation:         Pt was up out of bed with minimal assistance (1 person) to chair and bedside commode. She was confused at times overnight to situation, but otherwise oriented to location, self and time. Was up and voiding without difficulty at bedside commode.

## 2023-11-16 ENCOUNTER — HOME CARE VISIT (OUTPATIENT)
Dept: HOME HEALTH SERVICES | Facility: HOME HEALTHCARE | Age: 88
End: 2023-11-16
Payer: MEDICARE

## 2023-11-16 ENCOUNTER — TELEPHONE (OUTPATIENT)
Dept: INTERNAL MEDICINE | Facility: CLINIC | Age: 88
End: 2023-11-16
Payer: MEDICARE

## 2023-11-16 VITALS
BODY MASS INDEX: 28.31 KG/M2 | WEIGHT: 159.8 LBS | RESPIRATION RATE: 22 BRPM | HEART RATE: 127 BPM | SYSTOLIC BLOOD PRESSURE: 111 MMHG | HEIGHT: 63 IN | TEMPERATURE: 97.6 F | OXYGEN SATURATION: 93 % | DIASTOLIC BLOOD PRESSURE: 78 MMHG

## 2023-11-16 LAB
ANION GAP SERPL CALCULATED.3IONS-SCNC: 11 MMOL/L (ref 5–15)
BUN SERPL-MCNC: 12 MG/DL (ref 8–23)
BUN/CREAT SERPL: 15 (ref 7–25)
CALCIUM SPEC-SCNC: 9.5 MG/DL (ref 8.2–9.6)
CHLORIDE SERPL-SCNC: 88 MMOL/L (ref 98–107)
CO2 SERPL-SCNC: 34 MMOL/L (ref 22–29)
CREAT SERPL-MCNC: 0.8 MG/DL (ref 0.57–1)
EGFRCR SERPLBLD CKD-EPI 2021: 68.8 ML/MIN/1.73
GLUCOSE SERPL-MCNC: 183 MG/DL (ref 65–99)
POTASSIUM SERPL-SCNC: 3.2 MMOL/L (ref 3.5–5.2)
SODIUM SERPL-SCNC: 133 MMOL/L (ref 136–145)

## 2023-11-16 PROCEDURE — 80048 BASIC METABOLIC PNL TOTAL CA: CPT | Performed by: INTERNAL MEDICINE

## 2023-11-16 PROCEDURE — 99232 SBSQ HOSP IP/OBS MODERATE 35: CPT

## 2023-11-16 PROCEDURE — 99239 HOSP IP/OBS DSCHRG MGMT >30: CPT | Performed by: INTERNAL MEDICINE

## 2023-11-16 PROCEDURE — 92526 ORAL FUNCTION THERAPY: CPT

## 2023-11-16 PROCEDURE — 25010000002 CEFTRIAXONE PER 250 MG: Performed by: INTERNAL MEDICINE

## 2023-11-16 RX ORDER — CEFUROXIME AXETIL 500 MG/1
500 TABLET ORAL 2 TIMES DAILY
Qty: 10 TABLET | Refills: 0 | Status: SHIPPED | OUTPATIENT
Start: 2023-11-16

## 2023-11-16 RX ORDER — POTASSIUM CHLORIDE 20 MEQ/1
40 TABLET, EXTENDED RELEASE ORAL EVERY 4 HOURS
Status: DISCONTINUED | OUTPATIENT
Start: 2023-11-16 | End: 2023-11-16 | Stop reason: HOSPADM

## 2023-11-16 RX ORDER — AMIODARONE HYDROCHLORIDE 200 MG/1
TABLET ORAL
Start: 2023-11-16 | End: 2023-12-25

## 2023-11-16 RX ORDER — IPRATROPIUM BROMIDE AND ALBUTEROL SULFATE 2.5; .5 MG/3ML; MG/3ML
3 SOLUTION RESPIRATORY (INHALATION) EVERY 4 HOURS PRN
Qty: 360 ML | Refills: 0 | Status: SHIPPED | OUTPATIENT
Start: 2023-11-16

## 2023-11-16 RX ADMIN — PANTOPRAZOLE SODIUM 40 MG: 40 TABLET, DELAYED RELEASE ORAL at 05:59

## 2023-11-16 RX ADMIN — DOCUSATE SODIUM 100 MG: 100 CAPSULE, LIQUID FILLED ORAL at 08:48

## 2023-11-16 RX ADMIN — Medication 10 ML: at 08:50

## 2023-11-16 RX ADMIN — SENNOSIDES AND DOCUSATE SODIUM 2 TABLET: 8.6; 5 TABLET ORAL at 08:48

## 2023-11-16 RX ADMIN — POTASSIUM CHLORIDE 40 MEQ: 1500 TABLET, EXTENDED RELEASE ORAL at 12:11

## 2023-11-16 RX ADMIN — MIDODRINE HYDROCHLORIDE 10 MG: 10 TABLET ORAL at 05:59

## 2023-11-16 RX ADMIN — BUMETANIDE 2 MG: 1 TABLET ORAL at 08:48

## 2023-11-16 RX ADMIN — AMIODARONE HYDROCHLORIDE 200 MG: 200 TABLET ORAL at 08:48

## 2023-11-16 RX ADMIN — CHOLECALCIFEROL (VITAMIN D3) 10 MCG (400 UNIT) TABLET 400 UNITS: at 08:54

## 2023-11-16 RX ADMIN — APIXABAN 2.5 MG: 2.5 TABLET, FILM COATED ORAL at 08:49

## 2023-11-16 RX ADMIN — OXYBUTYNIN CHLORIDE 10 MG: 10 TABLET, EXTENDED RELEASE ORAL at 08:49

## 2023-11-16 NOTE — THERAPY TREATMENT NOTE
Acute Care - Speech Language Pathology   Swallow Treatment Note  [place]     Patient Name: Luh Mckeon  : 1930  MRN: [id number]  Today's Date: 2023               Admit Date: 2023    Visit Dx:     ICD-10-CM ICD-9-CM   1. Acute on chronic congestive heart failure, unspecified heart failure type  I50.9 428.0   2. Acute hyponatremia  E87.1 276.1   3. Acute on chronic systolic congestive heart failure  I50.23 428.23     428.0     Patient Active Problem List   Diagnosis    Generalized anxiety disorder    Unsteady gait    Risk for falls    Hyperlipidemia LDL goal <100    Benign essential hypertension    Memory loss    Urinary incontinence in female    Candida onychomycosis    AR (allergic rhinitis)    Overweight (BMI 25.0-29.9)    Chest pain    Cramp in limb    Disorder of mitral and aortic valves    Disorder of skeletal muscle    Diverticular disease of colon    Dysphagia    External hemorrhoids         Hand joint pain    Insomnia    Knee pain    Menopausal and postmenopausal disorder    Osteoporosis    Shoulder pain    Vitamin B deficiency    Medicare annual wellness visit, subsequent    Dizziness    Urgency of urination    Fatigue    Atrial fibrillation with rapid ventricular response    Hematochezia    Paroxysmal atrial fibrillation    Dementia without behavioral disturbance    Fall at home    Hip pain, acute, right    Acute right-sided low back pain without sciatica    Osteoarthritis of right hip    Complex regional pain syndrome type 2 of both lower extremities    Cellulitis of right lower extremity    Acute exacerbation of CHF (congestive heart failure)    Hypoxia    Moderate to severe aortic stenosis    Acute on chronic heart failure with preserved ejection fraction (HFpEF)    Arterial hypotension    Severe aortic stenosis    Hyponatremia     Past Medical History:   Diagnosis Date    Ankle instability     Chronic left shoulder pain 2018    Disorder of tendon of shoulder region,  left     Dysphagia     Esophagitis     Femoral neck fracture 7/15/2021    Finger fracture, right 2009    Dr Chandler casting and physical therapy    Hemorrhoids     Hypertension     Localized, primary osteoarthritis of left shoulder region     Osteoarthritis of right hip 3/20/2023    Paroxysmal atrial fibrillation 9/30/2021    Right shoulder pain     Shoulder joint replacement status     Right    Stroke     CVA     Past Surgical History:   Procedure Laterality Date    APPENDECTOMY      Daughter denies    CATARACT EXTRACTION, BILATERAL      2020    COLONOSCOPY N/A 8/23/2021    Procedure: COLONOSCOPY;  Surgeon: Aries Varela MD;  Location: [facility];  Service: Gastroenterology;  Laterality: N/A;    ENDOSCOPY  07/2009    with biopsy/esophageal ring dilation    ENDOSCOPY N/A 8/23/2021    Procedure: ESOPHAGOGASTRODUODENOSCOPY;  Surgeon: Aries Varela MD;  Location: [facility];  Service: Gastroenterology;  Laterality: N/A;    HEMORRHOIDECTOMY      lanced     HIP HEMIARTHROPLASTY Right 7/15/2021    Procedure: HIP HEMIARTHROPLASTY;  Surgeon: Adam Olsen MD;  Location: [facility] OR;  Service: Orthopedics;  Laterality: Right;    HYSTERECTOMY      partial     OOPHORECTOMY      SHOULDER SURGERY Right     replacement    SHOULDER SURGERY      Arthroscopy of shoulder:Right    TONSILLECTOMY         SLP Recommendation and Plan     SLP Diet Recommendation: soft to chew textures, whole, thin liquids (11/16/23 1435)  Recommended Precautions and Strategies: general aspiration precautions, upright posture during/after eating, small bites of food and sips of liquid, check mouth frequently for oral residue/pocketing (11/16/23 1435)  SLP Rec. for Method of Medication Administration: meds whole, with thin liquids, meds crushed, with puree, as tolerated (11/16/23 1435)     Monitor for Signs of Aspiration: yes, notify SLP if any concerns (11/16/23 1435)  Recommended Diagnostics: No further SLP services recommended  (11/16/23 1435)     Anticipated Discharge Disposition (SLP): home with home health (11/16/23 1435)     Therapy Frequency (Swallow): evaluation only (11/16/23 1435)  Predicted Duration Therapy Intervention (Days): until discharge (11/16/23 1435)  Oral Care Recommendations: Oral Care BID/PRN, Toothbrush (11/16/23 1435)        Daily Summary of Progress (SLP): progress toward functional goals as expected (11/16/23 1435)               Treatment Assessment (SLP): toleration of diet, no clinical signs of, pharyngeal dysphagia (11/16/23 1435)  Treatment Assessment Comments (SLP): No overt s/s of aspiration w/ thin liquid or regular solid trials. Prolonged mastication w/ regular solid & oral residue, pt able to clear w/ liquid wash. Ok to cont soft/whole diet w/ thin liquids vs regular solid diet per pt preference. If pt wishes to initiate regular solid diet, check for pocketing/residue. No acute needs @ this time, SLP will sign off for dysphagia (11/16/23 1435)  Plan for Continued Treatment (SLP): continue treatment per plan of care (11/16/23 1435)       Oral Care Recommendations: Oral Care BID/PRN, Toothbrush (11/16/23 1435)           SWALLOW EVALUATION (last 72 hours)       SLP Adult Swallow Evaluation       Row Name 11/16/23 1435 11/14/23 1010                Rehab Evaluation    Document Type therapy note (daily note)  - evaluation  -       Subjective Information no complaints  - no complaints  -       Patient Observations alert;cooperative  - alert;cooperative  -       Patient/Family/Caregiver Comments/Observations Family present  - Family present  -       Patient Effort good  - good  -       Symptoms Noted During/After Treatment none  - none  -          General Information    Patient Profile Reviewed -- yes  -MH       Pertinent History Of Current Problem -- Adm w/ increased shortness of breath & bilateral lower extremity edema. Hx: aortic stenosis, a-fib, dementia, CHF, HTN, HLD, osteoarthritis.  CXR: worsening multifocal airspace disease/effusions concerning for PNA  -       Current Method of Nutrition -- soft to chew textures;whole;thin liquids  -       Precautions/Limitations, Vision -- WFL;for purposes of eval  -       Precautions/Limitations, Hearing -- WFL;for purposes of eval  -       Prior Level of Function-Communication -- other (see comments)  Dementia per chart  -       Prior Level of Function-Swallowing -- soft to chew;whole;thin liquids  FEES completed 10/21/23 w/ recs for soft/whole solid diet w/ thin liquids, single sips, volitional cough at end of PO intake, alternate bites/sips  -       Plans/Goals Discussed with -- patient;family;agreed upon  [facility]       Barriers to Rehab -- none identified  -       Patient's Goals for Discharge -- patient did not state  -       Family Goals for Discharge -- family did not state  -          Pain    Additional Documentation Pain Scale: FACES Pre/Post-Treatment (Group)  [facility] Pain Scale: FACES Pre/Post-Treatment (Group)  [facility]          Pain Scale: FACES Pre/Post-Treatment    Pain: FACES Scale, Pretreatment 0-->no hurt  - 0-->no hurt  -       Posttreatment Pain Rating 0-->no hurt  - 0-->no hurt  [facility]          Oral Motor Structure and Function    Secretion Management -- WNL/WF  -       Mucosal Quality -- moist, healthy  -          Oral Musculature and Cranial Nerve Assessment    Oral Motor General Assessment -- [facility]  -          General Eating/Swallowing Observations    Respiratory Support Currently in Use -- nasal cannula  -       Eating/Swallowing Skills -- self-fed;fed by SLP  -       Positioning During Eating -- upright in bed  -       Utensils Used -- spoon;cup;straw  -       Consistencies Trialed -- ice chips;thin liquids;pureed;regular textures  -          Clinical Swallow Eval    Oral Prep Phase -- impaired  -       Oral Residue -- impaired  -       Clinical Swallow Evaluation Summary -- Pt noted w/ intermittent  throat clear throughout eval & prior to PO trials. Delayed throat clear x1 w/ thin liquid trial via sequential sips. No overt s/s of aspiration w/ thins via cup/straw when cued for small/single sips. No overt s/s of aspiration w/ pureed or regular solid trials. Prolonged mastication & oral residue w/ regular solid trial, able to clear w/ liquid wash. Recommend soft/whole solid diet w/ thin liquids, small/single sips. SLP will f/u for diet tolerance/adjustment  -          Oral Prep Concerns    Oral Prep Concerns -- prolonged mastication  -       Prolonged Mastication -- regular consistencies  -          Oral Residue Concerns    Oral Residue Concerns -- diffuse residue throughout oral cavity  -       Diffuse Residue Throughout Oral Cavity -- regular consistencies  -          SLP Evaluation Clinical Impression    SLP Swallowing Diagnosis -- R/O pharyngeal dysphagia  -       Functional Impact -- risk of aspiration/pneumonia  -       Rehab Potential/Prognosis, Swallowing -- good, to achieve stated therapy goals  -       Swallow Criteria for Skilled Therapeutic Interventions Met -- demonstrates skilled criteria  -          SLP Treatment Clinical Impressions    Treatment Assessment (SLP) toleration of diet;no clinical signs of;pharyngeal dysphagia  - --       Treatment Assessment Comments (SLP) No overt s/s of aspiration w/ thin liquid or regular solid trials. Prolonged mastication w/ regular solid & oral residue, pt able to clear w/ liquid wash. Ok to cont soft/whole diet w/ thin liquids vs regular solid diet per pt preference. If pt wishes to initiate regular solid diet, check for pocketing/residue. No acute needs @ this time, SLP will sign off for dysphagia  - --       Daily Summary of Progress (SLP) progress toward functional goals as expected  - --       Plan for Continued Treatment (SLP) continue treatment per plan of care  - --       Care Plan Review care plan/treatment goals reviewed  [facility]  --       Care Plan Review, Other Participant(s) family  - --          Recommendations    Therapy Frequency (Swallow) evaluation only  - PRN  -       Predicted Duration Therapy Intervention (Days) until discharge  - until discharge  -       SLP Diet Recommendation soft to chew textures;whole;thin liquids  - soft to chew textures;whole;thin liquids  -       Recommended Diagnostics No further SLP services recommended  - other (see comments)  DT  -       Recommended Precautions and Strategies general aspiration precautions;upright posture during/after eating;small bites of food and sips of liquid;check mouth frequently for oral residue/pocketing  - general aspiration precautions;upright posture during/after eating;small bites of food and sips of liquid  -       Oral Care Recommendations Oral Care BID/PRN;Toothbrush  - Oral Care BID/PRN;Toothbrush  -       SLP Rec. for Method of Medication Administration meds whole;with thin liquids;meds crushed;with puree;as tolerated  - meds whole;with thin liquids;meds crushed;with puree;as tolerated  -       Monitor for Signs of Aspiration yes;notify SLP if any concerns  - yes;notify SLP if any concerns  -       Anticipated Discharge Disposition (SLP) home with home health  - skilled nursing facility  -                 User Key  (r) = Recorded By, (t) = Taken By, (c) = Cosigned By      Initials Name Effective Dates    Cheli Turpin, MS [facility]-SLP 05/12/23 -                     EDUCATION  The patient has been educated in the following areas:   Dysphagia (Swallowing Impairment).        SLP GOALS       Row Name 11/16/23 8325 11/14/23 1010          (LTG) Patient will demonstrate functional swallow for    Diet Texture (Demonstrate functional swallow) regular textures  - regular textures  -     Liquid viscosity (Demonstrate functional swallow) thin liquids  - thin liquids  -     [place] (Demonstrate functional swallow) independently  (over 90% accuracy)  - independently (over 90% accuracy)  -     Time Frame (Demonstrate functional swallow) by discharge  - by discharge  -     Progress/Outcomes (Demonstrate functional swallow) goal met  - new goal  -     Comment (Demonstrate functional swallow) Prolonged mastication & residue w/ regular solid trial, able to clear w/ liquid wash. No overt s/s of aspiration w/ regular solid or thin liquid trials  - --        (STG) Patient will tolerate trials of    Consistencies Trialed (Tolerate trials) soft to chew (whole) textures;thin liquids  - soft to chew (whole) textures;thin liquids  -     Desired Outcome (Tolerate trials) without signs/symptoms of aspiration;with adequate oral prep/transit/clearance  - without signs/symptoms of aspiration;with adequate oral prep/transit/clearance  -     [place] (Tolerate trials) independently (over 90% accuracy)  - independently (over 90% accuracy)  -     Time Frame (Tolerate trials) 1 week  - 1 week  -     Progress/Outcomes (Tolerate trials) goal met  - new goal  -               User Key  (r) = Recorded By, (t) = Taken By, (c) = Cosigned By      Initials Name Provider Type     Cheli Martinez MS CCC-SLP Speech and Language Pathologist                       Time Calculation:    Time Calculation- SLP       Row Name 11/16/23 1535             Time Calculation- [facility]    SLP Start Time 1435  -      SLP Received On 11/16/23  -         Untimed Charges    [id number] Treatment Swallow Minutes 40  -         Total Minutes    Untimed Charges Total Minutes 40  -       Total Minutes 40  -                User Key  (r) = Recorded By, (t) = Taken By, (c) = Cosigned By      Initials Name Provider Type     Cheli Martinez MS CCC-SLP Speech and Language Pathologist                    Therapy Charges for Today       Code Description Service Date Service Provider Modifiers Qty    [id number] HC ST TREATMENT SWALLOW 3 11/16/2023 Juan  Cheli RODAS, MS [facility]-SLP GN 1                 MS GERMAIN Jeong  11/16/2023

## 2023-11-16 NOTE — CONSULTS
Hospice referral received, chart reviewed, and visit made. Spoke with pt, Marnie Ramsey (granddaughter), and Riri Camarillohy, (caregiver) at bedside. Reviewed hospice plan of care and goals of care. Confirmed comfort focused plan of care and no desire for further aggressive treatment. Overview of hospice services provided. Pt/Family wishes for location of care to be at pt's home which is [address] [place] [address] with [employer] caregivers.  Plan is for pt to discharge today via private car. [facility] to provide O2 tank for transport. DME to be delivered today by 1700 and include hospital bed, OBT, O2, and neb machine. Provider aware of need for 3-5 day supply of comfort meds to be sent home via meds to beds in hand prior to discharge.   24 hr Hospice #([phone]) given to HCS who understands to call hospice once pt arrives home to be admitted to hospice services. Team and bedside RN updated.  For further assistance, please call [phone].       Alejandra Westfall, RN, BSN, Hospice Liaison

## 2023-11-16 NOTE — TELEPHONE ENCOUNTER
Sesar from [facility] called about a referral that was sent for Ms. Mckeon. He wants to know if Dr. Garcia will still be following her while she's in hospice. You can call Sesar at [phone] option 2.

## 2023-11-16 NOTE — DISCHARGE SUMMARY
[facility] Medicine Services  DISCHARGE SUMMARY    Patient Name: Luh Mckeon  : 1930  MRN: [id number]    Date of Admission: 2023  7:59 PM  Date of Discharge:  2023  Primary Care Physician: Schuyler Garcia MD    Consults       Date and Time Order Name Status Description    2023 11:16 AM Inpatient Palliative Care MD Consult Completed     2023 11:50 PM Inpatient Cardiology Consult Completed     10/18/2023  3:32 AM Inpatient Cardiology Consult Completed             Hospital Course     Presenting Problem:     Active Hospital Problems    Diagnosis  POA    Severe aortic stenosis [I35.0]  Unknown    Hyponatremia [E87.1]  Yes    Acute on chronic heart failure with preserved ejection fraction (HFpEF) [I50.33]  Yes    Dementia without behavioral disturbance [F03.90]  Yes    Paroxysmal atrial fibrillation [I48.0]  Yes    Benign essential hypertension [I10]  Yes      Resolved Hospital Problems   No resolved problems to display.          Hospital Course:  Luh Mckeon is a 89 y.o. female with h/o DHF and severe AS presented with  worsening shortness of air and bilateral lower extremity edema over the past 3-4 days.  Cardiology consulted and patient was diuresed and doing better though may need home O2.  Per Dr. Miles with cardiology not much more to offer so recommended palliative care which was consulted.  Family also interested in home with hospice so hospice was consulted to see today prior to discharge     Acute on chronic HFpEF   Severe aortic valve stenosis   -last echo noted above   --cards/Dr. Miles following, s/p IV diuresis, transitioning to bumex 2mg PO BID        Hyponatremia  -improved  -TSH wnl     ? Aspiration pneumonia  - rocephin while here, transition to PO ceftin at home for 5 more days  -SLP followed recs soft to chew with thin liquids  -blood cultures no growth day 2 at time of discharge     4) Paroxysmal atrial fibrillation   -on  amiodarone and eliquis      5) GERD   -PPI      6) Hyperlipidemia  -continue statin      7) recent hypotension   -continue home midodrine     8) Dementia   -no active meds    Family wishes to go home today with hospice which has been arranged.       Discharge Follow Up Recommendations for outpatient labs/diagnostics:  F/u with PCP  F/u with palliative care clinic    Day of Discharge     HPI:   Breathing better.  Family has decided for palliative care and interested in home hospice so prefers to f/u with palliative rather than cardiology.  Would like to go home today.     Review of Systems  Gen- No fevers, chills  CV- No chest pain, palpitations  Resp- No cough, dyspnea  GI- No N/V/D, abd pain      Vital Signs:   Temp:  [97.6 °F (36.4 °C)] 97.6 °F (36.4 °C)  Heart Rate:  [] 106  Resp:  [18-22] 22  BP: (103-121)/(46-98) 111/78  Flow (L/min):  [1.5-2] 1.5      Physical Exam:  Constitutional: No acute distress, awake, alert, sitting up in bed, granddaughter at bedside  HENT: NCAT, mucous membranes moist  Respiratory: decreased BS in the bases, respiratory effort normal on 1.5LNC  Cardiovascular: RRR, no murmurs, rubs, or gallops  Gastrointestinal: Positive bowel sounds, soft, nontender, nondistended  Musculoskeletal: No bilateral ankle edema  Psychiatric: Appropriate affect, cooperative  Neurologic: mildly confused, strength symmetric in all extremities, Cranial Nerves grossly intact to confrontation, speech clear  Skin: No rashes      Pertinent  and/or Most Recent Results     LAB RESULTS:      Lab 11/14/23  0503 11/13/23  1734   WBC 10.09 9.86   HEMOGLOBIN 12.0 11.9*   HEMATOCRIT 35.1 35.8   PLATELETS 325 339   NEUTROS ABS 8.13* 7.49*   IMMATURE GRANS (ABS) 0.04 0.04   LYMPHS ABS 0.77 0.94   MONOS ABS 1.09* 1.27*   EOS ABS 0.04 0.08   MCV 88.2 89.9   PROCALCITONIN  --  0.04   LACTATE  --  1.3         Lab 11/16/23  0829 11/15/23  1218 11/15/23  0723 11/15/23  0022 11/14/23  1838 11/14/23  1138 11/14/23  0506  11/14/23  0026 11/13/23  1734 11/10/23  1107   SODIUM 133* 132* 130* 133* 127*   < > 129*  129*   < > 122* 130*   POTASSIUM 3.2*  --  3.9  --  4.1  --  3.0*  --  3.8 3.6   CHLORIDE 88*  --  90*  --   --   --  85*  --  83* 87*   CO2 34.0*  --  32.0*  --   --   --  33.0*  --  31.0* 30.7*   ANION GAP 11.0  --  8.0  --   --   --  11.0  --  8.0 12.3   BUN 12  --  11  --   --   --  8  --  9 9   CREATININE 0.80  --  0.75  --   --   --  0.76  --  0.69 0.76   EGFR 68.8  --  74.3  --   --   --  73.2  --  81.5 73.6   GLUCOSE 183*  --  114*  --   --   --  113*  --  117* 84   CALCIUM 9.5  --  9.2  --   --   --  8.9  --  8.9 9.1   MAGNESIUM  --   --   --   --   --   --  2.1  --   --  2.3   TSH  --   --   --   --   --   --  1.030  --   --   --     < > = values in this interval not displayed.         Lab 11/13/23 1734 11/10/23  1107   TOTAL PROTEIN 6.8 7.2   ALBUMIN 3.8 4.1   GLOBULIN 3.0 3.1   ALT (SGPT) 20 16   AST (SGOT) 23 18   BILIRUBIN 0.5 0.5   ALK PHOS 95 84         Lab 11/13/23  1734 11/10/23  1107   PROBNP 8,053.0* 5,545.0*   HSTROP T 43*  --                  Brief Urine Lab Results  (Last result in the past 365 days)        Color   Clarity   Blood   Leuk Est   Nitrite   Protein   CREAT   Urine HCG        09/28/23 1040             64.2               Microbiology Results (last 10 days)       Procedure Component Value - Date/Time    Blood Culture - Blood, Arm, Right [[id number]]  (Normal) Collected: 11/13/23 2120    Lab Status: Preliminary result Specimen: Blood from Arm, Right Updated: 11/15/23 2131     Blood Culture No growth at 2 days    Blood Culture - Blood, Hand, Right [[id number]]  (Normal) Collected: 11/13/23 2105    Lab Status: Preliminary result Specimen: Blood from Hand, Right Updated: 11/15/23 2131     Blood Culture No growth at 2 days            XR Chest 1 View    Result Date: 11/13/2023  XR CHEST 1 VW Date of Exam: 11/13/2023 6:28 PM EST Indication: SOA triage protocol Comparison: Chest radiograph 11/10/2023.  Findings: Cardiomediastinal silhouette is enlarged similar to previous exam. Patchy multifocal airspace opacities have increased including in the bilateral lower lobes and right greater than left upper lobes. There are increasing small bilateral pleural effusions.  Probable underlying component of atelectasis in the lung bases. No significant worsening edema. No visualized pneumothorax. Aortic atherosclerotic disease noted. Partially imaged right shoulder prosthesis. Osseous structures otherwise grossly unremarkable.     Impression: Worsening multifocal airspace disease and effusions, concerning for combination of pneumonia/aspiration and bibasilar atelectasis. Electronically Signed: Jeromy Maynard MD  11/13/2023 7:42 PM EST  Workstation ID: [id number]    XR Chest PA & Lateral    Result Date: 11/10/2023  XR CHEST PA AND LATERAL Date of Exam: 11/10/2023 11:07 AM EST Indication: dyspnea and cough Comparison: 10/19/2023. Findings: Mild patchy airspace changes are present within the bilateral lower lobes. Small bilateral pleural effusions are present. Stable chronic interstitial changes are present. Stable probable scarring present within the right perihilar region.. No pneumothorax. The pulmonary vasculature appears within normal limits. The cardiac and mediastinal silhouette appears enlarged, similar as compared to previous examinations.. No acute osseous abnormality identified.     Impression: Mild patchy airspace changes present within the bilateral lower lobes with small bilateral pleural effusions, likely related to pneumonia, similar as compared to recent examinations. Stable chronic interstitial changes are present. Electronically Signed: Marie Kowalski MD  11/10/2023 3:26 PM EST  Workstation ID: [id number]             Results for orders placed during the hospital encounter of 10/17/23    Adult Transthoracic Echo Complete w/ Color, Spectral and Contrast if necessary per protocol    Interpretation Summary    Left  ventricular ejection fraction appears to be greater than 70%.    Left ventricular diastolic function was indeterminate.    Left atrial volume is mildly increased.    Severe aortic valve stenosis is present.    Aortic valve maximum pressure gradient is 79 mmHg. Aortic valve mean pressure gradient is 65 mmHg.    Estimated right ventricular systolic pressure from tricuspid regurgitation is normal (<35 mmHg). Calculated right ventricular systolic pressure from tricuspid regurgitation is 32 mmHg.    Incidental irregular heart rate noted with MV inflow suggestive of atrial fibrillation      Plan for Follow-up of Pending Labs/Results:   Pending Labs       Order Current Status    Blood Culture - Blood, Arm, Right Preliminary result    Blood Culture - Blood, Hand, Right Preliminary result          Discharge Details        Discharge Medications        New Medications        Instructions Start Date   cefuroxime 500 MG tablet  Commonly known as: CEFTIN   500 mg, Oral, 2 Times Daily      ipratropium-albuterol 0.5-2.5 mg/3 ml nebulizer  Commonly known as: DUO-NEB   3 mL, Nebulization, Every 4 Hours PRN             Changes to Medications        Instructions Start Date   amiodarone 200 MG tablet  Commonly known as: PACERONE  What changed:   See the new instructions.  Another medication with the same name was removed. Continue taking this medication, and follow the directions you see here.   Take 1 tablet by mouth Daily for 3 days, THEN 1 tablet Every 12 (Twelve) Hours for 7 days, THEN 1 tablet Daily for 30 days.   Start Date: November 16, 2023            Continue These Medications        Instructions Start Date   apixaban 2.5 MG tablet tablet  Commonly known as: Eliquis   2.5 mg, Oral, Every 12 Hours Scheduled      bumetanide 1 MG tablet  Commonly known as: BUMEX   Take 1 tablet by mouth in the morning and 1/2 tablet in the afternoon      cholecalciferol 10 MCG (400 UNIT) tablet  Commonly known as: VITAMIN D3   1 tablet, Oral,  Every Morning      docusate sodium 100 MG capsule  Commonly known as: COLACE   100 mg, Oral, 2 Times Daily      EQL CoQ10 200 MG capsule  Generic drug: Coenzyme Q10   200 mg, Oral, 2 Times Daily      melatonin 3 MG tablet   3 mg, Oral, Nightly      midodrine 10 MG tablet  Commonly known as: PROAMATINE   10 mg, Oral, 3 Times Daily Before Meals      Myrbetriq 50 MG tablet sustained-release 24 hour 24 hr tablet  Generic drug: Mirabegron ER   50 mg, Oral, Every Night at Bedtime      omeprazole 40 MG capsule  Commonly known as: priLOSEC   40 mg, Oral, Every Night at Bedtime      polyethylene glycol 17 GM/SCOOP powder  Commonly known as: MIRALAX   Takes once daily on Monday, Wednesday, and Friday and PRN for constipation      potassium chloride 10 MEQ CR tablet   10 mEq, Oral, 2 Times Daily      Prolia 60 MG/ML solution prefilled syringe syringe  Generic drug: denosumab   1 mL, Subcutaneous, Every 6 Months      rosuvastatin 5 MG tablet  Commonly known as: CRESTOR   5 mg, Oral, Nightly             Stop These Medications      acetaminophen 325 MG tablet  Commonly known as: TYLENOL     bisacodyl 10 MG suppository  Commonly known as: DULCOLAX     cefdinir 300 MG capsule  Commonly known as: OMNICEF     Cranberry 450 MG tablet     magnesium oxide 400 MG tablet  Commonly known as: MAG-OX     ondansetron ODT 8 MG disintegrating tablet  Commonly known as: ZOFRAN-ODT              No Known Allergies      Discharge Disposition:  Hospice/Home    Diet:  Hospital:  Diet Order   Procedures    Diet: Cardiac Diets; Healthy Heart (2-3 Na+); Texture: Soft to Chew (NDD 3); Soft to Chew: Whole Meat; Fluid Consistency: Thin (IDDSI 0)            Activity:      Restrictions or Other Recommendations:         CODE STATUS:    Code Status and Medical Interventions:   Ordered at: 11/13/23 8700     Medical Intervention Limits:    NO intubation (DNI)     Level Of Support Discussed With:    Patient     Code Status (Patient has no pulse and is not  breathing):    No CPR (Do Not Attempt to Resuscitate)     Medical Interventions (Patient has pulse or is breathing):    Limited Support       Future Appointments   Date Time Provider Department Center   11/16/2023 To Be Determined Dandre Stark, PT [facility] None   11/20/2023  1:30 PM Schuyler Garcia MD [facility]   11/30/2023  2:40 PM [facility] 1 [facility]   1/4/2024  1:15 PM Juan Manuel Miles MD [facility]       Additional Instructions for the Follow-ups that You Need to Schedule       Discharge Follow-up with PCP   As directed       Currently Documented PCP:    Schuyler Garcia MD    PCP Phone Number:    [phone]     Follow Up Details: with PCP in 1 week        Discharge Follow-up with Specified Provider: with Palliative care   As directed      To: with Palliative care                      Aashish Raymond MD  11/16/23      Time Spent on Discharge:  I spent  39  minutes on this discharge activity which included: face-to-face encounter with the patient, reviewing the data in the system, coordination of the care with the nursing staff as well as consultants, documentation, and entering orders.

## 2023-11-16 NOTE — PROGRESS NOTES
"  [facility]  PROGRESS NOTE    Date of Admission: 11/13/2023  Date of Service: 11/16/23    Primary Care Physician: Schuyler Garcia MD    Chief Complaint: follow up CHF exacerbation    Problem List:   Benign essential hypertension    Paroxysmal atrial fibrillation    Dementia without behavioral disturbance    Acute on chronic heart failure with preserved ejection fraction (HFpEF)    Severe aortic stenosis    Hyponatremia      Subjective      No acute events overnight. Sitting up on side of bed eating breakfast. Edema continues to improve. Hospice/palliative meeting with family. Granddaughter at bedside at time of my visit.     Objective   Vitals: /46 (BP Location: Left arm, Patient Position: Sitting)   Pulse 111   Temp 97.6 °F (36.4 °C) (Oral)   Resp 22   Ht 160 cm (63\")   Wt 72.5 kg (159 lb 12.8 oz)   SpO2 96%   BMI 28.31 kg/m²     Physical Exam:  GENERAL:  in no acute distress.   HEENT:  no jugular venous distention  HEART: Regular rhythm, normal rate, 3/6 KAMILLE.   LUNGS: Clear to auscultation bilaterally. No wheezing, rales or rhonchi.  EXTREMITIES: Trace edema noted.     Results:  Results from last 7 days   Lab Units 11/14/23  0503 11/13/23  1734   WBC 10*3/mm3 10.09 9.86   HEMOGLOBIN g/dL 12.0 11.9*   HEMATOCRIT % 35.1 35.8   PLATELETS 10*3/mm3 325 339     Results from last 7 days   Lab Units 11/16/23  0829 11/15/23  1218 11/15/23  0723 11/15/23  0022 11/14/23  1838 11/14/23  1138 11/14/23  0503   SODIUM mmol/L 133* 132* 130*   < > 127*   < > 129*  129*   POTASSIUM mmol/L 3.2*  --  3.9  --  4.1  --  3.0*   CHLORIDE mmol/L 88*  --  90*  --   --   --  85*   CO2 mmol/L 34.0*  --  32.0*  --   --   --  33.0*   BUN mg/dL 12  --  11  --   --   --  8   CREATININE mg/dL 0.80  --  0.75  --   --   --  0.76   GLUCOSE mg/dL 183*  --  114*  --   --   --  113*    < > = values in this interval not displayed.      Lab Results   Component Value Date    CHOL 175 09/28/2023    " TRIG 71 09/28/2023    HDL 69 (H) 09/28/2023    LDL 93 09/28/2023    AST 23 11/13/2023    ALT 20 11/13/2023             Results from last 7 days   Lab Units 11/14/23  0503   TSH uIU/mL 1.030             Results from last 7 days   Lab Units 11/13/23  1734   HSTROP T ng/L 43*     Results from last 7 days   Lab Units 11/13/23  1734   PROBNP pg/mL 8,053.0*         Intake/Output Summary (Last 24 hours) at 11/16/2023 1106  Last data filed at 11/16/2023 0900  Gross per 24 hour   Intake 1200 ml   Output 2200 ml   Net -1000 ml       I personally reviewed the patient's EKG/Telemetry data    Radiology Data:   Results for orders placed during the hospital encounter of 10/17/23    Adult Transthoracic Echo Complete w/ Color, Spectral and Contrast if necessary per protocol    Interpretation Summary    Left ventricular ejection fraction appears to be greater than 70%.    Left ventricular diastolic function was indeterminate.    Left atrial volume is mildly increased.    Severe aortic valve stenosis is present.    Aortic valve maximum pressure gradient is 79 mmHg. Aortic valve mean pressure gradient is 65 mmHg.    Estimated right ventricular systolic pressure from tricuspid regurgitation is normal (<35 mmHg). Calculated right ventricular systolic pressure from tricuspid regurgitation is 32 mmHg.    Incidental irregular heart rate noted with MV inflow suggestive of atrial fibrillation        Current Medications:  amiodarone, 200 mg, Oral, Q12H   Followed by  [START ON 11/20/2023] amiodarone, 200 mg, Oral, Q24H  apixaban, 2.5 mg, Oral, Q12H  bumetanide, 2 mg, Oral, BID  cefTRIAXone, 2,000 mg, Intravenous, Q24H  cholecalciferol, 400 Units, Oral, Daily  docusate sodium, 100 mg, Oral, BID  melatonin, 2.5 mg, Oral, Nightly  midodrine, 10 mg, Oral, BID AC  oxybutynin XL, 10 mg, Oral, Daily  pantoprazole, 40 mg, Oral, Q AM  pharmacy consult - MTM, , Does not apply, Daily  potassium chloride ER, 40 mEq, Oral, Q4H  rosuvastatin, 5 mg, Oral,  Nightly  senna-docusate sodium, 2 tablet, Oral, BID  sodium chloride, 10 mL, Intravenous, Q12H           Assessment and Plan:   Acute on chronic heart failure with preserved ejection fraction  BP improved on midodrine.  IV bumex transitioned to oral yesterday. Continues to diurese well. Creatinine WNL. Continue Bumex 2mg BID for diuresis.   Long-term prognosis poor.  Limited resuscitative support.  No CPR/no intubation status currently.    Palliative care has been consulted family are discussing home options.  Hypotension  Improved on midodrine.   Possible aspiration pneumonia  Received antibiotics. Management per attending service  Paroxysmal atrial fibrillation -currently in A-fib with RVR  Maintaining sinus rhythm  Continue amiodarone and Eliquis  Hypotension inhibits use of AV dariana blocking agents. Maintaining HR in low 90s-low 100s.   Severe aortic stenosis  Poor candidate for valvular intervention due to age and cognitive impairment  Hypokalemia  Continue electrolyte protocol.   Hyponatremia   Likely chronic, sodium up to 133.  Diurese as blood pressure allows                                                                                                               Patient with multiple hospitalizations for CHF exacerbation. Management of heart failure symptoms limited due to hypotension. It seems family has decided to proceed with home with hospice. Cardiology will sign off. Please call with any questions.

## 2023-11-16 NOTE — DISCHARGE PLACEMENT REQUEST
"Lay Mckeon (89 y.o. Female)    Referred by Dr. Mai  PCP: Dr. Schuyler Garcia  DX: CHF Per Dr. Lutz    Date of Birth   11/14/1930    Social Security Number       Address   [address] [place] [address]    Home Phone   [phone]    MRN   [id number]       Roman Catholic   Anglican    Marital Status                               Admission Date   11/13/23    Admission Type   Emergency    Admitting Provider   Aashish Raymond MD    Attending Provider   Aashish Raymond MD    Department, Room/Bed   [address], S445/1       Discharge Date       Discharge Disposition       Discharge Destination                                 Attending Provider: Aashish Raymond MD    Allergies: No Known Allergies    Isolation: None   Infection: None   Code Status: No CPR    Ht: 160 cm (63\")   Wt: 72.5 kg (159 lb 12.8 oz)    Admission Cmt: None   Principal Problem: None                  Active Insurance as of 11/13/2023       Primary Coverage       Payor Plan Insurance Group Employer/Plan Group    MEDICARE MEDICARE A & B        Payor Plan Address Payor Plan Phone Number Payor Plan Fax Number Effective Dates    [address] [phone]  11/1/1995 - None Entered    [facility] [id number]         Subscriber Name Subscriber Birth Date Member ID       LAY MCKEON 11/14/1930 [id number]               Secondary Coverage       Payor Plan Insurance Group Employer/Plan Group    [facility] SUPP [id number]       Payor Plan Address Payor Plan Phone Number Payor Plan Fax Number Effective Dates    [address]   12/1/2016 - None Entered    [facility] [id number]         Subscriber Name Subscriber Birth Date Member ID       LAY MCKEON 11/14/1930 [id number]                     Emergency Contacts        (Rel.) Home Phone Work Phone Mobile Phone    NAIN AVERY (Daughter) [phone] -- [phone]    RO REID (Grandchild) [phone] -- [phone]    Juan Diego Avery " (Grandchild) [phone] -- --              Emergency Contact Information       Name Relation Home Work Mobile    NAIN AVERY Daughter [phone]    RO REID Grandchild [phone]    Juan Diego Avery Grandchild [phone]            Insurance Information                  MEDICARE/MEDICARE A & B Phone: [phone]    Subscriber: Luh Mckeon Subscriber#: [id number]    Group#: -- Precert#: --        ANTHEM BLUE CROSS/ANTHEM BC  SUPP Phone: --    Subscriber: Luh Mckeon Subscriber#: [id number]    Group#: [id number] Precert#: --             History & Physical        Meliton Medrano MD at 23              [facility] Medicine Services  HISTORY AND PHYSICAL    Patient Name: Luh Mckeon  : 1930  MRN: [id number]  Primary Care Physician: Schuyler Garcia MD  Date of admission: 2023    Subjective  Subjective     Chief Complaint:  Shortness of air, lower extremity edema     HPI:  Luh Mckeon is a 89 y.o. female with a past medical history significant for severe aortic stenosis, atrial fibrillation on eliquis and dementia presents to the ED accompanied by daughters due to increased shortness of air and bilateral lower extremity edema.  Patient was recently admitted to [facility] from 10/17/2023 to 10/23/2023 secondary to atrial fibrillation with RVR and acute on chronic HFpEF with severe aortic stenosis.  Cardiology was following and patient was started on amiodarone and bumex.  Patient was discharged to rehab facility ([facility]).  Family notes while she was there she done well and was discharged home.  Over the past 3-4 days the patient has had increased swelling along with increased shortness of air.  She was evaluated by her PCP on 11/10/2023 and underwent CXR PA/LAT that revealed mild airspace disease.  She was started on cefdinir.  Additionally her bumex was to be given 1 tablet during the day and half a tablet at  night.  However daughters report they were only told to take 1 tablet daily.  Additionally they report [facility] swallow [facility] that recommended mechanical soft diet with thickened liquids, however patient was not on that particular diet while at the [facility].  Patient denies any known fever, chills, chest pain, abdominal pain, dysuria, diarrhea or melena.  She does report decreased appetite, weight gain over the past 3-4 days and non-productive cough.  Workup in the ED tonight included CXR that showed worsening multifocal airspace disease and effusions, concerning for combination of pneumonia/aspiration and bibasilar atelectasis.  ProBNP is 8053.0, previously 5545.0 on 11/10/2023.  Patient will be admitted to [facility] under the care of the Hospitalist for further evaluation and treatment.         Review of Systems   Constitutional:  Positive for activity change, appetite change and unexpected weight change. Negative for chills, diaphoresis, fatigue and fever.   HENT: Negative.     Eyes:  Negative for photophobia and visual disturbance.   Respiratory:  Positive for cough and shortness of breath.    Cardiovascular:  Positive for leg swelling. Negative for chest pain and palpitations.   Gastrointestinal:  Positive for nausea and vomiting. Negative for abdominal distention, abdominal pain, blood in stool, constipation and diarrhea.   Genitourinary:  Negative for difficulty urinating, dysuria, flank pain, frequency and hematuria.   Musculoskeletal:  Negative for back pain, neck pain and neck stiffness.   Skin: Negative.    Neurological:  Negative for facial asymmetry, speech difficulty, weakness, light-headedness, numbness and headaches.   Psychiatric/Behavioral: Negative.                  Personal History     Past Medical History:   Diagnosis Date    Ankle instability     Chronic left shoulder pain 1/26/2018    Disorder of tendon of shoulder region, left     Dysphagia     Esophagitis      Femoral neck fracture 7/15/2021    Finger fracture, right 2009    Dr Chandler casting and physical therapy    Hemorrhoids     Hypertension     Localized, primary osteoarthritis of left shoulder region     Osteoarthritis of right hip 3/20/2023    Paroxysmal atrial fibrillation 9/30/2021    Right shoulder pain     Shoulder joint replacement status     Right    Stroke     CVA             Past Surgical History:   Procedure Laterality Date    APPENDECTOMY      Daughter denies    CATARACT EXTRACTION, BILATERAL      2020    COLONOSCOPY N/A 8/23/2021    Procedure: COLONOSCOPY;  Surgeon: Aries Varela MD;  Location:  [facility];  Service: Gastroenterology;  Laterality: N/A;    ENDOSCOPY  07/2009    with biopsy/esophageal ring dilation    ENDOSCOPY N/A 8/23/2021    Procedure: ESOPHAGOGASTRODUODENOSCOPY;  Surgeon: Aries Varela MD;  Location:  [facility];  Service: Gastroenterology;  Laterality: N/A;    HEMORRHOIDECTOMY      lanced     HIP HEMIARTHROPLASTY Right 7/15/2021    Procedure: HIP HEMIARTHROPLASTY;  Surgeon: Adam Olsen MD;  Location:  [facility];  Service: Orthopedics;  Laterality: Right;    HYSTERECTOMY      partial     OOPHORECTOMY      SHOULDER SURGERY Right     replacement    SHOULDER SURGERY      Arthroscopy of shoulder:Right    TONSILLECTOMY         Family History:  family history includes Bleeding Disorder in her brother and mother; Breast cancer (age of onset: 60) in her mother; Cancer in her mother and sister; Colon cancer in her brother; Coronary artery disease in her father; Heart attack in her father; Osteoarthritis in her mother; Rheum arthritis in her mother; Stroke in her mother.     Social History:  reports that she has never smoked. She has never used smokeless tobacco. She reports that she does not drink alcohol and does not use drugs.  Social History     Social History Narrative    Not on file       Medications:  Coenzyme Q10, Cranberry, Mirabegron ER, acetaminophen, amiodarone,  apixaban, bisacodyl, bumetanide, cefdinir, cholecalciferol, denosumab, docusate sodium, magnesium oxide, melatonin, midodrine, omeprazole, ondansetron ODT, polyethylene glycol, potassium chloride, and rosuvastatin    No Known Allergies    Objective  Objective     Vital Signs:   Temp:  [97.8 °F (36.6 °C)] 97.8 °F (36.6 °C)  Heart Rate:  [68-76] 71  Resp:  [16-18] 18  BP: (134-141)/(68-88) 141/88    Physical Exam  Vitals and nursing note reviewed.   Constitutional:       General: She is not in acute distress.     Appearance: Normal appearance. She is not ill-appearing, toxic-appearing or diaphoretic.   HENT:      Head: Normocephalic and atraumatic.      Nose: Nose normal.      Mouth/Throat:      Mouth: Mucous membranes are dry.      Pharynx: Oropharynx is clear.   Eyes:      Extraocular Movements: Extraocular movements intact.      Conjunctiva/sclera: Conjunctivae normal.      Pupils: Pupils are equal, round, and reactive to light.   Cardiovascular:      Rate and Rhythm: Normal rate and regular rhythm.      Pulses: Normal pulses.      Heart sounds: Murmur heard.   Pulmonary:      Effort: Pulmonary effort is normal.      Comments: Decreased to bilateral lower lobes   Abdominal:      General: Bowel sounds are normal. There is no distension.      Palpations: Abdomen is soft. There is no mass.      Tenderness: There is no abdominal tenderness. There is no right CVA tenderness, guarding or rebound.      Hernia: No hernia is present.   Musculoskeletal:         General: No swelling, tenderness, deformity or signs of injury. Normal range of motion.      Cervical back: Normal range of motion and neck supple.      Right lower leg: Edema present.      Left lower leg: Edema present.   Skin:     General: Skin is warm and dry.   Neurological:      General: No focal deficit present.      Mental Status: She is alert. Mental status is at baseline.   Psychiatric:         Mood and Affect: Mood normal.         Behavior: Behavior normal.          Thought Content: Thought content normal.            Result Review:  I have personally reviewed the results from the time of this admission to 11/13/2023 21:14 EST and agree with these findings:  [x]  Laboratory list / accordion  [x]  Microbiology  [x]  Radiology  [x]  EKG/Telemetry   []  Cardiology/Vascular   []  Pathology  []  Old records  []  Other:  Most notable findings include:     LAB RESULTS:      Lab 11/13/23  1734   WBC 9.86   HEMOGLOBIN 11.9*   HEMATOCRIT 35.8   PLATELETS 339   NEUTROS ABS 7.49*   IMMATURE GRANS (ABS) 0.04   LYMPHS ABS 0.94   MONOS ABS 1.27*   EOS ABS 0.08   MCV 89.9   PROCALCITONIN 0.04   LACTATE 1.3         Lab 11/13/23  1734 11/10/23  1107   SODIUM 122* 130*   POTASSIUM 3.8 3.6   CHLORIDE 83* 87*   CO2 31.0* 30.7*   ANION GAP 8.0 12.3   BUN 9 9   CREATININE 0.69 0.76   EGFR 81.5 73.6   GLUCOSE 117* 84   CALCIUM 8.9 9.1   MAGNESIUM  --  2.3         Lab 11/13/23  1734 11/10/23  1107   TOTAL PROTEIN 6.8 7.2   ALBUMIN 3.8 4.1   GLOBULIN 3.0 3.1   ALT (SGPT) 20 16   AST (SGOT) 23 18   BILIRUBIN 0.5 0.5   ALK PHOS 95 84         Lab 11/13/23  1734 11/10/23  1107   PROBNP 8,053.0* 5,545.0*   HSTROP T 43*  --                  Brief Urine Lab Results  (Last result in the past 365 days)        Color   Clarity   Blood   Leuk Est   Nitrite   Protein   CREAT   Urine HCG        09/28/23 1040             64.2               Microbiology Results (last 10 days)       ** No results found for the last 240 hours. **            XR Chest 1 View    Result Date: 11/13/2023  XR CHEST 1 VW Date of Exam: 11/13/2023 6:28 PM EST Indication: SOA triage protocol Comparison: Chest radiograph 11/10/2023. Findings: Cardiomediastinal silhouette is enlarged similar to previous exam. Patchy multifocal airspace opacities have increased including in the bilateral lower lobes and right greater than left upper lobes. There are increasing small bilateral pleural effusions.  Probable underlying component of atelectasis in  the lung bases. No significant worsening edema. No visualized pneumothorax. Aortic atherosclerotic disease noted. Partially imaged right shoulder prosthesis. Osseous structures otherwise grossly unremarkable.     Impression: Impression: Worsening multifocal airspace disease and effusions, concerning for combination of pneumonia/aspiration and bibasilar atelectasis. Electronically Signed: Jeromy Maynard MD  11/13/2023 7:42 PM EST  Workstation ID: [id number]     Results for orders placed during the hospital encounter of 10/17/23    Adult Transthoracic Echo Complete w/ Color, Spectral and Contrast if necessary per protocol    Interpretation Summary    Left ventricular ejection fraction appears to be greater than 70%.    Left ventricular diastolic function was indeterminate.    Left atrial volume is mildly increased.    Severe aortic valve stenosis is present.    Aortic valve maximum pressure gradient is 79 mmHg. Aortic valve mean pressure gradient is 65 mmHg.    Estimated right ventricular systolic pressure from tricuspid regurgitation is normal (<35 mmHg). Calculated right ventricular systolic pressure from tricuspid regurgitation is 32 mmHg.    Incidental irregular heart rate noted with MV inflow suggestive of atrial fibrillation      Assessment & Plan  Assessment & Plan       Benign essential hypertension    Paroxysmal atrial fibrillation    Dementia without behavioral disturbance    Acute on chronic heart failure with preserved ejection fraction (HFpEF)    Severe aortic stenosis    Hyponatremia      89 year old female presents to the ED due to worsening shortness of air and bilateral lower extremity edema over the past 3-4 days.     1) Acute on chronic HFpEF       Severe aortic valve stenosis   -last echo noted above   -proBNP 8053.0  -CXR mentioned above   -hold on abx for now  -s/p lasix 80 mg in the ED   -will give bumex 2 mg in am   -consult cardiology in am, follows with Dr. Miles   -strict I &O   -daily  weight     2) Hyponatremia  -sodium 122  -trend over night   -check TSH, Urine and serum osmolality     3) ? Aspiration pneumonia  -hold on abx for now  -continue mechanical soft, thickened liquid diet  -consult SLP in am   -procal 0.04, lactic acid 1.3, WBC 9.86  -blood cultures pending   -received vancomycin and zosyn in the ED    4) Paroxysmal atrial fibrillation   -on amiodarone and eliquis   -currently NSR     5) GERD   -PPI     6) Hyperlipidemia  -continue statin     7) recent hypotension   -on midodrine: Hold, currently /88    8) Dementia   -no active meds       DVT prophylaxis:  eliquis     CODE STATUS:  DNR/DNI       Expected Discharge  TBD     This note has been completed as part of a split-shared workflow.     Signature: Electronically signed by CHANTELLE Doan, 11/13/23, 9:33 PM EST.          Attending   Admission Attestation       I have performed an independent face-to-face diagnostic evaluation including performing an independent physical examination.  The documented plan of care above was reviewed and developed with the advanced practice clinician (APC).  I have updated the HPI as appropriate.    Brief HPI    89-year-old female with history of aortic stenosis, A-fib on Eliquis, dementia presents with shortness of breath, fluid overload.    Attending Physical Exam:  Temp:  [97.5 °F (36.4 °C)-97.8 °F (36.6 °C)] 97.5 °F (36.4 °C)  Heart Rate:  [] 136  Resp:  [16-18] 18  BP: (134-171)/(68-96) 171/96  Flow (L/min):  [2] 2    She is awake alert and oriented x3  Resting comfortably in bed  Speaking in full sentences  Mucous membranes are moist  Heart irregular rate and rhythm  Lungs diminished at bases, soft rales  2+ lower extremity pitting edema bilaterally    Assessment and Plan:    Acute on chronic heart failure with preserved ejection fraction-new 2 L O2 requirement.  Status post 80 mg Lasix IV in the ED.  Suspect secondary to inadequate dosing of outpatient Bumex, she has just been  taking 1 mg in the morning, supposed to be taking 1 in the morning and 0.5 in the afternoon.  Continue IV diuretic 2 mg bumex BID.  Cardiology consult in the morning    Hyponatremia-suspect related to fluid overload based on clinical exam and as it is improving with diuretic    Multifocal lung disease-suspect aspiration pneumonitis/chronic aspiration.  No convincing evidence of active infectious process.  Continue to monitor.  Speech evaluation    See assessment and plan documented by APC above and updated/edited by me as appropriate.    Meliton Medrano MD  11/14/23                      Electronically signed by Meliton Medrano MD at 11/14/23 0400       Current Facility-Administered Medications   Medication Dose Route Frequency Provider Last Rate Last Admin    amiodarone (PACERONE) tablet 200 mg  200 mg Oral Q12H Pedro, Teresa, APRN   200 mg at 11/16/23 0848    Followed by    [START ON 11/20/2023] amiodarone (PACERONE) tablet 200 mg  200 mg Oral Q24H Pedro, Teresa, APRN        apixaban (ELIQUIS) tablet 2.5 mg  2.5 mg Oral Q12H Pedro, Teresa, APRN   2.5 mg at 11/16/23 0849    sennosides-docusate (PERICOLACE) 8.6-50 MG per tablet 2 tablet  2 tablet Oral BID Pedro, Teresa, APRN   2 tablet at 11/16/23 0848    And    polyethylene glycol (MIRALAX) packet 17 g  17 g Oral Daily PRN Pedro, Teresa, APRN        And    bisacodyl (DULCOLAX) EC tablet 5 mg  5 mg Oral Daily PRN Pedro, Teresa, APRN        And    bisacodyl (DULCOLAX) suppository 10 mg  10 mg Rectal Daily PRN Pedro, Teresa, APRN        bumetanide (BUMEX) tablet 2 mg  2 mg Oral BID Juliana Olivares, APRN   2 mg at 11/16/23 0848    Calcium Replacement - Follow Nurse / BPA Driven Protocol   Does not apply PRN Pedro, Teresa, APRN        cefTRIAXone (ROCEPHIN) 2,000 mg in sodium chloride 0.9 % 100 mL IVPB  2,000 mg Intravenous Q24H Aashish Raymond  mL/hr at 11/15/23 0913 2,000 mg at 11/15/23 0913    cholecalciferol (VITAMIN D3) tablet  400 Units  400 Units Oral Daily Pedro, Teresa, APRN   400 Units at 11/16/23 0854    docusate sodium (COLACE) capsule 100 mg  100 mg Oral BID Pedro, Teresa, APRN   100 mg at 11/16/23 0848    Magnesium Standard Dose Replacement - Follow Nurse / BPA Driven Protocol   Does not apply PRN Pedro, Teresa, APRN        melatonin tablet 2.5 mg  2.5 mg Oral Nightly Pedro, Teresa, APRN   2.5 mg at 11/15/23 2047    midodrine (PROAMATINE) tablet 10 mg  10 mg Oral BID AC Aashish Raymond MD   10 mg at 11/16/23 0559    nitroglycerin (NITROSTAT) SL tablet 0.4 mg  0.4 mg Sublingual Q5 Min PRN Pedro, Teresa, APRN        oxybutynin XL (DITROPAN-XL) 24 hr tablet 10 mg  10 mg Oral Daily Pedro, Teresa, APRN   10 mg at 11/16/23 0849    pantoprazole (PROTONIX) EC tablet 40 mg  40 mg Oral Q AM Pedro, Teresa, APRN   40 mg at 11/16/23 0559    Pharmacy Consult - MTM   Does not apply Daily Jenna Grace, PharmD        Phosphorus Replacement - Follow Nurse / BPA Driven Protocol   Does not apply PRN Pedro, Teresa, APRN        potassium chloride (K-DUR,KLOR-CON) CR tablet 40 mEq  40 mEq Oral Q4H Aashish Raymond MD        Potassium Replacement - Follow Nurse / BPA Driven Protocol   Does not apply PRN Pedro, Teresa, APRN        rosuvastatin (CRESTOR) tablet 5 mg  5 mg Oral Nightly Pedro, Teresa, APRN   5 mg at 11/15/23 2047    sodium chloride 0.9 % flush 10 mL  10 mL Intravenous PRN Nirmal Hebert MD        sodium chloride 0.9 % flush 10 mL  10 mL Intravenous PRN Nirmal Hebert MD        sodium chloride 0.9 % flush 10 mL  10 mL Intravenous Q12H Pedro, Teresa, APRN   10 mL at 11/16/23 0850    sodium chloride 0.9 % flush 10 mL  10 mL Intravenous PRN Pedro, Teresa, APRN        sodium chloride 0.9 % infusion 40 mL  40 mL Intravenous PRN Teresa Vasquez APRN            Physician Progress Notes (last 72 hours)        Aashish Raymond MD at 11/15/23 [address] [place] [address]  "Medicine Services  PROGRESS NOTE    Patient Name: Luh Mckeon  : 1930  MRN: [id number]    Date of Admission: 2023  Primary Care Physician: Schuyler Garcia MD    Subjective   Subjective     CC:  SOA    HPI:  \"Pretty good.\"  Daughter at bedside states that she is doing much better, breathing looks much better.        Objective   Objective     Vital Signs:   Temp:  [97.6 °F (36.4 °C)-98.1 °F (36.7 °C)] 97.6 °F (36.4 °C)  Heart Rate:  [65-89] 81  Resp:  [16-18] 18  BP: ()/(57-77) 136/72  Flow (L/min):  [2] 2     Physical Exam:  Constitutional: No acute distress, awake, alert, sitting up in bed, daughter at bedside  HENT: NCAT, mucous membranes moist  Respiratory: decreased BS in the bases, nonlabored  Cardiovascular: RRR, no murmurs, rubs, or gallops  Gastrointestinal: Positive bowel sounds, soft, nontender, nondistended  Musculoskeletal: + edema  Psychiatric: Appropriate affect, cooperative  Neurologic: Oriented x 3, strength symmetric in all extremities, Cranial Nerves grossly intact to confrontation, speech clear  Skin: No rashes      Results Reviewed:  LAB RESULTS:      Lab 23  0503 23  1734   WBC 10.09 9.86   HEMOGLOBIN 12.0 11.9*   HEMATOCRIT 35.1 35.8   PLATELETS 325 339   NEUTROS ABS 8.13* 7.49*   IMMATURE GRANS (ABS) 0.04 0.04   LYMPHS ABS 0.77 0.94   MONOS ABS 1.09* 1.27*   EOS ABS 0.04 0.08   MCV 88.2 89.9   PROCALCITONIN  --  0.04   LACTATE  --  1.3         Lab 11/15/23  0723 11/15/23  0022 23  1838 23  1138 23  0503 23  0026 23  1734 11/10/23  1107   SODIUM 130* 133* 127* 128* 129*  129*   < > 122* 130*   POTASSIUM 3.9  --  4.1  --  3.0*  --  3.8 3.6   CHLORIDE 90*  --   --   --  85*  --  83* 87*   CO2 32.0*  --   --   --  33.0*  --  31.0* 30.7*   ANION GAP 8.0  --   --   --  11.0  --  8.0 12.3   BUN 11  --   --   --  8  --  9 9   CREATININE 0.75  --   --   --  0.76  --  0.69 0.76   EGFR 74.3  --   --   --  73.2  --  81.5 73.6 "   GLUCOSE 114*  --   --   --  113*  --  117* 84   CALCIUM 9.2  --   --   --  8.9  --  8.9 9.1   MAGNESIUM  --   --   --   --  2.1  --   --  2.3   TSH  --   --   --   --  1.030  --   --   --     < > = values in this interval not displayed.         Lab 11/13/23  1734 11/10/23  1107   TOTAL PROTEIN 6.8 7.2   ALBUMIN 3.8 4.1   GLOBULIN 3.0 3.1   ALT (SGPT) 20 16   AST (SGOT) 23 18   BILIRUBIN 0.5 0.5   ALK PHOS 95 84         Lab 11/13/23  1734 11/10/23  1107   PROBNP 8,053.0* 5,545.0*   HSTROP T 43*  --                  Brief Urine Lab Results  (Last result in the past 365 days)        Color   Clarity   Blood   Leuk Est   Nitrite   Protein   CREAT   Urine HCG        09/28/23 1040             64.2                 Microbiology Results Abnormal       Procedure Component Value - Date/Time    Blood Culture - Blood, Hand, Right [[id number]]  (Normal) Collected: 11/13/23 2105    Lab Status: Preliminary result Specimen: Blood from Hand, Right Updated: 11/14/23 2132     Blood Culture No growth at 24 hours    Blood Culture - Blood, Arm, Right [[id number]]  (Normal) Collected: 11/13/23 2120    Lab Status: Preliminary result Specimen: Blood from Arm, Right Updated: 11/14/23 2132     Blood Culture No growth at 24 hours            XR Chest 1 View    Result Date: 11/13/2023  XR CHEST 1 VW Date of Exam: 11/13/2023 6:28 PM EST Indication: SOA triage protocol Comparison: Chest radiograph 11/10/2023. Findings: Cardiomediastinal silhouette is enlarged similar to previous exam. Patchy multifocal airspace opacities have increased including in the bilateral lower lobes and right greater than left upper lobes. There are increasing small bilateral pleural effusions.  Probable underlying component of atelectasis in the lung bases. No significant worsening edema. No visualized pneumothorax. Aortic atherosclerotic disease noted. Partially imaged right shoulder prosthesis. Osseous structures otherwise grossly unremarkable.     Impression: Impression:  Worsening multifocal airspace disease and effusions, concerning for combination of pneumonia/aspiration and bibasilar atelectasis. Electronically Signed: Jeromy Maynard MD  11/13/2023 7:42 PM EST  Workstation ID: [id number]     Results for orders placed during the hospital encounter of 10/17/23    Adult Transthoracic Echo Complete w/ Color, Spectral and Contrast if necessary per protocol    Interpretation Summary    Left ventricular ejection fraction appears to be greater than 70%.    Left ventricular diastolic function was indeterminate.    Left atrial volume is mildly increased.    Severe aortic valve stenosis is present.    Aortic valve maximum pressure gradient is 79 mmHg. Aortic valve mean pressure gradient is 65 mmHg.    Estimated right ventricular systolic pressure from tricuspid regurgitation is normal (<35 mmHg). Calculated right ventricular systolic pressure from tricuspid regurgitation is 32 mmHg.    Incidental irregular heart rate noted with MV inflow suggestive of atrial fibrillation      Current medications:  Scheduled Meds:amiodarone, 200 mg, Oral, Q12H   Followed by  [START ON 11/20/2023] amiodarone, 200 mg, Oral, Q24H  apixaban, 2.5 mg, Oral, Q12H  bumetanide, 2 mg, Oral, BID  cefTRIAXone, 2,000 mg, Intravenous, Q24H  cholecalciferol, 400 Units, Oral, Daily  docusate sodium, 100 mg, Oral, BID  melatonin, 2.5 mg, Oral, Nightly  midodrine, 10 mg, Oral, BID AC  oxybutynin XL, 10 mg, Oral, Daily  pantoprazole, 40 mg, Oral, Q AM  pharmacy consult - MTM, , Does not apply, Daily  rosuvastatin, 5 mg, Oral, Nightly  senna-docusate sodium, 2 tablet, Oral, BID  sodium chloride, 10 mL, Intravenous, Q12H      Continuous Infusions:   PRN Meds:.  senna-docusate sodium **AND** polyethylene glycol **AND** bisacodyl **AND** bisacodyl    Calcium Replacement - Follow Nurse / BPA Driven Protocol    Magnesium Standard Dose Replacement - Follow Nurse / BPA Driven Protocol    nitroglycerin    Phosphorus Replacement - Follow  Nurse / BPA Driven Protocol    Potassium Replacement - Follow Nurse / BPA Driven Protocol    sodium chloride    [COMPLETED] Insert Peripheral IV **AND** sodium chloride    sodium chloride    sodium chloride    Assessment & Plan   Assessment & Plan     Active Hospital Problems    Diagnosis  POA    Severe aortic stenosis [I35.0]  Unknown    Hyponatremia [E87.1]  Yes    Acute on chronic heart failure with preserved ejection fraction (HFpEF) [I50.33]  Yes    Dementia without behavioral disturbance [F03.90]  Yes    Paroxysmal atrial fibrillation [I48.0]  Yes    Benign essential hypertension [I10]  Yes      Resolved Hospital Problems   No resolved problems to display.        Brief Hospital Course to date:  Luh Mckeon is a 89 y.o. female  worsening shortness of air and bilateral lower extremity edema over the past 3-4 days.      1) Acute on chronic HFpEF       Severe aortic valve stenosis   -last echo noted above   --cards/Dr. Miles following, s/p IV diuresis, transitioning to bumex 2mg PO qam and 1mg PO qhs        2) Hyponatremia  -improved  -TSH wnl     3) ? Aspiration pneumonia  -continue rocephin  -SLP following recs soft to chew with thin liquids  -procal 0.04, lactic acid 1.3, WBC 9.86  -blood cultures pending   -received vancomycin and zosyn in the ED     4) Paroxysmal atrial fibrillation   -on amiodarone and eliquis      5) GERD   -PPI      6) Hyperlipidemia  -continue statin      7) recent hypotension   -continue home midodrine     8) Dementia   -no active meds    D/w daughter at bedside on 11/15/2023    Palliative Care following    Expected Discharge Location and Transportation:   Expected Discharge   Expected Discharge Date: 11/17/2023; Expected Discharge Time:      DVT prophylaxis:  Medical DVT prophylaxis orders are present.     AM-PAC 6 Clicks Score (PT): 19 (11/14/23 2000)    CODE STATUS:   Code Status and Medical Interventions:   Ordered at: 11/13/23 2143     Medical Intervention Limits:    NO  "intubation (DNI)     Level Of Support Discussed With:    Patient     Code Status (Patient has no pulse and is not breathing):    No CPR (Do Not Attempt to Resuscitate)     Medical Interventions (Patient has pulse or is breathing):    Limited Support       Aashish Raymond MD  11/15/23        Electronically signed by Aashish Raymond MD at 11/15/23 1236       Isabella Mai MD at 11/15/23 1053          Palliative Care Daily Progress Note     Referring: Juan Manuel Miles    C/C: none    S: Medical record reviewed.  Events noted.  Continues to improve and feel better. Granddtr bedside.      ROS:   Denies SOA, pain, N/V/D/C.    O: Code Status:   Code Status and Medical Interventions:   Ordered at: 11/13/23 2144     Medical Intervention Limits:    NO intubation (DNI)     Level Of Support Discussed With:    Patient     Code Status (Patient has no pulse and is not breathing):    No CPR (Do Not Attempt to Resuscitate)     Medical Interventions (Patient has pulse or is breathing):    Limited Support      Advanced Directives: Advance Directive Status: Patient has advance directive, copy in chart   Goals of Care: Ongoing.   Palliative Performance Scale Score:       /57   Pulse 65   Temp 97.8 °F (36.6 °C) (Oral)   Resp 16   Ht 160 cm (63\")   Wt 72.5 kg (159 lb 12.8 oz)   SpO2 94%   BMI 28.31 kg/m²     Intake/Output Summary (Last 24 hours) at 11/15/2023 1053  Last data filed at 11/15/2023 0600  Gross per 24 hour   Intake --   Output 1450 ml   Net -1450 ml       PE:  General Appearance:    Alert, cooperative, NAD   HEENT:    EOMI, anicteric, MMM, face relaxed   Neck:   supple, trachea midline, no JVD   Lungs:     CTA bilaterally, diminished in bases; respirations regular, even and unlabored    Heart:    RRR, normal S1 and S2, + M, - G/R   Abdomen:     Normal bowel sounds, soft, nontender, nondistended   G/U:   Deferred   MSK/Extremities:   Wasting, + edema   Pulses:   Pulses palpable and equal bilaterally   Skin: "   Warm, dry   Neurologic:   A/Ox1, cooperative, WYATT   Psych:   Calm, appropriate     Meds: Reviewed and changes not noted    Labs:   Results from last 7 days   Lab Units 11/14/23  0503   WBC 10*3/mm3 10.09   HEMOGLOBIN g/dL 12.0   HEMATOCRIT % 35.1   PLATELETS 10*3/mm3 325     Results from last 7 days   Lab Units 11/15/23  0723   SODIUM mmol/L 130*   POTASSIUM mmol/L 3.9   CHLORIDE mmol/L 90*   CO2 mmol/L 32.0*   BUN mg/dL 11   CREATININE mg/dL 0.75   GLUCOSE mg/dL 114*   CALCIUM mg/dL 9.2     Results from last 7 days   Lab Units 11/15/23  0723 11/14/23  0026 11/13/23  1734   SODIUM mmol/L 130*   < > 122*   POTASSIUM mmol/L 3.9   < > 3.8   CHLORIDE mmol/L 90*   < > 83*   CO2 mmol/L 32.0*   < > 31.0*   BUN mg/dL 11   < > 9   CREATININE mg/dL 0.75   < > 0.69   CALCIUM mg/dL 9.2   < > 8.9   BILIRUBIN mg/dL  --   --  0.5   ALK PHOS U/L  --   --  95   ALT (SGPT) U/L  --   --  20   AST (SGOT) U/L  --   --  23   GLUCOSE mg/dL 114*   < > 117*    < > = values in this interval not displayed.     Imaging Results (Last 72 Hours)       Procedure Component Value Units Date/Time    XR Chest 1 View [[id number]] Collected: 11/13/23 1940     Updated: 11/13/23 1945    Narrative:      XR CHEST 1 VW    Date of Exam: 11/13/2023 6:28 PM EST    Indication: SOA triage protocol    Comparison: Chest radiograph 11/10/2023.    Findings:  Cardiomediastinal silhouette is enlarged similar to previous exam. Patchy multifocal airspace opacities have increased including in the bilateral lower lobes and right greater than left upper lobes. There are increasing small bilateral pleural effusions.   Probable underlying component of atelectasis in the lung bases. No significant worsening edema. No visualized pneumothorax. Aortic atherosclerotic disease noted. Partially imaged right shoulder prosthesis. Osseous structures otherwise grossly   unremarkable.      Impression:      Impression:  Worsening multifocal airspace disease and effusions, concerning for  combination of pneumonia/aspiration and bibasilar atelectasis.      Electronically Signed: Jeromy Maynard MD    11/13/2023 7:42 PM EST    Workstation ID: [id number]                  Diagnostics: Reviewed    A:     Benign essential hypertension    Paroxysmal atrial fibrillation    Dementia without behavioral disturbance    Acute on chronic heart failure with preserved ejection fraction (HFpEF)    Severe aortic stenosis    Hyponatremia       Impression:.  HFpEF  Afib  Hypotension  Severe AS  Hyponatremia improved  Dementia     Symptoms:  Dyspnea - improved  Debility    P:   Spoke with Dtr Swetha via phone.  Confirmed resuscitation status.  Discussed long term prognosis.  Wishes to try to take pt home with more caregivers and home palliative services.  She is physically unable to provide this care herself as had episode of vision loss and transported to ED via EMS after trying to do so for just a few days.  Palliative Care Team will continue to monitor patient and see prn.       Isabella Mai MD  11/15/2023  Time spent:27 min      Electronically signed by Isabella Mai MD at 11/15/23 1410       Juliana Olivares, APRN at 11/15/23 0846       Attestation signed by Juan Manuel Miles MD at 11/15/23 1157    I have reviewed this documentation and agree.                    [facility]  PROGRESS NOTE    Date of Admission: 11/13/2023  Date of Service: 11/15/23    Primary Care Physician: Schuyler Garcia MD    Chief Complaint: CHF, valvular heart disease     Problem List:   Benign essential hypertension    Paroxysmal atrial fibrillation    Dementia without behavioral disturbance    Acute on chronic heart failure with preserved ejection fraction (HFpEF)    Severe aortic stenosis    Hyponatremia      Subjective      Patient awake and sitting up in bed.  Granddaughter at bedside.  No acute events overnight.  She reports her breathing is better today.  He denies dizziness  "when getting up to use the restroom.  He is in sinus rhythm today heart rates 60s.      Objective   Vitals: /69 (BP Location: Left arm, Patient Position: Lying)   Pulse 71   Temp 97.8 °F (36.6 °C) (Oral)   Resp 16   Ht 160 cm (63\")   Wt 72.5 kg (159 lb 12.8 oz)   SpO2 94%   BMI 28.31 kg/m²      Physical Exam:  Constitutional:       General: Awake.      Appearance: Not in distress. Frail.   Neck:      Vascular: JVD normal.   Pulmonary:      Effort: Pulmonary effort is normal.      Breath sounds: Examination of the right-upper field reveals decreased breath sounds. Rales present.   Cardiovascular:      Normal rate. Regular rhythm.      Murmurs: There is a systolic murmur.   Edema:     Peripheral edema present.  Psychiatric:         Behavior: Behavior is cooperative.               Results:  Results from last 7 days   Lab Units 11/14/23  0503 11/13/23  1734   WBC 10*3/mm3 10.09 9.86   HEMOGLOBIN g/dL 12.0 11.9*   HEMATOCRIT % 35.1 35.8   PLATELETS 10*3/mm3 325 339     Results from last 7 days   Lab Units 11/15/23  0723 11/15/23  0022 11/14/23  1838 11/14/23  1138 11/14/23  0503 11/14/23  0026 11/13/23  1734   SODIUM mmol/L 130* 133* 127*   < > 129*  129*   < > 122*   POTASSIUM mmol/L 3.9  --  4.1  --  3.0*  --  3.8   CHLORIDE mmol/L 90*  --   --   --  85*  --  83*   CO2 mmol/L 32.0*  --   --   --  33.0*  --  31.0*   BUN mg/dL 11  --   --   --  8  --  9   CREATININE mg/dL 0.75  --   --   --  0.76  --  0.69   GLUCOSE mg/dL 114*  --   --   --  113*  --  117*    < > = values in this interval not displayed.      Lab Results   Component Value Date    CHOL 175 09/28/2023    TRIG 71 09/28/2023    HDL 69 (H) 09/28/2023    LDL 93 09/28/2023    AST 23 11/13/2023    ALT 20 11/13/2023             Results from last 7 days   Lab Units 11/14/23  0503   TSH uIU/mL 1.030             Results from last 7 days   Lab Units 11/13/23  1734   HSTROP T ng/L 43*     Results from last 7 days   Lab Units 11/13/23  1734   PROBNP pg/mL " 8,053.0*         Intake/Output Summary (Last 24 hours) at 11/15/2023 0846  Last data filed at 11/15/2023 0600  Gross per 24 hour   Intake --   Output 1450 ml   Net -1450 ml       I personally reviewed the patient's EKG/Telemetry data    Radiology Data:   Results for orders placed during the hospital encounter of 10/17/23    Adult Transthoracic Echo Complete w/ Color, Spectral and Contrast if necessary per protocol    Interpretation Summary    Left ventricular ejection fraction appears to be greater than 70%.    Left ventricular diastolic function was indeterminate.    Left atrial volume is mildly increased.    Severe aortic valve stenosis is present.    Aortic valve maximum pressure gradient is 79 mmHg. Aortic valve mean pressure gradient is 65 mmHg.    Estimated right ventricular systolic pressure from tricuspid regurgitation is normal (<35 mmHg). Calculated right ventricular systolic pressure from tricuspid regurgitation is 32 mmHg.    Incidental irregular heart rate noted with MV inflow suggestive of atrial fibrillation        Current Medications:  amiodarone, 200 mg, Oral, Q12H   Followed by  [START ON 11/20/2023] amiodarone, 200 mg, Oral, Q24H  apixaban, 2.5 mg, Oral, Q12H  bumetanide, 2 mg, Intravenous, Q12H  cefTRIAXone, 2,000 mg, Intravenous, Q24H  cholecalciferol, 400 Units, Oral, Daily  docusate sodium, 100 mg, Oral, BID  melatonin, 2.5 mg, Oral, Nightly  midodrine, 10 mg, Oral, BID AC  oxybutynin XL, 10 mg, Oral, Daily  pantoprazole, 40 mg, Oral, Q AM  pharmacy consult - [place], , Does not apply, Daily  rosuvastatin, 5 mg, Oral, Nightly  senna-docusate sodium, 2 tablet, Oral, BID  sodium chloride, 10 mL, Intravenous, Q12H           Assessment and Plan:   Acute on chronic heart failure with preserved ejection fraction  BP improved on midodrine.  Alter Bumex to 1 mg p.o. nightly followed by 2 mg p.o. in the a.m.  Long-term prognosis poor.  Limited resuscitative support.  No CPR/no intubation status currently.   "  Palliative care has been consulted family are discussing home options.  Hypotension  Improved on midodrine.  Possible aspiration pneumonia  Management per attending service  Paroxysmal atrial fibrillation -currently in A-fib with RVR  Maintaining sinus rhythm  Continue amiodarone and Eliquis  Hypotension inhibits use of AV dariana blocking agents  Severe aortic stenosis  Poor candidate for valvular intervention due to age and cognitive impairment  Hypokalemia  Continue electrolyte protocol.   Hyponatremia   Likely chronic, sodium up to 130.  Diurese as blood pressure allows    Likely discharge tomorrow from cardiology standpoint on p.o. Bumex.  Family aware [facility] can be utilized for outpatient IV diuresis.  Family still discussing home options and moving forward with palliative care.    CHANTELLE Dale         Electronically signed by Juan Manuel Miles MD at 11/15/23 1157       Aashish Raymond MD at 23 1207              [facility]  PROGRESS NOTE    Patient Name: Luh Mckeon  : 1930  MRN: [id number]    Date of Admission: 2023  Primary Care Physician: Schuyler Garcia MD    Subjective   Subjective     CC:  SOA    HPI:  States that feels \"fine.\" Daughter states that breathing is better than was yesterday.        Objective   Objective     Vital Signs:   Temp:  [97.1 °F (36.2 °C)-97.8 °F (36.6 °C)] 97.1 °F (36.2 °C)  Heart Rate:  [] 69  Resp:  [16-22] 16  BP: ()/(57-96) 95/57  Flow (L/min):  [2] 2     Physical Exam:  Constitutional: No acute distress, awake, alert  HENT: NCAT, mucous membranes moist  Respiratory: decreased BS in the bases, mild accessory muscle use  Cardiovascular: RRR, no murmurs, rubs, or gallops  Gastrointestinal: Positive bowel sounds, soft, nontender, nondistended  Musculoskeletal: + edema  Psychiatric: Appropriate affect, cooperative  Neurologic: Oriented x 3, strength symmetric in all " extremities, Cranial Nerves grossly intact to confrontation, speech clear  Skin: No rashes      Results Reviewed:  LAB RESULTS:      Lab 11/14/23  0503 11/13/23  1734   WBC 10.09 9.86   HEMOGLOBIN 12.0 11.9*   HEMATOCRIT 35.1 35.8   PLATELETS 325 339   NEUTROS ABS 8.13* 7.49*   IMMATURE GRANS (ABS) 0.04 0.04   LYMPHS ABS 0.77 0.94   MONOS ABS 1.09* 1.27*   EOS ABS 0.04 0.08   MCV 88.2 89.9   PROCALCITONIN  --  0.04   LACTATE  --  1.3         Lab 11/14/23  1138 11/14/23  0503 11/14/23  0026 11/13/23  1734 11/10/23  1107   SODIUM 128* 129*  129* 125* 122* 130*   POTASSIUM  --  3.0*  --  3.8 3.6   CHLORIDE  --  85*  --  83* 87*   CO2  --  33.0*  --  31.0* 30.7*   ANION GAP  --  11.0  --  8.0 12.3   BUN  --  8  --  9 9   CREATININE  --  0.76  --  0.69 0.76   EGFR  --  73.2  --  81.5 73.6   GLUCOSE  --  113*  --  117* 84   CALCIUM  --  8.9  --  8.9 9.1   MAGNESIUM  --  2.1  --   --  2.3   TSH  --  1.030  --   --   --          Lab 11/13/23  1734 11/10/23  1107   TOTAL PROTEIN 6.8 7.2   ALBUMIN 3.8 4.1   GLOBULIN 3.0 3.1   ALT (SGPT) 20 16   AST (SGOT) 23 18   BILIRUBIN 0.5 0.5   ALK PHOS 95 84         Lab 11/13/23  1734 11/10/23  1107   PROBNP 8,053.0* 5,545.0*   HSTROP T 43*  --                  Brief Urine Lab Results  (Last result in the past 365 days)        Color   Clarity   Blood   Leuk Est   Nitrite   Protein   CREAT   Urine HCG        09/28/23 1040             64.2                 Microbiology Results Abnormal       None            XR Chest 1 View    Result Date: 11/13/2023  XR CHEST 1 VW Date of Exam: 11/13/2023 6:28 PM EST Indication: SOA triage protocol Comparison: Chest radiograph 11/10/2023. Findings: Cardiomediastinal silhouette is enlarged similar to previous exam. Patchy multifocal airspace opacities have increased including in the bilateral lower lobes and right greater than left upper lobes. There are increasing small bilateral pleural effusions.  Probable underlying component of atelectasis in the lung  bases. No significant worsening edema. No visualized pneumothorax. Aortic atherosclerotic disease noted. Partially imaged right shoulder prosthesis. Osseous structures otherwise grossly unremarkable.     Impression: Impression: Worsening multifocal airspace disease and effusions, concerning for combination of pneumonia/aspiration and bibasilar atelectasis. Electronically Signed: Jeromy Maynard MD  11/13/2023 7:42 PM EST  Workstation ID: [id number]     Results for orders placed during the hospital encounter of 10/17/23    Adult Transthoracic Echo Complete w/ Color, Spectral and Contrast if necessary per protocol    Interpretation Summary    Left ventricular ejection fraction appears to be greater than 70%.    Left ventricular diastolic function was indeterminate.    Left atrial volume is mildly increased.    Severe aortic valve stenosis is present.    Aortic valve maximum pressure gradient is 79 mmHg. Aortic valve mean pressure gradient is 65 mmHg.    Estimated right ventricular systolic pressure from tricuspid regurgitation is normal (<35 mmHg). Calculated right ventricular systolic pressure from tricuspid regurgitation is 32 mmHg.    Incidental irregular heart rate noted with MV inflow suggestive of atrial fibrillation      Current medications:  Scheduled Meds:amiodarone, 200 mg, Oral, Q12H   Followed by  [START ON 11/20/2023] amiodarone, 200 mg, Oral, Q24H  apixaban, 2.5 mg, Oral, Q12H  bumetanide, 2 mg, Intravenous, Q12H  cholecalciferol, 400 Units, Oral, Daily  docusate sodium, 100 mg, Oral, BID  melatonin, 2.5 mg, Oral, Nightly  midodrine, 10 mg, Oral, BID AC  oxybutynin XL, 10 mg, Oral, Daily  pantoprazole, 40 mg, Oral, Q AM  pharmacy consult - MTM, , Does not apply, Daily  potassium chloride ER, 40 mEq, Oral, Q4H  rosuvastatin, 5 mg, Oral, Nightly  senna-docusate sodium, 2 tablet, Oral, BID  sodium chloride, 10 mL, Intravenous, Q12H      Continuous Infusions:   PRN Meds:.  senna-docusate sodium **AND**  polyethylene glycol **AND** bisacodyl **AND** bisacodyl    Calcium Replacement - Follow Nurse / BPA Driven Protocol    Magnesium Standard Dose Replacement - Follow Nurse / BPA Driven Protocol    nitroglycerin    Phosphorus Replacement - Follow Nurse / BPA Driven Protocol    Potassium Replacement - Follow Nurse / BPA Driven Protocol    sodium chloride    [COMPLETED] Insert Peripheral IV **AND** sodium chloride    sodium chloride    sodium chloride    Assessment & Plan   Assessment & Plan     Active Hospital Problems    Diagnosis  POA    Severe aortic stenosis [I35.0]  Unknown    Hyponatremia [E87.1]  Yes    Acute on chronic heart failure with preserved ejection fraction (HFpEF) [I50.33]  Yes    Dementia without behavioral disturbance [F03.90]  Yes    Paroxysmal atrial fibrillation [I48.0]  Yes    Benign essential hypertension [I10]  Yes      Resolved Hospital Problems   No resolved problems to display.        Brief Hospital Course to date:  Luh Mckeon is a 89 y.o. female  worsening shortness of air and bilateral lower extremity edema over the past 3-4 days.      1) Acute on chronic HFpEF       Severe aortic valve stenosis   -last echo noted above   -proBNP 8053.0  -CXR mentioned above   -hold on abx for now  -s/p lasix 80 mg in the ED   -bumex 2 mg this AM  -cards/Dr. Miles following, recs continue IV bumex  -strict I &O   -daily weight      2) Hyponatremia  -improved  -TSH wnl     3) ? Aspiration pneumonia  -start on rocephin  -continue mechanical soft, thickened liquid diet  -consult SLP  -procal 0.04, lactic acid 1.3, WBC 9.86  -blood cultures pending   -received vancomycin and zosyn in the ED     4) Paroxysmal atrial fibrillation   -on amiodarone and eliquis   -currently NSR      5) GERD   -PPI      6) Hyperlipidemia  -continue statin      7) recent hypotension   -BP low this AM after home midodrine held overnight, restart     8) Dementia   -no active meds    D/w daughter at bedside on  "11/14/2023    Expected Discharge Location and Transportation:   Expected Discharge   Expected Discharge Date: 11/17/2023; Expected Discharge Time:      DVT prophylaxis:  Medical DVT prophylaxis orders are present.     AM-PAC 6 Clicks Score (PT): 19 (11/13/23 4151)    CODE STATUS:   Code Status and Medical Interventions:   Ordered at: 11/13/23 7771     Medical Intervention Limits:    NO intubation (DNI)     Level Of Support Discussed With:    Patient     Code Status (Patient has no pulse and is not breathing):    No CPR (Do Not Attempt to Resuscitate)     Medical Interventions (Patient has pulse or is breathing):    Limited Support       Aashish Raymond MD  11/14/23        Electronically signed by Aashish Raymond MD at 11/14/23 1213       Juan Manuel Miles MD at 11/14/23 0903          [facility]  Hospital Progress Note     LOS: 1 day   Patient Care Team:  Schuyler Garcia MD as PCP - General  Schuyler Garcia MD as PCP - Family Medicine  Juan Manuel Miles MD as Consulting Physician (Cardiology)  PCP:  Schuyler Garcia MD    Chief Complaint:  CHF, valvular heart disease    SUBJECTIVE: Pt in bed resting. She arouses easily and feels breathing has improved. Family is at bedside.       OBJECTIVE:    Vital Sign Min/Max for last 24 hours  Temp  Min: 97.2 °F (36.2 °C)  Max: 97.8 °F (36.6 °C)   BP  Min: 93/59  Max: 171/96   Pulse  Min: 68  Max: 136   Resp  Min: 16  Max: 22   SpO2  Min: 87 %  Max: 97 %   No data recorded   Weight  Min: 72.5 kg (159 lb 12.8 oz)  Max: 73 kg (161 lb)     Flowsheet Rows      Flowsheet Row First Filed Value   Admission Height 160 cm (63\") Documented at 11/13/2023 1645   Admission Weight 73 kg (161 lb) Documented at 11/13/2023 1645            Telemetry: Afib, rate 120      Intake/Output Summary (Last 24 hours) at 11/14/2023 1025  Last data filed at 11/14/2023 0600  Gross per 24 hour   Intake 170 ml   Output 2400 ml   Net -2230 ml     Intake & Output (last 3 days)  "        11/11 0701 11/12 0700 11/12 0701 11/13 0700 11/13 0701  11/14 0700 11/14 0701  11/15 0700    P.O.   120     IV Piggyback   50     Total Intake(mL/kg)   170 (2.3)     Urine (mL/kg/hr)   2400     Emesis/NG output   0     Stool   0     Total Output   2400     Net   -2230             Stool Unmeasured Occurrence   3 x     Emesis Unmeasured Occurrence   1 x              Physical Exam:  Constitutional:       Appearance: Frail.   Pulmonary:      Effort: Increased respiratory effort. Tachypnea present.      Breath sounds: Examination of the right-upper field reveals decreased breath sounds. Decreased breath sounds present. Rales present.   Cardiovascular:      Tachycardia present. Irregular rhythm.      Murmurs: There is a systolic murmur.   Pulses:     Intact distal pulses.   Edema:     Peripheral edema present.  Abdominal:      Palpations: Abdomen is soft.      Tenderness: There is no abdominal tenderness.   Skin:     General: Skin is warm and dry.   Neurological:      Mental Status: Easily aroused. Mental status is at baseline.   Psychiatric:         Behavior: Behavior is cooperative.          LABS/DIAGNOSTIC DATA:  Results from last 7 days   Lab Units 11/14/23  0503 11/13/23  1734   WBC 10*3/mm3 10.09 9.86   HEMOGLOBIN g/dL 12.0 11.9*   HEMATOCRIT % 35.1 35.8   PLATELETS 10*3/mm3 325 339     Lab Results   Lab Value Date/Time    TROPONINT 43 (H) 11/13/2023 1734    TROPONINT 150 (C) 10/18/2023 0101    TROPONINT 171 (C) 10/17/2023 2310         Results from last 7 days   Lab Units 11/14/23  0503 11/14/23  0026 11/13/23  1734 11/10/23  1107   SODIUM mmol/L 129*  129* 125* 122* 130*   POTASSIUM mmol/L 3.0*  --  3.8 3.6   CHLORIDE mmol/L 85*  --  83* 87*   CO2 mmol/L 33.0*  --  31.0* 30.7*   BUN mg/dL 8  --  9 9   CREATININE mg/dL 0.76  --  0.69 0.76   CALCIUM mg/dL 8.9  --  8.9 9.1   BILIRUBIN mg/dL  --   --  0.5 0.5   ALK PHOS U/L  --   --  95 84   ALT (SGPT) U/L  --   --  20 16   AST (SGOT) U/L  --   --  23 18    GLUCOSE mg/dL 113*  --  117* 84     proBNP on admission    8,053            Results from last 7 days   Lab Units 11/14/23  0503   TSH uIU/mL 1.030     Chest x-ray 11/13/2023  IMPRESSION:  Impression:  Worsening multifocal airspace disease and effusions, concerning for combination of pneumonia/aspiration and bibasilar atelectasis.           Medication Review:   amiodarone, 200 mg, Oral, Q12H   Followed by  [START ON 11/20/2023] amiodarone, 200 mg, Oral, Q24H  apixaban, 2.5 mg, Oral, Q12H  bumetanide, 2 mg, Intravenous, Q12H  cholecalciferol, 400 Units, Oral, Daily  docusate sodium, 100 mg, Oral, BID  melatonin, 2.5 mg, Oral, Nightly  midodrine, 10 mg, Oral, BID AC  oxybutynin XL, 10 mg, Oral, Daily  pantoprazole, 40 mg, Oral, Q AM  pharmacy consult - [place], , Does not apply, Daily  potassium chloride ER, 40 mEq, Oral, Q4H  rosuvastatin, 5 mg, Oral, Nightly  senna-docusate sodium, 2 tablet, Oral, BID  sodium chloride, 10 mL, Intravenous, Q12H               Benign essential hypertension    Paroxysmal atrial fibrillation    Dementia without behavioral disturbance    Acute on chronic heart failure with preserved ejection fraction (HFpEF)    Severe aortic stenosis    Hyponatremia      ASSESSMENT/PLAN:  Acute on chronic heart failure with preserved ejection fraction  Continue IV Bumex as blood pressure allows  Long-term prognosis poor.  Limited resuscitative support.  No CPR status currently.  Consider palliative consult pending response to current treatment.  Hypotension  Agree with reinstitution of midodrine  Possible aspiration pneumonia  Management per attending service, speech consult pending  Paroxysmal atrial fibrillation -currently in A-fib with RVR  Continue amiodarone and Eliquis  If A-fib persists consider discontinuation of amiodarone  Hypotension inhibits use of AV dariana blocking agents  Likely exacerbated by current respiratory issues  Severe aortic stenosis  Poor candidate for valvular intervention due to  age and cognitive impairment  Hypokalemia  Continue replacement  Hyponatremia   Diurese as blood pressure allows    8.  Palliative care to be consulted after discussion with family.     Juliana Olivares, APRN   11/14/23  10:25 EST    Electronically signed by Juan Manuel Miles MD at 11/14/23 1115          Consult Notes (last 72 hours)        Isabella Mai MD at 11/14/23 1409        Consult Orders    1. Inpatient Palliative Care MD Consult [[id number]] ordered by Juan Manuel Miles MD at 11/14/23 1116                 Palliative Care Initial Consult   Attending Physician: Aashish Raymond MD  Referring Provider: Juan Manuel Miles    Reason for Referral:  assistance with clarification of goals of care    Code Status:   Code Status and Medical Interventions:   Ordered at: 11/13/23 7402     Medical Intervention Limits:    NO intubation (DNI)     Level Of Support Discussed With:    Patient     Code Status (Patient has no pulse and is not breathing):    No CPR (Do Not Attempt to Resuscitate)     Medical Interventions (Patient has pulse or is breathing):    Limited Support      Advanced Directives: Advance Directive Status: Patient has advance directive, copy in chart   Family/Support: dtr and ganddtr   Goals of Care: TBD.    HPI:   89 yo female with hx OFpEF and severe AS presented to ED for worsening SOA.  She was [facility]in October for cardiac reasons as well and then rehab at [facility].  She was seen by PCP on 11/10 with some increasing symptoms and prescribed cefdinir for airspace disease.  Bumex was also increased.  Unfortunately resp status continued to worsen with LE edema and weight gain.  CXR in ED demonstrated worsening infiltrated and effusions with subsequent admission.  Today she is feeling better and seen by cardiology.  Palliative subsequently to further explore goals of care.      ROS:   Limited but denies pain.  Up in chair without difficulties or SOA.  Denied N/V and was eating cake for birthday on  arrival.      Past Medical History:   Diagnosis Date    Ankle instability     Chronic left shoulder pain 1/26/2018    Disorder of tendon of shoulder region, left     Dysphagia     Esophagitis     Femoral neck fracture 7/15/2021    Finger fracture, right 2009    Dr Chandler casting and physical therapy    Hemorrhoids     Hypertension     Localized, primary osteoarthritis of left shoulder region     Osteoarthritis of right hip 3/20/2023    Paroxysmal atrial fibrillation 9/30/2021    Right shoulder pain     Shoulder joint replacement status     Right    Stroke     CVA     Past Surgical History:   Procedure Laterality Date    APPENDECTOMY      Daughter denies    CATARACT EXTRACTION, BILATERAL      2020    COLONOSCOPY N/A 8/23/2021    Procedure: COLONOSCOPY;  Surgeon: Aries Varela MD;  Location: [facility] ENDOSCOPY;  Service: Gastroenterology;  Laterality: N/A;    ENDOSCOPY  07/2009    with biopsy/esophageal ring dilation    ENDOSCOPY N/A 8/23/2021    Procedure: ESOPHAGOGASTRODUODENOSCOPY;  Surgeon: Aries Varela MD;  Location: [facility] ENDOSCOPY;  Service: Gastroenterology;  Laterality: N/A;    HEMORRHOIDECTOMY      lanced     HIP HEMIARTHROPLASTY Right 7/15/2021    Procedure: HIP HEMIARTHROPLASTY;  Surgeon: Adam Olsen MD;  Location: [facility] OR;  Service: Orthopedics;  Laterality: Right;    HYSTERECTOMY      partial     OOPHORECTOMY      SHOULDER SURGERY Right     replacement    SHOULDER SURGERY      Arthroscopy of shoulder:Right    TONSILLECTOMY       Social History     Socioeconomic History    Marital status:    Tobacco Use    Smoking status: Never    Smokeless tobacco: Never    Tobacco comments:      was smoker   Vaping Use    Vaping Use: Never used   Substance and Sexual Activity    Alcohol use: No    Drug use: No    Sexual activity: Not Currently     Partners: Male     Family History   Problem Relation Age of Onset    Cancer Mother     Stroke Mother     Bleeding Disorder Mother      "Osteoarthritis Mother     Rheum arthritis Mother     Breast cancer Mother 60    Heart attack Father     Coronary artery disease Father     Cancer Sister         Bladder     Bleeding Disorder Brother         2    Colon cancer Brother         3    Ovarian cancer Neg Hx        No Known Allergies    Current medication reviewed for route, type, dose and frequency and are current per MAR at time of dictation.    Palliative Performance Scale Score:      BP 95/74 (BP Location: Right arm, Patient Position: Lying)   Pulse 67   Temp 97.1 °F (36.2 °C) (Oral)   Resp 16   Ht 160 cm (63\")   Wt 72.5 kg (159 lb 12.8 oz)   SpO2 96%   BMI 28.31 kg/m²     Intake/Output Summary (Last 24 hours) at 11/14/2023 1409  Last data filed at 11/14/2023 0600  Gross per 24 hour   Intake 170 ml   Output 2400 ml   Net -2230 ml       Physical Exam:    General Appearance:    Alert, cooperative, NAD   HEENT:    NC/AT, EOMI, anicteric, MMM, face relaxed   Neck:   supple, trachea midline, no JVD   Lungs:     CTA bilat, diminished in bases; respirations regular, even and unlabored    Heart:    RRR, normal S1 and S2, no M/R/G   Abdomen:     Normal bowel sounds, soft, nontender, nondistended   G/U:   Deferred   MSK/Extremities:   Wasting, + edema   Pulses:   Pulses palpable and equal bilaterally   Skin:   Warm, dry   Neurologic:   A/Ox3, cooperative, WYATT   Psych:   Calm, appropriate         Labs:   Results from last 7 days   Lab Units 11/14/23  0503   WBC 10*3/mm3 10.09   HEMOGLOBIN g/dL 12.0   HEMATOCRIT % 35.1   PLATELETS 10*3/mm3 325     Results from last 7 days   Lab Units 11/14/23  1138 11/14/23  0503   SODIUM mmol/L 128* 129*  129*   POTASSIUM mmol/L  --  3.0*   CHLORIDE mmol/L  --  85*   CO2 mmol/L  --  33.0*   BUN mg/dL  --  8   CREATININE mg/dL  --  0.76   GLUCOSE mg/dL  --  113*   CALCIUM mg/dL  --  8.9     Results from last 7 days   Lab Units 11/14/23  1138 11/14/23  0503 11/14/23  0026 11/13/23  1734   SODIUM mmol/L 128* 129*  129*   < > " 122*   POTASSIUM mmol/L  --  3.0*  --  3.8   CHLORIDE mmol/L  --  85*  --  83*   CO2 mmol/L  --  33.0*  --  31.0*   BUN mg/dL  --  8  --  9   CREATININE mg/dL  --  0.76  --  0.69   CALCIUM mg/dL  --  8.9  --  8.9   BILIRUBIN mg/dL  --   --   --  0.5   ALK PHOS U/L  --   --   --  95   ALT (SGPT) U/L  --   --   --  20   AST (SGOT) U/L  --   --   --  23   GLUCOSE mg/dL  --  113*  --  117*    < > = values in this interval not displayed.     Imaging Results (Last 72 Hours)       Procedure Component Value Units Date/Time    XR Chest 1 View [[id number]] Collected: 11/13/23 1940     Updated: 11/13/23 1945    Narrative:      XR CHEST 1 VW    Date of Exam: 11/13/2023 6:28 PM EST    Indication: SOA triage protocol    Comparison: Chest radiograph 11/10/2023.    Findings:  Cardiomediastinal silhouette is enlarged similar to previous exam. Patchy multifocal airspace opacities have increased including in the bilateral lower lobes and right greater than left upper lobes. There are increasing small bilateral pleural effusions.   Probable underlying component of atelectasis in the lung bases. No significant worsening edema. No visualized pneumothorax. Aortic atherosclerotic disease noted. Partially imaged right shoulder prosthesis. Osseous structures otherwise grossly   unremarkable.      Impression:      Impression:  Worsening multifocal airspace disease and effusions, concerning for combination of pneumonia/aspiration and bibasilar atelectasis.      Electronically Signed: Jeromy Maynard MD    11/13/2023 7:42 PM EST    Workstation ID: [id number]                Diagnostics: Reviewed    A:     Benign essential hypertension    Paroxysmal atrial fibrillation    Dementia without behavioral disturbance    Acute on chronic heart failure with preserved ejection fraction (HFpEF)    Severe aortic stenosis    Hyponatremia         Impression:.  HFpEF  Afib  Hypotension  Severe AS  Hyponatremia improved  Dementia    Symptoms:  Dyspnea -  improved  Debility       P:    Met with granddtr bedside and introduced palliative care and support.  Discussed potential for palliative or even hospice support in the home.  Plan to discuss with dtr later this afternoon.  Nia wished to speak with her first.  Thank you for this consult and allowing us to participate in patient's plan of care. Palliative Care Team will continue to follow patient.     Time spent:38 minutes spent reviewing medical and medication records, assessing and examining patient, discussing with family, answering questions, providing some guidance about a plan and documentation of care, and coordinating care with other healthcare members, with > 50% time spent face to face.     Isabella Mai MD  11/14/2023    Addendum:  Not successful reaching dtr at this time.  Will reach out again in AM.    Electronically signed by Isabella Mai MD at 11/14/23 8983

## 2023-11-16 NOTE — PROGRESS NOTES
"Palliative Care Daily Progress Note     C/C: none    S: Medical record reviewed.  Events noted.  Family with additional questions re: hospice with decision for discharge with hospice and comfort care.    ROS:   Constitutional: No fevers, chills, sweats, loss of appetite, weight loss  Eye:  No vision changes  ENMT:  No ear pain, nasal congestion, sore throat  CV: No chest pain, LE edema,   Pulm: No shortness of breath, cough, pain  GI:  No nausea, vomiting, diarrhea, constipation, melena, abdominal pain  Musculoskeletal:  No joint pain, swelling, stiffness  Integumentary:  No rashes, itching, wounds  Neuro: No weakness, numbness, tingling, speech changes, vision changes, swallowing difficulties  : no dysuria, hematuria, hesitancy  Heme: No bruising tendency, swollen lymph glands  Psychiatric:  Denies sadness, anxiety, panic    O: Code Status:   Code Status and Medical Interventions:   Ordered at: 11/13/23 214     Medical Intervention Limits:    NO intubation (DNI)     Level Of Support Discussed With:    Patient     Code Status (Patient has no pulse and is not breathing):    No CPR (Do Not Attempt to Resuscitate)     Medical Interventions (Patient has pulse or is breathing):    Limited Support      Advanced Directives: Advance Directive Status: Patient has advance directive, copy in chart   Goals of Care: Ongoing.   Palliative Performance Scale Score: 40%     /46 (BP Location: Left arm, Patient Position: Sitting)   Pulse 116   Temp 97.6 °F (36.4 °C) (Oral)   Resp 22   Ht 160 cm (63\")   Wt 72.5 kg (159 lb 12.8 oz)   SpO2 92%   BMI 28.31 kg/m²     Intake/Output Summary (Last 24 hours) at 11/16/2023 0955  Last data filed at 11/16/2023 0900  Gross per 24 hour   Intake 1200 ml   Output 2750 ml   Net -1550 ml       PE:  General Appearance:    Alert, cooperative, NAD   HEENT:    NC/AT, EOMI, anicteric, MMM, face relaxed   Neck:   supple, trachea midline, no JVD   Lungs:     CTA bilaterally, diminished in " bases; respirations regular, even and unlabored    Heart:    RRR, normal S1 and S2, no M/R/G   Abdomen:     Normal bowel sounds, soft, nontender, nondistended   G/U:   Deferred   MSK/Extremities:   Wasting, tr edema   Pulses:   Pulses palpable and equal bilaterally   Skin:   Warm, dry   Neurologic:   A/Ox1, cooperative, WYATT   Psych:   Calm, appropriate     Meds: Reviewed and changes not noted    Labs:   Results from last 7 days   Lab Units 11/14/23  0503   WBC 10*3/mm3 10.09   HEMOGLOBIN g/dL 12.0   HEMATOCRIT % 35.1   PLATELETS 10*3/mm3 325     Results from last 7 days   Lab Units 11/16/23  0829   SODIUM mmol/L 133*   POTASSIUM mmol/L 3.2*   CHLORIDE mmol/L 88*   CO2 mmol/L 34.0*   BUN mg/dL 12   CREATININE mg/dL 0.80   GLUCOSE mg/dL 183*   CALCIUM mg/dL 9.5     Results from last 7 days   Lab Units 11/16/23  0829 11/14/23  0026 11/13/23  1734   SODIUM mmol/L 133*   < > 122*   POTASSIUM mmol/L 3.2*   < > 3.8   CHLORIDE mmol/L 88*   < > 83*   CO2 mmol/L 34.0*   < > 31.0*   BUN mg/dL 12   < > 9   CREATININE mg/dL 0.80   < > 0.69   CALCIUM mg/dL 9.5   < > 8.9   BILIRUBIN mg/dL  --   --  0.5   ALK PHOS U/L  --   --  95   ALT (SGPT) U/L  --   --  20   AST (SGOT) U/L  --   --  23   GLUCOSE mg/dL 183*   < > 117*    < > = values in this interval not displayed.     Imaging Results (Last 72 Hours)       Procedure Component Value Units Date/Time    XR Chest 1 View [[id number]] Collected: 11/13/23 1940     Updated: 11/13/23 1945    Narrative:      XR CHEST 1 VW    Date of Exam: 11/13/2023 6:28 PM EST    Indication: SOA triage protocol    Comparison: Chest radiograph 11/10/2023.    Findings:  Cardiomediastinal silhouette is enlarged similar to previous exam. Patchy multifocal airspace opacities have increased including in the bilateral lower lobes and right greater than left upper lobes. There are increasing small bilateral pleural effusions.   Probable underlying component of atelectasis in the lung bases. No significant  worsening edema. No visualized pneumothorax. Aortic atherosclerotic disease noted. Partially imaged right shoulder prosthesis. Osseous structures otherwise grossly   unremarkable.      Impression:      Impression:  Worsening multifocal airspace disease and effusions, concerning for combination of pneumonia/aspiration and bibasilar atelectasis.      Electronically Signed: Jeromy Maynard MD    11/13/2023 7:42 PM EST    Workstation ID: [id number]                  Diagnostics: Reviewed      A:     Benign essential hypertension    Paroxysmal atrial fibrillation    Dementia without behavioral disturbance    Acute on chronic heart failure with preserved ejection fraction (HFpEF)    Severe aortic stenosis    Hyponatremia       Impression:.  HFpEF  Afib  Hypotension  Severe AS  Hyponatremia improved  Dementia     Symptoms:  Dyspnea - improved  Debility    P:   Reviewed hospice and palliative again with dtr and gddtr.  Decision confirmed for home with hospice.  Referral made. Available for any needs while in hospital.  Palliative Care Team will continue to follow patient.     Isabella Mai MD  11/16/2023  Time spent:26

## 2023-11-16 NOTE — PLAN OF CARE
Goal Outcome Evaluation:  Plan of Care Reviewed With: patient, grandchild(rlaph)        Progress: no change  Outcome Evaluation: Pt. lying in bed resting on 1.5LNC.  Pt. did not demonstrate any visible signs of discomfort during palliative nursing visit.  Dr. TOMASZ Mai and palliative RN spoke with granddaughter outside room with daughter on phone.  Daughter indicated that she was disinterested in palliative care at home and now interested in hospice consult for at home.  Hospice consult entered.  Hospice liaison called to notifiy of consult.  They plan to talk to family today.  Palliative care to continue to follow for support.

## 2023-11-16 NOTE — DISCHARGE PLACEMENT REQUEST
"Lay Mckeon (89 y.o. Female)    Discharge Summary    Date of Birth   11/14/1930    Social Security Number       Address   [address] [place] [address]    Home Phone   [phone]    MRN   [id number]       [employer]   [facility]    Marital Status                               Admission Date   11/13/23    Admission Type   Emergency    Admitting Provider   Aashish Raymond MD    Attending Provider   Aashish Raymond MD    Department, Room/Bed   [address], S445/1       Discharge Date       Discharge Disposition   Hospice/Home    Discharge Destination                                 Attending Provider: Aashish Raymond MD    Allergies: No Known Allergies    Isolation: None   Infection: None   Code Status: No CPR    Ht: 160 cm (63\")   Wt: 72.5 kg (159 lb 12.8 oz)    Admission Cmt: None   Principal Problem: None                  Active Insurance as of 11/13/2023       Primary Coverage       Payor Plan Insurance Group Employer/Plan Group    MEDICARE MEDICARE A & B        Payor Plan Address Payor Plan Phone Number Payor Plan Fax Number Effective Dates    [address] [phone]  11/1/1995 - None Entered    [facility] [id number]         Subscriber Name Subscriber Birth Date Member ID       LAY MCKEON 11/14/1930 [id number]               Secondary Coverage       Payor Plan Insurance Group Employer/Plan Group    ANTH BLUE CROSS [facility] SUPP [id number]       Payor Plan Address Payor Plan Phone Number Payor Plan Fax Number Effective Dates    [address]   12/1/2016 - None Entered    [facility] [id number]         Subscriber Name Subscriber Birth Date Member ID       LAY MCKEON 11/14/1930 [id number]                     Emergency Contacts        (Rel.) Home Phone Work Phone Mobile Phone    EDGARNAIN (Daughter) [phone] -- [phone]    RO REID (Grandchild) [phone] -- [phone]    Juan Diego Avery (Grandchild) [phone] -- --               "   Discharge Summary        Aashish Raymond MD at 23 1117              [facility] Medicine Services  DISCHARGE SUMMARY    Patient Name: Luh Mckeon  : 1930  MRN: [id number]    Date of Admission: 2023  7:59 PM  Date of Discharge:  2023  Primary Care Physician: Schuyler Garcia MD    Consults       Date and Time Order Name Status Description    2023 11:16 AM Inpatient Palliative Care MD Consult Completed     2023 11:50 PM Inpatient Cardiology Consult Completed     10/18/2023  3:32 AM Inpatient Cardiology Consult Completed             Hospital Course     Presenting Problem:     Active Hospital Problems    Diagnosis  POA    Severe aortic stenosis [I35.0]  Unknown    Hyponatremia [E87.1]  Yes    Acute on chronic heart failure with preserved ejection fraction (HFpEF) [I50.33]  Yes    Dementia without behavioral disturbance [F03.90]  Yes    Paroxysmal atrial fibrillation [I48.0]  Yes    Benign essential hypertension [I10]  Yes      Resolved Hospital Problems   No resolved problems to display.          Hospital Course:  Luh Mckeon is a 89 y.o. female with h/o DHF and severe AS presented with  worsening shortness of air and bilateral lower extremity edema over the past 3-4 days.  Cardiology consulted and patient was diuresed and doing better though may need home O2.  Per Dr. Miles with cardiology not much more to offer so recommended palliative care which was consulted.  Family also interested in home with hospice so hospice was consulted to see today prior to discharge     Acute on chronic HFpEF   Severe aortic valve stenosis   -last echo noted above   --cards/Dr. Miles following, s/p IV diuresis, transitioning to bumex 2mg PO BID        Hyponatremia  -improved  -TSH wnl     ? Aspiration pneumonia  - rocephin while here, transition to PO ceftin at home for 5 more days  -SLP followed recs soft to chew with thin liquids  -blood cultures no growth  day 2 at time of discharge     4) Paroxysmal atrial fibrillation   -on amiodarone and eliquis      5) GERD   -PPI      6) Hyperlipidemia  -continue statin      7) recent hypotension   -continue home midodrine     8) Dementia   -no active meds    Family wishes to go home today with hospice which has been arranged.       Discharge Follow Up Recommendations for outpatient labs/diagnostics:  F/u with PCP  F/u with palliative care clinic    Day of Discharge     HPI:   Breathing better.  Family has decided for palliative care and interested in home hospice so prefers to f/u with palliative rather than cardiology.  Would like to go home today.     Review of Systems  Gen- No fevers, chills  CV- No chest pain, palpitations  Resp- No cough, dyspnea  GI- No N/V/D, abd pain      Vital Signs:   Temp:  [97.6 °F (36.4 °C)] 97.6 °F (36.4 °C)  Heart Rate:  [] 106  Resp:  [18-22] 22  BP: (103-121)/(46-98) 111/78  Flow (L/min):  [1.5-2] 1.5      Physical Exam:  Constitutional: No acute distress, awake, alert, sitting up in bed, granddaughter at bedside  HENT: NCAT, mucous membranes moist  Respiratory: decreased BS in the bases, respiratory effort normal on 1.5LNC  Cardiovascular: RRR, no murmurs, rubs, or gallops  Gastrointestinal: Positive bowel sounds, soft, nontender, nondistended  Musculoskeletal: No bilateral ankle edema  Psychiatric: Appropriate affect, cooperative  Neurologic: mildly confused, strength symmetric in all extremities, Cranial Nerves grossly intact to confrontation, speech clear  Skin: No rashes      Pertinent  and/or Most Recent Results     LAB RESULTS:      Lab 11/14/23  0503 11/13/23  1734   WBC 10.09 9.86   HEMOGLOBIN 12.0 11.9*   HEMATOCRIT 35.1 35.8   PLATELETS 325 339   NEUTROS ABS 8.13* 7.49*   IMMATURE GRANS (ABS) 0.04 0.04   LYMPHS ABS 0.77 0.94   MONOS ABS 1.09* 1.27*   EOS ABS 0.04 0.08   MCV 88.2 89.9   PROCALCITONIN  --  0.04   LACTATE  --  1.3         Lab 11/16/23  0829 11/15/23  1218  11/15/23  0723 11/15/23  0022 11/14/23  1838 11/14/23  1138 11/14/23  0503 11/14/23  0026 11/13/23  1734 11/10/23  1107   SODIUM 133* 132* 130* 133* 127*   < > 129*  129*   < > 122* 130*   POTASSIUM 3.2*  --  3.9  --  4.1  --  3.0*  --  3.8 3.6   CHLORIDE 88*  --  90*  --   --   --  85*  --  83* 87*   CO2 34.0*  --  32.0*  --   --   --  33.0*  --  31.0* 30.7*   ANION GAP 11.0  --  8.0  --   --   --  11.0  --  8.0 12.3   BUN 12  --  11  --   --   --  8  --  9 9   CREATININE 0.80  --  0.75  --   --   --  0.76  --  0.69 0.76   EGFR 68.8  --  74.3  --   --   --  73.2  --  81.5 73.6   GLUCOSE 183*  --  114*  --   --   --  113*  --  117* 84   CALCIUM 9.5  --  9.2  --   --   --  8.9  --  8.9 9.1   MAGNESIUM  --   --   --   --   --   --  2.1  --   --  2.3   TSH  --   --   --   --   --   --  1.030  --   --   --     < > = values in this interval not displayed.         Lab 11/13/23  1734 11/10/23  1107   TOTAL PROTEIN 6.8 7.2   ALBUMIN 3.8 4.1   GLOBULIN 3.0 3.1   ALT (SGPT) 20 16   AST (SGOT) 23 18   BILIRUBIN 0.5 0.5   ALK PHOS 95 84         Lab 11/13/23  1734 11/10/23  1107   PROBNP 8,053.0* 5,545.0*   HSTROP T 43*  --                  Brief Urine Lab Results  (Last result in the past 365 days)        Color   Clarity   Blood   Leuk Est   Nitrite   Protein   CREAT   Urine HCG        09/28/23 1040             64.2               Microbiology Results (last 10 days)       Procedure Component Value - Date/Time    Blood Culture - Blood, Arm, Right [[id number]]  (Normal) Collected: 11/13/23 2120    Lab Status: Preliminary result Specimen: Blood from Arm, Right Updated: 11/15/23 2131     Blood Culture No growth at 2 days    Blood Culture - Blood, Hand, Right [[id number]]  (Normal) Collected: 11/13/23 2105    Lab Status: Preliminary result Specimen: Blood from Hand, Right Updated: 11/15/23 2131     Blood Culture No growth at 2 days            XR Chest 1 View    Result Date: 11/13/2023  XR CHEST 1 VW Date of Exam: 11/13/2023 6:28 PM  EST Indication: SOA triage protocol Comparison: Chest radiograph 11/10/2023. Findings: Cardiomediastinal silhouette is enlarged similar to previous exam. Patchy multifocal airspace opacities have increased including in the bilateral lower lobes and right greater than left upper lobes. There are increasing small bilateral pleural effusions.  Probable underlying component of atelectasis in the lung bases. No significant worsening edema. No visualized pneumothorax. Aortic atherosclerotic disease noted. Partially imaged right shoulder prosthesis. Osseous structures otherwise grossly unremarkable.     Impression: Worsening multifocal airspace disease and effusions, concerning for combination of pneumonia/aspiration and bibasilar atelectasis. Electronically Signed: Jeromy Maynard MD  11/13/2023 7:42 PM EST  Workstation ID: [id number]    XR Chest PA & Lateral    Result Date: 11/10/2023  XR CHEST PA AND LATERAL Date of Exam: 11/10/2023 11:07 AM EST Indication: dyspnea and cough Comparison: 10/19/2023. Findings: Mild patchy airspace changes are present within the bilateral lower lobes. Small bilateral pleural effusions are present. Stable chronic interstitial changes are present. Stable probable scarring present within the right perihilar region.. No pneumothorax. The pulmonary vasculature appears within normal limits. The cardiac and mediastinal silhouette appears enlarged, similar as compared to previous examinations.. No acute osseous abnormality identified.     Impression: Mild patchy airspace changes present within the bilateral lower lobes with small bilateral pleural effusions, likely related to pneumonia, similar as compared to recent examinations. Stable chronic interstitial changes are present. Electronically Signed: Marie Kowalski MD  11/10/2023 3:26 PM EST  Workstation ID: [id number]             Results for orders placed during the hospital encounter of 10/17/23    Adult Transthoracic Echo Complete w/ Color, Spectral  and Contrast if necessary per protocol    Interpretation Summary    Left ventricular ejection fraction appears to be greater than 70%.    Left ventricular diastolic function was indeterminate.    Left atrial volume is mildly increased.    Severe aortic valve stenosis is present.    Aortic valve maximum pressure gradient is 79 mmHg. Aortic valve mean pressure gradient is 65 mmHg.    Estimated right ventricular systolic pressure from tricuspid regurgitation is normal (<35 mmHg). Calculated right ventricular systolic pressure from tricuspid regurgitation is 32 mmHg.    Incidental irregular heart rate noted with MV inflow suggestive of atrial fibrillation      Plan for Follow-up of Pending Labs/Results:   Pending Labs       Order Current Status    Blood Culture - Blood, Arm, Right Preliminary result    Blood Culture - Blood, Hand, Right Preliminary result          Discharge Details        Discharge Medications        New Medications        Instructions Start Date   cefuroxime 500 MG tablet  Commonly known as: CEFTIN   500 mg, Oral, 2 Times Daily      ipratropium-albuterol 0.5-2.5 mg/3 ml nebulizer  Commonly known as: DUO-NEB   3 mL, Nebulization, Every 4 Hours PRN             Changes to Medications        Instructions Start Date   amiodarone 200 MG tablet  Commonly known as: PACERONE  What changed:   See the new instructions.  Another medication with the same name was removed. Continue taking this medication, and follow the directions you see here.   Take 1 tablet by mouth Daily for 3 days, THEN 1 tablet Every 12 (Twelve) Hours for 7 days, THEN 1 tablet Daily for 30 days.   Start Date: November 16, 2023            Continue These Medications        Instructions Start Date   apixaban 2.5 MG tablet tablet  Commonly known as: Eliquis   2.5 mg, Oral, Every 12 Hours Scheduled      bumetanide 1 MG tablet  Commonly known as: BUMEX   Take 1 tablet by mouth in the morning and 1/2 tablet in the afternoon      cholecalciferol 10  MCG (400 UNIT) tablet  Commonly known as: VITAMIN D3   1 tablet, Oral, Every Morning      docusate sodium 100 MG capsule  Commonly known as: COLACE   100 mg, Oral, 2 Times Daily      EQL CoQ10 200 MG capsule  Generic drug: Coenzyme Q10   200 mg, Oral, 2 Times Daily      melatonin 3 MG tablet   3 mg, Oral, Nightly      midodrine 10 MG tablet  Commonly known as: PROAMATINE   10 mg, Oral, 3 Times Daily Before Meals      Myrbetriq 50 MG tablet sustained-release 24 hour 24 hr tablet  Generic drug: Mirabegron ER   50 mg, Oral, Every Night at Bedtime      omeprazole 40 MG capsule  Commonly known as: priLOSEC   40 mg, Oral, Every Night at Bedtime      polyethylene glycol 17 GM/SCOOP powder  Commonly known as: MIRALAX   Takes once daily on Monday, Wednesday, and Friday and PRN for constipation      potassium chloride 10 MEQ CR tablet   10 mEq, Oral, 2 Times Daily      Prolia 60 MG/ML solution prefilled syringe syringe  Generic drug: denosumab   1 mL, Subcutaneous, Every 6 Months      rosuvastatin 5 MG tablet  Commonly known as: CRESTOR   5 mg, Oral, Nightly             Stop These Medications      acetaminophen 325 MG tablet  Commonly known as: TYLENOL     bisacodyl 10 MG suppository  Commonly known as: DULCOLAX     cefdinir 300 MG capsule  Commonly known as: OMNICEF     Cranberry 450 MG tablet     magnesium oxide 400 MG tablet  Commonly known as: MAG-OX     ondansetron ODT 8 MG disintegrating tablet  Commonly known as: ZOFRAN-ODT              No Known Allergies      Discharge Disposition:  Hospice/Home    Diet:  Hospital:  Diet Order   Procedures    Diet: Cardiac Diets; Healthy Heart (2-3 Na+); Texture: Soft to Chew (NDD 3); Soft to Chew: Whole Meat; Fluid Consistency: Thin (IDDSI 0)            Activity:      Restrictions or Other Recommendations:         CODE STATUS:    Code Status and Medical Interventions:   Ordered at: 11/13/23 1263     Medical Intervention Limits:    NO intubation (DNI)     Level Of Support Discussed  With:    Patient     Code Status (Patient has no pulse and is not breathing):    No CPR (Do Not Attempt to Resuscitate)     Medical Interventions (Patient has pulse or is breathing):    Limited Support       Future Appointments   Date Time Provider Department Center   11/16/2023 To Be Determined Dandre Stark, PT [facility] None   11/20/2023  1:30 PM Schuyler Garcia MD [facility]   11/30/2023  2:40 PM [facility]A 1 [facility]   1/4/2024  1:15 PM Juan Manuel Miles MD [facility]       Additional Instructions for the Follow-ups that You Need to Schedule       Discharge Follow-up with PCP   As directed       Currently Documented PCP:    Schuyler Garcia MD    PCP Phone Number:    [phone]     Follow Up Details: with PCP in 1 week        Discharge Follow-up with Specified Provider: with Palliative care   As directed      To: with Palliative care                      Aashish Raymond MD  11/16/23      Time Spent on Discharge:  I spent  39  minutes on this discharge activity which included: face-to-face encounter with the patient, reviewing the data in the system, coordination of the care with the nursing staff as well as consultants, documentation, and entering orders.            Electronically signed by Aashish Raymond MD at 11/16/23 2651

## 2023-11-18 LAB
BACTERIA SPEC AEROBE CULT: NORMAL
BACTERIA SPEC AEROBE CULT: NORMAL

## 2024-04-04 ENCOUNTER — TELEPHONE (OUTPATIENT)
Dept: INTERNAL MEDICINE | Facility: CLINIC | Age: 89
End: 2024-04-04
Payer: MEDICARE

## 2024-04-04 NOTE — TELEPHONE ENCOUNTER
Caller: CASTRO HERNÁNDEZNA    Relationship: Emergency Contact    Best call back number: [phone]     What was the call regarding:   PATIENT'S (DAUGHTER) NAIN WOULD LIKE A CALL BACK REGARDING GETTING A LETTER FROM ELA KIRK MD THAT STATES THAT PATIENT IS NOT OF SOUND OF MIND TO HAND HER OWN BILLING AND MAKING DECISION FOR HERSELF FOR NAIN TO BE ABLE TO DO THESE FOR THE PATIENT SINCE AT THIS TIME NAIN HAS POA OF THE PATIENT

## 2024-04-08 ENCOUNTER — TELEPHONE (OUTPATIENT)
Dept: INTERNAL MEDICINE | Facility: CLINIC | Age: 89
End: 2024-04-08

## 2024-04-13 NOTE — TELEPHONE ENCOUNTER
Pt daughter called to inform us that pt will not be at appointment today. I advised rescheduling and daughter states pt refuses to come to appointment, so they will not need to reschedule.  
IV and/or equipment tethered to patient/Weakness/Other

## 2024-04-19 NOTE — TELEPHONE ENCOUNTER
Spoke with patient's daughter (Swetha).  Informed her that Mrs. Mckeon's lab results all look good/normal range.    Swetha reports patient is feeling better after completing antibiotic for urinary tract infection but admits she is still have urinary frequency- asking if patient should still take Myrbetriq 50mg daily.  Advised that she should continue this dose as prescribed.    Swetha reports only new complaint that patient has had in last few days is bilateral foot pain.  States she thinks it is because patient hasnt been up walking much.  Patient has been trying to ambulate a bit more.    Advised Swetha to be sure that patient is wearing shoes with good support, avoid barefoot walking even in house.  Make appointment to be seen if symptoms worsen or persist.    Swetha verbalizes understanding and agrees to plan.    
protein bar

## 2024-09-20 NOTE — OP NOTE
OPERATIVE REPORT     DATE OF PROCEDURE: 7/15/2021    SURGEON: Adam Olsen M.D.     ASSISTANT(S): Circulator: Linda Ventura RN; Katerina Manuel RN  Radiology Technologist: Linda Killian  Scrub Person: Racheal Mead; Nicholas Eddy  Vendor Representative: Hugh Couch  Assistant: Ra Lao PA  Assistant: Ra Lao PA    Note-PA was utilized during the case to facilitate positioning the patient, exposure, retraction, placement of final components and definitive closure.    PREOPERATIVE DIAGNOSIS: Right femoral neck fracture femoral neck fracture     POSTOPERATIVE DIAGNOSIS: same     PROCEDURE: Right hip Hemiarthroplasty     SURGICAL DETAILS:     APPROACH: Posterior    ANESTHESIA: General plus local periarticular block    PREOPERATIVE ANTIBIOTICS: Ancef 2 g IV    TRANEXAMIC ACID: IV    ESTIMATED BLOOD LOSS: 100 cc     SPECIMENS: None    IMPLANTS:   : [employer]  Femoral component: Accolade 2 size five 127 degree  Femoral head: 28+0 mm ceramic head with 45 mm bipolar head    DRAINS: None    LOCAL INJECTION: 1 cc Toradol 30mg/ml, 4 cc duramorph 2mg/ml, 20 cc 0.5% ropivicaine, 20 cc 0.5% lidocaine with 1:200,000 epinephrine, 15 cc preservative free normal saline     MODIFIER(S): None    COMPLICATIONS: None apparent    INDICATIONS FOR PROCEDURE: Patient is a 89-year-old female who sustained mechanical fall resulting in right displaced femoral neck fracture.  Based on her prior mobility status and current activity level as well advanced age, hemiarthroplasty was recommended.  The risks, benefits and potential complications of the hemiarthroplasty surgery were discussed with the patient in detail to include but not limited to infection, bleeding, anesthesia risks, sciatic nerve palsy, instability/dislocation, limb length discrepancy, aseptic loosening, osteolysis, blood clots, continued pain, iatrogenic fracture, possible need for blood transfusion, possible need for further  procedures, myocardial infarction, stroke, and death. Specific details of the procedure, hospitalization, recovery, rehabilitation, and long-term precautions were also provided. Pre-operative teaching was provided. Implant/prosthesis selection was outlined, and the many options available were explained; the final choice will be made at the time of the procedure to match the anatomy and condition of the bone, ligaments, tendons, and muscles. Understanding of all topics was conveyed to me by the patient, and consent was given to proceed with a right hip hemiarthroplasty.     INTRAOPERATIVE FINDINGS: Comminuted right femoral neck fracture    PROCEDURE: The patient was identified in the preoperative holding area. The operative site was confirmed and marked. A sequential compression device was placed on the nonoperative leg. The risks, benefits, and alternatives to surgery were again confirmed with the patient and the patient wished to proceed. The patient was brought to the operating room and placed on the operating room table in the supine position. A huddle was performed with the patient and all vital surgical team members to confirm the correct operative site, procedure, anesthesia type, and operative plan with the patient. After anesthesia was performed, the patient was positioned in the lateral decubitus position on the pegboard and secured with the operative side up. An axillary roll was placed in the axilla and all bony prominences and pressure points were checked and padded. A relative leg length assessment was carried out and markers were placed for intraoperative assessment. Intravenous antibiotic prophylaxis was given and confirmed with the anesthesia team.     The operative leg was prepped and draped in the usual sterile fashion. A surgical time out was performed immediately preceding the incision with all personnel in the operating room to again confirm patient identity, the correct operative site and  extremity, correct radiographic studies, availability of appropriate surgical equipment and agreement on the planned procedure. A posterolateral approach to the hip was performed through an incision centered over the greater trochanter. The incision was carried through the subcutaneous tissue to the underlying fascia merna and gluteus sandy fascia, which were incised and split posteriorly over the trochanter in the direction of the fibers. Hemostasis was obtained with electrocautery. The Charnley retractor was placed after carefully palpating the sciatic nerve which was protected throughout the case. A standard posterior approach to the hip was performed by releasing the piriformis, short external rotators and posterior capsule and reflecting them posteriorly as a rectangular flap. The femoral neck osteotomy was made using a sagittal saw to freshen up the fracture, and the head was removed from the acetabulum.     Attention was then turned to the acetabulum. Retractors were placed circumferentially for wide acetabular exposure. The labrum remained intact. The acetabulum was appropriately sized using the sizing templates for the bipolar head.     Attention was then turned to the femur. The leg was positioned so access to the femoral canal did not result in soft-tissue injury. The femoral preparation was started with a box osteotome. The medullary cavity of the femur was then entered and opened with hand reamers. Femoral broaches were then employed in an incremental fashion up to the final size, targeting 15-20 degrees of anteversion. The final broach was fully seated, had good rotational and axial stability, and was seated at the appropriate height in relation to the greater trochanter and the preoperative plan. A trial head was placed and a trial reduction was done. Excellent stability and range of motion was achieved without impingement at any position. Leg lengths were re-created within millimeters based on the  markers and relative measurement. The trials were then dislocated and removed. The wound was copiously irrigated, and the permanent femoral stem was then impacted down in approximately 15-20 degrees of anteversion. The press-fit was firm, and stable to axial and rotational force in all planes. The permanent femoral head was then impacted on the clean trunnion. The socket and wound were irrigated, suctioned, and inspected for debris. The final reduction was performed, and again the hip was stable in all planes without impingement when stressed to the extremes.     The wound was irrigated with dilute betadine solution as well as saline, and hemostasis obtained with electrocautery. The analgesic cocktail was injected in the pericapsular tissues. The posterior capsule, piriformis, and short external rotators were repaired utilizing #2 Ticron through drill holes in the greater trochanter. The sciatic nerve was palpated and found to be intact and the wound was irrigated. Instrument and sponge counts were completed and confirmed correct. The fascia merna and gluteal fascia were closed with interrupted #1 Vicryl suture and oversewn with a #2 Stratafix. The deep subcutaneous tissue was closed with running #0 Stratafix suture and the superficial subcutaneous tissue with interrupted 2-0 Vicryl suture. A 3-0 monocryl running stitch was used to close skin followed by skin glue adhesive to seal the wound. A silver impregnated dressing was then placed, followed by a sequential compression device to the operative limb, followed by an abduction pillow. The patient was then returned to a supine position on the operating room table. The patient was sufficiently recovered from anesthesia, transferred to a hospital bed and taken to the PACU in stable condition.     One gram (1000 mg) of intravenous tranexamic acid was administered prior to incision. A second one gram (1000 mg) intravenous dose was given prior to wound closure.    No  apparent complications occurred during the procedure. Instrument, sponge and needle counts were correct x 2.     The patient underwent risk stratification preoperatively and aspirin was chosen for DVT prophylaxis. Delay in starting chemical prophylaxis for 23 hours from surgical incision was over concerns for hematoma formation and wound related issues.     POST OPERATIVE PLAN:   Protected weight bearing as tolerated   Posterior hip precautions x 6 weeks   PT/OT for mobilization and medical equipment needs   23 hours perioperative antibiotic prophylaxis   Pain control with PO/IV meds   Keep silver dressing in place for 7 days post op. Change dressing only if saturated.   SCD's to bilateral lower extremities   Social work for discharge planning needs   Follow up in 3 weeks for post-operative wound check with XR AP pelvis.      No

## (undated) DEVICE — SOL IRR H2O BTL 1000ML STRL

## (undated) DEVICE — COATED BRAIDED POLYESTER: Brand: TI-CRON

## (undated) DEVICE — SYR LUERLOK 50ML

## (undated) DEVICE — HEWSON SUTURE RETRIEVER: Brand: HEWSON SUTURE RETRIEVER

## (undated) DEVICE — HDRST POSTIN FM CRDL TRACH SLOT 9X8X4IN

## (undated) DEVICE — UNDERGLV SURG BIOGEL INDICAT PI SZ8 BLU

## (undated) DEVICE — ELECTRD BLD EZ CLN STD 2.5IN

## (undated) DEVICE — INTRO ACCSR BLNT TP

## (undated) DEVICE — SYR LUERLOK 30CC

## (undated) DEVICE — CONTN GRAD MEAS TRIANG 32OZ BLK

## (undated) DEVICE — ANTIBACTERIAL UNDYED BRAIDED (POLYGLACTIN 910), SYNTHETIC ABSORBABLE SUTURE: Brand: COATED VICRYL

## (undated) DEVICE — SST TWIST DRILL, STANDARD, 2.4MM DIA. X 127MM: Brand: MICROAIRE®

## (undated) DEVICE — SUCTION CANISTER, 1000CC,SAFELINER: Brand: DEROYAL

## (undated) DEVICE — SPNG VERSALON 4X4 4PLY NONSTRL LF BG/200

## (undated) DEVICE — HYBRID CO2 TUBING/CAP SET FOR OLYMPUS® SCOPES & CO2 SOURCE: Brand: ERBE

## (undated) DEVICE — PK HIP TOTL UNIV 10

## (undated) DEVICE — KT ORCA ORCAPOD DISP STRL

## (undated) DEVICE — STERILE PVP: Brand: MEDLINE INDUSTRIES, INC.

## (undated) DEVICE — NEEDLE, QUINCKE 22GX3.5": Brand: MEDLINE INDUSTRIES, INC.

## (undated) DEVICE — DRSNG SURG AQUACEL AG 9X25CM

## (undated) DEVICE — SUT MONOCRYL PLS ANTIB UND 3/0  PS1 27IN

## (undated) DEVICE — TRY EPID SFTY 18G 3.5IN 1T7680

## (undated) DEVICE — ADHS SKIN PREMIERPRO EXOFIN TOPICAL HI/VISC .5ML

## (undated) DEVICE — GLV SURG SENSICARE PI ORTHO SZ8 LF STRL

## (undated) DEVICE — ELECTRD BLD EZ CLN STD 6.5IN

## (undated) DEVICE — LUBE GEL ENDOGLIDE 1.1OZ

## (undated) DEVICE — PENCL ROCKRSWCH MEGADYNE W/HOLSTR 10FT SS

## (undated) DEVICE — THE BITE BLOCK MAXI, LATEX FREE STRAP IS USED TO PROTECT THE ENDOSCOPE INSERTION TUBE FROM BEING BITTEN BY THE PATIENT.

## (undated) DEVICE — TB SXN FRAZIER 12F STRL

## (undated) DEVICE — SINGLE-USE BIOPSY FORCEPS: Brand: RADIAL JAW 4

## (undated) DEVICE — TUBING, SUCTION, 1/4" X 10', STRAIGHT: Brand: MEDLINE

## (undated) DEVICE — GLV SURG SENSICARE PI MIC PF SZ9 LF STRL

## (undated) DEVICE — BLANKT WARM UPPR/BDY ARM/OUT 57X196CM

## (undated) DEVICE — DRAPE,REIN 53X77,STERILE: Brand: MEDLINE

## (undated) DEVICE — PATIENT RETURN ELECTRODE, SINGLE-USE, CONTACT QUALITY MONITORING, ADULT, WITH 9FT CORD, FOR PATIENTS WEIGING OVER 33LBS. (15KG): Brand: MEGADYNE